# Patient Record
Sex: FEMALE | Race: WHITE | NOT HISPANIC OR LATINO | Employment: OTHER | URBAN - NONMETROPOLITAN AREA
[De-identification: names, ages, dates, MRNs, and addresses within clinical notes are randomized per-mention and may not be internally consistent; named-entity substitution may affect disease eponyms.]

---

## 2017-01-20 ENCOUNTER — DOCTOR'S OFFICE (OUTPATIENT)
Dept: URBAN - NONMETROPOLITAN AREA CLINIC 1 | Facility: CLINIC | Age: 62
Setting detail: OPHTHALMOLOGY
End: 2017-01-20
Payer: OTHER GOVERNMENT

## 2017-01-20 DIAGNOSIS — H04.122: ICD-10-CM

## 2017-01-20 DIAGNOSIS — H00.025: ICD-10-CM

## 2017-01-20 DIAGNOSIS — H04.121: ICD-10-CM

## 2017-01-20 PROCEDURE — 83861 MICROFLUID ANALY TEARS: CPT | Performed by: OPHTHALMOLOGY

## 2017-01-20 PROCEDURE — 92012 INTRM OPH EXAM EST PATIENT: CPT | Performed by: OPHTHALMOLOGY

## 2017-01-20 ASSESSMENT — REFRACTION_MANIFEST
OD_VA2: 20/
OS_ADD: +2.50
OS_AXIS: 090
OU_VA: 20/
OD_SPHERE: +1.00
OU_VA: 20/
OS_VA2: 20/
OD_AXIS: 090
OD_VA3: 20/
OD_VA2: 20/25-2
OD_VA1: 20/25-2
OS_VA2: 20/
OD_VA1: 20/
OS_VA3: 20/
OS_VA2: 20/25-2
OS_VA1: 20/25-2
OD_ADD: +2.50
OD_VA2: 20/
OS_VA3: 20/
OD_VA1: 20/
OU_VA: 20/
OS_SPHERE: +1.00
OS_VA3: 20/
OD_CYLINDER: -1.00
OD_VA3: 20/
OS_VA1: 20/
OS_CYLINDER: -1.25
OD_VA3: 20/
OS_VA1: 20/

## 2017-01-20 ASSESSMENT — REFRACTION_CURRENTRX
OD_VPRISM_DIRECTION: PROGS
OD_CYLINDER: -0.75
OS_VPRISM_DIRECTION: PROGS
OD_AXIS: 100
OD_OVR_VA: 20/
OS_AXIS: 110
OS_OVR_VA: 20/
OS_OVR_VA: 20/
OD_VPRISM_DIRECTION: PROGS
OD_OVR_VA: 20/
OS_ADD: +2.50
OD_ADD: +2.50
OS_OVR_VA: 20/
OD_OVR_VA: 20/
OS_VPRISM_DIRECTION: PROGS
OS_SPHERE: PLANO
OS_CYLINDER: -0.75
OD_SPHERE: PLANO

## 2017-01-20 ASSESSMENT — VISUAL ACUITY
OS_BCVA: 20/25-2
OD_BCVA: 20/30

## 2017-01-20 ASSESSMENT — REFRACTION_AUTOREFRACTION
OD_SPHERE: +1.50
OD_CYLINDER: -1.00
OS_SPHERE: +1.25
OS_AXIS: 87
OD_AXIS: 88
OS_CYLINDER: -1.25

## 2017-01-20 ASSESSMENT — SUPERFICIAL PUNCTATE KERATITIS (SPK)
OS_SPK: INFERIOR NASALLY
OS_SPK: 1+

## 2017-01-20 ASSESSMENT — CONFRONTATIONAL VISUAL FIELD TEST (CVF)
OS_FINDINGS: FULL
OD_FINDINGS: FULL

## 2017-01-20 ASSESSMENT — LID EXAM ASSESSMENTS
OS_MEIBOMITIS: 1+
OD_MEIBOMITIS: 1+

## 2017-01-20 ASSESSMENT — SPHEQUIV_DERIVED
OS_SPHEQUIV: 0.625
OS_SPHEQUIV: 0.375
OD_SPHEQUIV: 0.5
OD_SPHEQUIV: 1

## 2017-01-27 ENCOUNTER — RX ONLY (RX ONLY)
Age: 62
End: 2017-01-27

## 2017-01-27 ENCOUNTER — DOCTOR'S OFFICE (OUTPATIENT)
Dept: URBAN - NONMETROPOLITAN AREA CLINIC 1 | Facility: CLINIC | Age: 62
Setting detail: OPHTHALMOLOGY
End: 2017-01-27
Payer: OTHER GOVERNMENT

## 2017-01-27 DIAGNOSIS — H00.021: ICD-10-CM

## 2017-01-27 DIAGNOSIS — H10.413: ICD-10-CM

## 2017-01-27 DIAGNOSIS — H04.122: ICD-10-CM

## 2017-01-27 DIAGNOSIS — H00.025: ICD-10-CM

## 2017-01-27 DIAGNOSIS — H00.024: ICD-10-CM

## 2017-01-27 DIAGNOSIS — H33.312: ICD-10-CM

## 2017-01-27 DIAGNOSIS — H04.121: ICD-10-CM

## 2017-01-27 DIAGNOSIS — H00.022: ICD-10-CM

## 2017-01-27 PROCEDURE — 92012 INTRM OPH EXAM EST PATIENT: CPT | Performed by: OPHTHALMOLOGY

## 2017-01-27 ASSESSMENT — VISUAL ACUITY
OD_BCVA: 20/30
OS_BCVA: 20/25

## 2017-01-27 ASSESSMENT — REFRACTION_MANIFEST
OU_VA: 20/
OS_VA3: 20/
OS_CYLINDER: -1.25
OS_VA2: 20/25-2
OD_SPHERE: +1.00
OS_VA1: 20/25-2
OS_VA1: 20/
OD_VA1: 20/
OD_VA1: 20/25-2
OD_VA3: 20/
OS_ADD: +2.50
OS_VA1: 20/
OD_VA3: 20/
OU_VA: 20/
OS_AXIS: 090
OD_VA2: 20/
OD_AXIS: 090
OS_VA3: 20/
OS_VA2: 20/
OS_SPHERE: +1.00
OU_VA: 20/
OD_VA2: 20/25-2
OD_VA1: 20/
OD_VA2: 20/
OD_CYLINDER: -1.00
OD_VA3: 20/
OS_VA2: 20/
OS_VA3: 20/
OD_ADD: +2.50

## 2017-01-27 ASSESSMENT — LID EXAM ASSESSMENTS
OD_MEIBOMITIS: 1+
OS_MEIBOMITIS: 1+

## 2017-01-27 ASSESSMENT — SPHEQUIV_DERIVED
OS_SPHEQUIV: 0.375
OD_SPHEQUIV: 0.5
OD_SPHEQUIV: 1
OS_SPHEQUIV: 0.625

## 2017-01-27 ASSESSMENT — REFRACTION_AUTOREFRACTION
OD_CYLINDER: -1.00
OS_CYLINDER: -1.25
OD_SPHERE: +1.50
OS_SPHERE: +1.25
OD_AXIS: 88
OS_AXIS: 87

## 2017-01-27 ASSESSMENT — REFRACTION_CURRENTRX
OD_VPRISM_DIRECTION: PROGS
OS_OVR_VA: 20/
OS_ADD: +2.50
OS_OVR_VA: 20/
OS_CYLINDER: -0.75
OD_VPRISM_DIRECTION: PROGS
OD_CYLINDER: -0.75
OS_VPRISM_DIRECTION: PROGS
OS_AXIS: 110
OD_ADD: +2.50
OD_OVR_VA: 20/
OD_OVR_VA: 20/
OS_OVR_VA: 20/
OD_SPHERE: PLANO
OD_OVR_VA: 20/
OS_VPRISM_DIRECTION: PROGS
OD_AXIS: 100
OS_SPHERE: PLANO

## 2017-01-27 ASSESSMENT — SUPERFICIAL PUNCTATE KERATITIS (SPK)
OS_SPK: 1+
OS_SPK: INFERIOR NASALLY

## 2017-01-27 ASSESSMENT — CONFRONTATIONAL VISUAL FIELD TEST (CVF)
OD_FINDINGS: FULL
OS_FINDINGS: FULL

## 2017-02-10 ENCOUNTER — DOCTOR'S OFFICE (OUTPATIENT)
Dept: URBAN - NONMETROPOLITAN AREA CLINIC 1 | Facility: CLINIC | Age: 62
Setting detail: OPHTHALMOLOGY
End: 2017-02-10
Payer: OTHER GOVERNMENT

## 2017-02-10 DIAGNOSIS — H00.021: ICD-10-CM

## 2017-02-10 DIAGNOSIS — H00.025: ICD-10-CM

## 2017-02-10 DIAGNOSIS — H04.121: ICD-10-CM

## 2017-02-10 DIAGNOSIS — H00.022: ICD-10-CM

## 2017-02-10 DIAGNOSIS — H10.413: ICD-10-CM

## 2017-02-10 DIAGNOSIS — H00.024: ICD-10-CM

## 2017-02-10 DIAGNOSIS — H04.122: ICD-10-CM

## 2017-02-10 DIAGNOSIS — H33.312: ICD-10-CM

## 2017-02-10 PROCEDURE — 92012 INTRM OPH EXAM EST PATIENT: CPT | Performed by: OPHTHALMOLOGY

## 2017-02-10 ASSESSMENT — REFRACTION_AUTOREFRACTION
OD_AXIS: 88
OS_SPHERE: +1.25
OS_AXIS: 87
OD_CYLINDER: -1.00
OD_SPHERE: +1.50
OS_CYLINDER: -1.25

## 2017-02-10 ASSESSMENT — CONFRONTATIONAL VISUAL FIELD TEST (CVF)
OD_FINDINGS: FULL
OS_FINDINGS: FULL

## 2017-02-10 ASSESSMENT — REFRACTION_MANIFEST
OS_ADD: +2.50
OD_ADD: +2.50
OS_VA2: 20/
OD_VA1: 20/25-2
OS_CYLINDER: -1.25
OD_VA1: 20/
OD_SPHERE: +1.00
OU_VA: 20/
OD_VA3: 20/
OD_VA2: 20/25-2
OD_VA2: 20/
OS_VA3: 20/
OU_VA: 20/
OS_VA2: 20/25-2
OS_SPHERE: +1.00
OU_VA: 20/
OS_VA3: 20/
OD_VA1: 20/
OS_VA3: 20/
OS_VA1: 20/
OS_VA1: 20/
OD_CYLINDER: -1.00
OS_AXIS: 090
OD_VA3: 20/
OD_VA3: 20/
OD_AXIS: 090
OS_VA1: 20/25-2
OS_VA2: 20/
OD_VA2: 20/

## 2017-02-10 ASSESSMENT — SUPERFICIAL PUNCTATE KERATITIS (SPK)
OS_SPK: 1+
OS_SPK: INFERIOR NASALLY

## 2017-02-10 ASSESSMENT — LID EXAM ASSESSMENTS
OS_MEIBOMITIS: 1+
OD_MEIBOMITIS: 1+

## 2017-02-10 ASSESSMENT — REFRACTION_CURRENTRX
OS_ADD: +2.50
OD_VPRISM_DIRECTION: PROGS
OS_AXIS: 110
OS_VPRISM_DIRECTION: PROGS
OS_OVR_VA: 20/
OS_OVR_VA: 20/
OD_OVR_VA: 20/
OD_OVR_VA: 20/
OD_SPHERE: PLANO
OD_CYLINDER: -0.75
OS_CYLINDER: -0.75
OD_VPRISM_DIRECTION: PROGS
OD_OVR_VA: 20/
OD_ADD: +2.50
OS_OVR_VA: 20/
OS_SPHERE: PLANO
OD_AXIS: 100
OS_VPRISM_DIRECTION: PROGS

## 2017-02-10 ASSESSMENT — SPHEQUIV_DERIVED
OD_SPHEQUIV: 1
OS_SPHEQUIV: 0.625
OS_SPHEQUIV: 0.375
OD_SPHEQUIV: 0.5

## 2017-02-10 ASSESSMENT — VISUAL ACUITY
OS_BCVA: 20/25
OD_BCVA: 20/40

## 2017-02-23 ENCOUNTER — DOCTOR'S OFFICE (OUTPATIENT)
Dept: URBAN - NONMETROPOLITAN AREA CLINIC 1 | Facility: CLINIC | Age: 62
Setting detail: OPHTHALMOLOGY
End: 2017-02-23
Payer: COMMERCIAL

## 2017-02-23 DIAGNOSIS — H52.4: ICD-10-CM

## 2017-02-23 DIAGNOSIS — H52.03: ICD-10-CM

## 2017-02-23 PROCEDURE — 92015 DETERMINE REFRACTIVE STATE: CPT | Performed by: OPTOMETRIST

## 2017-02-23 ASSESSMENT — REFRACTION_OUTSIDERX
OD_VA2: 20/25-2
OS_VA2: 20/25-2
OD_ADD: +2.50
OD_CYLINDER: -1.00
OD_VA3: 20/
OD_VA1: 20/25-2
OU_VA: 20/25
OD_AXIS: 090
OS_ADD: +2.50
OS_SPHERE: +1.00
OS_CYLINDER: -1.00
OS_AXIS: 090
OS_VA3: 20/
OD_SPHERE: +1.00
OS_VA1: 20/25-2

## 2017-02-23 ASSESSMENT — REFRACTION_MANIFEST
OD_VA3: 20/
OU_VA: 20/
OS_VA2: 20/
OS_VA3: 20/
OD_VA1: 20/
OS_VA1: 20/
OD_VA3: 20/
OS_VA3: 20/
OD_VA1: 20/
OD_VA2: 20/
OD_VA2: 20/
OU_VA: 20/
OS_VA1: 20/
OS_VA2: 20/

## 2017-02-23 ASSESSMENT — REFRACTION_CURRENTRX
OS_OVR_VA: 20/
OS_AXIS: 105
OS_VPRISM_DIRECTION: PROGS
OD_SPHERE: +1.25
OS_SPHERE: PLANO
OD_VPRISM_DIRECTION: PROGS
OS_CYLINDER: -0.75
OS_ADD: +2.50
OD_AXIS: 90
OS_OVR_VA: 20/
OS_CYLINDER: -1.25
OS_AXIS: 110
OS_ADD: +2.25
OD_OVR_VA: 20/
OS_VPRISM_DIRECTION: PROGS
OD_CYLINDER: -0.75
OD_AXIS: 100
OS_OVR_VA: 20/
OD_CYLINDER: -1.00
OD_OVR_VA: 20/
OD_ADD: +2.50
OD_ADD: +2.25
OS_SPHERE: +1.25
OD_SPHERE: PLANO
OD_VPRISM_DIRECTION: PROGS
OD_OVR_VA: 20/

## 2017-02-23 ASSESSMENT — REFRACTION_AUTOREFRACTION
OS_SPHERE: +1.00
OS_AXIS: 76
OS_CYLINDER: -1.00
OD_SPHERE: +1.75
OD_CYLINDER: -2.00
OD_AXIS: 78

## 2017-02-23 ASSESSMENT — SPHEQUIV_DERIVED
OD_SPHEQUIV: 0.75
OS_SPHEQUIV: 0.5

## 2017-02-23 ASSESSMENT — VISUAL ACUITY
OD_BCVA: 20/25
OS_BCVA: 20/25-1

## 2017-07-14 ENCOUNTER — DOCTOR'S OFFICE (OUTPATIENT)
Dept: URBAN - NONMETROPOLITAN AREA CLINIC 1 | Facility: CLINIC | Age: 62
Setting detail: OPHTHALMOLOGY
End: 2017-07-14
Payer: OTHER GOVERNMENT

## 2017-07-14 DIAGNOSIS — H00.021: ICD-10-CM

## 2017-07-14 DIAGNOSIS — H33.312: ICD-10-CM

## 2017-07-14 DIAGNOSIS — H04.122: ICD-10-CM

## 2017-07-14 DIAGNOSIS — H04.121: ICD-10-CM

## 2017-07-14 DIAGNOSIS — H25.13: ICD-10-CM

## 2017-07-14 DIAGNOSIS — H00.024: ICD-10-CM

## 2017-07-14 DIAGNOSIS — H43.813: ICD-10-CM

## 2017-07-14 DIAGNOSIS — H00.022: ICD-10-CM

## 2017-07-14 DIAGNOSIS — H00.025: ICD-10-CM

## 2017-07-14 PROCEDURE — 92014 COMPRE OPH EXAM EST PT 1/>: CPT | Performed by: OPHTHALMOLOGY

## 2017-07-14 ASSESSMENT — REFRACTION_AUTOREFRACTION
OS_SPHERE: +1.00
OS_CYLINDER: -1.00
OD_CYLINDER: -2.00
OS_AXIS: 76
OD_AXIS: 78
OD_SPHERE: +1.75

## 2017-07-14 ASSESSMENT — REFRACTION_CURRENTRX
OD_VPRISM_DIRECTION: PROGS
OS_CYLINDER: -1.25
OD_CYLINDER: -1.00
OS_ADD: +2.25
OS_CYLINDER: -0.75
OD_SPHERE: +1.25
OD_AXIS: 90
OD_AXIS: 100
OS_AXIS: 110
OD_SPHERE: PLANO
OS_SPHERE: PLANO
OD_ADD: +2.50
OS_OVR_VA: 20/
OS_SPHERE: +1.25
OD_CYLINDER: -0.75
OD_OVR_VA: 20/
OD_OVR_VA: 20/
OD_VPRISM_DIRECTION: PROGS
OD_OVR_VA: 20/
OS_AXIS: 105
OS_OVR_VA: 20/
OS_OVR_VA: 20/
OS_ADD: +2.50
OS_VPRISM_DIRECTION: PROGS
OD_ADD: +2.25
OS_VPRISM_DIRECTION: PROGS

## 2017-07-14 ASSESSMENT — LID EXAM ASSESSMENTS
OD_MEIBOMITIS: 1+
OS_MEIBOMITIS: 1+

## 2017-07-14 ASSESSMENT — REFRACTION_MANIFEST
OD_VA3: 20/
OS_VA2: 20/
OS_VA1: 20/
OS_VA1: 20/
OS_VA2: 20/
OS_VA3: 20/
OD_VA1: 20/
OU_VA: 20/
OS_VA3: 20/
OD_VA1: 20/
OD_VA3: 20/
OD_VA2: 20/
OU_VA: 20/
OD_VA2: 20/

## 2017-07-14 ASSESSMENT — SPHEQUIV_DERIVED
OS_SPHEQUIV: 0.5
OD_SPHEQUIV: 0.75

## 2017-07-14 ASSESSMENT — REFRACTION_OUTSIDERX
OS_ADD: +2.50
OS_VA1: 20/25-2
OD_VA1: 20/25-2
OD_SPHERE: +1.00
OU_VA: 20/25
OS_VA2: 20/25-2
OS_SPHERE: +1.00
OD_ADD: +2.50
OD_CYLINDER: -1.00
OD_AXIS: 090
OS_VA3: 20/
OD_VA2: 20/25-2
OD_VA3: 20/
OS_AXIS: 090
OS_CYLINDER: -1.00

## 2017-07-14 ASSESSMENT — CONFRONTATIONAL VISUAL FIELD TEST (CVF)
OS_FINDINGS: FULL
OD_FINDINGS: FULL

## 2017-07-14 ASSESSMENT — SUPERFICIAL PUNCTATE KERATITIS (SPK)
OS_SPK: 1+
OS_SPK: INFERIOR NASALLY

## 2017-07-14 ASSESSMENT — VISUAL ACUITY
OD_BCVA: 20/25
OS_BCVA: 20/20-1

## 2017-11-07 ENCOUNTER — DOCTOR'S OFFICE (OUTPATIENT)
Dept: URBAN - NONMETROPOLITAN AREA CLINIC 1 | Facility: CLINIC | Age: 62
Setting detail: OPHTHALMOLOGY
End: 2017-11-07
Payer: OTHER GOVERNMENT

## 2017-11-07 DIAGNOSIS — H43.813: ICD-10-CM

## 2017-11-07 DIAGNOSIS — H00.025: ICD-10-CM

## 2017-11-07 DIAGNOSIS — H25.13: ICD-10-CM

## 2017-11-07 DIAGNOSIS — H04.123: ICD-10-CM

## 2017-11-07 DIAGNOSIS — H33.312: ICD-10-CM

## 2017-11-07 DIAGNOSIS — H00.024: ICD-10-CM

## 2017-11-07 DIAGNOSIS — H00.022: ICD-10-CM

## 2017-11-07 DIAGNOSIS — H04.121: ICD-10-CM

## 2017-11-07 DIAGNOSIS — H10.413: ICD-10-CM

## 2017-11-07 DIAGNOSIS — H04.122: ICD-10-CM

## 2017-11-07 DIAGNOSIS — H00.021: ICD-10-CM

## 2017-11-07 PROCEDURE — 92014 COMPRE OPH EXAM EST PT 1/>: CPT | Performed by: OPHTHALMOLOGY

## 2017-11-07 PROCEDURE — 68761 CLOSE TEAR DUCT OPENING: CPT | Performed by: OPHTHALMOLOGY

## 2017-11-07 PROCEDURE — 83861 MICROFLUID ANALY TEARS: CPT | Performed by: OPHTHALMOLOGY

## 2017-11-07 ASSESSMENT — REFRACTION_CURRENTRX
OS_OVR_VA: 20/
OS_CYLINDER: -0.75
OS_SPHERE: +1.25
OD_ADD: +2.25
OS_SPHERE: PLANO
OD_CYLINDER: -0.75
OD_AXIS: 90
OS_OVR_VA: 20/
OS_AXIS: 105
OD_VPRISM_DIRECTION: PROGS
OS_CYLINDER: -1.25
OD_SPHERE: PLANO
OD_CYLINDER: -1.00
OS_ADD: +2.50
OS_AXIS: 110
OD_SPHERE: +1.25
OS_VPRISM_DIRECTION: PROGS
OD_AXIS: 100
OD_ADD: +2.50
OS_VPRISM_DIRECTION: PROGS
OD_VPRISM_DIRECTION: PROGS
OD_OVR_VA: 20/
OS_OVR_VA: 20/
OD_OVR_VA: 20/
OS_ADD: +2.25
OD_OVR_VA: 20/

## 2017-11-07 ASSESSMENT — CONFRONTATIONAL VISUAL FIELD TEST (CVF)
OS_FINDINGS: FULL
OD_FINDINGS: FULL

## 2017-11-07 ASSESSMENT — REFRACTION_MANIFEST
OD_VA1: 20/
OS_VA3: 20/
OS_VA1: 20/
OS_VA3: 20/
OD_VA2: 20/
OD_VA2: 20/
OS_VA2: 20/
OS_VA2: 20/
OD_VA3: 20/
OU_VA: 20/
OS_VA1: 20/
OD_VA1: 20/
OU_VA: 20/
OD_VA3: 20/

## 2017-11-07 ASSESSMENT — SUPERFICIAL PUNCTATE KERATITIS (SPK)
OS_SPK: 1+
OS_SPK: INFERIOR NASALLY

## 2017-11-07 ASSESSMENT — REFRACTION_AUTOREFRACTION
OD_SPHERE: +1.75
OS_CYLINDER: -1.00
OS_AXIS: 76
OD_AXIS: 78
OS_SPHERE: +1.00
OD_CYLINDER: -2.00

## 2017-11-07 ASSESSMENT — LID EXAM ASSESSMENTS
OS_MEIBOMITIS: 1+
OD_MEIBOMITIS: 1+

## 2017-11-07 ASSESSMENT — REFRACTION_OUTSIDERX
OS_VA2: 20/25-2
OD_SPHERE: +1.00
OS_VA3: 20/
OS_AXIS: 090
OD_CYLINDER: -1.00
OD_ADD: +2.50
OD_AXIS: 090
OD_VA3: 20/
OS_CYLINDER: -1.00
OS_ADD: +2.50
OS_VA1: 20/25-2
OD_VA1: 20/25-2
OD_VA2: 20/25-2
OU_VA: 20/25
OS_SPHERE: +1.00

## 2017-11-07 ASSESSMENT — PUNCTA - ASSESSMENT: OD_PUNCTA: COL PLUG RLL RUL SMALL

## 2017-11-07 ASSESSMENT — SPHEQUIV_DERIVED
OD_SPHEQUIV: 0.75
OS_SPHEQUIV: 0.5

## 2017-11-07 ASSESSMENT — VISUAL ACUITY
OD_BCVA: 20/25-2
OS_BCVA: 20/25

## 2018-02-17 ENCOUNTER — OFFICE VISIT (OUTPATIENT)
Dept: URGENT CARE | Facility: CLINIC | Age: 63
End: 2018-02-17
Payer: OTHER GOVERNMENT

## 2018-02-17 VITALS
TEMPERATURE: 99.1 F | HEIGHT: 67 IN | BODY MASS INDEX: 39.24 KG/M2 | SYSTOLIC BLOOD PRESSURE: 121 MMHG | DIASTOLIC BLOOD PRESSURE: 58 MMHG | OXYGEN SATURATION: 96 % | HEART RATE: 80 BPM | RESPIRATION RATE: 18 BRPM | WEIGHT: 250 LBS

## 2018-02-17 DIAGNOSIS — Z23 FLU VACCINE NEED: Primary | ICD-10-CM

## 2018-02-17 DIAGNOSIS — H60.91 OTITIS EXTERNA OF RIGHT EAR, UNSPECIFIED CHRONICITY, UNSPECIFIED TYPE: ICD-10-CM

## 2018-02-17 PROCEDURE — 90686 IIV4 VACC NO PRSV 0.5 ML IM: CPT

## 2018-02-17 PROCEDURE — G0382 LEV 3 HOSP TYPE B ED VISIT: HCPCS | Performed by: PHYSICIAN ASSISTANT

## 2018-02-17 RX ORDER — CITALOPRAM 40 MG/1
40 TABLET ORAL DAILY
COMMUNITY

## 2018-02-17 RX ORDER — ATORVASTATIN CALCIUM 10 MG/1
10 TABLET, FILM COATED ORAL DAILY
COMMUNITY

## 2018-02-17 NOTE — PROGRESS NOTES
Assessment/Plan:      Diagnoses and all orders for this visit:    Flu vaccine need  -     Flu Vaccine greater than or equal to 2yo Preservative Free IM    Otitis externa of right ear, unspecified chronicity, unspecified type  -     neomycin-polymyxin-hydrocortisone (CORTISPORIN) otic solution; Administer 4 drops to the right ear every 8 (eight) hours for 7 days    Other orders  -     atorvastatin (LIPITOR) 10 mg tablet; Take 10 mg by mouth daily  -     citalopram (CeleXA) 10 mg/5 mL suspension; Take 10 mg by mouth daily  -     penbutolol (LEVATOL) 20 MG TABS; Take 20 mg by mouth daily        Patient Instructions   Drops as prescribed      Subjective:    Chief Complaint   Patient presents with   Rani Keila     started Wednesday      Patient ID: Caleb Dai is a 57 y o  female  Earache    There is pain in the right ear  This is a new problem  The current episode started yesterday  The problem occurs constantly  The problem has been unchanged  There has been no fever  Pertinent negatives include no abdominal pain, coughing, diarrhea, ear discharge, headaches, hearing loss, neck pain, rash, rhinorrhea, sore throat or vomiting  She has tried nothing for the symptoms  The treatment provided mild relief  There is no history of a chronic ear infection, hearing loss or a tympanostomy tube  Review of Systems   HENT: Positive for ear pain  Negative for ear discharge, hearing loss, rhinorrhea and sore throat  Respiratory: Negative for cough  Gastrointestinal: Negative for abdominal pain, diarrhea and vomiting  Musculoskeletal: Negative for neck pain  Skin: Negative for rash  Neurological: Negative for headaches           Objective:    /58 (BP Location: Right arm, Patient Position: Sitting, Cuff Size: Standard)   Pulse 80   Temp 99 1 °F (37 3 °C) (Tympanic)   Resp 18   Ht 5' 7" (1 702 m)   Wt 113 kg (250 lb)   LMP  (LMP Unknown)   SpO2 96%   BMI 39 16 kg/m²      Physical Exam Constitutional: She is oriented to person, place, and time  She appears well-developed and well-nourished  HENT:   Head: Normocephalic  Right Ear: There is tenderness (erythematous canal)  Left Ear: External ear normal    Mouth/Throat: Oropharynx is clear and moist  No oropharyngeal exudate  Eyes: Conjunctivae and EOM are normal  Pupils are equal, round, and reactive to light  Cardiovascular: Normal rate, regular rhythm, normal heart sounds and intact distal pulses  Exam reveals no gallop and no friction rub  No murmur heard  Pulmonary/Chest: Breath sounds normal  No respiratory distress  She has no wheezes  She has no rales  She exhibits no tenderness  Abdominal: Soft  Bowel sounds are normal  She exhibits no distension and no mass  There is no tenderness  There is no rebound and no guarding  Musculoskeletal: Normal range of motion  Neurological: She is alert and oriented to person, place, and time  She displays normal reflexes  No cranial nerve deficit  She exhibits normal muscle tone  Coordination normal    Skin: Skin is warm and dry  No rash noted  No erythema  No pallor

## 2018-12-21 ENCOUNTER — HOSPITAL ENCOUNTER (EMERGENCY)
Facility: HOSPITAL | Age: 63
Discharge: HOME | End: 2018-12-22
Attending: EMERGENCY MEDICINE | Admitting: EMERGENCY MEDICINE
Payer: MEDICARE

## 2018-12-21 ENCOUNTER — APPOINTMENT (EMERGENCY)
Dept: RADIOLOGY | Facility: HOSPITAL | Age: 63
End: 2018-12-21
Payer: MEDICARE

## 2018-12-21 VITALS
BODY MASS INDEX: 41.44 KG/M2 | TEMPERATURE: 97.8 F | HEIGHT: 67 IN | RESPIRATION RATE: 18 BRPM | DIASTOLIC BLOOD PRESSURE: 71 MMHG | WEIGHT: 264 LBS | HEART RATE: 75 BPM | SYSTOLIC BLOOD PRESSURE: 146 MMHG | OXYGEN SATURATION: 96 %

## 2018-12-21 DIAGNOSIS — S46.811A: Primary | ICD-10-CM

## 2018-12-21 PROCEDURE — 73030 X-RAY EXAM OF SHOULDER: CPT

## 2018-12-21 PROCEDURE — 99283 EMERGENCY DEPT VISIT LOW MDM: CPT

## 2018-12-22 NOTE — DISCHARGE INSTRUCTIONS
Muscle Strain   WHAT YOU NEED TO KNOW:   A muscle strain is a twist, pull, or tear of a muscle or tendon  A tendon is a strong elastic tissue that connects a muscle to a bone  Signs of a strained muscle include bruising and swelling over the area, pain with movement, and loss of strength  DISCHARGE INSTRUCTIONS:   Return to the emergency department if:   · You suddenly cannot feel or move your injured muscle  Contact your healthcare provider if:   · Your pain and swelling worsen or do not go away  · You have questions or concerns about your condition or care  Medicines:   · NSAIDs  help decrease swelling and pain or fever  This medicine is available with or without a doctor's order  NSAIDs can cause stomach bleeding or kidney problems in certain people  If you take blood thinner medicine, always ask your healthcare provider if NSAIDs are safe for you  Always read the medicine label and follow directions  · Muscle relaxers  help decrease pain and muscle spasms  · Take your medicine as directed  Contact your healthcare provider if you think your medicine is not helping or if you have side effects  Tell him of her if you are allergic to any medicine  Keep a list of the medicines, vitamins, and herbs you take  Include the amounts, and when and why you take them  Bring the list or the pill bottles to follow-up visits  Carry your medicine list with you in case of an emergency  Follow up with your healthcare provider as directed: Your healthcare provider may suggest that you have a follow-up visit before you go back to your usual activity  Write down your questions so you remember to ask them during your visits  Self-care:   · 3 to 7 days after the injury:  Use Rest, Ice, Compression, and Elevation (RICE) to help stop bruising and decrease pain and swelling  ¨ Rest:  Rest your muscle to allow your injury to heal  When the pain decreases, begin normal, slow movements   For mild and moderate muscle strains, you should rest your muscles for about 2 days  However, if you have a severe muscle strain, you should rest for 10 to 14 days  You may need to use crutches to walk if your muscle strain is in your legs or lower body  ¨ Ice:  Put an ice pack on the injured area  Put a towel between the ice pack and your skin  Do not put the ice pack directly on your skin  You can use a package of frozen peas instead of an ice pack  ¨ Compression:  You may need to wrap an elastic bandage around the area to decrease swelling  It should be tight enough for you to feel support  Do not wrap it too tightly  ¨ Elevation:  Keep the injured muscle raised above your heart if possible  For example if you have a strain of your lower leg muscle, lie down and prop your leg up on pillows  This helps decrease pain and swelling  · 3 to 21 days after the injury:  Start to slowly and regularly exercise your muscle  This will help it heal  If you feel pain, decrease how hard you are exercising  · 1 to 6 weeks after the injury:  Stretch the injured muscle  Hold the stretch for about 30 seconds  Do this 4 times a day  You may stretch the muscle until you feel a slight pull  Stop stretching if you feel pain  · 2 weeks to 6 months after the injury:  The goal of this phase is to return to the activity you were doing before the injury happened, without hurting the muscle again  · 3 weeks to 6 months after the injury:  Keep stretching and strengthening your muscles to avoid injury  Slowly increase the time and distance that you exercise  You may have signs and symptoms of muscle strain 6 months after the injury, even if you do things to help it heal  In this case, you may need surgery on the muscle  © 2017 [employer] Information is for End User's use only and may not be sold, redistributed or otherwise used for commercial purposes   All illustrations and images included in CareNotes® are the copyrighted property of [employer]  or [employer]  The above information is an  only  It is not intended as medical advice for individual conditions or treatments  Talk to your doctor, nurse or pharmacist before following any medical regimen to see if it is safe and effective for you  Exercises for Shoulder Abduction and Adduction   WHAT YOU NEED TO KNOW:   Shoulder abduction and adduction exercises work the muscles at the back of your shoulder and your upper back  DISCHARGE INSTRUCTIONS:   Contact your healthcare provider if:   · You have sharp or worsening pain during exercise or at rest     · You have questions or concerns about your shoulder exercises  Before you exercise:  Warm up and stretch before you exercise  Walk or ride a stationary bike for 5 to 10 minutes to help you warm up  Stretching helps increase range of motion  It may also decrease muscle soreness and help prevent another injury  Your healthcare provider will tell you which of the following stretches to do:  · Crossover arm stretch:  Relax your shoulders  Hold your upper arm with the opposite hand  Pull your arm across your chest until you feel a stretch  Hold the stretch for 30 seconds  Return to the starting position  · Shoulder flexion stretch:  Stand facing a wall  Slowly walk your fingers up the wall until you feel a stretch  Hold the stretch for 30 seconds  Return to the starting position  · Sleeper stretch:  Lie on your injured side on a firm, flat surface  Bend the elbow of your injured arm 90° with your hand facing up  Use your arm that is not injured to slowly push your injured arm down  Stop when you feel a stretch at the back of your injured shoulder  Hold the stretch for 30 seconds  Slowly return to the starting position  How to exercise with a weight:  Your healthcare provider will tell you how much weight to use    · Shoulder abduction:  Stand and hold a weight in your hand with your palm facing your body  Slowly raise your arm to the side with your thumb pointing up  Then raise your arm over your head as far as you can without pain  Hold this position for as long as directed  Do not raise your arm over your head unless your healthcare provider says it is okay  · Shoulder adduction:  Lie on your back on a firm surface  Extend your arm out to a "T " Bend your elbow so your forearm in the air  Hold a weight in your hand  Slowly raise your arm toward the ceiling and straighten your elbow  Hold this position for as long as directed  Slowly return to the starting position  How to exercise with an exercise band:   · Shoulder abduction:  Wrap the exercise band around a heavy, stable object near your foot  Grab the band with the hand of your injured shoulder  Keep your arm straight  Slowly raise your arm to the side with your thumb pointing up  Then, slowly pull the band over your head as far as you can without pain  Do not raise your arm over your head unless your healthcare provider says it is okay  Do not let your shoulder shrug  Hold this position for as long as directed  Slowly return to the starting position  · Shoulder adduction:  Wrap the exercise band around a heavy, stable object  Stand and face away from where the band is anchored  Hold each end of the band in both hands with your elbows bent  Your elbows should not be behind your body  Keep your arms parallel to the floor and slowly straighten your elbows  Hold this position for as long as directed  Slowly return to the starting position  Follow up with your physical therapist as directed:  Write down your questions so you remember to ask them at your visits  © 2017 [employer] Information is for End User's use only and may not be sold, redistributed or otherwise used for commercial purposes  All illustrations and images included in CareNotes® are the copyrighted property of [employer]  or [employer] Bernard    The above information is an  only  It is not intended as medical advice for individual conditions or treatments  Talk to your doctor, nurse or pharmacist before following any medical regimen to see if it is safe and effective for you

## 2018-12-23 NOTE — ED PROVIDER NOTES
History  Chief Complaint   Patient presents with    Shoulder Pain     patient states that shoulder has been bothering her for a week and it got worse today  denies any injury or trauma  hx of shoulder surgery in 1995       History provided by:  Patient  Shoulder Pain   Location:  Shoulder  Shoulder location:  R shoulder  Injury: no    Pain details:     Quality:  Aching    Radiates to:  Does not radiate    Severity:  Moderate    Onset quality:  Gradual    Duration:  7 days    Timing:  Constant    Progression:  Waxing and waning  Handedness:  Right-handed  Prior injury to area:  Yes (Previous bilateral rotator cuff repair in 1995)  Relieved by:  Rest and heat  Worsened by: Movement (Abduction of shoulder is the only movement that specifically causes pain)  Ineffective treatments:  None tried  Associated symptoms: no decreased range of motion, no fatigue, no fever, no muscle weakness, no numbness, no stiffness, no swelling and no tingling    Associated symptoms comment:  No paresthesias/weakness/paralysis of RUE  Risk factors comment:  Patient states that she has to frequently push her large dog away from her while she is eating at home  She does so with forceful abduction of the shoulder  Prior to Admission Medications   Prescriptions Last Dose Informant Patient Reported?  Taking?   atorvastatin (LIPITOR) 10 mg tablet   Yes No   Sig: Take 10 mg by mouth daily   citalopram (CeleXA) 10 mg/5 mL suspension   Yes No   Sig: Take 10 mg by mouth daily   neomycin-polymyxin-hydrocortisone (CORTISPORIN) otic solution   No No   Sig: Administer 4 drops to the right ear every 8 (eight) hours for 7 days   penbutolol (LEVATOL) 20 MG TABS  Self Yes No   Sig: Take 20 mg by mouth daily      Facility-Administered Medications: None       Past Medical History:   Diagnosis Date    Disease of thyroid gland     hypo    Hyperlipidemia     Hypertension        Past Surgical History:   Procedure Laterality Date    HYSTERECTOMY      SHOULDER OPEN ROTATOR CUFF REPAIR      TONSILLECTOMY         History reviewed  No pertinent family history  I have reviewed and agree with the history as documented  Social History   Substance Use Topics    Smoking status: Never Smoker    Smokeless tobacco: Never Used    Alcohol use No        Review of Systems   Constitutional: Negative for chills, diaphoresis, fatigue and fever  Musculoskeletal: Positive for arthralgias  Negative for stiffness  Skin: Negative for color change, pallor, rash and wound  Neurological: Negative for weakness and numbness  Hematological: Negative for adenopathy  Does not bruise/bleed easily  Physical Exam  Physical Exam   Constitutional: She is oriented to person, place, and time  Vital signs are normal  She appears well-developed and well-nourished  She is active and cooperative  No distress  HENT:   Head: Normocephalic and atraumatic  Neck: Trachea normal and phonation normal    Cardiovascular: Normal rate, regular rhythm, intact distal pulses and normal pulses  Pulses:       Radial pulses are 2+ on the right side, and 2+ on the left side  Pulmonary/Chest: Effort normal  No respiratory distress  Musculoskeletal:        Right shoulder: She exhibits tenderness (mild ttp of lateral aspect of deltoid only  There is no ttp of glenoid, humeral head, AC joint, or periscapular region  )  She exhibits normal range of motion (Full AROM in all planes at shoulder  Patient reports increasing pain with abduction however ), no swelling, no effusion, no deformity and no spasm          Left shoulder: Normal         Right elbow: Normal        Left elbow: Normal         Right wrist: Normal         Left wrist: Normal         Right upper arm: Normal         Left upper arm: Normal         Right forearm: Normal         Left forearm: Normal         Arms:       Right hand: Normal         Left hand: Normal    Apley scratch test, resisted abdominal lift-off test, empty can test, and drop arm tests wnl in b/l UE  Neurological: She is alert and oriented to person, place, and time  She has normal strength  No sensory deficit  GCS eye subscore is 4  GCS verbal subscore is 5  GCS motor subscore is 6  Reflex Scores:       Tricep reflexes are 2+ on the right side and 2+ on the left side  Bicep reflexes are 2+ on the right side and 2+ on the left side  Brachioradialis reflexes are 2+ on the right side and 2+ on the left side  Sensation intact C3-T2 in UE bilaterally to sharp/dull sensation  Strength 5/5 in UE bilaterally at shoulder/elbow/wrist in all planes of motion  Skin: Skin is warm, dry and intact  Capillary refill takes less than 2 seconds  She is not diaphoretic  Nursing note and vitals reviewed  Vital Signs  ED Triage Vitals [12/21/18 2321]   Temperature Pulse Respirations Blood Pressure SpO2   97 8 °F (36 6 °C) 75 18 146/71 96 %      Temp Source Heart Rate Source Patient Position - Orthostatic VS BP Location FiO2 (%)   Temporal Monitor Sitting Left arm --      Pain Score       No Pain           Vitals:    12/21/18 2321   BP: 146/71   Pulse: 75   Patient Position - Orthostatic VS: Sitting       Visual Acuity      ED Medications  Medications - No data to display    Diagnostic Studies  Results Reviewed     None                 XR shoulder 2+ views RIGHT   Final Result by Neftaly Cleary MD (12/22 6319)      No acute osseous abnormality              Workstation performed: [id number]                    Procedures  Procedures       Phone Contacts  ED Phone Contact    ED Course       MDM  Number of Diagnoses or Management Options  Strain of right deltoid muscle: new and does not require workup     Amount and/or Complexity of Data Reviewed  Tests in the radiology section of CPT®: ordered and reviewed  Decide to obtain previous medical records or to obtain history from someone other than the patient: yes  Review and summarize past medical records: yes  Independent visualization of images, tracings, or specimens: yes    Risk of Complications, Morbidity, and/or Mortality  Presenting problems: moderate  Diagnostic procedures: moderate  Management options: moderate  General comments: Atraumatic R shoulder pain isolated to region of deltoid without crepitus/bony deformity or evidence of neurovascular impairment  No radiographic evidence of fracture/dislocation  Patient's reported hx of forcibly pushing her dog with abduction of the shoulder likely accounts for her sx  Advised consistent use of nsaid/apap for sx control as needed with AROM as tolerated  She may follow with her orthopedic physician as needed also  All questions answered prior to discharge  She expressed understanding and agreed to plan  Patient Progress  Patient progress: stable    CritCare Time    Disposition  Final diagnoses:   Strain of right deltoid muscle     Time reflects when diagnosis was documented in both MDM as applicable and the Disposition within this note     Time User Action Codes Description Comment    12/22/2018 12:44 AM Nevaeh Arreguin Add [[id number]] Strain of right deltoid muscle       ED Disposition     ED Disposition Condition Comment    Discharge  [facility] discharge to home/self care  Condition at discharge: Stable        Follow-up Information     Follow up With Specialties Details Why Contact Info    Evelia Gillespie DO  Schedule an appointment as soon as possible for a visit in 1 week If symptoms have not started to improve or if pain has increased significantly [address] [place] [address] [place] [address]  [phone]            Discharge Medication List as of 12/22/2018 12:45 AM      CONTINUE these medications which have NOT CHANGED    Details   atorvastatin (LIPITOR) 10 mg tablet Take 10 mg by mouth daily, Historical Med      citalopram (CeleXA) 10 mg/5 mL suspension Take 10 mg by mouth daily, Historical Med      neomycin-polymyxin-hydrocortisone (CORTISPORIN) otic solution Administer 4 drops to the right ear every 8 (eight) hours for 7 days, Starting Sat 2/17/2018, Until Sat 2/24/2018, Normal      penbutolol (LEVATOL) 20 MG TABS Take 20 mg by mouth daily, Historical Med           No discharge procedures on file      ED Provider  Electronically Signed by           Yassine Aden DO  12/23/18 6658

## 2019-09-28 ENCOUNTER — APPOINTMENT (EMERGENCY)
Dept: CT IMAGING | Facility: HOSPITAL | Age: 64
End: 2019-09-28
Payer: MEDICARE

## 2019-09-28 ENCOUNTER — APPOINTMENT (EMERGENCY)
Dept: RADIOLOGY | Facility: HOSPITAL | Age: 64
End: 2019-09-28
Payer: MEDICARE

## 2019-09-28 ENCOUNTER — OFFICE VISIT (OUTPATIENT)
Dept: URGENT CARE | Facility: CLINIC | Age: 64
End: 2019-09-28
Payer: MEDICARE

## 2019-09-28 ENCOUNTER — HOSPITAL ENCOUNTER (EMERGENCY)
Facility: HOSPITAL | Age: 64
Discharge: HOME | End: 2019-09-28
Attending: EMERGENCY MEDICINE | Admitting: EMERGENCY MEDICINE
Payer: MEDICARE

## 2019-09-28 VITALS
SYSTOLIC BLOOD PRESSURE: 147 MMHG | OXYGEN SATURATION: 97 % | HEIGHT: 67 IN | HEART RATE: 79 BPM | DIASTOLIC BLOOD PRESSURE: 87 MMHG | RESPIRATION RATE: 18 BRPM | BODY MASS INDEX: 40.02 KG/M2 | TEMPERATURE: 97.9 F | WEIGHT: 255 LBS

## 2019-09-28 VITALS
SYSTOLIC BLOOD PRESSURE: 173 MMHG | RESPIRATION RATE: 20 BRPM | WEIGHT: 266.76 LBS | BODY MASS INDEX: 41.78 KG/M2 | TEMPERATURE: 98.9 F | OXYGEN SATURATION: 97 % | DIASTOLIC BLOOD PRESSURE: 69 MMHG | HEART RATE: 94 BPM

## 2019-09-28 DIAGNOSIS — R06.02 SOB (SHORTNESS OF BREATH): Primary | ICD-10-CM

## 2019-09-28 DIAGNOSIS — R06.00 DYSPNEA, UNSPECIFIED TYPE: Primary | ICD-10-CM

## 2019-09-28 LAB
ANION GAP SERPL CALCULATED.3IONS-SCNC: 8 MMOL/L (ref 4–13)
BASOPHILS # BLD AUTO: 0.05 THOUSANDS/ΜL (ref 0–0.1)
BASOPHILS NFR BLD AUTO: 1 % (ref 0–1)
BUN SERPL-MCNC: 20 MG/DL (ref 5–25)
CALCIUM SERPL-MCNC: 9.1 MG/DL (ref 8.3–10.1)
CHLORIDE SERPL-SCNC: 102 MMOL/L (ref 100–108)
CO2 SERPL-SCNC: 27 MMOL/L (ref 21–32)
CREAT SERPL-MCNC: 1.1 MG/DL (ref 0.6–1.3)
DEPRECATED D DIMER PPP: 1218 NG/ML (FEU)
EOSINOPHIL # BLD AUTO: 0.23 THOUSAND/ΜL (ref 0–0.61)
EOSINOPHIL NFR BLD AUTO: 3 % (ref 0–6)
ERYTHROCYTE [DISTWIDTH] IN BLOOD BY AUTOMATED COUNT: 14.6 % (ref 11.6–15.1)
GFR SERPL CREATININE-BSD FRML MDRD: 52 ML/MIN/1.73SQ M
GLUCOSE SERPL-MCNC: 100 MG/DL (ref 65–140)
HCT VFR BLD AUTO: 40.8 % (ref 34.8–46.1)
HGB BLD-MCNC: 13.1 G/DL (ref 11.5–15.4)
IMM GRANULOCYTES # BLD AUTO: 0.03 THOUSAND/UL (ref 0–0.2)
IMM GRANULOCYTES NFR BLD AUTO: 0 % (ref 0–2)
LYMPHOCYTES # BLD AUTO: 1.99 THOUSANDS/ΜL (ref 0.6–4.47)
LYMPHOCYTES NFR BLD AUTO: 22 % (ref 14–44)
MAGNESIUM SERPL-MCNC: 2.1 MG/DL (ref 1.6–2.6)
MCH RBC QN AUTO: 28.9 PG (ref 26.8–34.3)
MCHC RBC AUTO-ENTMCNC: 32.1 G/DL (ref 31.4–37.4)
MCV RBC AUTO: 90 FL (ref 82–98)
MONOCYTES # BLD AUTO: 0.73 THOUSAND/ΜL (ref 0.17–1.22)
MONOCYTES NFR BLD AUTO: 8 % (ref 4–12)
NEUTROPHILS # BLD AUTO: 6.24 THOUSANDS/ΜL (ref 1.85–7.62)
NEUTS SEG NFR BLD AUTO: 66 % (ref 43–75)
NRBC BLD AUTO-RTO: 0 /100 WBCS
PLATELET # BLD AUTO: 267 THOUSANDS/UL (ref 149–390)
PMV BLD AUTO: 9.5 FL (ref 8.9–12.7)
POTASSIUM SERPL-SCNC: 3.8 MMOL/L (ref 3.5–5.3)
RBC # BLD AUTO: 4.53 MILLION/UL (ref 3.81–5.12)
SODIUM SERPL-SCNC: 137 MMOL/L (ref 136–145)
TROPONIN I SERPL-MCNC: <0.02 NG/ML
TROPONIN I SERPL-MCNC: <0.02 NG/ML
WBC # BLD AUTO: 9.27 THOUSAND/UL (ref 4.31–10.16)

## 2019-09-28 PROCEDURE — 83735 ASSAY OF MAGNESIUM: CPT | Performed by: EMERGENCY MEDICINE

## 2019-09-28 PROCEDURE — 85025 COMPLETE CBC W/AUTO DIFF WBC: CPT | Performed by: EMERGENCY MEDICINE

## 2019-09-28 PROCEDURE — 93005 ELECTROCARDIOGRAM TRACING: CPT

## 2019-09-28 PROCEDURE — 71046 X-RAY EXAM CHEST 2 VIEWS: CPT

## 2019-09-28 PROCEDURE — 96374 THER/PROPH/DIAG INJ IV PUSH: CPT

## 2019-09-28 PROCEDURE — 96375 TX/PRO/DX INJ NEW DRUG ADDON: CPT

## 2019-09-28 PROCEDURE — 71275 CT ANGIOGRAPHY CHEST: CPT

## 2019-09-28 PROCEDURE — 99213 OFFICE O/P EST LOW 20 MIN: CPT | Performed by: EMERGENCY MEDICINE

## 2019-09-28 PROCEDURE — 99285 EMERGENCY DEPT VISIT HI MDM: CPT | Performed by: EMERGENCY MEDICINE

## 2019-09-28 PROCEDURE — 84484 ASSAY OF TROPONIN QUANT: CPT | Performed by: EMERGENCY MEDICINE

## 2019-09-28 PROCEDURE — 99285 EMERGENCY DEPT VISIT HI MDM: CPT

## 2019-09-28 PROCEDURE — G0463 HOSPITAL OUTPT CLINIC VISIT: HCPCS | Performed by: EMERGENCY MEDICINE

## 2019-09-28 PROCEDURE — 36415 COLL VENOUS BLD VENIPUNCTURE: CPT | Performed by: EMERGENCY MEDICINE

## 2019-09-28 PROCEDURE — 80048 BASIC METABOLIC PNL TOTAL CA: CPT | Performed by: EMERGENCY MEDICINE

## 2019-09-28 PROCEDURE — 85379 FIBRIN DEGRADATION QUANT: CPT | Performed by: EMERGENCY MEDICINE

## 2019-09-28 RX ORDER — DIPHENHYDRAMINE HYDROCHLORIDE 50 MG/ML
25 INJECTION INTRAMUSCULAR; INTRAVENOUS ONCE
Status: DISCONTINUED | OUTPATIENT
Start: 2019-09-28 | End: 2019-09-28

## 2019-09-28 RX ORDER — OLMESARTAN MEDOXOMIL 5 MG/1
5 TABLET ORAL DAILY
COMMUNITY
End: 2020-11-25 | Stop reason: CLARIF

## 2019-09-28 RX ORDER — ASPIRIN 81 MG/1
81 TABLET, CHEWABLE ORAL DAILY
COMMUNITY
End: 2020-11-25

## 2019-09-28 RX ORDER — METHYLPREDNISOLONE SODIUM SUCCINATE 125 MG/2ML
150 INJECTION, POWDER, LYOPHILIZED, FOR SOLUTION INTRAMUSCULAR; INTRAVENOUS ONCE
Status: COMPLETED | OUTPATIENT
Start: 2019-09-28 | End: 2019-09-28

## 2019-09-28 RX ORDER — DIPHENHYDRAMINE HYDROCHLORIDE 50 MG/ML
50 INJECTION INTRAMUSCULAR; INTRAVENOUS ONCE
Status: COMPLETED | OUTPATIENT
Start: 2019-09-28 | End: 2019-09-28

## 2019-09-28 RX ORDER — DIAZEPAM 2 MG/1
2 TABLET ORAL ONCE
Status: COMPLETED | OUTPATIENT
Start: 2019-09-28 | End: 2019-09-28

## 2019-09-28 RX ORDER — ALPRAZOLAM 0.5 MG/1
TABLET ORAL
COMMUNITY

## 2019-09-28 RX ADMIN — METHYLPREDNISOLONE SODIUM SUCCINATE 150 MG: 125 INJECTION, POWDER, FOR SOLUTION INTRAMUSCULAR; INTRAVENOUS at 15:48

## 2019-09-28 RX ADMIN — DIAZEPAM 2 MG: 2 TABLET ORAL at 18:02

## 2019-09-28 RX ADMIN — DIPHENHYDRAMINE HYDROCHLORIDE 50 MG: 50 INJECTION INTRAMUSCULAR; INTRAVENOUS at 18:54

## 2019-09-28 RX ADMIN — IODIXANOL 100 ML: 320 INJECTION, SOLUTION INTRAVASCULAR at 19:38

## 2019-09-28 NOTE — ED NOTES
Patient is very upset, crying, and anxious that there may be something wrong with her heart  Also worried about her husbands health conditions  Patient would like something for her anxiety  Dr Charity Jones made aware        Blossom Daley RN  09/28/19 4124

## 2019-09-28 NOTE — PROGRESS NOTES
[facility]        NAME: Guerrero Aguilar is a 75 y o  female  : 1955    MRN: [id number]  DATE: 2019  TIME: 1:51 PM    Assessment and Plan   Dyspnea, unspecified type [R06 00]  1  Dyspnea, unspecified type  Ambulatory Referral to Emergency Medicine    Transfer to other facility   t      Patient Instructions     Patient Instructions     Dyspnea   WHAT YOU NEED TO KNOW:   Dyspnea is breathing difficulty or discomfort  You may have labored, painful, or shallow breathing  You may feel breathless or short of breath  Dyspnea can occur during rest or with activity  You may have dyspnea for a short time, or it might become chronic  Dyspnea is often a symptom of a disease or condition  DISCHARGE INSTRUCTIONS:   Return to the emergency department if:   · Your signs and symptoms are the same or worse within 24 hours of treatment  · You have shaking chills or a fever over 102°F      · You have new pain, pressure, or tightness in your chest      · You have a new or worse cough or wheezing, or you cough up blood  · You feel like you cannot get enough air  · The skin over your ribs or on your neck sinks in when you breathe  · You have a severe headache with vomiting and abdominal pain  · You feel confused or dizzy  Contact your healthcare provider or specialist if:   · You have questions or concerns about your condition or care  Medicines:   · Medicines  may be used to treat the cause of your dyspnea  Medicines may reduce swelling in your airway or decrease extra fluid from around your heart or lungs  Other medicines may be used to decrease anxiety and help you feel calm and relaxed  · Take your medicine as directed  Contact your healthcare provider if you think your medicine is not helping or if you have side effects  Tell him or her if you are allergic to any medicine  Keep a list of the medicines, vitamins, and herbs you take   Include the amounts, and when and why you take them  Bring the list or the pill bottles to follow-up visits  Carry your medicine list with you in case of an emergency  Manage long-term dyspnea:   · Create an action plan  You and your healthcare provider can work together to create a plan for how to handle episodes of dyspnea  The plan can include daily activities, treatment changes, and what to do if you have severe breathing problems  · Lean forward on your elbows when you sit  This helps your lungs expand and may make it easier to breathe  · Use pursed-lip breathing any time you feel short of breath  Breathe in through your nose and then slowly breathe out through your mouth with your lips slightly puckered  It should take you twice as long to breathe out as it did to breathe in  · Do not smoke  Nicotine and other chemicals in cigarettes and cigars can cause lung damage and make it harder to breathe  Ask your healthcare provider for information if you currently smoke and need help to quit  E-cigarettes or smokeless tobacco still contain nicotine  Talk to your healthcare provider before you use these products  · Reach or maintain a healthy weight  Your healthcare provider can help you create a safe weight loss plan if you are overweight  · Exercise as directed  Exercise can help your lungs work more easily  Exercise can also help you lose weight if needed  Try to get at least 30 minutes of exercise most days of the week  Your healthcare provider can help you create an exercise plan that is safe for you  Follow up with your healthcare provider or specialist as directed:  Write down your questions so you remember to ask them during your visits  © 2017 [employer] Information is for End User's use only and may not be sold, redistributed or otherwise used for commercial purposes  All illustrations and images included in CareNotes® are the copyrighted property of [employer]  or [employer]    The above information is an  only  It is not intended as medical advice for individual conditions or treatments  Talk to your doctor, nurse or pharmacist before following any medical regimen to see if it is safe and effective for you  Follow up with PCP in 3-5 days  Proceed to  ER if symptoms worsen  Chief Complaint     Chief Complaint   Patient presents with    Shortness of Breath     shortness of breath with exertion yesterday and noticed an incresed heart rate  resolved  states highest rate was 90         History of Present Illness       Patient complains of dyspnea since yesterday  Onset of symptoms when patient was walking to a football game in hot humid conditions  Patient had very little to drink yesterday  She notes symptoms persisted after she arrived home  She still feels somewhat short of breath today  She denies associated chest pain, palpitations or leg swelling  She denies history of PE or DVT  She claims history of a heart murmur since childhood but denies other history of heart disease  She has history of hypertension but denies other cardiac risk factors  She has history of anxiety for which she has Xanax to take p r n  She took dose of Xanax last night without symptomatic relief  Review of Systems   Review of Systems   Constitutional: Negative for chills and fever  HENT: Negative for congestion, rhinorrhea, sinus pressure, sore throat, trouble swallowing and voice change  Respiratory: Positive for shortness of breath  Negative for cough, chest tightness and wheezing  Cardiovascular: Negative for chest pain, palpitations and leg swelling           Current Medications       Current Outpatient Medications:     olmesartan (BENICAR) 5 mg tablet, Take 5 mg by mouth daily, Disp: , Rfl:     atorvastatin (LIPITOR) 10 mg tablet, Take 10 mg by mouth daily, Disp: , Rfl:     citalopram (CeleXA) 10 mg tablet, Take 10 mg by mouth daily , Disp: , Rfl:    neomycin-polymyxin-hydrocortisone (CORTISPORIN) otic solution, Administer 4 drops to the right ear every 8 (eight) hours for 7 days, Disp: 10 mL, Rfl: 0    penbutolol (LEVATOL) 20 MG TABS, Take 20 mg by mouth daily, Disp: , Rfl:     Current Allergies     Allergies as of 09/28/2019 - Reviewed 09/28/2019   Allergen Reaction Noted    Prednisone Shortness Of Breath 12/21/2018    Benicar [olmesartan]  02/17/2018    Celexa [citalopram] Rash 02/17/2018    Contrast [iodinated diagnostic agents] Rash 12/21/2018    Iodine Rash 02/17/2018            The following portions of the patient's history were reviewed and updated as appropriate: allergies, current medications, past family history, past medical history, past social history, past surgical history and problem list      Past Medical History:   Diagnosis Date    Disease of thyroid gland     hypo    Hyperlipidemia     Hypertension        Past Surgical History:   Procedure Laterality Date    HYSTERECTOMY      SHOULDER OPEN ROTATOR CUFF REPAIR      TONSILLECTOMY         No family history on file  Medications have been verified  Objective   /87   Pulse 79   Temp 97 9 °F (36 6 °C) (Tympanic)   Resp 18   Ht 5' 7" (1 702 m)   Wt 116 kg (255 lb)   LMP  (LMP Unknown)   SpO2 97%   BMI 39 94 kg/m²        Physical Exam     Physical Exam   Constitutional: She is oriented to person, place, and time  She appears well-developed and well-nourished  HENT:   Head: Normocephalic and atraumatic  Eyes: Pupils are equal, round, and reactive to light  Conjunctivae and EOM are normal    Neck: Normal range of motion  Neck supple  No thyromegaly present  Cardiovascular: Normal rate and regular rhythm  Pulmonary/Chest: Effort normal and breath sounds normal    Abdominal: Soft  Bowel sounds are normal  She exhibits no distension and no mass  There is no tenderness  There is no rebound and no guarding  Musculoskeletal: She exhibits no tenderness  Right lower leg: Normal  She exhibits no tenderness and no edema  Left lower leg: Normal  She exhibits no tenderness and no edema  Homans negative bilaterally   Neurological: She is alert and oriented to person, place, and time  Skin: Skin is warm and dry  No rash noted  Psychiatric: She has a normal mood and affect  Her behavior is normal  Judgment and thought content normal    Nursing note and vitals reviewed

## 2019-09-28 NOTE — PATIENT INSTRUCTIONS
Dyspnea   WHAT YOU NEED TO KNOW:   Dyspnea is breathing difficulty or discomfort  You may have labored, painful, or shallow breathing  You may feel breathless or short of breath  Dyspnea can occur during rest or with activity  You may have dyspnea for a short time, or it might become chronic  Dyspnea is often a symptom of a disease or condition  DISCHARGE INSTRUCTIONS:   Return to the emergency department if:   · Your signs and symptoms are the same or worse within 24 hours of treatment  · You have shaking chills or a fever over 102°F      · You have new pain, pressure, or tightness in your chest      · You have a new or worse cough or wheezing, or you cough up blood  · You feel like you cannot get enough air  · The skin over your ribs or on your neck sinks in when you breathe  · You have a severe headache with vomiting and abdominal pain  · You feel confused or dizzy  Contact your healthcare provider or specialist if:   · You have questions or concerns about your condition or care  Medicines:   · Medicines  may be used to treat the cause of your dyspnea  Medicines may reduce swelling in your airway or decrease extra fluid from around your heart or lungs  Other medicines may be used to decrease anxiety and help you feel calm and relaxed  · Take your medicine as directed  Contact your healthcare provider if you think your medicine is not helping or if you have side effects  Tell him or her if you are allergic to any medicine  Keep a list of the medicines, vitamins, and herbs you take  Include the amounts, and when and why you take them  Bring the list or the pill bottles to follow-up visits  Carry your medicine list with you in case of an emergency  Manage long-term dyspnea:   · Create an action plan  You and your healthcare provider can work together to create a plan for how to handle episodes of dyspnea   The plan can include daily activities, treatment changes, and what to do if you have severe breathing problems  · Lean forward on your elbows when you sit  This helps your lungs expand and may make it easier to breathe  · Use pursed-lip breathing any time you feel short of breath  Breathe in through your nose and then slowly breathe out through your mouth with your lips slightly puckered  It should take you twice as long to breathe out as it did to breathe in  · Do not smoke  Nicotine and other chemicals in cigarettes and cigars can cause lung damage and make it harder to breathe  Ask your healthcare provider for information if you currently smoke and need help to quit  E-cigarettes or smokeless tobacco still contain nicotine  Talk to your healthcare provider before you use these products  · Reach or maintain a healthy weight  Your healthcare provider can help you create a safe weight loss plan if you are overweight  · Exercise as directed  Exercise can help your lungs work more easily  Exercise can also help you lose weight if needed  Try to get at least 30 minutes of exercise most days of the week  Your healthcare provider can help you create an exercise plan that is safe for you  Follow up with your healthcare provider or specialist as directed:  Write down your questions so you remember to ask them during your visits  © 2017 [employer]  Information is for End User's use only and may not be sold, redistributed or otherwise used for commercial purposes  All illustrations and images included in CareNotes® are the copyrighted property of [employer]  or Nate Bernard  The above information is an  only  It is not intended as medical advice for individual conditions or treatments  Talk to your doctor, nurse or pharmacist before following any medical regimen to see if it is safe and effective for you

## 2019-09-28 NOTE — ED PROVIDER NOTES
History  Chief Complaint   Patient presents with    Shortness of Breath     pt states was at football game, ambulated a small distance and became "winded"     This is a 75year old female with a history of hypertension and hyperlipidemia who presents with dyspnea on exertion  The patient states that she was at a football game last night  She walked a short distance from the entrance to her seat  The patient felt winded after the walk  However, her dyspnea resolved when sitting down  Walking to her car and felt winded again  Patient states that she felt fine driving home from the game  She was able to ambulate up and down her steps last night at her house without difficulty  She has never experienced these type of symptoms before  Denies any chest pain on exertion  She was seen at an urgent care today and advised to come to the emergency department for evaluation  Denies history of diabetes, tobacco use (within last 3 months), family history of CAD (before age 72), personal history of MI, PAD, CVA  Denies history of DVT/PE (also, no objective results indicating DVT/PE), unilateral calf pain/swelling, hemoptysis, recent trauma/surgery (</= 4 weeks ago requiring general anesthesia), recent travel, cancer/cancer treatment (in last 6 months), exogenous estrogen use  Currently, the patient states that she feels fine and just wanted to get checked out  Denies fever/chills, nausea/vomiting, lightheadedness/dizziness, numbness/weakness, headache, change in vision, URI symptoms, neck pain, chest pain, palpitations, cough, back pain, flank pain, abdominal pain, diarrhea, hematochezia, melena, dysuria, hematuria, abnormal vaginal discharge/bleeding  Prior to Admission Medications   Prescriptions Last Dose Informant Patient Reported? Taking?    ALPRAZolam (XANAX) 0 5 mg tablet 9/27/2019 at Unknown time  Yes Yes   Sig: Take by mouth daily at bedtime as needed for anxiety   aspirin 81 mg chewable tablet 9/28/2019 at Unknown time  Yes Yes   Sig: Chew 81 mg daily   atorvastatin (LIPITOR) 10 mg tablet 9/27/2019 at Unknown time  Yes Yes   Sig: Take 10 mg by mouth daily   citalopram (CeleXA) 10 mg tablet 9/28/2019 at Unknown time  Yes Yes   Sig: Take 10 mg by mouth daily    neomycin-polymyxin-hydrocortisone (CORTISPORIN) otic solution   No No   Sig: Administer 4 drops to the right ear every 8 (eight) hours for 7 days   olmesartan (BENICAR) 5 mg tablet 9/28/2019 at Unknown time  Yes Yes   Sig: Take 5 mg by mouth daily   penbutolol (LEVATOL) 20 MG TABS 9/28/2019 at Unknown time Self Yes Yes   Sig: Take 20 mg by mouth daily      Facility-Administered Medications: None       Past Medical History:   Diagnosis Date    Disease of thyroid gland     hypo    Hyperlipidemia     Hypertension        Past Surgical History:   Procedure Laterality Date    HYSTERECTOMY      SHOULDER OPEN ROTATOR CUFF REPAIR      TONSILLECTOMY         History reviewed  No pertinent family history  I have reviewed and agree with the history as documented  Social History     Tobacco Use    Smoking status: Never Smoker    Smokeless tobacco: Never Used   Substance Use Topics    Alcohol use: No    Drug use: No        Review of Systems   Constitutional: Negative for chills and fever  HENT: Negative for rhinorrhea, sore throat and trouble swallowing  Eyes: Negative for photophobia and visual disturbance  Respiratory: Positive for shortness of breath  Negative for cough and chest tightness  Cardiovascular: Negative for chest pain, palpitations and leg swelling  Gastrointestinal: Negative for abdominal pain, blood in stool, diarrhea, nausea and vomiting  Endocrine: Negative for polyuria  Genitourinary: Negative for dysuria, flank pain, hematuria, vaginal bleeding and vaginal discharge  Musculoskeletal: Negative for back pain and neck pain  Skin: Negative for color change and rash     Allergic/Immunologic: Negative for immunocompromised state  Neurological: Negative for dizziness, weakness, light-headedness, numbness and headaches  All other systems reviewed and are negative  Physical Exam  Physical Exam   Constitutional: Vital signs are normal  She appears well-developed  She is cooperative  No distress  HENT:   Mouth/Throat: Uvula is midline, oropharynx is clear and moist and mucous membranes are normal    Eyes: Pupils are equal, round, and reactive to light  Conjunctivae and EOM are normal    Neck: Trachea normal  No thyroid mass and no thyromegaly present  Cardiovascular: Normal rate, regular rhythm, normal heart sounds, intact distal pulses and normal pulses  No murmur heard  Pulmonary/Chest: Effort normal and breath sounds normal    Abdominal: Soft  Normal appearance and bowel sounds are normal  There is no tenderness  There is no rebound, no guarding and no CVA tenderness  Musculoskeletal:   No lower extremity edema  Neurological: She is alert  Skin: Skin is warm, dry and intact  Psychiatric: She has a normal mood and affect   Her speech is normal and behavior is normal  Thought content normal        Vital Signs  ED Triage Vitals   Temperature Pulse Respirations Blood Pressure SpO2   09/28/19 1449 09/28/19 1448 09/28/19 1448 09/28/19 1448 09/28/19 1448   98 9 °F (37 2 °C) 84 21 (!) 199/81 99 %      Temp Source Heart Rate Source Patient Position - Orthostatic VS BP Location FiO2 (%)   09/28/19 1448 09/28/19 1448 09/28/19 1448 09/28/19 1448 --   Temporal Monitor Sitting Left arm       Pain Score       09/28/19 1448       No Pain           Vitals:    09/28/19 1700 09/28/19 1730 09/28/19 1800 09/28/19 1900   BP: 165/73 (!) 186/82 (!) 172/68 (!) 173/69   Pulse: 72 81 84 94   Patient Position - Orthostatic VS: Lying Lying Lying Lying         Visual Acuity      ED Medications  Medications   methylPREDNISolone sodium succinate (Solu-MEDROL) injection 150 mg (150 mg Intravenous Given 9/28/19 1548) diphenhydrAMINE (BENADRYL) injection 50 mg (50 mg Intravenous Given 9/28/19 1854)   diazepam (VALIUM) tablet 2 mg (2 mg Oral Given 9/28/19 1802)   iodixanol (VISIPAQUE) 320 MG/ML injection 100 mL (100 mL Intravenous Given 9/28/19 1938)       Diagnostic Studies  Results Reviewed     Procedure Component Value Units Date/Time    Troponin I [[id number]]  (Normal) Collected:  09/28/19 1807    Lab Status:  Final result Specimen:  Blood from Arm, Right Updated:  09/28/19 1830     Troponin I <0 02 ng/mL     Troponin I [[id number]]  (Normal) Collected:  09/28/19 1457    Lab Status:  Final result Specimen:  Blood from Arm, Right Updated:  09/28/19 1523     Troponin I <0 02 ng/mL     D-Dimer [[id number]]  (Abnormal) Collected:  09/28/19 1457    Lab Status:  Final result Specimen:  Blood from Arm, Right Updated:  09/28/19 1517     D-Dimer, Quant 1,218 ng/ml (FEU)     Basic metabolic panel [[id number]] Collected:  09/28/19 1457    Lab Status:  Final result Specimen:  Blood from Arm, Right Updated:  09/28/19 1514     Sodium 137 mmol/L      Potassium 3 8 mmol/L      Chloride 102 mmol/L      CO2 27 mmol/L      ANION GAP 8 mmol/L      BUN 20 mg/dL      Creatinine 1 10 mg/dL      Glucose 100 mg/dL      Calcium 9 1 mg/dL      eGFR 52 ml/min/1 73sq m     Narrative:       Margi guidelines for Chronic Kidney Disease (CKD):     Stage 1 with normal or high GFR (GFR > 90 mL/min/1 73 square meters)    Stage 2 Mild CKD (GFR = 60-89 mL/min/1 73 square meters)    Stage 3A Moderate CKD (GFR = 45-59 mL/min/1 73 square meters)    Stage 3B Moderate CKD (GFR = 30-44 mL/min/1 73 square meters)    Stage 4 Severe CKD (GFR = 15-29 mL/min/1 73 square meters)    Stage 5 End Stage CKD (GFR <15 mL/min/1 73 square meters)  Note: GFR calculation is accurate only with a steady state creatinine    Magnesium [[id number]]  (Normal) Collected:  09/28/19 1457    Lab Status:  Final result Specimen:  Blood from Arm, Right Updated: 09/28/19 1514     Magnesium 2 1 mg/dL     CBC and differential [[id number]] Collected:  09/28/19 1457    Lab Status:  Final result Specimen:  Blood from Arm, Right Updated:  09/28/19 1502     WBC 9 27 Thousand/uL      RBC 4 53 Million/uL      Hemoglobin 13 1 g/dL      Hematocrit 40 8 %      MCV 90 fL      MCH 28 9 pg      MCHC 32 1 g/dL      RDW 14 6 %      MPV 9 5 fL      Platelets 544 Thousands/uL      nRBC 0 /100 WBCs      Neutrophils Relative 66 %      Immat GRANS % 0 %      Lymphocytes Relative 22 %      Monocytes Relative 8 %      Eosinophils Relative 3 %      Basophils Relative 1 %      Neutrophils Absolute 6 24 Thousands/µL      Immature Grans Absolute 0 03 Thousand/uL      Lymphocytes Absolute 1 99 Thousands/µL      Monocytes Absolute 0 73 Thousand/µL      Eosinophils Absolute 0 23 Thousand/µL      Basophils Absolute 0 05 Thousands/µL                  CTA ED chest PE study   Final Result by Marni Narvaez MD (09/28 2028)      No pulmonary embolism or aortic dissection  No effusion, airspace disease, or pneumothorax  Workstation performed: [id number]         XR chest 2 views   Final Result by Adarsh Crowe MD (09/28 1650)      No acute cardiopulmonary disease              Workstation performed: [id number]                    Procedures  ECG 12 Lead Documentation Only  Date/Time: 9/28/2019 3:06 PM  Performed by: Yovany Smith MD  Authorized by: Yovany Smith MD     ECG reviewed by me, the ED Provider: yes    Patient location:  ED  Previous ECG:     Previous ECG:  Compared to current    Comparison ECG info:  12/13/15    Similarity:  No change    Comparison to cardiac monitor: Yes    Interpretation:     Interpretation: normal    Rate:     ECG rate:  73    ECG rate assessment: normal    Rhythm:     Rhythm: sinus rhythm    Ectopy:     Ectopy: none    QRS:     QRS axis:  Normal    QRS intervals:  Normal  Conduction:     Conduction: normal    ST segments:     ST segments:  Normal  T waves:     T waves: normal             ED Course  ED Course as of Sep 28 2054   Sat Sep 28, 2019   1519 Will order CTA  The patient states that she got a rash from IV contrast approximately 30 years ago  D-DIMER QUANTITATIVE(!): 1,218   1826 Repeat EKG reveals normal sinus rhythm at 84 beats per minute  Normal axis  Normal QRS and QT intervals  No ST elevation or depressions  Unremarkable T-waves  Unchanged EKG from previous  Michael Hernandes' Criteria for PE      Most Recent Value   Wells' Criteria for PE   Clinical signs and symptoms of DVT  0 Filed at: 09/28/2019 1456   PE is primary diagnosis or equally likely  0 Filed at: 09/28/2019 1456   HR >100  0 Filed at: 09/28/2019 1456   Immobilization at least 3 days or Surgery in the previous 4 weeks  0 Filed at: 09/28/2019 1456   Previous, objectively diagnosed PE or DVT  0 Filed at: 09/28/2019 1456   Hemoptysis  0 Filed at: 09/28/2019 1456   Malignancy with treatment within 6 months or palliative  0 Filed at: 09/28/2019 1456   Wells' Criteria Total  0 Filed at: 09/28/2019 1456            [facility]  Number of Diagnoses or Management Options  Diagnosis management comments: Plan to check labs, troponin, D-dimer  EKG  Chest x-ray  Disposition pending results  Disposition  Final diagnoses:   SOB (shortness of breath)     Time reflects when diagnosis was documented in both MDM as applicable and the Disposition within this note     Time User Action Codes Description Comment    9/28/2019  8:40 PM Cathie ANGUIANO Add [R06 02] SOB (shortness of breath)       ED Disposition     ED Disposition Condition Date/Time Comment    Discharge Stable Sat Sep 28, 2019  8:40 PM Amy Bear discharge to home/self care  Follow-up Information     Follow up With Specialties Details Why Contact Info Additional Information    Evelia Gillespie DO  Schedule an appointment as soon as possible for a visit   [address] [place] [address] Joseph Colmenares  [phone]         [facility] SPECIALTY [facility] Emergency Department Emergency Medicine Go to  If symptoms worsen [place] [address]  [phone] [place] [facility], [address], [place], [place], [address]          Discharge Medication List as of 9/28/2019  8:40 PM      CONTINUE these medications which have NOT CHANGED    Details   ALPRAZolam (XANAX) 0 5 mg tablet Take by mouth daily at bedtime as needed for anxiety, Historical Med      aspirin 81 mg chewable tablet Chew 81 mg daily, Historical Med      atorvastatin (LIPITOR) 10 mg tablet Take 10 mg by mouth daily, Historical Med      citalopram (CeleXA) 10 mg tablet Take 10 mg by mouth daily , Historical Med      olmesartan (BENICAR) 5 mg tablet Take 5 mg by mouth daily, Historical Med      penbutolol (LEVATOL) 20 MG TABS Take 20 mg by mouth daily, Historical Med      neomycin-polymyxin-hydrocortisone (CORTISPORIN) otic solution Administer 4 drops to the right ear every 8 (eight) hours for 7 days, Starting Sat 2/17/2018, Until Sat 2/24/2018, Normal           No discharge procedures on file      ED Provider  Electronically Signed by           Codey Cortes MD  09/28/19 0197

## 2019-09-28 NOTE — ED NOTES
Patient transported to [facility] via stretcher       Gutierrez Daley RN  09/28/19 1904

## 2019-10-01 LAB
ATRIAL RATE: 73 BPM
ATRIAL RATE: 84 BPM
P AXIS: 24 DEGREES
P AXIS: 46 DEGREES
PR INTERVAL: 172 MS
PR INTERVAL: 176 MS
QRS AXIS: 7 DEGREES
QRS AXIS: 9 DEGREES
QRSD INTERVAL: 90 MS
QRSD INTERVAL: 94 MS
QT INTERVAL: 410 MS
QT INTERVAL: 416 MS
QTC INTERVAL: 458 MS
QTC INTERVAL: 484 MS
T WAVE AXIS: 26 DEGREES
T WAVE AXIS: 41 DEGREES
VENTRICULAR RATE: 73 BPM
VENTRICULAR RATE: 84 BPM

## 2019-10-01 PROCEDURE — 93010 ELECTROCARDIOGRAM REPORT: CPT | Performed by: INTERNAL MEDICINE

## 2019-10-29 ENCOUNTER — TELEPHONE (OUTPATIENT)
Dept: DERMATOLOGY | Facility: CLINIC | Age: 64
End: 2019-10-29

## 2019-11-11 ENCOUNTER — DOCTOR'S OFFICE (OUTPATIENT)
Dept: URBAN - NONMETROPOLITAN AREA CLINIC 1 | Facility: CLINIC | Age: 64
Setting detail: OPHTHALMOLOGY
End: 2019-11-11
Payer: COMMERCIAL

## 2019-11-11 DIAGNOSIS — H00.024: ICD-10-CM

## 2019-11-11 DIAGNOSIS — H00.021: ICD-10-CM

## 2019-11-11 DIAGNOSIS — H00.022: ICD-10-CM

## 2019-11-11 DIAGNOSIS — H04.123: ICD-10-CM

## 2019-11-11 PROCEDURE — 92012 INTRM OPH EXAM EST PATIENT: CPT | Performed by: PHYSICIAN ASSISTANT

## 2019-11-11 ASSESSMENT — PUNCTA - ASSESSMENT: OD_PUNCTA: COL PLUG RLL RUL SMALL

## 2019-11-11 ASSESSMENT — LID EXAM ASSESSMENTS
OS_MEIBOMITIS: 1+
OD_MEIBOMITIS: 1+

## 2019-11-11 ASSESSMENT — SUPERFICIAL PUNCTATE KERATITIS (SPK)
OS_SPK: INFERIOR NASALLY
OS_SPK: T 1+
OD_SPK: T

## 2019-11-11 ASSESSMENT — CONFRONTATIONAL VISUAL FIELD TEST (CVF)
OS_FINDINGS: FULL
OD_FINDINGS: FULL

## 2019-11-12 PROBLEM — H04.122 DRY EYE SYNDROME; RIGHT EYE, LEFT EYE, BOTH EYES: Status: ACTIVE | Noted: 2017-11-07

## 2019-11-12 PROBLEM — H00.025 MEIBOMITIS; RIGHT UPPER LID, RIGHT LOWER LID, LEFT UPPER LID, LEFT LOWER LID: Status: ACTIVE | Noted: 2017-02-23

## 2019-11-12 PROBLEM — H43.813 POSTERIOR VITREOUS DETACHMENT; BOTH EYES: Status: ACTIVE | Noted: 2017-02-23

## 2019-11-12 PROBLEM — H00.021 MEIBOMITIS; RIGHT UPPER LID, RIGHT LOWER LID, LEFT UPPER LID, LEFT LOWER LID: Status: ACTIVE | Noted: 2017-02-23

## 2019-11-12 PROBLEM — H00.024 MEIBOMITIS; RIGHT UPPER LID, RIGHT LOWER LID, LEFT UPPER LID, LEFT LOWER LID: Status: ACTIVE | Noted: 2017-02-23

## 2019-11-12 PROBLEM — H04.123 DRY EYE SYNDROME; RIGHT EYE, LEFT EYE, BOTH EYES: Status: ACTIVE | Noted: 2017-11-07

## 2019-11-12 PROBLEM — H04.121 DRY EYE SYNDROME; RIGHT EYE, LEFT EYE, BOTH EYES: Status: ACTIVE | Noted: 2017-11-07

## 2019-11-12 PROBLEM — H25.13 NUCLEAR SCLEROSIS ; BOTH EYES: Status: ACTIVE | Noted: 2017-02-23

## 2019-11-12 PROBLEM — H10.413 CONJUNCTIVITIS, GIANT PAPILLARY CHRONIC; BOTH EYES: Status: ACTIVE | Noted: 2017-02-23

## 2019-11-12 PROBLEM — H00.022 MEIBOMITIS; RIGHT UPPER LID, RIGHT LOWER LID, LEFT UPPER LID, LEFT LOWER LID: Status: ACTIVE | Noted: 2017-02-23

## 2019-11-12 PROBLEM — H33.312: Status: ACTIVE | Noted: 2017-02-23

## 2019-11-12 ASSESSMENT — REFRACTION_MANIFEST
OD_ADD: +2.50
OS_SPHERE: +1.00
OS_VA2: 20/
OD_VA3: 20/
OU_VA: 20/25
OD_VA3: 20/
OS_VA1: 20/25-2
OD_CYLINDER: -1.00
OD_VA2: 20/
OD_SPHERE: +1.00
OU_VA: 20/
OS_ADD: +2.50
OS_VA2: 20/25-2
OS_CYLINDER: -1.00
OS_VA3: 20/
OS_VA1: 20/
OS_AXIS: 090
OD_VA1: 20/
OS_VA3: 20/
OD_AXIS: 090
OD_VA1: 20/25-2
OD_VA2: 20/25-2

## 2019-11-12 ASSESSMENT — REFRACTION_CURRENTRX
OD_AXIS: 100
OD_OVR_VA: 20/
OS_CYLINDER: -0.75
OD_OVR_VA: 20/
OS_OVR_VA: 20/
OS_VPRISM_DIRECTION: PROGS
OD_ADD: +2.25
OD_VPRISM_DIRECTION: PROGS
OS_AXIS: 110
OD_OVR_VA: 20/
OD_CYLINDER: -1.00
OD_CYLINDER: -0.75
OS_AXIS: 105
OS_VPRISM_DIRECTION: PROGS
OS_ADD: +2.25
OS_CYLINDER: -1.25
OS_SPHERE: +1.25
OD_VPRISM_DIRECTION: PROGS
OS_SPHERE: PLANO
OD_AXIS: 90
OD_SPHERE: PLANO
OS_ADD: +2.50
OD_ADD: +2.50
OD_SPHERE: +1.25
OS_OVR_VA: 20/
OS_OVR_VA: 20/

## 2019-11-12 ASSESSMENT — SPHEQUIV_DERIVED
OS_SPHEQUIV: -0.375
OD_SPHEQUIV: 0
OD_SPHEQUIV: 0.5
OS_SPHEQUIV: 0.5

## 2019-11-12 ASSESSMENT — REFRACTION_AUTOREFRACTION
OD_CYLINDER: -1.00
OS_AXIS: 087
OD_SPHERE: +0.50
OS_CYLINDER: -1.25
OS_SPHERE: +0.25
OD_AXIS: 089

## 2019-11-12 ASSESSMENT — VISUAL ACUITY
OS_BCVA: 20/30+2
OD_BCVA: 20/30-1

## 2019-12-27 ENCOUNTER — TELEPHONE (OUTPATIENT)
Dept: DERMATOLOGY | Facility: CLINIC | Age: 64
End: 2019-12-27

## 2020-05-13 ENCOUNTER — OPTICAL OFFICE (OUTPATIENT)
Dept: URBAN - NONMETROPOLITAN AREA CLINIC 4 | Facility: CLINIC | Age: 65
Setting detail: OPHTHALMOLOGY
End: 2020-05-13
Payer: COMMERCIAL

## 2020-05-13 DIAGNOSIS — H52.223: ICD-10-CM

## 2020-05-13 PROCEDURE — V2020 VISION SVCS FRAMES PURCHASES: HCPCS | Performed by: OPHTHALMOLOGY

## 2020-05-13 PROCEDURE — V2750 ANTI-REFLECTIVE COATING: HCPCS | Performed by: OPHTHALMOLOGY

## 2020-05-13 PROCEDURE — V2203 LENS SPHCYL BIFOCAL 4.00D/.1: HCPCS | Performed by: OPHTHALMOLOGY

## 2020-05-13 PROCEDURE — V2025 EYEGLASSES DELUX FRAMES: HCPCS | Performed by: OPHTHALMOLOGY

## 2020-05-13 PROCEDURE — V2781 PROGRESSIVE LENS PER LENS: HCPCS | Performed by: OPHTHALMOLOGY

## 2020-11-25 ENCOUNTER — APPOINTMENT (EMERGENCY)
Dept: CT IMAGING | Facility: HOSPITAL | Age: 65
DRG: 309 | End: 2020-11-25
Payer: MEDICARE

## 2020-11-25 ENCOUNTER — TELEPHONE (OUTPATIENT)
Dept: CARDIOLOGY CLINIC | Facility: CLINIC | Age: 65
End: 2020-11-25

## 2020-11-25 ENCOUNTER — APPOINTMENT (EMERGENCY)
Dept: RADIOLOGY | Facility: HOSPITAL | Age: 65
DRG: 309 | End: 2020-11-25
Payer: MEDICARE

## 2020-11-25 ENCOUNTER — HOSPITAL ENCOUNTER (INPATIENT)
Facility: HOSPITAL | Age: 65
LOS: 1 days | Discharge: HOME | DRG: 309 | End: 2020-11-25
Attending: EMERGENCY MEDICINE | Admitting: FAMILY MEDICINE
Payer: MEDICARE

## 2020-11-25 ENCOUNTER — TELEPHONE (OUTPATIENT)
Dept: EMERGENCY DEPT | Facility: HOSPITAL | Age: 65
End: 2020-11-25

## 2020-11-25 VITALS
TEMPERATURE: 97.8 F | HEART RATE: 68 BPM | BODY MASS INDEX: 43.25 KG/M2 | WEIGHT: 275.57 LBS | SYSTOLIC BLOOD PRESSURE: 119 MMHG | HEIGHT: 67 IN | DIASTOLIC BLOOD PRESSURE: 56 MMHG | OXYGEN SATURATION: 92 % | RESPIRATION RATE: 17 BRPM

## 2020-11-25 DIAGNOSIS — I48.0 PAROXYSMAL ATRIAL FIBRILLATION (HCC): ICD-10-CM

## 2020-11-25 DIAGNOSIS — R00.2 PALPITATIONS: ICD-10-CM

## 2020-11-25 DIAGNOSIS — E06.3 HYPOTHYROIDISM DUE TO HASHIMOTO'S THYROIDITIS: ICD-10-CM

## 2020-11-25 DIAGNOSIS — E66.01 MORBID OBESITY WITH BMI OF 40.0-44.9, ADULT (HCC): ICD-10-CM

## 2020-11-25 DIAGNOSIS — R07.9 CHEST PAIN: Primary | ICD-10-CM

## 2020-11-25 DIAGNOSIS — E03.8 HYPOTHYROIDISM DUE TO HASHIMOTO'S THYROIDITIS: ICD-10-CM

## 2020-11-25 PROBLEM — F41.9 ANXIETY AND DEPRESSION: Status: ACTIVE | Noted: 2020-11-25

## 2020-11-25 PROBLEM — R79.89 ELEVATED D-DIMER: Status: ACTIVE | Noted: 2020-11-25

## 2020-11-25 PROBLEM — F32.A ANXIETY AND DEPRESSION: Status: ACTIVE | Noted: 2020-11-25

## 2020-11-25 LAB
ANION GAP SERPL CALCULATED.3IONS-SCNC: 12 MMOL/L (ref 4–13)
BASOPHILS # BLD AUTO: 0.06 THOUSANDS/ΜL (ref 0–0.1)
BASOPHILS NFR BLD AUTO: 1 % (ref 0–1)
BUN SERPL-MCNC: 28 MG/DL (ref 5–25)
CALCIUM SERPL-MCNC: 8.7 MG/DL (ref 8.3–10.1)
CHLORIDE SERPL-SCNC: 103 MMOL/L (ref 100–108)
CO2 SERPL-SCNC: 22 MMOL/L (ref 21–32)
CREAT SERPL-MCNC: 1.28 MG/DL (ref 0.6–1.3)
D DIMER PPP FEU-MCNC: 1.24 UG/ML FEU
EOSINOPHIL # BLD AUTO: 0.23 THOUSAND/ΜL (ref 0–0.61)
EOSINOPHIL NFR BLD AUTO: 3 % (ref 0–6)
ERYTHROCYTE [DISTWIDTH] IN BLOOD BY AUTOMATED COUNT: 13.8 % (ref 11.6–15.1)
FLUAV RNA RESP QL NAA+PROBE: NEGATIVE
FLUBV RNA RESP QL NAA+PROBE: NEGATIVE
GFR SERPL CREATININE-BSD FRML MDRD: 43 ML/MIN/1.73SQ M
GLUCOSE SERPL-MCNC: 117 MG/DL (ref 65–140)
HCT VFR BLD AUTO: 38.2 % (ref 34.8–46.1)
HGB BLD-MCNC: 12.5 G/DL (ref 11.5–15.4)
IMM GRANULOCYTES # BLD AUTO: 0.02 THOUSAND/UL (ref 0–0.2)
IMM GRANULOCYTES NFR BLD AUTO: 0 % (ref 0–2)
INR PPP: 1.13 (ref 0.84–1.19)
LYMPHOCYTES # BLD AUTO: 2.71 THOUSANDS/ΜL (ref 0.6–4.47)
LYMPHOCYTES NFR BLD AUTO: 36 % (ref 14–44)
MAGNESIUM SERPL-MCNC: 2 MG/DL (ref 1.6–2.6)
MCH RBC QN AUTO: 28.8 PG (ref 26.8–34.3)
MCHC RBC AUTO-ENTMCNC: 32.7 G/DL (ref 31.4–37.4)
MCV RBC AUTO: 88 FL (ref 82–98)
MONOCYTES # BLD AUTO: 0.73 THOUSAND/ΜL (ref 0.17–1.22)
MONOCYTES NFR BLD AUTO: 10 % (ref 4–12)
NEUTROPHILS # BLD AUTO: 3.87 THOUSANDS/ΜL (ref 1.85–7.62)
NEUTS SEG NFR BLD AUTO: 50 % (ref 43–75)
NRBC BLD AUTO-RTO: 0 /100 WBCS
NT-PROBNP SERPL-MCNC: 129 PG/ML
PLATELET # BLD AUTO: 228 THOUSANDS/UL (ref 149–390)
PMV BLD AUTO: 10.3 FL (ref 8.9–12.7)
POTASSIUM SERPL-SCNC: 3.5 MMOL/L (ref 3.5–5.3)
PROTHROMBIN TIME: 14.3 SECONDS (ref 11.6–14.5)
RBC # BLD AUTO: 4.34 MILLION/UL (ref 3.81–5.12)
RSV RNA RESP QL NAA+PROBE: NEGATIVE
SARS-COV-2 RNA RESP QL NAA+PROBE: NEGATIVE
SODIUM SERPL-SCNC: 137 MMOL/L (ref 136–145)
TROPONIN I SERPL-MCNC: <0.02 NG/ML
TSH SERPL DL<=0.05 MIU/L-ACNC: 4.05 UIU/ML (ref 0.36–3.74)
WBC # BLD AUTO: 7.62 THOUSAND/UL (ref 4.31–10.16)

## 2020-11-25 PROCEDURE — 71275 CT ANGIOGRAPHY CHEST: CPT

## 2020-11-25 PROCEDURE — 93005 ELECTROCARDIOGRAM TRACING: CPT

## 2020-11-25 PROCEDURE — 85610 PROTHROMBIN TIME: CPT | Performed by: NURSE PRACTITIONER

## 2020-11-25 PROCEDURE — 0241U HB NFCT DS VIR RESP RNA 4 TRGT: CPT | Performed by: EMERGENCY MEDICINE

## 2020-11-25 PROCEDURE — 85379 FIBRIN DEGRADATION QUANT: CPT | Performed by: EMERGENCY MEDICINE

## 2020-11-25 PROCEDURE — 1124F ACP DISCUSS-NO DSCNMKR DOCD: CPT | Performed by: INTERNAL MEDICINE

## 2020-11-25 PROCEDURE — 99285 EMERGENCY DEPT VISIT HI MDM: CPT

## 2020-11-25 PROCEDURE — 83880 ASSAY OF NATRIURETIC PEPTIDE: CPT | Performed by: EMERGENCY MEDICINE

## 2020-11-25 PROCEDURE — 71045 X-RAY EXAM CHEST 1 VIEW: CPT

## 2020-11-25 PROCEDURE — 84484 ASSAY OF TROPONIN QUANT: CPT | Performed by: NURSE PRACTITIONER

## 2020-11-25 PROCEDURE — 80048 BASIC METABOLIC PNL TOTAL CA: CPT | Performed by: EMERGENCY MEDICINE

## 2020-11-25 PROCEDURE — 84443 ASSAY THYROID STIM HORMONE: CPT | Performed by: EMERGENCY MEDICINE

## 2020-11-25 PROCEDURE — NC001 PR NO CHARGE: Performed by: FAMILY MEDICINE

## 2020-11-25 PROCEDURE — 85025 COMPLETE CBC W/AUTO DIFF WBC: CPT | Performed by: EMERGENCY MEDICINE

## 2020-11-25 PROCEDURE — 99222 1ST HOSP IP/OBS MODERATE 55: CPT | Performed by: INTERNAL MEDICINE

## 2020-11-25 PROCEDURE — 83735 ASSAY OF MAGNESIUM: CPT | Performed by: NURSE PRACTITIONER

## 2020-11-25 PROCEDURE — 96374 THER/PROPH/DIAG INJ IV PUSH: CPT

## 2020-11-25 PROCEDURE — 96375 TX/PRO/DX INJ NEW DRUG ADDON: CPT

## 2020-11-25 PROCEDURE — 84484 ASSAY OF TROPONIN QUANT: CPT | Performed by: EMERGENCY MEDICINE

## 2020-11-25 PROCEDURE — 36415 COLL VENOUS BLD VENIPUNCTURE: CPT | Performed by: EMERGENCY MEDICINE

## 2020-11-25 PROCEDURE — G1004 CDSM NDSC: HCPCS

## 2020-11-25 PROCEDURE — 99285 EMERGENCY DEPT VISIT HI MDM: CPT | Performed by: EMERGENCY MEDICINE

## 2020-11-25 RX ORDER — METOPROLOL SUCCINATE 25 MG/1
25 TABLET, EXTENDED RELEASE ORAL DAILY
Qty: 30 TABLET | Refills: 0 | Status: SHIPPED | OUTPATIENT
Start: 2020-11-26 | End: 2020-12-04 | Stop reason: SDUPTHER

## 2020-11-25 RX ORDER — ASPIRIN 81 MG/1
81 TABLET, CHEWABLE ORAL DAILY
Status: DISCONTINUED | OUTPATIENT
Start: 2020-11-25 | End: 2020-11-25

## 2020-11-25 RX ORDER — CITALOPRAM 20 MG/1
40 TABLET ORAL DAILY
Status: DISCONTINUED | OUTPATIENT
Start: 2020-11-25 | End: 2020-11-25 | Stop reason: HOSPADM

## 2020-11-25 RX ORDER — METOPROLOL SUCCINATE 25 MG/1
25 TABLET, EXTENDED RELEASE ORAL DAILY
Status: DISCONTINUED | OUTPATIENT
Start: 2020-11-25 | End: 2020-11-25 | Stop reason: HOSPADM

## 2020-11-25 RX ORDER — DILTIAZEM HYDROCHLORIDE 5 MG/ML
10 INJECTION INTRAVENOUS ONCE
Status: COMPLETED | OUTPATIENT
Start: 2020-11-25 | End: 2020-11-25

## 2020-11-25 RX ORDER — POTASSIUM CHLORIDE 20 MEQ/1
20 TABLET, EXTENDED RELEASE ORAL ONCE
Status: COMPLETED | OUTPATIENT
Start: 2020-11-25 | End: 2020-11-25

## 2020-11-25 RX ORDER — LEVOTHYROXINE SODIUM 0.1 MG/1
100 TABLET ORAL DAILY
Qty: 30 TABLET | Refills: 2 | Status: SHIPPED | OUTPATIENT
Start: 2020-11-25 | End: 2021-03-22

## 2020-11-25 RX ORDER — SODIUM CHLORIDE 9 MG/ML
3 INJECTION INTRAVENOUS
Status: DISCONTINUED | OUTPATIENT
Start: 2020-11-25 | End: 2020-11-25 | Stop reason: HOSPADM

## 2020-11-25 RX ORDER — LEVOTHYROXINE SODIUM 88 UG/1
TABLET ORAL
COMMUNITY
End: 2020-11-25 | Stop reason: SDUPTHER

## 2020-11-25 RX ORDER — LEVOTHYROXINE SODIUM 88 UG/1
88 TABLET ORAL
Status: DISCONTINUED | OUTPATIENT
Start: 2020-11-25 | End: 2020-11-25 | Stop reason: HOSPADM

## 2020-11-25 RX ORDER — METOPROLOL TARTRATE 5 MG/5ML
2.5 INJECTION INTRAVENOUS ONCE
Status: COMPLETED | OUTPATIENT
Start: 2020-11-25 | End: 2020-11-25

## 2020-11-25 RX ORDER — LORAZEPAM 2 MG/ML
1 INJECTION INTRAMUSCULAR ONCE
Status: COMPLETED | OUTPATIENT
Start: 2020-11-25 | End: 2020-11-25

## 2020-11-25 RX ORDER — OLMESARTAN MEDOXOMIL AND HYDROCHLOROTHIAZIDE 20/12.5 20; 12.5 MG/1; MG/1
1 TABLET ORAL DAILY
COMMUNITY

## 2020-11-25 RX ORDER — ATORVASTATIN CALCIUM 10 MG/1
10 TABLET, FILM COATED ORAL DAILY
Status: DISCONTINUED | OUTPATIENT
Start: 2020-11-25 | End: 2020-11-25 | Stop reason: HOSPADM

## 2020-11-25 RX ORDER — DIPHENHYDRAMINE HYDROCHLORIDE 50 MG/ML
50 INJECTION INTRAMUSCULAR; INTRAVENOUS ONCE
Status: COMPLETED | OUTPATIENT
Start: 2020-11-25 | End: 2020-11-25

## 2020-11-25 RX ADMIN — METOPROLOL SUCCINATE 25 MG: 25 TABLET, EXTENDED RELEASE ORAL at 13:48

## 2020-11-25 RX ADMIN — POTASSIUM CHLORIDE 20 MEQ: 1500 TABLET, EXTENDED RELEASE ORAL at 09:30

## 2020-11-25 RX ADMIN — CITALOPRAM HYDROBROMIDE 40 MG: 20 TABLET ORAL at 08:39

## 2020-11-25 RX ADMIN — DIPHENHYDRAMINE HYDROCHLORIDE 50 MG: 50 INJECTION, SOLUTION INTRAMUSCULAR; INTRAVENOUS at 02:46

## 2020-11-25 RX ADMIN — DILTIAZEM HYDROCHLORIDE 10 MG: 5 INJECTION INTRAVENOUS at 01:50

## 2020-11-25 RX ADMIN — LEVOTHYROXINE SODIUM 88 MCG: 88 TABLET ORAL at 08:39

## 2020-11-25 RX ADMIN — ASPIRIN 81 MG 81 MG: 81 TABLET ORAL at 08:45

## 2020-11-25 RX ADMIN — IODIXANOL 100 ML: 320 INJECTION, SOLUTION INTRAVASCULAR at 03:06

## 2020-11-25 RX ADMIN — SODIUM CHLORIDE 1000 ML: 0.9 INJECTION, SOLUTION INTRAVENOUS at 05:37

## 2020-11-25 RX ADMIN — METOROPROLOL TARTRATE 2.5 MG: 5 INJECTION, SOLUTION INTRAVENOUS at 04:41

## 2020-11-25 RX ADMIN — APIXABAN 5 MG: 5 TABLET, FILM COATED ORAL at 08:47

## 2020-11-25 RX ADMIN — LORAZEPAM 1 MG: 2 INJECTION INTRAMUSCULAR; INTRAVENOUS at 02:58

## 2020-11-27 ENCOUNTER — TELEPHONE (OUTPATIENT)
Dept: NON INVASIVE DIAGNOSTICS | Facility: HOSPITAL | Age: 65
End: 2020-11-27

## 2020-11-29 LAB
ATRIAL RATE: 286 BPM
QRS AXIS: 34 DEGREES
QRSD INTERVAL: 90 MS
QT INTERVAL: 350 MS
QTC INTERVAL: 490 MS
T WAVE AXIS: 75 DEGREES
VENTRICULAR RATE: 118 BPM

## 2020-11-29 PROCEDURE — 93010 ELECTROCARDIOGRAM REPORT: CPT | Performed by: INTERNAL MEDICINE

## 2020-12-02 ENCOUNTER — HOSPITAL ENCOUNTER (OUTPATIENT)
Dept: NON INVASIVE DIAGNOSTICS | Facility: HOSPITAL | Age: 65
Discharge: HOME | End: 2020-12-02
Attending: FAMILY MEDICINE
Payer: MEDICARE

## 2020-12-02 ENCOUNTER — HOSPITAL ENCOUNTER (OUTPATIENT)
Dept: NUCLEAR MEDICINE | Facility: HOSPITAL | Age: 65
Discharge: HOME | End: 2020-12-02
Attending: FAMILY MEDICINE
Payer: MEDICARE

## 2020-12-02 ENCOUNTER — HOSPITAL ENCOUNTER (OUTPATIENT)
Dept: NON INVASIVE DIAGNOSTICS | Facility: HOSPITAL | Age: 65
Discharge: HOME | End: 2020-12-02
Payer: MEDICARE

## 2020-12-02 DIAGNOSIS — I48.0 PAROXYSMAL ATRIAL FIBRILLATION (HCC): ICD-10-CM

## 2020-12-02 PROCEDURE — 93306 TTE W/DOPPLER COMPLETE: CPT

## 2020-12-02 PROCEDURE — A9502 TC99M TETROFOSMIN: HCPCS

## 2020-12-02 PROCEDURE — G1004 CDSM NDSC: HCPCS

## 2020-12-02 PROCEDURE — 93017 CV STRESS TEST TRACING ONLY: CPT

## 2020-12-02 PROCEDURE — 78452 HT MUSCLE IMAGE SPECT MULT: CPT

## 2020-12-02 RX ADMIN — REGADENOSON 0.4 MG: 0.08 INJECTION, SOLUTION INTRAVENOUS at 10:14

## 2020-12-04 ENCOUNTER — OFFICE VISIT (OUTPATIENT)
Dept: CARDIOLOGY CLINIC | Facility: CLINIC | Age: 65
End: 2020-12-04
Payer: MEDICARE

## 2020-12-04 VITALS
BODY MASS INDEX: 41.44 KG/M2 | HEIGHT: 67 IN | TEMPERATURE: 97.9 F | DIASTOLIC BLOOD PRESSURE: 78 MMHG | OXYGEN SATURATION: 96 % | SYSTOLIC BLOOD PRESSURE: 138 MMHG | HEART RATE: 64 BPM | WEIGHT: 264 LBS

## 2020-12-04 DIAGNOSIS — F51.11 PRIMARY HYPERSOMNIA: ICD-10-CM

## 2020-12-04 DIAGNOSIS — I10 ESSENTIAL HYPERTENSION: ICD-10-CM

## 2020-12-04 DIAGNOSIS — E03.8 HYPOTHYROIDISM DUE TO HASHIMOTO'S THYROIDITIS: ICD-10-CM

## 2020-12-04 DIAGNOSIS — R06.83 SNORING: ICD-10-CM

## 2020-12-04 DIAGNOSIS — E78.5 HYPERLIPIDEMIA, UNSPECIFIED HYPERLIPIDEMIA TYPE: ICD-10-CM

## 2020-12-04 DIAGNOSIS — R40.0 DAYTIME SLEEPINESS: ICD-10-CM

## 2020-12-04 DIAGNOSIS — I48.0 PAROXYSMAL ATRIAL FIBRILLATION (HCC): Primary | ICD-10-CM

## 2020-12-04 DIAGNOSIS — E66.01 MORBID OBESITY WITH BMI OF 40.0-44.9, ADULT (HCC): ICD-10-CM

## 2020-12-04 DIAGNOSIS — E06.3 HYPOTHYROIDISM DUE TO HASHIMOTO'S THYROIDITIS: ICD-10-CM

## 2020-12-04 PROCEDURE — 99215 OFFICE O/P EST HI 40 MIN: CPT | Performed by: INTERNAL MEDICINE

## 2020-12-04 RX ORDER — METOPROLOL SUCCINATE 25 MG/1
25 TABLET, EXTENDED RELEASE ORAL DAILY
Qty: 90 TABLET | Refills: 3 | Status: SHIPPED | OUTPATIENT
Start: 2020-12-04 | End: 2022-05-05

## 2020-12-11 LAB
ARRHY DURING EX: NORMAL
CHEST PAIN STATEMENT: NORMAL
MAX DIASTOLIC BP: 70 MMHG
MAX HEART RATE: 85 BPM
MAX PREDICTED HEART RATE: 153 BPM
MAX. SYSTOLIC BP: 138 MMHG
PROTOCOL NAME: NORMAL
REASON FOR TERMINATION: NORMAL
TARGET HR FORMULA: NORMAL
TEST INDICATION: NORMAL
TIME IN EXERCISE PHASE: NORMAL

## 2020-12-21 ENCOUNTER — TELEPHONE (OUTPATIENT)
Dept: SLEEP CENTER | Facility: CLINIC | Age: 65
End: 2020-12-21

## 2020-12-23 ENCOUNTER — TELEPHONE (OUTPATIENT)
Dept: OTHER | Facility: OTHER | Age: 65
End: 2020-12-23

## 2020-12-23 ENCOUNTER — TELEPHONE (OUTPATIENT)
Dept: NON INVASIVE DIAGNOSTICS | Facility: HOSPITAL | Age: 65
End: 2020-12-23

## 2021-01-08 ENCOUNTER — TELEPHONE (OUTPATIENT)
Dept: NEPHROLOGY | Facility: CLINIC | Age: 66
End: 2021-01-08

## 2021-01-08 NOTE — TELEPHONE ENCOUNTER
Pt called questioning how Dr Martha Vitale will receive results from any EKG's that she does on her own  Pt bought device on her own   She will contact company to make sure she added doctor to receive results

## 2021-01-15 ENCOUNTER — TELEPHONE (OUTPATIENT)
Dept: CARDIOLOGY CLINIC | Facility: CLINIC | Age: 66
End: 2021-01-15

## 2021-01-15 NOTE — TELEPHONE ENCOUNTER
Pt is concerned and states that her PCP is also concerned that her TSH dropped from 1 76 to  91  Her PCP wanted Dr Alida Villalobos to take a look at this  FYI pt states that she did get her cardiamobile device  States that there is a way to have her information from that sent to you, but unsure how

## 2021-01-19 NOTE — TELEPHONE ENCOUNTER
Per Dr Carmella Weinstein she would recommend that the level be around 1-1 5  advised pt of this  Advised her to talk to her PCP about adjustment

## 2021-02-04 ENCOUNTER — APPOINTMENT (EMERGENCY)
Dept: RADIOLOGY | Facility: HOSPITAL | Age: 66
End: 2021-02-04
Payer: MEDICARE

## 2021-02-04 ENCOUNTER — HOSPITAL ENCOUNTER (EMERGENCY)
Facility: HOSPITAL | Age: 66
Discharge: HOME | End: 2021-02-04
Attending: EMERGENCY MEDICINE
Payer: MEDICARE

## 2021-02-04 VITALS
TEMPERATURE: 98.4 F | OXYGEN SATURATION: 94 % | RESPIRATION RATE: 12 BRPM | HEART RATE: 52 BPM | BODY MASS INDEX: 42.57 KG/M2 | SYSTOLIC BLOOD PRESSURE: 122 MMHG | WEIGHT: 271.83 LBS | DIASTOLIC BLOOD PRESSURE: 64 MMHG

## 2021-02-04 DIAGNOSIS — R42 DIZZINESS: ICD-10-CM

## 2021-02-04 DIAGNOSIS — N39.0 UTI (URINARY TRACT INFECTION): Primary | ICD-10-CM

## 2021-02-04 LAB
ALBUMIN SERPL BCP-MCNC: 3.7 G/DL (ref 3.5–5)
ALP SERPL-CCNC: 110 U/L (ref 46–116)
ALT SERPL W P-5'-P-CCNC: 45 U/L (ref 12–78)
ANION GAP SERPL CALCULATED.3IONS-SCNC: 9 MMOL/L (ref 4–13)
AST SERPL W P-5'-P-CCNC: 37 U/L (ref 5–45)
BACTERIA UR QL AUTO: ABNORMAL /HPF
BASOPHILS # BLD AUTO: 0.07 THOUSANDS/ΜL (ref 0–0.1)
BASOPHILS NFR BLD AUTO: 1 % (ref 0–1)
BILIRUB SERPL-MCNC: 0.61 MG/DL (ref 0.2–1)
BILIRUB UR QL STRIP: NEGATIVE
BUN SERPL-MCNC: 22 MG/DL (ref 5–25)
CALCIUM SERPL-MCNC: 9.2 MG/DL (ref 8.3–10.1)
CHLORIDE SERPL-SCNC: 99 MMOL/L (ref 100–108)
CLARITY UR: CLEAR
CO2 SERPL-SCNC: 28 MMOL/L (ref 21–32)
COLOR UR: YELLOW
CREAT SERPL-MCNC: 1.14 MG/DL (ref 0.6–1.3)
EOSINOPHIL # BLD AUTO: 0.2 THOUSAND/ΜL (ref 0–0.61)
EOSINOPHIL NFR BLD AUTO: 2 % (ref 0–6)
ERYTHROCYTE [DISTWIDTH] IN BLOOD BY AUTOMATED COUNT: 14.1 % (ref 11.6–15.1)
GFR SERPL CREATININE-BSD FRML MDRD: 50 ML/MIN/1.73SQ M
GLUCOSE SERPL-MCNC: 104 MG/DL (ref 65–140)
GLUCOSE SERPL-MCNC: 91 MG/DL (ref 65–140)
GLUCOSE UR STRIP-MCNC: NEGATIVE MG/DL
HCT VFR BLD AUTO: 39.6 % (ref 34.8–46.1)
HGB BLD-MCNC: 12.9 G/DL (ref 11.5–15.4)
HGB UR QL STRIP.AUTO: ABNORMAL
IMM GRANULOCYTES # BLD AUTO: 0.03 THOUSAND/UL (ref 0–0.2)
IMM GRANULOCYTES NFR BLD AUTO: 0 % (ref 0–2)
KETONES UR STRIP-MCNC: NEGATIVE MG/DL
LEUKOCYTE ESTERASE UR QL STRIP: ABNORMAL
LYMPHOCYTES # BLD AUTO: 2.16 THOUSANDS/ΜL (ref 0.6–4.47)
LYMPHOCYTES NFR BLD AUTO: 25 % (ref 14–44)
MAGNESIUM SERPL-MCNC: 2.1 MG/DL (ref 1.6–2.6)
MCH RBC QN AUTO: 28.5 PG (ref 26.8–34.3)
MCHC RBC AUTO-ENTMCNC: 32.6 G/DL (ref 31.4–37.4)
MCV RBC AUTO: 88 FL (ref 82–98)
MONOCYTES # BLD AUTO: 0.86 THOUSAND/ΜL (ref 0.17–1.22)
MONOCYTES NFR BLD AUTO: 10 % (ref 4–12)
NEUTROPHILS # BLD AUTO: 5.23 THOUSANDS/ΜL (ref 1.85–7.62)
NEUTS SEG NFR BLD AUTO: 62 % (ref 43–75)
NITRITE UR QL STRIP: NEGATIVE
NON-SQ EPI CELLS URNS QL MICRO: ABNORMAL /HPF
NRBC BLD AUTO-RTO: 0 /100 WBCS
PH UR STRIP.AUTO: 6.5 [PH]
PLATELET # BLD AUTO: 248 THOUSANDS/UL (ref 149–390)
PMV BLD AUTO: 10.1 FL (ref 8.9–12.7)
POTASSIUM SERPL-SCNC: 4.4 MMOL/L (ref 3.5–5.3)
PROT SERPL-MCNC: 8.3 G/DL (ref 6.4–8.2)
PROT UR STRIP-MCNC: NEGATIVE MG/DL
RBC # BLD AUTO: 4.52 MILLION/UL (ref 3.81–5.12)
RBC #/AREA URNS AUTO: ABNORMAL /HPF
SODIUM SERPL-SCNC: 136 MMOL/L (ref 136–145)
SP GR UR STRIP.AUTO: 1.01 (ref 1–1.03)
TROPONIN I SERPL-MCNC: <0.02 NG/ML
TSH SERPL DL<=0.05 MIU/L-ACNC: 1.15 UIU/ML (ref 0.36–3.74)
UROBILINOGEN UR QL STRIP.AUTO: 0.2 E.U./DL
WBC # BLD AUTO: 8.55 THOUSAND/UL (ref 4.31–10.16)
WBC #/AREA URNS AUTO: ABNORMAL /HPF

## 2021-02-04 PROCEDURE — 81001 URINALYSIS AUTO W/SCOPE: CPT | Performed by: PHYSICIAN ASSISTANT

## 2021-02-04 PROCEDURE — 87086 URINE CULTURE/COLONY COUNT: CPT | Performed by: PHYSICIAN ASSISTANT

## 2021-02-04 PROCEDURE — 36415 COLL VENOUS BLD VENIPUNCTURE: CPT | Performed by: PHYSICIAN ASSISTANT

## 2021-02-04 PROCEDURE — 84443 ASSAY THYROID STIM HORMONE: CPT | Performed by: PHYSICIAN ASSISTANT

## 2021-02-04 PROCEDURE — 85025 COMPLETE CBC W/AUTO DIFF WBC: CPT | Performed by: PHYSICIAN ASSISTANT

## 2021-02-04 PROCEDURE — 84484 ASSAY OF TROPONIN QUANT: CPT | Performed by: PHYSICIAN ASSISTANT

## 2021-02-04 PROCEDURE — 96374 THER/PROPH/DIAG INJ IV PUSH: CPT

## 2021-02-04 PROCEDURE — 80053 COMPREHEN METABOLIC PANEL: CPT | Performed by: PHYSICIAN ASSISTANT

## 2021-02-04 PROCEDURE — 99284 EMERGENCY DEPT VISIT MOD MDM: CPT

## 2021-02-04 PROCEDURE — 71045 X-RAY EXAM CHEST 1 VIEW: CPT

## 2021-02-04 PROCEDURE — 82948 REAGENT STRIP/BLOOD GLUCOSE: CPT

## 2021-02-04 PROCEDURE — 96361 HYDRATE IV INFUSION ADD-ON: CPT

## 2021-02-04 PROCEDURE — 99285 EMERGENCY DEPT VISIT HI MDM: CPT | Performed by: PHYSICIAN ASSISTANT

## 2021-02-04 PROCEDURE — 93005 ELECTROCARDIOGRAM TRACING: CPT

## 2021-02-04 PROCEDURE — 83735 ASSAY OF MAGNESIUM: CPT | Performed by: PHYSICIAN ASSISTANT

## 2021-02-04 RX ORDER — CEPHALEXIN 250 MG/1
500 CAPSULE ORAL ONCE
Status: COMPLETED | OUTPATIENT
Start: 2021-02-04 | End: 2021-02-04

## 2021-02-04 RX ORDER — ONDANSETRON 4 MG/1
4 TABLET, FILM COATED ORAL EVERY 6 HOURS
Qty: 12 TABLET | Refills: 0 | Status: SHIPPED | OUTPATIENT
Start: 2021-02-04 | End: 2021-06-24

## 2021-02-04 RX ORDER — MECLIZINE HCL 12.5 MG/1
12.5 TABLET ORAL ONCE
Status: COMPLETED | OUTPATIENT
Start: 2021-02-04 | End: 2021-02-04

## 2021-02-04 RX ORDER — DIAZEPAM 5 MG/ML
5 INJECTION, SOLUTION INTRAMUSCULAR; INTRAVENOUS ONCE
Status: COMPLETED | OUTPATIENT
Start: 2021-02-04 | End: 2021-02-04

## 2021-02-04 RX ORDER — CEPHALEXIN 500 MG/1
500 CAPSULE ORAL EVERY 6 HOURS SCHEDULED
Qty: 28 CAPSULE | Refills: 0 | Status: SHIPPED | OUTPATIENT
Start: 2021-02-04 | End: 2021-02-11

## 2021-02-04 RX ORDER — MECLIZINE HYDROCHLORIDE 25 MG/1
25 TABLET ORAL 3 TIMES DAILY PRN
Qty: 30 TABLET | Refills: 0 | Status: SHIPPED | OUTPATIENT
Start: 2021-02-04 | End: 2021-06-24 | Stop reason: ALTCHOICE

## 2021-02-04 RX ADMIN — CEPHALEXIN 500 MG: 250 CAPSULE ORAL at 17:26

## 2021-02-04 RX ADMIN — SODIUM CHLORIDE 1000 ML: 0.9 INJECTION, SOLUTION INTRAVENOUS at 16:04

## 2021-02-04 RX ADMIN — MECLIZINE HCL 12.5 MG 12.5 MG: 12.5 TABLET ORAL at 16:39

## 2021-02-04 RX ADMIN — DIAZEPAM 5 MG: 10 INJECTION, SOLUTION INTRAMUSCULAR; INTRAVENOUS at 16:39

## 2021-02-04 NOTE — ED PROVIDER NOTES
History  Chief Complaint   Patient presents with    Dizziness     PT STATES SHE WOKE UP TODAY AND FELT DIZZY, LIKE THE ROOM WAS SPINNING  IT HAS COME AND GONE TODAY AND IS NOW WORSE, STATES WORSE WITH MOVEMENT  INTERMITTENT NAUSEA  NEURO INTACT  DENIES SOB CP     The patient is a 77year old female, PMH Afib on eliquis, hypothyroidism,  who presents to the ED today with the c/o dizziness  The patient states that after waking up she bent over at home to do laundry and had an episode of dizziness that resolved with rest  She states that she then had another episode with movement and nausea without vomiting  She denies any falls or traumas, headache, neck pain, sob, chest pain, vomiting, abdominal pain, numbness or tingling  She describes this as a spinning sensation  She denies any recent illnesses or ear pain, tinnitus  She denies any new medications  Dizziness  Quality:  Room spinning  Severity:  Moderate  Onset quality:  Unable to specify  Duration:  1 day  Timing:  Intermittent  Progression:  Waxing and waning  Chronicity:  New  Context: bending over, head movement and standing up    Relieved by:  Change in position  Worsened by: Movement  Ineffective treatments:  Lying down  Associated symptoms: nausea    Associated symptoms: no chest pain, no diarrhea, no headaches, no hearing loss, no palpitations, no shortness of breath, no syncope, no tinnitus, no vision changes, no vomiting and no weakness    Nausea:     Severity:  Moderate    Onset quality:  Unable to specify    Timing:  Intermittent    Progression:  Waxing and waning  Risk factors: multiple medications    Risk factors: no heart disease, no hx of stroke, no hx of vertigo, no Meniere's disease and no new medications        Prior to Admission Medications   Prescriptions Last Dose Informant Patient Reported? Taking?    ALPRAZolam (XANAX) 0 5 mg tablet   Yes No   Sig: Take by mouth daily at bedtime as needed for anxiety   Cholecalciferol (Vitamin D3) 125 MCG (5000 UT) TABS   Yes No   Sig: Take 5,000 Units by mouth daily   apixaban (ELIQUIS) 5 mg   No No   Sig: Take 1 tablet (5 mg total) by mouth 2 (two) times a day   atorvastatin (LIPITOR) 10 mg tablet   Yes No   Sig: Take 10 mg by mouth daily   citalopram (CeleXA) 10 mg tablet   Yes No   Sig: Take 40 mg by mouth daily    levothyroxine 100 mcg tablet   No No   Sig: Take 1 tablet (100 mcg total) by mouth daily   magnesium oxide (MAG-OX) 400 mg   No No   Sig: Take 0 5 tablets (200 mg total) by mouth 2 (two) times a day   metoprolol succinate (TOPROL-XL) 25 mg 24 hr tablet   No No   Sig: Take 1 tablet (25 mg total) by mouth daily   olmesartan-hydrochlorothiazide (BENICAR HCT) 20-12 5 MG per tablet   Yes No   Sig: Take 1 tablet by mouth daily      Facility-Administered Medications: None       Past Medical History:   Diagnosis Date    Anxiety     Elevated d-dimer     2 seperate episodes of unclear etiology   Hyperlipidemia     Hypertension     Hypothyroid     Obesity     Paroxysmal atrial fibrillation ([facility] [address] )     New onset 2020  Past Surgical History:   Procedure Laterality Date    HYSTERECTOMY      KNEE ARTHROPLASTY      SHOULDER OPEN ROTATOR CUFF REPAIR      x 2    TONSILLECTOMY         Family History   Problem Relation Age of Onset    Hypertension Sister     Heart disease Paternal [de-identified]     Other Mother         Encephalitis    Other Father          in his 80s without significant problems    Lung cancer Brother     No Known Problems Brother      I have reviewed and agree with the history as documented  E-Cigarette/Vaping    E-Cigarette Use Never User      E-Cigarette/Vaping Substances     Social History     Tobacco Use    Smoking status: Never Smoker    Smokeless tobacco: Never Used   Substance Use Topics    Alcohol use: Yes     Frequency: Monthly or less     Comment: Social EtOH    Drug use: Never       Review of Systems   Constitutional: Negative for chills and fever  HENT: Negative for ear pain, hearing loss, sore throat and tinnitus  Eyes: Negative for pain and visual disturbance  Respiratory: Negative for cough, shortness of breath and wheezing  Cardiovascular: Negative for chest pain, palpitations, leg swelling and syncope  Gastrointestinal: Positive for nausea  Negative for abdominal pain, diarrhea and vomiting  Genitourinary: Negative for dysuria and hematuria  Musculoskeletal: Negative for arthralgias and back pain  Skin: Negative for color change and rash  Neurological: Positive for dizziness  Negative for seizures, syncope, weakness and headaches  Physical Exam  Physical Exam  Vitals signs and nursing note reviewed  Constitutional:       General: She is not in acute distress  Appearance: She is well-developed  HENT:      Head: Normocephalic and atraumatic  Right Ear: External ear normal       Left Ear: External ear normal       Mouth/Throat:      Mouth: Mucous membranes are moist    Eyes:      Extraocular Movements: Extraocular movements intact  Right eye: No nystagmus  Left eye: No nystagmus  Pupils: Pupils are equal, round, and reactive to light  Neck:      Musculoskeletal: Normal range of motion and neck supple  Cardiovascular:      Rate and Rhythm: Normal rate and regular rhythm  Pulses: Normal pulses  Heart sounds: No murmur  Pulmonary:      Effort: Pulmonary effort is normal  No respiratory distress  Breath sounds: Normal breath sounds  No wheezing  Abdominal:      General: Bowel sounds are normal       Palpations: Abdomen is soft  There is no mass  Tenderness: There is no abdominal tenderness  There is no rebound  Hernia: No hernia is present  Musculoskeletal:      Right lower leg: No edema  Left lower leg: No edema  Skin:     General: Skin is warm and dry  Capillary Refill: Capillary refill takes less than 2 seconds     Neurological:      General: No focal deficit present  Mental Status: She is alert and oriented to person, place, and time  Cranial Nerves: No cranial nerve deficit  Coordination: Coordination normal       Gait: Gait is intact  Gait normal    Psychiatric:         Behavior: Behavior normal          Vital Signs  ED Triage Vitals   Temperature Pulse Respirations Blood Pressure SpO2   02/04/21 1540 02/04/21 1540 02/04/21 1540 02/04/21 1541 02/04/21 1541   98 4 °F (36 9 °C) 71 18 (!) 187/85 95 %      Temp Source Heart Rate Source Patient Position - Orthostatic VS BP Location FiO2 (%)   02/04/21 1540 02/04/21 1540 02/04/21 1540 02/04/21 1540 --   Temporal Monitor Lying Right arm       Pain Score       02/04/21 1749       No Pain           Vitals:    02/04/21 1630 02/04/21 1645 02/04/21 1700 02/04/21 1715   BP: 129/65 124/62 133/71 122/64   Pulse: 57 63 56 (!) 52   Patient Position - Orthostatic VS:             Visual Acuity  Visual Acuity      Most Recent Value   L Pupil Size (mm)  4   R Pupil Size (mm)  4          ED Medications  Medications   sodium chloride 0 9 % bolus 1,000 mL (1,000 mL Intravenous New Bag 2/4/21 1604)   diazepam (VALIUM) injection 5 mg (5 mg Intravenous Given 2/4/21 1639)   meclizine (ANTIVERT) tablet 12 5 mg (12 5 mg Oral Given 2/4/21 1639)   cephalexin (KEFLEX) capsule 500 mg (500 mg Oral Given 2/4/21 1726)       Diagnostic Studies  Results Reviewed     Procedure Component Value Units Date/Time    TSH [[id number]]  (Normal) Collected: 02/04/21 1558    Lab Status: Final result Specimen: Blood from Arm, Left Updated: 02/04/21 1630     TSH 3RD GENERATON 1 150 uIU/mL     Narrative:      Patients undergoing fluorescein dye angiography may retain small amounts of fluorescein in the body for 48-72 hours post procedure  Samples containing fluorescein can produce falsely depressed TSH values  If the patient had this procedure,a specimen should be resubmitted post fluorescein clearance        Urine Microscopic [[id number]]  (Abnormal) Collected: 02/04/21 1603    Lab Status: Final result Specimen: Urine, Clean Catch Updated: 02/04/21 1626     RBC, UA 0-5 /hpf      WBC, UA 10-20 /hpf      Epithelial Cells Occasional /hpf      Bacteria, UA Occasional /hpf     Urine culture [[id number]] Collected: 02/04/21 1603    Lab Status:  In process Specimen: Urine, Clean Catch Updated: 02/04/21 1626    Troponin I [[id number]]  (Normal) Collected: 02/04/21 1558    Lab Status: Final result Specimen: Blood from Arm, Left Updated: 02/04/21 1623     Troponin I <0 02 ng/mL     Comprehensive metabolic panel [[id number]]  (Abnormal) Collected: 02/04/21 1558    Lab Status: Final result Specimen: Blood from Arm, Left Updated: 02/04/21 1618     Sodium 136 mmol/L      Potassium 4 4 mmol/L      Chloride 99 mmol/L      CO2 28 mmol/L      ANION GAP 9 mmol/L      BUN 22 mg/dL      Creatinine 1 14 mg/dL      Glucose 104 mg/dL      Calcium 9 2 mg/dL      AST 37 U/L      ALT 45 U/L      Alkaline Phosphatase 110 U/L      Total Protein 8 3 g/dL      Albumin 3 7 g/dL      Total Bilirubin 0 61 mg/dL      eGFR 50 ml/min/1 73sq m     Narrative:      [place] guidelines for Chronic Kidney Disease (CKD):     Stage 1 with normal or high GFR (GFR > 90 mL/min/1 73 square meters)    Stage 2 Mild CKD (GFR = 60-89 mL/min/1 73 square meters)    Stage 3A Moderate CKD (GFR = 45-59 mL/min/1 73 square meters)    Stage 3B Moderate CKD (GFR = 30-44 mL/min/1 73 square meters)    Stage 4 Severe CKD (GFR = 15-29 mL/min/1 73 square meters)    Stage 5 End Stage CKD (GFR <15 mL/min/1 73 square meters)  Note: GFR calculation is accurate only with a steady state creatinine    Magnesium [[id number]]  (Normal) Collected: 02/04/21 1558    Lab Status: Final result Specimen: Blood from Arm, Left Updated: 02/04/21 1618     Magnesium 2 1 mg/dL     UA w Reflex to Microscopic w Reflex to Culture [[id number]]  (Abnormal) Collected: 02/04/21 1603    Lab Status: Final result Specimen: Urine, Clean Catch Updated: 02/04/21 1617     Color, UA Yellow     Clarity, UA Clear     Specific Gravity, UA 1 015     pH, UA 6 5     Leukocytes, UA Moderate     Nitrite, UA Negative     Protein, UA Negative mg/dl      Glucose, UA Negative mg/dl      Ketones, UA Negative mg/dl      Urobilinogen, UA 0 2 E U /dl      Bilirubin, UA Negative     Blood, UA Trace-Intact    CBC and differential [[id number]] Collected: 02/04/21 1558    Lab Status: Final result Specimen: Blood from Arm, Left Updated: 02/04/21 1603     WBC 8 55 Thousand/uL      RBC 4 52 Million/uL      Hemoglobin 12 9 g/dL      Hematocrit 39 6 %      MCV 88 fL      MCH 28 5 pg      MCHC 32 6 g/dL      RDW 14 1 %      MPV 10 1 fL      Platelets 482 Thousands/uL      nRBC 0 /100 WBCs      Neutrophils Relative 62 %      Immat GRANS % 0 %      Lymphocytes Relative 25 %      Monocytes Relative 10 %      Eosinophils Relative 2 %      Basophils Relative 1 %      Neutrophils Absolute 5 23 Thousands/µL      Immature Grans Absolute 0 03 Thousand/uL      Lymphocytes Absolute 2 16 Thousands/µL      Monocytes Absolute 0 86 Thousand/µL      Eosinophils Absolute 0 20 Thousand/µL      Basophils Absolute 0 07 Thousands/µL     Fingerstick Glucose (POCT) [[id number]]  (Normal) Collected: 02/04/21 1546    Lab Status: Final result Updated: 02/04/21 1548     POC Glucose 91 mg/dl                  XR chest 1 view portable   Final Result by Katie De La Rosa MD (02/04 1754)      No acute cardiopulmonary disease                    Workstation performed: [id number]                    Procedures  ECG 12 Lead Documentation Only    Date/Time: 2/4/2021 4:07 PM  Performed by: Viviana Chanel PA-C  Authorized by: Viviana Chanel PA-C     Indications / Diagnosis:  Dizziness  ECG reviewed by me, the ED Provider: yes    Patient location:  ED  Previous ECG:     Previous ECG:  Unavailable  Interpretation:     Interpretation: normal    Rate:     ECG rate:  74    ECG rate assessment: normal    Rhythm: Rhythm: sinus rhythm    Conduction:     Conduction: normal    ST segments:     ST segments:  Non-specific  T waves:     T waves: non-specific               ED Course  ED Course as of Feb 04 1811   Thu Feb 04, 2021   1549 Reviewed echo 12/02/2020SUMMARY:  -  Perfusion imaging: Rotating projection images reveal moderate breast attenuation   -  Gated SPECT: The calculated left ventricular ejection fraction was 73 %  There was no left ventricular regional abnormality   -  Impressions and recommendations: Normal study after pharmacologic vasodilation      IMPRESSIONS: Normal study after pharmacologic vasodilation      Prepared and signed by     Michael Buchanan MD  Signed 12/02/2020 12:01:02      1625 Troponin I: <0 02   1630 Magnesium: 2 1   1632 TSH 3RD GENERATON: 1 150   1650 Patient just informed of lab work findingsTreated with IVF, valium, and antivert will re evaluate later        [id number] Ambulated well without assistance had improvement of dizziness                                  SBIRT 20yo+      Most Recent Value   SBIRT (25 yo +)   In order to provide better care to our patients, we are screening all of our patients for alcohol and drug use  Would it be okay to ask you these screening questions? Yes Filed at: 02/04/2021 1610   Initial Alcohol Screen: US AUDIT-C    1  How often do you have a drink containing alcohol?  0 Filed at: 02/04/2021 1610   2  How many drinks containing alcohol do you have on a typical day you are drinking? 0 Filed at: 02/04/2021 1610   3b  FEMALE Any Age, or MALE 65+: How often do you have 4 or more drinks on one occassion? 0 Filed at: 02/04/2021 1610   Audit-C Score  0 Filed at: 02/04/2021 1610                    MDM  Number of Diagnoses or Management Options  Dizziness:   UTI (urinary tract infection):   Diagnosis management comments: Labs unremarkable  Patients symptoms improved  UA concerning for UTI  Patient ambulated without difficulty         Amount and/or Complexity of Data Reviewed  Clinical lab tests: ordered and reviewed  Tests in the radiology section of CPT®: ordered and reviewed  Decide to obtain previous medical records or to obtain history from someone other than the patient: yes  Review and summarize past medical records: yes  Independent visualization of images, tracings, or specimens: yes    Risk of Complications, Morbidity, and/or Mortality  Presenting problems: moderate  Diagnostic procedures: moderate  Management options: moderate    Patient Progress  Patient progress: stable      Disposition  Final diagnoses:   UTI (urinary tract infection)   Dizziness     Time reflects when diagnosis was documented in both MDM as applicable and the Disposition within this note     Time User Action Codes Description Comment    2/4/2021  5:48 PM Lillie Butt Add [N39 0] UTI (urinary tract infection)     2/4/2021  5:48 PM Lillie Woody [R42] Dizziness       ED Disposition     ED Disposition Condition Date/Time Comment    Discharge Stable Thu Feb 4, 2021  5:48 PM Codey Bey discharge to home/self care              Follow-up Information     Follow up With Specialties Details Why Contact [facility],   Call  As needed [id number]  [phone]            Patient's Medications   Discharge Prescriptions    CEPHALEXIN (KEFLEX) 500 MG CAPSULE    Take 1 capsule (500 mg total) by mouth every 6 (six) hours for 7 days       Start Date: 2/4/2021  End Date: 2/11/2021       Order Dose: 500 mg       Quantity: 28 capsule    Refills: 0    MECLIZINE (ANTIVERT) 25 MG TABLET    Take 1 tablet (25 mg total) by mouth 3 (three) times a day as needed for dizziness       Start Date: 2/4/2021  End Date: --       Order Dose: 25 mg       Quantity: 30 tablet    Refills: 0    ONDANSETRON (ZOFRAN) 4 MG TABLET    Take 1 tablet (4 mg total) by mouth every 6 (six) hours       Start Date: 2/4/2021  End Date: --       Order Dose: 4 mg       Quantity: 12 tablet Refills: 0     No discharge procedures on file      PDMP Review       Value Time User    PDMP Reviewed  Yes 11/25/2020  2:06 PM Marko Yadav MD          ED Provider  Electronically Signed by           Alex Irene PA-C  02/04/21 [id number]

## 2021-02-05 NOTE — ED ATTENDING ATTESTATION
2/4/2021  ITam, DO, discussed the patient with the resident/non-physician practitioner and agree with the resident's/non-physician practitioner's findings, Plan of Care, and MDM as documented in the resident's/non-physician practitioner's note, except where noted  All available labs and Radiology studies were reviewed  I was present for key portions of any procedure(s) performed by the resident/non-physician practitioner and I was immediately available to provide assistance  At this point I agree with the current assessment done in the Emergency Department

## 2021-02-06 LAB — BACTERIA UR CULT: NORMAL

## 2021-02-07 LAB
ATRIAL RATE: 74 BPM
P AXIS: 71 DEGREES
PR INTERVAL: 176 MS
QRS AXIS: 60 DEGREES
QRSD INTERVAL: 98 MS
QT INTERVAL: 438 MS
QTC INTERVAL: 486 MS
T WAVE AXIS: 82 DEGREES
VENTRICULAR RATE: 74 BPM

## 2021-02-07 PROCEDURE — 93010 ELECTROCARDIOGRAM REPORT: CPT | Performed by: INTERNAL MEDICINE

## 2021-03-04 ENCOUNTER — HOSPITAL ENCOUNTER (OUTPATIENT)
Dept: SLEEP CENTER | Facility: HOSPITAL | Age: 66
Discharge: HOME | End: 2021-03-04
Attending: INTERNAL MEDICINE
Payer: MEDICARE

## 2021-03-04 DIAGNOSIS — R06.83 SNORING: ICD-10-CM

## 2021-03-04 DIAGNOSIS — F51.11 PRIMARY HYPERSOMNIA: ICD-10-CM

## 2021-03-04 DIAGNOSIS — E66.01 MORBID OBESITY WITH BMI OF 40.0-44.9, ADULT (HCC): ICD-10-CM

## 2021-03-04 DIAGNOSIS — I10 ESSENTIAL HYPERTENSION: ICD-10-CM

## 2021-03-04 DIAGNOSIS — I48.0 PAROXYSMAL ATRIAL FIBRILLATION (HCC): ICD-10-CM

## 2021-03-04 DIAGNOSIS — R40.0 DAYTIME SLEEPINESS: ICD-10-CM

## 2021-03-04 PROCEDURE — G0399 HOME SLEEP TEST/TYPE 3 PORTA: HCPCS

## 2021-03-06 NOTE — PROGRESS NOTES
Home Sleep Study Documentation    Pre-Sleep Home Study:    Set-up and instructions performed by: LIBRADO Sky RRT    Technician performed demonstration for Patient: yes    Return demonstration performed by Patient: yes    Written instructions provided to Patient: yes    Patient signed consent form: yes        Post-Sleep Home Study:    Additional comments by Patient: n/a    Home Sleep Study Failed:no:    Failure reason: N/A    Reported or Detected: N/A    Scored by: LIBRADO Sky RRT

## 2021-03-09 ENCOUNTER — TELEPHONE (OUTPATIENT)
Dept: SLEEP CENTER | Facility: CLINIC | Age: 66
End: 2021-03-09

## 2021-03-09 ENCOUNTER — TELEPHONE (OUTPATIENT)
Dept: CARDIOLOGY CLINIC | Facility: CLINIC | Age: 66
End: 2021-03-09

## 2021-03-09 DIAGNOSIS — G47.33 OSA (OBSTRUCTIVE SLEEP APNEA): Primary | ICD-10-CM

## 2021-03-09 NOTE — TELEPHONE ENCOUNTER
----- Message from [facility], DO sent at 3/9/2021  7:32 AM EST -----  Can you please call the patient let her know that her sleep study does suggest severe sleep apnea and I would recommend evaluation with one of our sleep medicine doctors in [place] [employer] if she is agreeable  I will place order for evaluation if she is agreeable  Thank you

## 2021-03-09 NOTE — TELEPHONE ENCOUNTER
PCP reached out to patient with sleep study results and recommended fconsult with sleep specialist   Patient scheduled consult
Neuro

## 2021-03-10 DIAGNOSIS — Z23 ENCOUNTER FOR IMMUNIZATION: ICD-10-CM

## 2021-03-14 ENCOUNTER — OFFICE VISIT (OUTPATIENT)
Dept: URGENT CARE | Facility: CLINIC | Age: 66
End: 2021-03-14
Payer: MEDICARE

## 2021-03-14 VITALS
SYSTOLIC BLOOD PRESSURE: 116 MMHG | HEIGHT: 67 IN | OXYGEN SATURATION: 98 % | BODY MASS INDEX: 39.24 KG/M2 | DIASTOLIC BLOOD PRESSURE: 54 MMHG | RESPIRATION RATE: 18 BRPM | HEART RATE: 66 BPM | WEIGHT: 250 LBS

## 2021-03-14 DIAGNOSIS — R07.89 CHEST WALL PAIN: Primary | ICD-10-CM

## 2021-03-14 PROCEDURE — 99213 OFFICE O/P EST LOW 20 MIN: CPT | Performed by: EMERGENCY MEDICINE

## 2021-03-14 PROCEDURE — G0463 HOSPITAL OUTPT CLINIC VISIT: HCPCS | Performed by: EMERGENCY MEDICINE

## 2021-03-14 NOTE — PROGRESS NOTES
[facility]        NAME: London Santos is a 77 y o  female  : 1955    MRN: [id number]  DATE: 2021  TIME: 1:53 PM    Assessment and Plan   Chest wall pain [R07 89]  1  Chest wall pain        EKG shows  Sinus bradycardia at 57 beats per minute, no Q-waves, no ST or T-wave abnormalities  Patient Instructions     Patient Instructions   Chest Pain   AMBULATORY CARE:   Chest pain  can be caused by a range of conditions, from not serious to life-threatening  It may be caused by a heart attack or a blood clot in your lungs  Sometimes chest pain or pressure is caused by poor blood flow to your heart (angina)  Infection, inflammation, or a fracture in the bones or cartilage in your chest can cause pain or discomfort  Chest pain can also be a symptom of a digestive problem, such as acid reflux or a stomach ulcer  An anxiety attack or a strong emotion such as anger can also cause chest pain  It is important to follow up with your healthcare provider to find the cause of your chest pain  Common symptoms you may have with chest pain:   · Fever or sweating     · Nausea or vomiting     · Shortness of breath     · Discomfort or pressure that spreads from your chest to your back, jaw, or arm     · A racing or slow heartbeat     · Feeling weak, tired, or faint    Call 911 if:   · You have any of the following signs of a heart attack:      ? Squeezing, pressure, or pain in your chest    ? You may  also have any of the following:     § Discomfort or pain in your back, neck, jaw, stomach, or arm    § Shortness of breath    § Nausea or vomiting    § Lightheadedness or a sudden cold sweat      Seek care immediately if:   · You have chest discomfort that gets worse, even with medicine  · You cough or vomit blood  · Your bowel movements are black or bloody  · You cannot stop vomiting, or it hurts to swallow      Contact your healthcare provider if:   · You have questions or concerns about your condition or care  Treatment for chest pain  may include medicine to treat your symptoms while your healthcare provider finds the cause of your chest pain  · Medicines  may be given to treat the cause of your chest pain  Examples include pain medicine, anxiety medicine, or medicines to increase blood flow to your heart  · Do not take certain medicines without asking your healthcare provider first   These include NSAIDs, herbal or vitamin supplements, or hormones (estrogen or progestin)  · One or more stents  may need to be placed in your heart if pain was caused by blockage  A stent is a wire mesh tube that helps hold your artery open  Follow up with your healthcare provider within 72 hours, or as directed: You may need to return for more tests to find the cause of your chest pain  You may be referred to a specialist, such as a cardiologist or gastroenterologist  Write down your questions so you remember to ask them during your visits  Healthy living tips: The following are general healthy guidelines  If your chest pain is caused by a heart problem, your healthcare provider will give you specific guidelines to follow  · Do not smoke  Nicotine and other chemicals in cigarettes and cigars can cause lung and heart damage  Ask your healthcare provider for information if you currently smoke and need help to quit  E-cigarettes or smokeless tobacco still contain nicotine  Talk to your healthcare provider before you use these products  · Eat a variety of healthy, low-fat, low-salt foods  Healthy foods include fruits, vegetables, whole-grain breads, low-fat dairy products, beans, lean meats, and fish  Ask for more information about a heart healthy diet  · Drink plenty of water every day  Your body is made of mostly water  Water helps your body to control your temperature and blood pressure  Ask your healthcare provider how much water you should drink every day  · Ask about activity    Your healthcare provider will tell you which activities to limit or avoid  Ask when you can drive, return to work, and have sex  Ask about the best exercise plan for you  · Maintain a healthy weight  Ask your healthcare provider how much you should weigh  Ask him or her to help you create a weight loss plan if you are overweight  · Get the flu and pneumonia vaccines  All adults should get the influenza (flu) vaccine  Get it every year as soon as it becomes available  The pneumococcal vaccine is given to adults aged 72 years or older  The vaccine is given every 5 years to prevent pneumococcal disease, such as pneumonia  If you have a stent:   · Carry your stent card with you at all times  · Let all healthcare providers know that you have a stent  © Copyright [address] Information is for End User's use only and may not be sold, redistributed or otherwise used for commercial purposes  All illustrations and images included in CareNotes® are the copyrighted property of A D A M , Inc  or [facility] [employer]   The above information is an  only  It is not intended as medical advice for individual conditions or treatments  Talk to your doctor, nurse or pharmacist before following any medical regimen to see if it is safe and effective for you  Follow up with PCP in 3-5 days  Proceed to  ER if symptoms worsen  Chief Complaint     Chief Complaint   Patient presents with    Chest Pain     When she takes a deep breath sometimes the right side of her chest hurts, doesnt always happens only when she takes a really deep breath  History of Present Illness        Patient complains of recurrent episodes of right sided positional and pleuritic chest pain for the past 3 days, onset after rearranging furniture  She denies associated palpitation, shortness of breath, diaphoresis  Pain is not radiating    She was recently diagnosed with atrial fibrillation and has an appointment with her cardiologist in 2 days  Review of Systems   Review of Systems   Constitutional: Negative for activity change and fever  Respiratory: Negative for cough, chest tightness, shortness of breath and wheezing  Cardiovascular: Positive for chest pain  Negative for palpitations and leg swelling  Gastrointestinal: Negative for abdominal pain  Musculoskeletal: Negative for arthralgias, back pain, myalgias, neck pain and neck stiffness  Skin: Negative for color change and wound  Neurological: Negative for dizziness, syncope, weakness, light-headedness and headaches  Psychiatric/Behavioral: Negative for confusion           Current Medications       Current Outpatient Medications:     ALPRAZolam (XANAX) 0 5 mg tablet, Take by mouth daily at bedtime as needed for anxiety, Disp: , Rfl:     apixaban (ELIQUIS) 5 mg, Take 1 tablet (5 mg total) by mouth 2 (two) times a day, Disp: 60 tablet, Rfl: 0    atorvastatin (LIPITOR) 10 mg tablet, Take 10 mg by mouth daily, Disp: , Rfl:     Cholecalciferol (Vitamin D3) 125 MCG (5000 UT) TABS, Take 5,000 Units by mouth daily, Disp: , Rfl:     citalopram (CeleXA) 10 mg tablet, Take 40 mg by mouth daily , Disp: , Rfl:     levothyroxine 100 mcg tablet, Take 1 tablet (100 mcg total) by mouth daily, Disp: 30 tablet, Rfl: 2    magnesium oxide (MAG-OX) 400 mg, Take 0 5 tablets (200 mg total) by mouth 2 (two) times a day, Disp: 30 tablet, Rfl: 0    meclizine (ANTIVERT) 25 mg tablet, Take 1 tablet (25 mg total) by mouth 3 (three) times a day as needed for dizziness, Disp: 30 tablet, Rfl: 0    metoprolol succinate (TOPROL-XL) 25 mg 24 hr tablet, Take 1 tablet (25 mg total) by mouth daily, Disp: 90 tablet, Rfl: 3    olmesartan-hydrochlorothiazide (BENICAR HCT) 20-12 5 MG per tablet, Take 1 tablet by mouth daily, Disp: , Rfl:     ondansetron (ZOFRAN) 4 mg tablet, Take 1 tablet (4 mg total) by mouth every 6 (six) hours, Disp: 12 tablet, Rfl: 0    Current Allergies Allergies as of 2021 - Reviewed 2021   Allergen Reaction Noted    Prednisone Shortness Of Breath 2018    Contrast [iodinated diagnostic agents] Rash 2018    Iodine Rash 2018            The following portions of the patient's history were reviewed and updated as appropriate: allergies, current medications, past family history, past medical history, past social history, past surgical history and problem list      Past Medical History:   Diagnosis Date    Anxiety     Elevated d-dimer     2 seperate episodes of unclear etiology   Hyperlipidemia     Hypertension     Hypothyroid     Obesity     Paroxysmal atrial fibrillation ([address] )     New onset 2020  Past Surgical History:   Procedure Laterality Date    HYSTERECTOMY      KNEE ARTHROPLASTY      SHOULDER OPEN ROTATOR CUFF REPAIR      x 2    TONSILLECTOMY         Family History   Problem Relation Age of Onset    Hypertension Sister     Heart disease Paternal [de-identified]     Other Mother         Encephalitis    Other Father          in his 80s without significant problems    Lung cancer Brother     No Known Problems Brother          Medications have been verified  Objective   /54   Pulse 66   Resp 18   Ht 5' 7" (1 702 m)   Wt 113 kg (250 lb)   LMP  (LMP Unknown)   SpO2 98%   BMI 39 16 kg/m²        Physical Exam     Physical Exam  Vitals signs and nursing note reviewed  Constitutional:       Appearance: She is well-developed  HENT:      Head: Normocephalic and atraumatic  Eyes:      Conjunctiva/sclera: Conjunctivae normal       Pupils: Pupils are equal, round, and reactive to light  Neck:      Musculoskeletal: Normal range of motion and neck supple  Cardiovascular:      Rate and Rhythm: Normal rate and regular rhythm  Heart sounds: Normal heart sounds  No murmur  No friction rub  No gallop      Pulmonary:      Effort: Pulmonary effort is normal       Breath sounds: Normal breath sounds  Chest:      Chest wall: Tenderness present  Comments: Tender right chest wall midclavicular line with full reproduction of pain  Musculoskeletal:         General: No tenderness  Skin:     General: Skin is warm and dry  Findings: No rash  Neurological:      Mental Status: She is alert and oriented to person, place, and time

## 2021-03-14 NOTE — PATIENT INSTRUCTIONS
Chest Wall Pain   WHAT YOU NEED TO KNOW:   Chest wall pain may be caused by problems with the muscles, cartilage, or bones of the chest wall  Chest wall pain may also be caused by pain that spreads to your chest from another part of your body  The pain may be aching, severe, dull, or sharp  It may come and go, or it may be constant  The pain may be worse when you move in certain ways, breathe deeply, or cough  DISCHARGE INSTRUCTIONS:   Call 911 if:   · You have any of the following signs of a heart attack:      ? Squeezing, pressure, or pain in your chest    ? You may  also have any of the following:     § Discomfort or pain in your back, neck, jaw, stomach, or arm    § Shortness of breath    § Nausea or vomiting    § Lightheadedness or a sudden cold sweat      Return to the emergency department if:   · You have severe pain  Contact your healthcare provider if:   · You develop a rash  · You have other new symptoms  · Your pain does not improve, even with treatment  · You have questions or concerns about your condition or care  Medicines: You may need any of the following:  · NSAIDs , such as ibuprofen, help decrease swelling, pain, and fever  This medicine is available with or without a doctor's order  NSAIDs can cause stomach bleeding or kidney problems in certain people  If you take blood thinner medicine, always ask your healthcare provider if NSAIDs are safe for you  Always read the medicine label and follow directions  · Acetaminophen  decreases pain  It is available without a doctor's order  Ask how much to take and how often to take it  Follow directions  Acetaminophen can cause liver damage if not taken correctly  · A cream  may be applied to your chest to decrease pain  · Take your medicine as directed  Contact your healthcare provider if you think your medicine is not helping or if you have side effects  Tell him of her if you are allergic to any medicine   Keep a list of the medicines, vitamins, and herbs you take  Include the amounts, and when and why you take them  Bring the list or the pill bottles to follow-up visits  Carry your medicine list with you in case of an emergency  Follow up with your healthcare provider as directed:  Write down your questions so you remember to ask them during your visits  Self-care:   · Rest  as needed  Avoid activities that make your chest wall pain worse  · Apply heat  on your chest for 20 to 30 minutes every 2 hours for as many days as directed  Heat helps decrease pain and muscle spasms  · Apply ice  on your chest for 15 to 20 minutes every hour or as directed  Use an ice pack, or put crushed ice in a plastic bag  Cover it with a towel  Ice helps prevent tissue damage and decreases swelling and pain  © Copyright [address] Information is for End User's use only and may not be sold, redistributed or otherwise used for commercial purposes  All illustrations and images included in CareNotes® are the copyrighted property of A D A M , Inc  or [employer]   The above information is an  only  It is not intended as medical advice for individual conditions or treatments  Talk to your doctor, nurse or pharmacist before following any medical regimen to see if it is safe and effective for you  Chest Pain   AMBULATORY CARE:   Chest pain  can be caused by a range of conditions, from not serious to life-threatening  It may be caused by a heart attack or a blood clot in your lungs  Sometimes chest pain or pressure is caused by poor blood flow to your heart (angina)  Infection, inflammation, or a fracture in the bones or cartilage in your chest can cause pain or discomfort  Chest pain can also be a symptom of a digestive problem, such as acid reflux or a stomach ulcer  An anxiety attack or a strong emotion such as anger can also cause chest pain   It is important to follow up with your healthcare provider to find the cause of your chest pain  Common symptoms you may have with chest pain:   · Fever or sweating     · Nausea or vomiting     · Shortness of breath     · Discomfort or pressure that spreads from your chest to your back, jaw, or arm     · A racing or slow heartbeat     · Feeling weak, tired, or faint    Call 911 if:   · You have any of the following signs of a heart attack:      ? Squeezing, pressure, or pain in your chest    ? You may  also have any of the following:     § Discomfort or pain in your back, neck, jaw, stomach, or arm    § Shortness of breath    § Nausea or vomiting    § Lightheadedness or a sudden cold sweat      Seek care immediately if:   · You have chest discomfort that gets worse, even with medicine  · You cough or vomit blood  · Your bowel movements are black or bloody  · You cannot stop vomiting, or it hurts to swallow  Contact your healthcare provider if:   · You have questions or concerns about your condition or care  Treatment for chest pain  may include medicine to treat your symptoms while your healthcare provider finds the cause of your chest pain  · Medicines  may be given to treat the cause of your chest pain  Examples include pain medicine, anxiety medicine, or medicines to increase blood flow to your heart  · Do not take certain medicines without asking your healthcare provider first   These include NSAIDs, herbal or vitamin supplements, or hormones (estrogen or progestin)  · One or more stents  may need to be placed in your heart if pain was caused by blockage  A stent is a wire mesh tube that helps hold your artery open  Follow up with your healthcare provider within 72 hours, or as directed: You may need to return for more tests to find the cause of your chest pain  You may be referred to a specialist, such as a cardiologist or gastroenterologist  Write down your questions so you remember to ask them during your visits  Healthy living tips:   The following are general healthy guidelines  If your chest pain is caused by a heart problem, your healthcare provider will give you specific guidelines to follow  · Do not smoke  Nicotine and other chemicals in cigarettes and cigars can cause lung and heart damage  Ask your healthcare provider for information if you currently smoke and need help to quit  E-cigarettes or smokeless tobacco still contain nicotine  Talk to your healthcare provider before you use these products  · Eat a variety of healthy, low-fat, low-salt foods  Healthy foods include fruits, vegetables, whole-grain breads, low-fat dairy products, beans, lean meats, and fish  Ask for more information about a heart healthy diet  · Drink plenty of water every day  Your body is made of mostly water  Water helps your body to control your temperature and blood pressure  Ask your healthcare provider how much water you should drink every day  · Ask about activity  Your healthcare provider will tell you which activities to limit or avoid  Ask when you can drive, return to work, and have sex  Ask about the best exercise plan for you  · Maintain a healthy weight  Ask your healthcare provider how much you should weigh  Ask him or her to help you create a weight loss plan if you are overweight  · Get the flu and pneumonia vaccines  All adults should get the influenza (flu) vaccine  Get it every year as soon as it becomes available  The pneumococcal vaccine is given to adults aged 72 years or older  The vaccine is given every 5 years to prevent pneumococcal disease, such as pneumonia  If you have a stent:   · Carry your stent card with you at all times  · Let all healthcare providers know that you have a stent  © Copyright [address] Information is for End User's use only and may not be sold, redistributed or otherwise used for commercial purposes   All illustrations and images included in CareNotes® are the copyrighted property of A D A M , Inc  or [facility] [employer] Lyman   The above information is an  only  It is not intended as medical advice for individual conditions or treatments  Talk to your doctor, nurse or pharmacist before following any medical regimen to see if it is safe and effective for you

## 2021-03-22 ENCOUNTER — OFFICE VISIT (OUTPATIENT)
Dept: CARDIOLOGY CLINIC | Facility: CLINIC | Age: 66
End: 2021-03-22
Payer: MEDICARE

## 2021-03-22 VITALS
WEIGHT: 268.4 LBS | TEMPERATURE: 97.9 F | HEIGHT: 67 IN | BODY MASS INDEX: 42.12 KG/M2 | HEART RATE: 62 BPM | DIASTOLIC BLOOD PRESSURE: 78 MMHG | OXYGEN SATURATION: 97 % | SYSTOLIC BLOOD PRESSURE: 120 MMHG

## 2021-03-22 DIAGNOSIS — I48.0 PAROXYSMAL ATRIAL FIBRILLATION (HCC): Primary | ICD-10-CM

## 2021-03-22 DIAGNOSIS — G47.33 OBSTRUCTIVE SLEEP APNEA SYNDROME: ICD-10-CM

## 2021-03-22 DIAGNOSIS — E66.01 MORBID OBESITY WITH BMI OF 40.0-44.9, ADULT (HCC): ICD-10-CM

## 2021-03-22 DIAGNOSIS — I10 ESSENTIAL HYPERTENSION: ICD-10-CM

## 2021-03-22 DIAGNOSIS — E03.8 HYPOTHYROIDISM DUE TO HASHIMOTO'S THYROIDITIS: ICD-10-CM

## 2021-03-22 DIAGNOSIS — E78.2 MIXED HYPERLIPIDEMIA: ICD-10-CM

## 2021-03-22 DIAGNOSIS — R06.83 SNORING: ICD-10-CM

## 2021-03-22 DIAGNOSIS — E06.3 HYPOTHYROIDISM DUE TO HASHIMOTO'S THYROIDITIS: ICD-10-CM

## 2021-03-22 PROCEDURE — 99214 OFFICE O/P EST MOD 30 MIN: CPT | Performed by: INTERNAL MEDICINE

## 2021-03-22 RX ORDER — LEVOTHYROXINE SODIUM 0.07 MG/1
75 TABLET ORAL DAILY
COMMUNITY

## 2021-03-22 NOTE — PROGRESS NOTES
Cardiology Office Visit    Vannesa Shipman  [id number]  1955    [facility]  [address] [facility] ([place], [place]) RT [facility] [facility] [address] [phone]      Dear Guillermo De Leon, ,    I had the pleasure of seeing your patient at our [address] [facility] today 3/22/2021  As you know she is a pleasant  female with a medical history as described below  Reason for office visit: Follow up atrial fibrillation, hypertension and hyperlipidemia  1  Paroxysmal atrial fibrillation [facility])  Assessment & Plan:  Patient with new onset atrial fibrillation 11/25/2020  Patient presented to the emergency room after onset of fluttering and rapid heart rate  She had associated chest discomfort  In the emergency room she was noted to have atrial fibrillation/atrial flutter with rapid ventricular rate  Patient was given Cardizem and metoprolol and converted to normal sinus rhythm  She has remained in normal sinus rhythm since and feels well  [id number] Score 3   Patient was started on  Eliquis 5 mg twice daily which should be continued for stroke prophylaxis  Aspirin 81 mg daily was discontinued  Echocardiogram and outpatient stress test 12/02/2020 were unremarkable  Home sleep study notable for severe sleep apnea with follow up in place with Sleep medicine  Continue magnesium 250 mg daily over-the-counter  She was discharged home on magnesium oxide 400 mg twice daily which should be discontinued when finished and transition to 250 mg daily over-the-counter  Patient was instructed that if she has recurrent palpitations she can take an extra metoprolol succinate as well as Xanax and relax for period of time however if symptoms persist she should come back to the emergency room    I had previously and again reassured her that atrial fibrillation in and of itself is not dangerous however the risk of stroke is and she is now on anticoagulation to reduce that risk  We discussed the need to decrease caffeine intake  She also needs to find additional ways to deal with the significant amount of stress she is under  I have not ordered a monitor at this time as she has not had any recurrent palpitations  If she does have recurrent palpitations we can consider ZIO  Currently episodes are very short-lived and well tolerated  Orders:  -     Ambulatory referral to Cardiology    2  Essential hypertension  Assessment & Plan:  Blood pressure is well controlled on exam today  Would continue on losartan hydrochlorothiazide as well as metoprolol succinate at current doses  If blood pressure remains elevated we can consider up titration or addition of medications  3  Mixed hyperlipidemia  Assessment & Plan:  Continue atorvastatin 10 mg daily  Lipid panel 10/13/2020: C 166  T 154  H 64  L 71  Should have updated lipid panel 10/2021  4  Morbid obesity with BMI of 40 0-44 9, adult [facility])  Assessment & Plan: Body mass index is 42 04 kg/m²  Weight loss would be beneficial   Will continue to address attempts at weight loss  5  Snoring    6  Obstructive sleep apnea syndrome  Assessment & Plan:  Home sleep study 12/20/2020 revealed severe sleep apnea  She does have follow-up to discuss treatment options with sleep medicine  Will await final recommendation for CPAP  7  Hypothyroidism due to Hashimoto's thyroiditis  Assessment & Plan:  Would try to optimize TSH in the 1-2 range if possible  HPI     Patient presented to [address] [place] [address] 11/25/2020 with chest pain, palpitations and shortness of breath  Patient was found to be in atrial fibrillation with a heart rate of 118 beats per minute  She was noted to have an elevated D-dimer and underwent CT scan to rule out pulmonary embolism which was negative  Patient reported that she had previously had an elevated D-dimer about 5 years ago unclear etiology    She was given IV lopressor and IV Cardizem in the ER and converted back to normal sinus rhythm  I saw the patient in consultation and recommended that she be discharged on metoprolol succinate 25 mg daily as well as magnesium 250 mg daily and eliquis 5 mg twice daily  I discussed with the patient at that time that she would need a stress test as well as echocardiogram and sleep study given new onset atrial fibrillation  TSH was mildly elevated and the hospitalist did increase her levothyroxine from 88 to 100 mcg with plans for repeat TSH in 4-6 weeks  Patient was very anxious about the onset of atrial fibrillation and I tried to reassure her about the diagnoses and treatment options  Patient had noted significant stress as her  has a limited prognosis due to cancer and had just undergone a very large surgery in [place]  She previously had noted only rare palpitations/'fluttering' in her chest      12/4/2020: Patient presents to the office today for follow up from her ER evaluation  She has been tolerating the medications started in the hospital without difficulty  She has had no recurrent palpitations or chest pain since her discharge home  She did see her PCP yesterday  Full note is not available for review  There was some question as to why she was discharged on magnesium as well as why her thyroid dose has been adjusted  I will be sure to send copy of this note to PCP for review  Patient had noted some increased dyspnea on exertion  She also told me that she had been seen by Cardiology in the past when she was first diagnosed with hypertension and underwent echocardiogram and stress test at that time  She does admit to snoring but has never undergone a sleep study  She typically drinks coffee in the morning and tea throughout the day  Echocardiogram 12/02/2020 was unremarkable with an ejection fraction of 60% and no significant valvular abnormalities    Nuclear stress test 12/02/2020 showed no scar or ischemia with an EF of 73%  We discussed the results of her testing     3/22/2021: Junie Matute presents to the office today for follow-up  In general she states she is feeling well  She did have 1 episode of vertigo about 2 weeks ago  She denies any recurrent sustained episodes of palpitations  She does note occasional short episodes  She did have home sleep study done 03/08/2021 which showed severe obstructive sleep apnea  She does have follow-up scheduled with sleep medicine  She denies any overt chest pain  She denies any significant shortness of breath  Patient Active Problem List   Diagnosis    Paroxysmal atrial fibrillation (HCC)    Essential hypertension    Hyperlipidemia    Hypothyroidism due to Hashimoto's thyroiditis    Morbid obesity with BMI of 40 0-44 9, adult (HCC)    Anxiety and depression    Elevated d-dimer    Snoring    Obstructive sleep apnea syndrome    Obesity hypoventilation syndrome (HCC)     Past Medical History:   Diagnosis Date    Anxiety     Elevated d-dimer     2 seperate episodes of unclear etiology   Hyperlipidemia     Hypertension     Hypothyroid     Obesity     Paroxysmal atrial fibrillation ([facility] [address] )     New onset 11/25/2020       Social History     Socioeconomic History    Marital status: /Civil Union     Spouse name: Not on file    Number of children: Not on file    Years of education: Not on file    Highest education level: Not on file   Occupational History    Occupation: Retired teacher   Tobacco Use    Smoking status: Never Smoker    Smokeless tobacco: Never Used   Vaping Use    Vaping Use: Never used   Substance and Sexual Activity    Alcohol use: Not Currently    Drug use: Never    Sexual activity: Yes     Partners: Male   Other Topics Concern    Not on file   Social History Narrative    Not on file     Social Determinants of Health     Financial Resource Strain: Not on file   Food Insecurity: Not on file   Transportation Needs: Not on file   Physical Activity: Not on file   Stress: Not on file   Social Connections: Not on file   Intimate Partner Violence: Not on file   Housing Stability: Not on file      Family History   Problem Relation Age of Onset    Hypertension Sister     Heart disease Paternal [address]     Other Mother         Encephalitis    Other Father          in his 80s without significant problems    Lung cancer Brother     No Known Problems Brother      Past Surgical History:   Procedure Laterality Date    CATARACT EXTRACTION, BILATERAL      HYSTERECTOMY      KNEE ARTHROPLASTY      SHOULDER OPEN ROTATOR CUFF REPAIR      x 2    TONSILLECTOMY       * Current medications reviewed  Allergies   Allergen Reactions    Prednisone Shortness Of Breath    Contrast [Iodinated Diagnostic Agents] Rash    Iodine - Food Allergy Rash       Test Results:     ECG 2020:  Atrial flutter/atrial fibrillation at 118 bpm     Echocardiogram 2020:  EF 60%  Normal diastolic function  Mild annular calcification of the mitral valve  Trace tricuspid regurgitation  Milner Darnell nuclear stress test 2020:  No evidence of myocardial scar  No evidence of myocardial ischemia  EF 73%  Lipid panel 10/13/2020: C 166  T 154  H 64  L 71  Echocardiogram 2015:  EF 55-60%  Mild tricuspid regurgitation  Estimated PASP 38 mmHg  Review of Systems:    Review of Systems   Constitutional: Positive for fatigue  Negative for activity change and appetite change  HENT: Negative for congestion, hearing loss, tinnitus and trouble swallowing  Eyes: Negative for visual disturbance  Respiratory: Negative for cough, chest tightness, shortness of breath and wheezing  Dyspnea on exertion with certain activities   Cardiovascular: Negative for chest pain, palpitations and leg swelling  Gastrointestinal: Negative for abdominal distention, abdominal pain, nausea and vomiting     Genitourinary: Negative for difficulty urinating  Musculoskeletal: Negative for arthralgias  Skin: Negative for rash  Neurological: Negative for dizziness, syncope and light-headedness  Hematological: Does not bruise/bleed easily  Psychiatric/Behavioral: Negative for confusion  The patient is nervous/anxious  Increased stress   All other systems reviewed and are negative  Vitals:    03/22/21 1552 03/22/21 1622   BP: 130/78 120/78   BP Location:  Left arm   Patient Position:  Sitting   Pulse: 62    Temp: 97 9 °F (36 6 °C)    SpO2: 97%    Weight: 122 kg (268 lb 6 4 oz)    Height: 5' 7" (1 702 m)      Vitals:    03/22/21 1552   Weight: 122 kg (268 lb 6 4 oz)     Height: 5' 7" (170 2 cm)     Body mass index is 42 04 kg/m²  Physical Exam   Constitutional: She is oriented to person, place, and time  She appears well-developed and well-nourished  HENT:   Head: Normocephalic and atraumatic  Eyes: Pupils are equal, round, and reactive to light  Conjunctivae are normal    Neck: Normal range of motion  No JVD present  Cardiovascular: Normal rate, regular rhythm and normal heart sounds  Exam reveals no gallop and no friction rub  No murmur heard  Pulmonary/Chest: Effort normal and breath sounds normal    Abdominal: Soft  Bowel sounds are normal    Musculoskeletal:         General: No edema  Neurological: She is alert and oriented to person, place, and time  Skin: Skin is warm and dry  Psychiatric: She has a normal mood and affect  Her behavior is normal    Vitals reviewed

## 2021-04-01 ENCOUNTER — OFFICE VISIT (OUTPATIENT)
Dept: SLEEP CENTER | Facility: CLINIC | Age: 66
End: 2021-04-01
Payer: MEDICARE

## 2021-04-01 ENCOUNTER — TELEPHONE (OUTPATIENT)
Dept: SLEEP CENTER | Facility: CLINIC | Age: 66
End: 2021-04-01

## 2021-04-01 VITALS
BODY MASS INDEX: 41.12 KG/M2 | SYSTOLIC BLOOD PRESSURE: 144 MMHG | HEIGHT: 67 IN | WEIGHT: 262 LBS | DIASTOLIC BLOOD PRESSURE: 70 MMHG | OXYGEN SATURATION: 95 % | HEART RATE: 60 BPM | TEMPERATURE: 96.3 F

## 2021-04-01 DIAGNOSIS — E66.01 MORBID OBESITY WITH BMI OF 40.0-44.9, ADULT (HCC): ICD-10-CM

## 2021-04-01 DIAGNOSIS — Z01.812 ENCOUNTER FOR PREPROCEDURE SCREENING LABORATORY TESTING FOR COVID-19: Primary | ICD-10-CM

## 2021-04-01 DIAGNOSIS — F41.9 ANXIETY: ICD-10-CM

## 2021-04-01 DIAGNOSIS — G47.33 OSA (OBSTRUCTIVE SLEEP APNEA): Primary | ICD-10-CM

## 2021-04-01 DIAGNOSIS — F32.A ANXIETY AND DEPRESSION: ICD-10-CM

## 2021-04-01 DIAGNOSIS — I10 ESSENTIAL HYPERTENSION: ICD-10-CM

## 2021-04-01 DIAGNOSIS — F41.9 ANXIETY AND DEPRESSION: ICD-10-CM

## 2021-04-01 DIAGNOSIS — E66.2 OBESITY HYPOVENTILATION SYNDROME (HCC): ICD-10-CM

## 2021-04-01 DIAGNOSIS — R40.0 DAYTIME SLEEPINESS: ICD-10-CM

## 2021-04-01 DIAGNOSIS — J31.0 CHRONIC RHINITIS: ICD-10-CM

## 2021-04-01 DIAGNOSIS — G47.9 SLEEP DISTURBANCE: ICD-10-CM

## 2021-04-01 DIAGNOSIS — Z20.822 ENCOUNTER FOR PREPROCEDURE SCREENING LABORATORY TESTING FOR COVID-19: Primary | ICD-10-CM

## 2021-04-01 DIAGNOSIS — I48.0 PAROXYSMAL ATRIAL FIBRILLATION (HCC): ICD-10-CM

## 2021-04-01 PROCEDURE — 99204 OFFICE O/P NEW MOD 45 MIN: CPT | Performed by: INTERNAL MEDICINE

## 2021-04-01 RX ORDER — VITAMIN B COMPLEX
TABLET ORAL
COMMUNITY
End: 2021-11-02 | Stop reason: CLARIF

## 2021-04-01 RX ORDER — METRONIDAZOLE 7.5 MG/G
GEL VAGINAL AS NEEDED
COMMUNITY
Start: 2021-03-22

## 2021-04-01 RX ORDER — CONJUGATED ESTROGENS 0.62 MG/G
CREAM VAGINAL
COMMUNITY
Start: 2021-03-18 | End: 2021-06-24

## 2021-04-01 RX ORDER — MULTIVIT-MIN/IRON/FOLIC ACID/K 18-600-40
CAPSULE ORAL
COMMUNITY

## 2021-04-01 NOTE — PROGRESS NOTES
Consultation - [address] Ng  77 y o  female  FGN:[id number]  NOEMI:[id number]    Physician Requesting Consult: Navi Holloway DO     Reason for Consult : At your kind request I saw Kwabena Houser for initial sleep evaluation today  Home sleep testing was undertaken to evaluate for sleep disordered breathing and patient is here to review results and further options  The study demonstrated : CAROLINE (respiratory event index of) 46 9 /hour  Minimum oxygen saturation 81%  and 14 4 % of total sleep time was spent with saturations less than 90%  The snore index was 21 6%  PFSH, Problem List, Medications & Allergies were reviewed in EMR  Norma Bear  has a past medical history of Anxiety, Elevated d-dimer, Hyperlipidemia, Hypertension, Hypothyroid, Obesity, and Paroxysmal atrial fibrillation ([address] )  She has a current medication list which includes the following prescription(s): alprazolam, vitamin c, atorvastatin, vitamin d3, citalopram, coq10, levothyroxine, magnesium gluconate, metoprolol succinate, metronidazole, olmesartan-hydrochlorothiazide, premarin, apixaban, meclizine, and ondansetron  HPI:  Study was undertaken because of an episode of atrial fibrillation arising out of sleep in November of 2020  She has successfully cardioverted and there has been no recurrence  She has a history of loud snoring of several years duration reported by her   On rare occasions she has a woken herself with snoring  Symptoms are worse when she sleep supine and she has to sleep in the prone position  She is not aware of breathing difficulties during sleep  Other Complaints: none  Restless Leg Syndrome: reports no suggestive symptoms    Parasomnia: no features reported    Sleep Routine (averages): Typical Bedtime:  11:00 p m  Gets OOB:  8:00 a m  TIB:9 hrs  Sleep latency:<  30 minutes Sleep Interruptions:2-3/nite and frequently has difficulty falling back asleep    She attributes to anxiety for which she uses Xanax p r n  Dalton Manifold Awakens: spontaneously  Upon awakening: is not always refreshed  She estimates getting 7 hrs sleep  Worthy Ranks reports episodic  Excessive Daytime Sleepiness feels drowsy in the afternoons and naps occasionally and rated herself at Total score: 7 /24 on the [place] Sleepiness Scale  Habits:  reports that she has never smoked  She has never used smokeless tobacco  ;   E-Cigarette/Vaping    E-Cigarette Use Never User     ;  reports no history of drug use ;  reports current alcohol use  ; Caffeine use:limited ; Exercise routine: sometimes   Family History: Negative for sleep disturbance  ROS - see attached  Significant for weight has been stable  She has nasal and sinus congestion  She has rare episodic palpitations and at times feel short of breath  She has feelings of anxiety and depression  EXAM:  /70 (BP Location: Right arm, Cuff Size: Large)   Pulse 60   Temp (!) 96 3 °F (35 7 °C) (Temporal)   Ht 5' 7" (1 702 m)   Wt 119 kg (262 lb)   LMP  (LMP Unknown)   SpO2 95%   BMI 41 04 kg/m²    General: Well groomed female, well appearing, in no apparent distress  Neurological: Alert and orientated;  cooperative; Cranial nerves intact;    Psychiatric: Speech:clear and coherent; Normal mood, affect & thought   Skin: warm and dry; Color& Hydration good; no facial rashes or lesions   HEENT:  Craniofacial anatomy: normal Sinuses: non- tender  TMJ: Normal    Eyes: EOM's intact;  conjunctiva/corneas clear   Ears: Externallyappear normal     Nasal Airway: narrow nares Septum:intact; Mucosa: normal; Turbinates: normal; Rhinorrhea: None   Mouth: Lips: normal posture; Dentition: normal   Mucosa:moist  ; Hard Palate:normal    Oropharryx: crowded and AP narrowing Tongue: Mallampati:Class IV, Mobile and ScallopedSoft Palate:  redundant  Tonsils: cryptic  Neck:is thick; Neck Circumference: 17 5 "; Supple; no abnormal masses;  Thyroid:normal  Trachea:central  Lymph: No Cervical or Submandibular Lymhadenopathy  Heart: S1,S2 normal; RRR; no gallop; nomurmurs   Lungs: Respiratory Effort:normal  Air entry good bilaterally  No wheezes  No rales  Abdomen: Obese, Soft & non-tender    Extremities: No pedal edema  No clubbing or cyanosis  Musculoskeletal:  Motor normal; Gait:normal        Recent CMP in February of this year demonstrated CO2 of 28 millimoles per L and marginally low chloride of 99 millimoles per L  IMPRESSION: Primary, Secondary (to Medical or Psych conditions) Diagnoses & Comorbidities   1  DEANNA (obstructive sleep apnea)  Ambulatory referral to Sleep Medicine    CPAP Study   2  Obesity hypoventilation syndrome (HCC)  CPAP Study   3  Sleep disturbance     4  Daytime sleepiness     5  Anxiety     6  Paroxysmal atrial fibrillation (HCC)     7  Essential hypertension     8  Morbid obesity with BMI of 40 0-44 9, adult ([facility] [address] )     9  Chronic rhinitis     10  Anxiety and depression          PLAN:   1  I reviewed results of the Sleep study with the patient  2  With respect to above conditions, I counseled on pathophysiology, diagnosis, treatment options, risks and benefits; inter-relationship and effects on symptoms and comorbidities; risks of no treatment; costs and insurance aspects  3  Patient elected positive airway pressure therapy and is to be scheduled for a titration study  4  Nasal symptoms may improve with regular nasal saline rinse followed by topical nasal steroid if necessary  5  I also advised on weight reduction  6  Follow-up to be scheduled after the study to review results and to initiate therapy           Sincerely,     Authenticated electronically by Caryle Cozier MD   on 32/64/68   Board Certified Specialist

## 2021-04-01 NOTE — TELEPHONE ENCOUNTER
Patient informed that COVID testing is to be performed 5-10 days prior to PAP study  COVID tests performed more than 10 days prior to the sleep study will not be accepted  Testing site information provided as well as letter with testing dates  Explained when and where to have her covid test done  Pt was agreeable with everything

## 2021-04-01 NOTE — PROGRESS NOTES
Review of Systems      Genitourinary post menopausal (no peroids)   Cardiology palpitations/fluttering feeling in the chest   Gastrointestinal none   Neurology numbness/tingling of an extremity   Constitutional claustrophobia   Integumentary rash or dry skin   Psychiatry anxiety and depression   Musculoskeletal joint pain and sciatica   Pulmonary shortness of breath with activity and snoring   ENT none   Endocrine none   Hematological none

## 2021-04-01 NOTE — PATIENT INSTRUCTIONS
----- Message from Wicho Colvin MD sent at 12/3/2019 11:52 AM CST -----  I'm good with it.     ThanksWicho  ----- Message -----  From: José Joshi MD  Sent: 12/3/2019   9:17 AM CST  To: Wicho Colvin MD, Yahir Kiran MD, #    At this time his immunosuppressant is rituximab which he received in October and will not need again until April. I am glad that you will keep him on voriconazole. Dyspnea and fatigue are his greatest morbidities at this time so addition of 10 mg/d prednisone may help his QOL if Dr Colvin is ok with that.   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  His fungitell has started climbing again suggesting persistent Aspergillus infection so I will continue his voriconazole - Mr. Mccormick and his wife are okay continuing with the voriconazole for treatment.  I am still okay with him receiving immunosuppression as it seems their goal is to improve his quality of life as opposed to prolonging it.  If you feel the immunosuppression will help his quality of life, I am okay with continuing him on voriconazole indefinitely.  Let me know if you have any questions or concerns.  Thanks,  Yesi       What is DEANNA? Obstructive sleep apnea is a common and serious sleep disorder that causes you to stop breathing during sleep  The airway repeatedly becomes blocked, limiting the amount of air that reaches your lungs  When this happens, you may snore loudly or making choking noises as you try to breathe  Your brain and body becomes oxygen deprived and you may wake up  This may happen a few times a night, or in more severe cases, several hundred times a night  Sleep apnea can make you wake up in the morning feeling tired or unrefreshed even though you have had a full night of sleep  During the day, you may feel fatigued, have difficulty concentrating or you may even unintentionally fall asleep  This is because your body is waking up numerous times throughout the night, even though you might not be conscious of each awakening  The lack of oxygen your body receives can have negative long-term consequences for your health  This includes:  High blood pressure  Heart disease  Irregular heart rhythms  Stroke  Pre-diabetes and diabetes  Depression    Testing  An objective evaluation of your sleep may be needed before your board certified sleep physician can make a diagnosis  Options include:   In-lab overnight sleep study  This type of sleep study requires you to stay overnight at a sleep center, in a bed that may resemble a hotel room  You will sleep with sensors hooked up to various parts of your body  These sensors record your brain waves, heartbeat, breathing and movement  An overnight sleep study also provides your doctor with the most complete information about your sleep  Learn more about an overnight sleep study      Home sleep apnea test  Some patients with high risk factors for obstructive sleep apnea and no other medical disorders may be candidates for a home sleep apnea test  The testing equipment differs in that it is less complicated than what is used in an overnight sleep study   As such, does not give all the data an in-lab will and if negative, may not mean you do not have the problem  Treatment for sleep apnea  includes using a continuous positive airway pressure (CPAP) machine to keep your airway open during sleep  A mask is placed over your nose and mouth, or just your nose  The mask is hooked to the CPAP machine that blows a gentle stream of air into the mask when you breathe  This helps keep your airway open so you can breathe more regularly  Extra oxygen may be given to you through the machine  You may be given a mouth device  It looks like a mouth guard or dental retainer and stops your tongue and mouth tissues from blocking your throat while you sleep  Surgery may be needed to remove extra tissues that block your mouth, throat, or nose  Manage sleep apnea:   Do not smoke  Nicotine and other chemicals in cigarettes and cigars can cause lung damage  Ask your healthcare provider for information if you currently smoke and need help to quit  E-cigarettes or smokeless tobacco still contain nicotine  Talk to your healthcare provider before you use these products  Do not drink alcohol or take sedative medicine before you go to sleep  Alcohol and sedatives can relax the muscles and tissues around your throat  This can block the airflow to your lungs  Maintain a healthy weight  Excess tissue around your throat may restrict your breathing  Ask your healthcare provider for information if you need to lose weight  Sleep on your side or use pillows designed to prevent sleep apnea  This prevents your tongue or other tissues from blocking your throat  You can also raise the head of your bed  Driving Safety  Refrain from driving when drowsy  Follow up with your healthcare provider as directed:  Write down your questions so you remember to ask them during your visits  Go to AASM website for more information: Sleepeducation  org     What is DEANNA?    Obstructive sleep apnea is a common and serious sleep disorder that causes you to stop breathing during sleep  The airway repeatedly becomes blocked, limiting the amount of air that reaches your lungs  When this happens, you may snore loudly or making choking noises as you try to breathe  Your brain and body becomes oxygen deprived and you may wake up  This may happen a few times a night, or in more severe cases, several hundred times a night  Sleep apnea can make you wake up in the morning feeling tired or unrefreshed even though you have had a full night of sleep  During the day, you may feel fatigued, have difficulty concentrating or you may even unintentionally fall asleep  This is because your body is waking up numerous times throughout the night, even though you might not be conscious of each awakening  The lack of oxygen your body receives can have negative long-term consequences for your health  This includes:  High blood pressure  Heart disease  Irregular heart rhythms  Stroke  Pre-diabetes and diabetes  Depression    Testing  An objective evaluation of your sleep may be needed before your board certified sleep physician can make a diagnosis  Options include:   In-lab overnight sleep study  This type of sleep study requires you to stay overnight at a sleep center, in a bed that may resemble a hotel room  You will sleep with sensors hooked up to various parts of your body  These sensors record your brain waves, heartbeat, breathing and movement  An overnight sleep study also provides your doctor with the most complete information about your sleep  Learn more about an overnight sleep study      Home sleep apnea test  Some patients with high risk factors for obstructive sleep apnea and no other medical disorders may be candidates for a home sleep apnea test  The testing equipment differs in that it is less complicated than what is used in an overnight sleep study   As such, does not give all the data an in-lab will and if negative, may not mean you do not have the problem  Treatment for sleep apnea  includes using a continuous positive airway pressure (CPAP) machine to keep your airway open during sleep  A mask is placed over your nose and mouth, or just your nose  The mask is hooked to the CPAP machine that blows a gentle stream of air into the mask when you breathe  This helps keep your airway open so you can breathe more regularly  Extra oxygen may be given to you through the machine  You may be given a mouth device  It looks like a mouth guard or dental retainer and stops your tongue and mouth tissues from blocking your throat while you sleep  Surgery may be needed to remove extra tissues that block your mouth, throat, or nose  Manage sleep apnea:   Do not smoke  Nicotine and other chemicals in cigarettes and cigars can cause lung damage  Ask your healthcare provider for information if you currently smoke and need help to quit  E-cigarettes or smokeless tobacco still contain nicotine  Talk to your healthcare provider before you use these products  Do not drink alcohol or take sedative medicine before you go to sleep  Alcohol and sedatives can relax the muscles and tissues around your throat  This can block the airflow to your lungs  Maintain a healthy weight  Excess tissue around your throat may restrict your breathing  Ask your healthcare provider for information if you need to lose weight  Sleep on your side or use pillows designed to prevent sleep apnea  This prevents your tongue or other tissues from blocking your throat  You can also raise the head of your bed  Driving Safety  Refrain from driving when drowsy  Follow up with your healthcare provider as directed:  Write down your questions so you remember to ask them during your visits  Go to AAS website for more information: Sleepeducation  org           Nursing Support:  When: Monday through Friday 7A-5PM except holidays  Where: Our direct line is [phone]      If you are having a true emergency please call 911  In the event that the line is busy or it is after hours please leave a voice message and we will return your call  Please speak clearly, leaving your full name, birth date, best number to reach you and the reason for your call  Medication refills: We will need the name of the medication, the dosage, the ordering provider, whether you get a 30 or 90 day refill, and the pharmacy name and address  Medications will be ordered by the provider only  Nurses cannot call in prescriptions  Please allow 7 days for medication refills  Physician requested updates: If your provider requested that you call with an update after starting medication, please be ready to provide us the medication and dosage, what time you take your medication, the time you attempt to fall asleep, time you fall asleep, when you wake up, and what time you get out of bed  Sleep Study Results: We will contact you with sleep study results and/or next steps after the physician has reviewed your testing

## 2021-04-06 DIAGNOSIS — Z01.812 ENCOUNTER FOR PREPROCEDURE SCREENING LABORATORY TESTING FOR COVID-19: ICD-10-CM

## 2021-04-06 DIAGNOSIS — Z20.822 ENCOUNTER FOR PREPROCEDURE SCREENING LABORATORY TESTING FOR COVID-19: ICD-10-CM

## 2021-04-06 PROCEDURE — U0003 INFECTIOUS AGENT DETECTION BY NUCLEIC ACID (DNA OR RNA); SEVERE ACUTE RESPIRATORY SYNDROME CORONAVIRUS 2 (SARS-COV-2) (CORONAVIRUS DISEASE [COVID-19]), AMPLIFIED PROBE TECHNIQUE, MAKING USE OF HIGH THROUGHPUT TECHNOLOGIES AS DESCRIBED BY CMS-2020-01-R: HCPCS | Performed by: INTERNAL MEDICINE

## 2021-04-06 PROCEDURE — U0005 INFEC AGEN DETEC AMPLI PROBE: HCPCS | Performed by: INTERNAL MEDICINE

## 2021-04-07 LAB — SARS-COV-2 RNA RESP QL NAA+PROBE: NEGATIVE

## 2021-04-08 ENCOUNTER — HOSPITAL ENCOUNTER (OUTPATIENT)
Dept: SLEEP CENTER | Facility: HOSPITAL | Age: 66
Discharge: HOME | End: 2021-04-08
Attending: INTERNAL MEDICINE
Payer: MEDICARE

## 2021-04-08 DIAGNOSIS — E66.2 OBESITY HYPOVENTILATION SYNDROME (HCC): ICD-10-CM

## 2021-04-08 DIAGNOSIS — G47.33 OSA (OBSTRUCTIVE SLEEP APNEA): ICD-10-CM

## 2021-04-08 PROCEDURE — 95811 POLYSOM 6/>YRS CPAP 4/> PARM: CPT

## 2021-04-09 DIAGNOSIS — G47.33 OSA (OBSTRUCTIVE SLEEP APNEA): Primary | ICD-10-CM

## 2021-04-09 NOTE — PROGRESS NOTES
Sleep Study Documentation    Pre-Sleep Study       Sleep testing procedure explained to patient:YES    Patient napped prior to study:NO    Caffeine:Dayshift worker after 12PM   Caffeine use:YES- soda  12 ounces    Alcohol:Dayshift workers after 5PM: Alcohol use:NO    Typical day for patient:YES       Study Documentation    Sleep Study Indications: DEANNA diagnosed on HST    Sleep Study: Treatment   Optimal PAP pressure: +9  Leak:Small  Snore:Eliminated  REM Obtained:yes  Supplemental O2: no    Minimum SaO2 89  Baseline SaO2 94  PAP mask tried (list all)Simplus p30i, N20  PAP mask choice (final)Medium Resmed N20  PAP mask type:nasal  PAP pressure at which snoring was eliminated +9  Minimum SaO2 at final PAP pressure 89  Mode of Therapy:CPAP  ETCO2:No  CPAP changed to BiPAP:No    Mode of Therapy:CPAP    EKG abnormalities: no     EEG abnormalities: no    Sleep Study Recorded < 2 hours: N/A    Sleep Study Recorded > 2 hours but incomplete study: N/A    Sleep Study Recorded 6 hours but no sleep obtained: NO    Patient classification: retired       Post-Sleep Study    Medication used at bedtime or during sleep study:YES other prescription medications    Patient reports time it took to fall asleep:less than 20 minutes    Patient reports waking up during study:1 to 2 times  Patient reports returning to sleep without difficulty  Patient reports sleeping 6 to 8 hours with dreaming  Patient reports sleep during study:better than usual    Patient rated sleepiness: Not sleepy or tired    PAP treatment:yes: Post PAP treatment patient reports feeling better and  would wear PAP mask at home

## 2021-04-12 ENCOUNTER — TRANSCRIBE ORDERS (OUTPATIENT)
Dept: ADMINISTRATIVE | Facility: HOSPITAL | Age: 66
End: 2021-04-12

## 2021-04-12 DIAGNOSIS — G47.30 SLEEP APNEA: Primary | ICD-10-CM

## 2021-04-13 ENCOUNTER — TELEPHONE (OUTPATIENT)
Dept: OTOLARYNGOLOGY | Facility: CLINIC | Age: 66
End: 2021-04-13

## 2021-04-13 NOTE — TELEPHONE ENCOUNTER
Patient had left 3 different messages to change her sleep appointment  1 message on Friday 4/9 @ 8:49am and 2 messages yesterday4/12/ @ 11:12am and 3pm  No body was in the office those 2 days  I called the patient this morning and left message for the patient to return my calls regarding about changing an appointment with Dr Jaylen Sullivan

## 2021-04-14 ENCOUNTER — OFFICE VISIT (OUTPATIENT)
Dept: SLEEP CENTER | Facility: CLINIC | Age: 66
End: 2021-04-14
Payer: MEDICARE

## 2021-04-14 VITALS
HEART RATE: 66 BPM | HEIGHT: 67 IN | SYSTOLIC BLOOD PRESSURE: 110 MMHG | DIASTOLIC BLOOD PRESSURE: 68 MMHG | BODY MASS INDEX: 42.44 KG/M2 | WEIGHT: 270.38 LBS

## 2021-04-14 DIAGNOSIS — F32.A ANXIETY AND DEPRESSION: ICD-10-CM

## 2021-04-14 DIAGNOSIS — R40.0 DAYTIME SLEEPINESS: ICD-10-CM

## 2021-04-14 DIAGNOSIS — F41.9 ANXIETY AND DEPRESSION: ICD-10-CM

## 2021-04-14 DIAGNOSIS — J31.0 CHRONIC RHINITIS: ICD-10-CM

## 2021-04-14 DIAGNOSIS — I48.0 PAROXYSMAL ATRIAL FIBRILLATION (HCC): ICD-10-CM

## 2021-04-14 DIAGNOSIS — I10 ESSENTIAL HYPERTENSION: ICD-10-CM

## 2021-04-14 DIAGNOSIS — E66.2 OBESITY HYPOVENTILATION SYNDROME (HCC): ICD-10-CM

## 2021-04-14 DIAGNOSIS — E66.01 MORBID OBESITY WITH BMI OF 40.0-44.9, ADULT (HCC): ICD-10-CM

## 2021-04-14 DIAGNOSIS — G47.9 SLEEP DISTURBANCE: ICD-10-CM

## 2021-04-14 DIAGNOSIS — G47.33 OBSTRUCTIVE SLEEP APNEA SYNDROME: Primary | ICD-10-CM

## 2021-04-14 PROCEDURE — 99214 OFFICE O/P EST MOD 30 MIN: CPT | Performed by: INTERNAL MEDICINE

## 2021-04-14 NOTE — PROGRESS NOTES
Follow-up Note - [address]  77 y o  female  FWK:[birth year]  EWT:[id number]    I saw Ora Flanagan in sleep clinic today for her sleep complaints & comorbidities  A sleep study to titrate Positive airway pressure (PAP) therapy was undertaken  Patient is here to review results and to initiate therapy  The study demonstrated sleep disordered breathing was successfully remediated with PAP at 9 cm H2O  Preceding Home sleep testing demonstrated CAROLINE (respiratory event index of) 46 9 /hour  Minimum oxygen saturation 81%  and 14 4 % of total sleep time was spent with saturations less than 90%  The snore index was 21 6%  PFSH, Problem List, Medications & Allergies were reviewed in EMR  Interval changes: none reported  She  has a past medical history of Anxiety, Elevated d-dimer, Hyperlipidemia, Hypertension, Hypothyroid, Obesity, and Paroxysmal atrial fibrillation ([facility] [address] )  She has a current medication list which includes the following prescription(s): alprazolam, apixaban, vitamin c, atorvastatin, vitamin d3, citalopram, coq10, levothyroxine, magnesium gluconate, meclizine, metoprolol succinate, metronidazole, olmesartan-hydrochlorothiazide, ondansetron, and premarin  HPI:  Patient had no difficulty tolerating the mask or the pressure on the study night  Reported no nasal congestion, no mucosal dryness, no chest or abdominal discomfort  Sleep was   better than usual and felt more energetic the next day  Sleep Routine: No interval changes  [She reports getting 7 hrs sleep out of approximately 9 hours in bed  She has frequent interruptions of sleep and difficulty falling back asleep ; She awakens spontaneously and is not always refreshed  Ora Flanagan reports Excessive Daytime Sleepiness feels drowsy in the afternoons and naps occasionally  She  rated herself at Total score: 6 /24 on the [place] Sleepiness Scale  Habits:  reports that she has never smoked   She has never used smokeless tobacco  She reports current alcohol use  She reports that she does not use drugs  ROS: as attached  EXAM: /68   Pulse 66   Ht 5' 7" (1 702 m)   Wt 123 kg (270 lb 6 oz)   LMP  (LMP Unknown)   BMI 42 35 kg/m²   Patient is well groomed; well appearing  Psych: cooperativeand in no distress  Mental state appears anxious  CNS: Alert, orientated, clear & coherent speech  H&N: EOMI; NC/AT:no facial pressure marks, no rashes  Skin/Extrem: col & hydration normal; no edema  Physical findings are otherwise essentially unchanged from previous     IMPRESSION: Diagnoses, Problem List Items & Comorbidities Addressed this Visit   1  Obstructive sleep apnea syndrome  Ambulatory referral to Sleep Medicine   2  Obesity hypoventilation syndrome ([facility] )     3  Sleep disturbance     4  Daytime sleepiness     5  Anxiety and depression     6  Chronic rhinitis     7  Paroxysmal atrial fibrillation (HCC)     8  Essential hypertension     9  Morbid obesity with BMI of 40 0-44 9, adult ([facility] )         PLAN:    1  I reviewed results of the Sleep studies with the patient  2  With respect to above conditions, I counseled on pathophysiology, diagnosis, treatment options, risks and benefits; inter-relationship and effects on symptoms and comorbidities; risks of no treatment; costs and insurance aspects  3  Patient elected positive airway pressure therapy and care coordinated with the [facility] provider for set up  4  Need for compliance with therapy and weight reduction were emphasized  5  Nasal symptoms may improve with regular nasal saline rinse followed by topical nasal steroid if necessary  6  Cognitive behavioral therapy, Sleep Hygiene and behavioral techniques to manage Insomnia were discussed  7  Follow-up to be scheduled in 2 months to monitor compliance, progress and to adjust therapy  Thank you for allowing me to participate in the care of this patient   I will keep you apprised of developments        Sincerely,    Authenticated electronically by Sienna Wright MD on 66/32/86   Board Certified Specialist

## 2021-04-14 NOTE — PATIENT INSTRUCTIONS

## 2021-04-14 NOTE — PROGRESS NOTES
Review of Systems      Genitourinary post menopausal (no peroids)   Cardiology palpitations/fluttering feeling in the chest   Gastrointestinal none   Neurology none   Constitutional claustrophobia and fatigue   Integumentary rash or dry skin   Psychiatry anxiety   Musculoskeletal joint pain   Pulmonary snoring   ENT none   Endocrine none   Hematological none

## 2021-04-15 ENCOUNTER — TELEPHONE (OUTPATIENT)
Dept: SLEEP CENTER | Facility: CLINIC | Age: 66
End: 2021-04-15

## 2021-04-15 NOTE — TELEPHONE ENCOUNTER
I spoke with Newton Phoenix from [facility], advised patient scheduled for DME set up scheduled tomorrow, she will discuss with Edward Dutton and contact patient if they need to be rescheduled

## 2021-04-16 ENCOUNTER — TELEPHONE (OUTPATIENT)
Dept: SLEEP CENTER | Facility: CLINIC | Age: 66
End: 2021-04-16

## 2021-04-16 NOTE — TELEPHONE ENCOUNTER
dreamstation @ 9cm, heated humidifier, heated tubing, N30i nasal mask   Equip set up @ [facility] per script Dr Keshav Angel

## 2021-04-19 ENCOUNTER — TELEPHONE (OUTPATIENT)
Dept: SLEEP CENTER | Facility: CLINIC | Age: 66
End: 2021-04-19

## 2021-04-19 NOTE — TELEPHONE ENCOUNTER
Returned the patient's call   Patient set up on 4/15/2021    Patient reports that she did well at her sleep study with a mask that only went over her nose  But when she got her machine they gave her nasal prong mask  And she can't use it  She does have the mask they gave her at the sleep study but she states it does not fit the machine  Advised that I would have the Nick foster call her   Also provided her the phone number    Left voicemail message with the Nick foster in the [facility] office

## 2021-06-24 ENCOUNTER — OFFICE VISIT (OUTPATIENT)
Dept: SLEEP CENTER | Facility: CLINIC | Age: 66
End: 2021-06-24
Payer: MEDICARE

## 2021-06-24 VITALS
HEIGHT: 67 IN | BODY MASS INDEX: 40.97 KG/M2 | OXYGEN SATURATION: 93 % | WEIGHT: 261 LBS | HEART RATE: 73 BPM | DIASTOLIC BLOOD PRESSURE: 72 MMHG | SYSTOLIC BLOOD PRESSURE: 130 MMHG | TEMPERATURE: 96.3 F

## 2021-06-24 DIAGNOSIS — J30.9 ALLERGIC RHINITIS WITH POSTNASAL DRIP: ICD-10-CM

## 2021-06-24 DIAGNOSIS — R68.2 DRY MOUTH: ICD-10-CM

## 2021-06-24 DIAGNOSIS — I10 ESSENTIAL HYPERTENSION: ICD-10-CM

## 2021-06-24 DIAGNOSIS — F41.9 ANXIETY AND DEPRESSION: ICD-10-CM

## 2021-06-24 DIAGNOSIS — E66.01 MORBID OBESITY WITH BMI OF 40.0-44.9, ADULT (HCC): ICD-10-CM

## 2021-06-24 DIAGNOSIS — I48.0 PAROXYSMAL ATRIAL FIBRILLATION (HCC): ICD-10-CM

## 2021-06-24 DIAGNOSIS — R09.82 ALLERGIC RHINITIS WITH POSTNASAL DRIP: ICD-10-CM

## 2021-06-24 DIAGNOSIS — E66.2 OBESITY HYPOVENTILATION SYNDROME (HCC): ICD-10-CM

## 2021-06-24 DIAGNOSIS — G47.33 OBSTRUCTIVE SLEEP APNEA SYNDROME: Primary | ICD-10-CM

## 2021-06-24 DIAGNOSIS — F32.A ANXIETY AND DEPRESSION: ICD-10-CM

## 2021-06-24 DIAGNOSIS — R40.0 DAYTIME SLEEPINESS: ICD-10-CM

## 2021-06-24 DIAGNOSIS — G47.9 SLEEP DISTURBANCE: ICD-10-CM

## 2021-06-24 PROCEDURE — 99214 OFFICE O/P EST MOD 30 MIN: CPT | Performed by: INTERNAL MEDICINE

## 2021-06-24 NOTE — PATIENT INSTRUCTIONS

## 2021-06-24 NOTE — PROGRESS NOTES
Follow-Up Note - [address]  74 y o  female  [facility]:[birth year]  TKP:[id number]    CC: I saw this patient for follow-up in clinic today for Sleep disordered breathing, Coexisting Sleep and Medical Problems  Home sleep testing in March of this year demonstrated : CAROLINE (respiratory event index of) 46 9 /hour  Minimum oxygen saturation 81%  and 14 4 % of total sleep time was spent with saturations less than 90%  The snore index was 21 6%  The subsequent titration study demonstrated sleep disordered breathing was successfully remediated with PAP at 9 cm H2O  PFSH, Problem List, Medications & Allergies were reviewed in EMR  Interval changes: none reported  She  has a past medical history of Anxiety, Elevated d-dimer, Hyperlipidemia, Hypertension, Hypothyroid, Obesity, and Paroxysmal atrial fibrillation ([address] )  She has a current medication list which includes the following prescription(s): alprazolam, vitamin c, atorvastatin, vitamin d3, citalopram, coq10, levothyroxine, magnesium gluconate, metoprolol succinate, olmesartan-hydrochlorothiazide, apixaban, and metronidazole  PHYSIOLOGICAL DATA REVIEW AND INTERPRETATION:   using PAP > 4 hours/night 100%  Estimated CAROLINE 4 4/hour with pressure of 9cm H2O ; Compliance: excellent; Sleep disordered breathing:stable & within target range    SUBJECTIVE: Regarding use of PAP, Ramona reports:   · She is experiencing some adverse effects: dry mouth/throat  · She is benefiting from use: sleeping better and no longer snoring / having breathing difficulties   Sleep Routine: Jeromekendrickjoanne Greshamcarolyn reports getting 8 hrs sleep  ; she has no difficulty initiating or maintaining sleep   She arises spontaneously and feels more refreshed since on Rx  Jeromekendrickjoanne Amaral reports significantly improved  Excessive Daytime Sleepiness  and rated herself at Total score: 5 /24 on the [place] Sleepiness Scale  Habits: reports that she has never smoked   She has never used smokeless tobacco ,  reports current alcohol use ,  reports no history of drug use , Caffeine use: none , Exercise routine: sometimes   ROS: as attached  Significant for postnasal drip due to allergies  Infrequent flutter sensation  EXAM: /72 (BP Location: Left arm, Cuff Size: Large)   Pulse 73   Temp (!) 96 3 °F (35 7 °C) (Temporal)   Ht 5' 7" (1 702 m)   Wt 118 kg (261 lb)   LMP  (LMP Unknown)   SpO2 93%   BMI 40 88 kg/m²     Patient is well groomed; well appearing  H&N: EOMI; NC/AT:no facial pressure marks, no rashes  Skin/Extrem: col & hydration normal; no edema  Psych: cooperativeand in no distress  Mental state:appears normal   Resp: Respiratory effort is normal  CNS: Alert, orientated, clear & coherent speech  Physical findings otherwise essentially unchanged from previous  IMPRESSION: Problem List Items & Comorbidities Addressed this Visit    1  Obstructive sleep apnea syndrome  PAP DME Resupply/Reorder    Cpap DME   2  Obesity hypoventilation syndrome ([address] )     3  Sleep disturbance     4  Daytime sleepiness     5  Dry mouth     6  Anxiety and depression     7  Allergic rhinitis with postnasal drip     8  Paroxysmal atrial fibrillation (HCC)     9  Essential hypertension     10  Morbid obesity with BMI of 40 0-44 9, adult ([address] )         PLAN:   I reviewed results of prior studies and physiologic data with the patient  Notified regarding recall of Topher devices and the recommendation to discontinue use pending resolution of degradation of the foam by replacing it  I discussed treatment options with risks and benefits  Patient understands risks of discontinuing PAP vs continuing use while awaiting resolution of the new issue  Instructed patient to register  machine with [employer] and to follow progress on their website  Including web sites of DME provider, [facility] and [facility]  looking out for notifications     Treatment is medically necessary and patient elected to continue using CPAP while awaiting remediation  Care of equipment, methods to improve comfort using PAP and importance of compliance with therapy were discussed  He was instructed not to use So Clean or other cleaning devices unless approved by  a and FDA  Pressure settin cmH2O  Rx provided to replace the machine, supplies and Care coordinated with DME provider  Nasal symptoms may improve with regular nasal saline rinse followed by topical nasal steroid if necessary  Discussed strategies for weight reduction  Follow-up is advised in 1 year or sooner if needed to monitor progress, compliance and to adjust therapy  Thank you for allowing me to participate in the care of this patient      Sincerely,    Authenticated electronically by Angie Mcdonald MD on    Board Certified Specialist

## 2021-06-24 NOTE — PROGRESS NOTES
Review of Systems      Genitourinary none   Cardiology palpitations/fluttering feeling in the chest   Gastrointestinal none   Neurology none   Constitutional claustrophobia   Integumentary rash or dry skin   Psychiatry anxiety   Musculoskeletal none   Pulmonary difficulty breathing when lying flat    ENT throat clearing   Endocrine none   Hematological none

## 2021-06-25 ENCOUNTER — TELEPHONE (OUTPATIENT)
Dept: SLEEP CENTER | Facility: CLINIC | Age: 66
End: 2021-06-25

## 2021-06-25 NOTE — TELEPHONE ENCOUNTER
Left message, advised following up from visit, new DME script written  Advised to call office to determine what to do with new script

## 2021-06-28 ENCOUNTER — OFFICE VISIT (OUTPATIENT)
Dept: CARDIOLOGY CLINIC | Facility: CLINIC | Age: 66
End: 2021-06-28
Payer: MEDICARE

## 2021-06-28 VITALS
DIASTOLIC BLOOD PRESSURE: 78 MMHG | BODY MASS INDEX: 41.28 KG/M2 | OXYGEN SATURATION: 95 % | WEIGHT: 263 LBS | HEART RATE: 62 BPM | HEIGHT: 67 IN | SYSTOLIC BLOOD PRESSURE: 140 MMHG | TEMPERATURE: 97.6 F

## 2021-06-28 DIAGNOSIS — I10 ESSENTIAL HYPERTENSION: ICD-10-CM

## 2021-06-28 DIAGNOSIS — E03.8 HYPOTHYROIDISM DUE TO HASHIMOTO'S THYROIDITIS: ICD-10-CM

## 2021-06-28 DIAGNOSIS — G47.33 OBSTRUCTIVE SLEEP APNEA SYNDROME: ICD-10-CM

## 2021-06-28 DIAGNOSIS — E78.2 MIXED HYPERLIPIDEMIA: ICD-10-CM

## 2021-06-28 DIAGNOSIS — E06.3 HYPOTHYROIDISM DUE TO HASHIMOTO'S THYROIDITIS: ICD-10-CM

## 2021-06-28 DIAGNOSIS — I48.0 PAROXYSMAL ATRIAL FIBRILLATION (HCC): Primary | ICD-10-CM

## 2021-06-28 PROCEDURE — 99214 OFFICE O/P EST MOD 30 MIN: CPT | Performed by: NURSE PRACTITIONER

## 2021-06-28 NOTE — PATIENT INSTRUCTIONS
No adjustment to medications today  Continue with dietary and lifestyle modifications! Keep up the good work!

## 2021-06-28 NOTE — ASSESSMENT & PLAN NOTE
Patient with new onset atrial fibrillation 11/25/2020  Patient presented to the emergency room after onset of fluttering and rapid heart rate  She had associated chest discomfort  In the emergency room she was noted to have atrial fibrillation/atrial flutter with rapid ventricular rate  Patient was given Cardizem and metoprolol and converted to normal sinus rhythm  She has remained in normal sinus rhythm since and feels well  [id number] Score 3   Patient was started on  Eliquis 5 mg twice daily which should be continued for stroke prophylaxis  Aspirin 81 mg daily was discontinued  Echocardiogram and outpatient stress test 12/02/2020 were unremarkable  Home sleep study revealed severe sleep apnea - which is now treated  Continue magnesium 250 mg daily over-the-counter   Patient was instructed that if she has recurrent palpitations she can take an extra metoprolol succinate as well as Xanax and relax for period of time however if symptoms persist she should come back to the emergency room  Continue decreased caffeine intake  I have not ordered a monitor at this time as she has not had any recurrent palpitations  If she does have recurrent palpitations we can consider JASON

## 2021-06-28 NOTE — PROGRESS NOTES
Cardiology Follow Up    Leland Garcia  1955  [id number]  [facility]  [address]  [address] [address]  [phone]  [phone]    [facility] presents today for routine follow up of her atrial fibrillation, HTN, HLD     1  Paroxysmal atrial fibrillation [facility])  Assessment & Plan:  Patient with new onset atrial fibrillation 11/25/2020  Patient presented to the emergency room after onset of fluttering and rapid heart rate  She had associated chest discomfort  In the emergency room she was noted to have atrial fibrillation/atrial flutter with rapid ventricular rate  Patient was given Cardizem and metoprolol and converted to normal sinus rhythm  She has remained in normal sinus rhythm since and feels well  [id number] Score 3   Patient was started on  Eliquis 5 mg twice daily which should be continued for stroke prophylaxis  Aspirin 81 mg daily was discontinued  Echocardiogram and outpatient stress test 12/02/2020 were unremarkable  Home sleep study revealed severe sleep apnea - which is now treated  Continue magnesium 250 mg daily over-the-counter   Patient was instructed that if she has recurrent palpitations she can take an extra metoprolol succinate as well as Xanax and relax for period of time however if symptoms persist she should come back to the emergency room  Continue decreased caffeine intake  I have not ordered a monitor at this time as she has not had any recurrent palpitations  If she does have recurrent palpitations we can consider ZIO  Orders:  -     apixaban (ELIQUIS) 5 mg; Take 1 tablet (5 mg total) by mouth 2 (two) times a day    2  Essential hypertension  Assessment & Plan:  Blood pressure is mildly elevated on exam today  BP in excellent control at PCP office and with home cuff  Would continue on olmesartan-hydrochlorothiazide as well as metoprolol succinate at current doses    If blood pressure remains elevated we can consider up titration or addition of medications  3  Mixed hyperlipidemia  Assessment & Plan:  Continue atorvastatin 10 mg daily  Lipid panel 10/13/2020: C 166  T 154  H 64  L 71        4  Obstructive sleep apnea syndrome  Assessment & Plan:  Home sleep study 12/2020 revealed severe sleep apnea  Compliant with CPAP therapy with excellent control in symptoms  Currently there is a recall on pt's CPAP machine and she will be obtaining a Res-med travel machine to continue treatment as waiting for a new machine through her primary medical supply company [employer]      5  Hypothyroidism due to Hashimoto's thyroiditis  Assessment & Plan:  Would try to optimize TSH in the 1-2 range if possible  TSH 1 38 on labs in 4/2021 through PCP  On Levothyroxine 75mcg currently  PCP managing         Ena presented to [address] [place] [address] 11/25/2020 with chest pain, palpitations and shortness of breath  Patient was found to be in atrial fibrillation with a heart rate of 118 beats per minute  She was noted to have an elevated D-dimer and underwent CT scan to rule out pulmonary embolism which was negative  Patient reported that she had previously had an elevated D-dimer about 5 years ago unclear etiology  She was given IV lopressor and IV Cardizem in the ER and converted back to normal sinus rhythm  Dr Ciarra Otto saw the patient in consultation and recommended that she be discharged on metoprolol succinate 25 mg daily as well as magnesium 250 mg daily and eliquis 5 mg twice daily  She discussed with the patient at that time that she would need a stress test as well as echocardiogram and sleep study given new onset atrial fibrillation  TSH was mildly elevated and the hospitalist did increase her levothyroxine from 88 to 100 mcg with plans for repeat TSH in 4-6 weeks  Patient was very anxious about the onset of atrial fibrillation and Dr Ciarra Otto tried to reassure her about the diagnoses and treatment options  Patient had noted significant stress as her  has a limited prognosis due to cancer and had just undergone a very large surgery in [place]  She previously had noted only rare palpitations/'fluttering' in her chest       12/4/2020: Patient presented to the office today for follow up with Dr Alvina Reddy from her ER evaluation  She has been tolerating the medications started in the hospital without difficulty  She had no recurrent palpitations or chest pain since her discharge home  She did see her PCP and there was some question as to why she was discharged on magnesium as well as why her thyroid dose has been adjusted  Patient had noted some increased dyspnea on exertion  She also reported that she had been seen by Cardiology in the past when she was first diagnosed with hypertension and underwent echocardiogram and stress test at that time  She did admit to snoring but has never undergone a sleep study  She typically drank coffee in the morning and tea throughout the day        Echocardiogram 12/02/2020 was unremarkable with an ejection fraction of 60% and no significant valvular abnormalities  Nuclear stress test 12/02/2020 showed no scar or ischemia with an EF of 73%  Interval History: Hamzah Song denies complaint today  She states she has only felt a very brief fluttering sensation that lasts only seconds on 3 occasions in the past 2 months  She denies any lightheadedness, dizziness, chest pain/pressure, palpitations, leg swelling  She is taking and tolerating her medications without issue  No abnormal bleeding or bruising  She has been avoiding caffeine in her diet other than a cup of decaffeinated coffee in the mornings  She does tell me she has noticed excellent improvement with faithful CPAP usage  She unfortunately got notice last week of a recall on her machine  She states the foam in the humidification system can break off and be inhaled   She will be obtaining a travel machine today or tomorrow while she waits for a replacement through her medical supply company, which she was told could take a few months  She has noticed a big difference not having a machine for a few days  She continues to support her spouse through his terminal cancer treatment  She reports less stress as her spouse's most recent scan did not show evidence of tumor progression  She states he will have another scan at the end of next month  Medical Problems     Problem List     Paroxysmal atrial fibrillation ([facility] [address] )    Essential hypertension    Hyperlipidemia    Hypothyroidism due to Hashimoto's thyroiditis    Obstructive sleep apnea syndrome    Morbid obesity with BMI of 40 0-44 9, adult (HCC)    Anxiety and depression    Elevated d-dimer    Snoring    Obesity hypoventilation syndrome (HCC)              Past Medical History:   Diagnosis Date    Anxiety     Elevated d-dimer     2 seperate episodes of unclear etiology   Hyperlipidemia     Hypertension     Hypothyroid     Obesity     [facility]     New onset 11/25/2020       Social History     Socioeconomic History    Marital status: /Civil Union     Spouse name: Not on file    Number of children: Not on file    Years of education: Not on file    Highest education level: Not on file   Occupational History    Occupation: Retired teacher   Tobacco Use    Smoking status: Never Smoker    Smokeless tobacco: Never Used   Vaping Use    Vaping Use: Never used   Substance and Sexual Activity    Alcohol use: Yes     Comment: Social EtOH    Drug use: Never    Sexual activity: Yes     Partners: Male   Other Topics Concern    Not on file   Social History Narrative    Not on file     Social Determinants of Health     Financial Resource Strain:     Difficulty of Paying Living Expenses:    Food Insecurity:     Worried About [address] in the Last Year:     [address] in the Last Year:    Transportation Needs:     Lack of Transportation (Medical):      Lack of Transportation (Non-Medical):    Physical Activity:     Days of Exercise per Week:     Minutes of Exercise per Session:    Stress:     Feeling of Stress :    Social Connections:     Frequency of Communication with Friends and Family:     Frequency of Social Gatherings with Friends and Family:     Attends [employer] Services:     Active Member of Clubs or Organizations:     Attends Club or Organization Meetings:     Marital Status:    Intimate Partner Violence:     Fear of Current or Ex-Partner:     Emotionally Abused:     Physically Abused:     Sexually Abused:       Family History   Problem Relation Age of Onset    Hypertension Sister     Heart disease Paternal [de-identified]     Other Mother         Encephalitis    Other Father          in his 80s without significant problems    Lung cancer Brother     No Known Problems Brother      Past Surgical History:   Procedure Laterality Date    HYSTERECTOMY      KNEE ARTHROPLASTY      SHOULDER OPEN ROTATOR CUFF REPAIR      x 2    TONSILLECTOMY         Current Outpatient Medications:     ALPRAZolam (XANAX) 0 5 mg tablet, Take by mouth daily at bedtime as needed for anxiety, Disp: , Rfl:     apixaban (ELIQUIS) 5 mg, Take 1 tablet (5 mg total) by mouth 2 (two) times a day, Disp: 180 tablet, Rfl: 3    Ascorbic Acid (Vitamin C) 500 MG CAPS, Take by mouth, Disp: , Rfl:     atorvastatin (LIPITOR) 10 mg tablet, Take 10 mg by mouth daily, Disp: , Rfl:     Cholecalciferol (Vitamin D3) 125 MCG (5000 UT) TABS, Take 2,000 Units by mouth daily , Disp: , Rfl:     citalopram (CeleXA) 40 mg tablet, Take 40 mg by mouth daily , Disp: , Rfl:     Coenzyme Q10 (CoQ10) 100 MG CAPS, Take by mouth, Disp: , Rfl:     levothyroxine 75 mcg tablet, Take 75 mcg by mouth daily , Disp: , Rfl:     MAGNESIUM GLUCONATE PO, Take 250 mg by mouth , Disp: , Rfl:     metoprolol succinate (TOPROL-XL) 25 mg 24 hr tablet, Take 1 tablet (25 mg total) by mouth daily, Disp: 90 tablet, Rfl: 3    olmesartan-hydrochlorothiazide (BENICAR HCT) 20-12 5 MG per tablet, Take 1 tablet by mouth daily, Disp: , Rfl:     metroNIDAZOLE (METROGEL) 0 75 % vaginal gel, insert 1 applicatorful vaginally nightly for 7 days (Patient not taking: Reported on 6/24/2021), Disp: , Rfl:   Allergies   Allergen Reactions    Prednisone Shortness Of Breath    Contrast [Iodinated Diagnostic Agents] Rash    Iodine - Food Allergy Rash       Labs:     Chemistry        Component Value Date/Time     11/06/2015 0655    K 4 4 02/04/2021 1558    K 3 9 11/06/2015 0655    CL 99 (L) 02/04/2021 1558     11/06/2015 0655    CO2 28 02/04/2021 1558    CO2 26 3 11/06/2015 0655    BUN 22 02/04/2021 1558    BUN 15 11/06/2015 0655    CREATININE 1 14 02/04/2021 1558    CREATININE 0 86 11/06/2015 0655        Component Value Date/Time    CALCIUM 9 2 02/04/2021 1558    CALCIUM 8 7 11/06/2015 0655    ALKPHOS 110 02/04/2021 1558    ALKPHOS 96 11/06/2015 0655    AST 37 02/04/2021 1558    AST 21 11/06/2015 0655    ALT 45 02/04/2021 1558    ALT 32 11/06/2015 0655    BILITOT 0 34 11/06/2015 0655            Lab Results   Component Value Date    CHOL 166 11/06/2015     Lab Results   Component Value Date    HDL 67 11/06/2015     Lab Results   Component Value Date    LDLCALC 74 11/06/2015     Lab Results   Component Value Date    TRIG 124 11/06/2015     ECG 11/25/2020:  Atrial flutter/atrial fibrillation at 118 bpm      Echocardiogram 12/02/2020:  EF 60%  Normal diastolic function  Mild annular calcification of the mitral valve  Trace tricuspid regurgitation      Lexiscan nuclear stress test 12/02/2020:  No evidence of myocardial scar  No evidence of myocardial ischemia  EF 73%      Lipid panel 10/13/2020: C 166  T 154  H 64  L 71       Echocardiogram 11/05/2015:  EF 55-60%  Mild tricuspid regurgitation  Estimated PASP 38 mmHg  Review of Systems   Constitutional: Negative  HENT: Negative  Cardiovascular: Negative for chest pain, dyspnea on exertion, irregular heartbeat, leg swelling, near-syncope, orthopnea, palpitations and syncope  Respiratory: Negative for cough, sleep disturbances due to breathing and snoring  Endocrine: Negative  Skin: Negative  Musculoskeletal: Negative  Gastrointestinal: Negative  Genitourinary: Negative  Neurological: Negative  Psychiatric/Behavioral: Negative  Vitals:    06/28/21 1113   BP: 140/78   Pulse: 62   Temp: 97 6 °F (36 4 °C)   SpO2: 95%     Vitals:    06/28/21 1113   Weight: 119 kg (263 lb)     Height: 5' 7" (170 2 cm)   Body mass index is 41 19 kg/m²  Physical Exam  Vitals and nursing note reviewed  Constitutional:       General: She is not in acute distress  Appearance: She is well-developed  She is obese  She is not diaphoretic  HENT:      Head: Normocephalic and atraumatic  Neck:      Thyroid: No thyromegaly  Vascular: No carotid bruit or JVD  Cardiovascular:      Rate and Rhythm: Normal rate and regular rhythm  Pulses: Intact distal pulses  Radial pulses are 2+ on the right side and 2+ on the left side  Heart sounds: Normal heart sounds, S1 normal and S2 normal  No murmur heard  Comments: No lower leg edema on exam  Pulmonary:      Effort: Pulmonary effort is normal       Breath sounds: Normal breath sounds  Abdominal:      General: There is no distension  Palpations: Abdomen is soft  Tenderness: There is no abdominal tenderness  Musculoskeletal:         General: Normal range of motion  Cervical back: Normal range of motion and neck supple  Lymphadenopathy:      Cervical: No cervical adenopathy  Skin:     General: Skin is warm and dry  Neurological:      Mental Status: She is alert and oriented to person, place, and time  Cranial Nerves: No cranial nerve deficit     Psychiatric:         Behavior: Behavior normal

## 2021-06-28 NOTE — ASSESSMENT & PLAN NOTE
Would try to optimize TSH in the 1-2 range if possible    TSH 1 38 on labs in 4/2021 through PCP  On Levothyroxine 75mcg currently  PCP managing

## 2021-06-28 NOTE — ASSESSMENT & PLAN NOTE
Blood pressure is mildly elevated on exam today  BP in excellent control at PCP office and with home cuff  Would continue on olmesartan-hydrochlorothiazide as well as metoprolol succinate at current doses  If blood pressure remains elevated we can consider up titration or addition of medications

## 2021-06-28 NOTE — ASSESSMENT & PLAN NOTE
Home sleep study 12/2020 revealed severe sleep apnea  Compliant with CPAP therapy with excellent control in symptoms  Currently there is a recall on pt's CPAP machine and she will be obtaining a Res-med travel machine to continue treatment as waiting for a new machine through her primary medical supply company [employer]

## 2021-07-21 ENCOUNTER — HOSPITAL ENCOUNTER (EMERGENCY)
Facility: HOSPITAL | Age: 66
Discharge: HOME | End: 2021-07-21
Attending: EMERGENCY MEDICINE | Admitting: EMERGENCY MEDICINE
Payer: MEDICARE

## 2021-07-21 ENCOUNTER — APPOINTMENT (EMERGENCY)
Dept: CT IMAGING | Facility: HOSPITAL | Age: 66
End: 2021-07-21
Payer: MEDICARE

## 2021-07-21 VITALS
OXYGEN SATURATION: 96 % | TEMPERATURE: 98.2 F | SYSTOLIC BLOOD PRESSURE: 162 MMHG | DIASTOLIC BLOOD PRESSURE: 70 MMHG | HEART RATE: 68 BPM | WEIGHT: 267.2 LBS | BODY MASS INDEX: 41.85 KG/M2 | RESPIRATION RATE: 16 BRPM

## 2021-07-21 DIAGNOSIS — K80.20 CALCULUS OF GALLBLADDER WITHOUT CHOLECYSTITIS WITHOUT OBSTRUCTION: ICD-10-CM

## 2021-07-21 DIAGNOSIS — N39.0 UTI (URINARY TRACT INFECTION): Primary | ICD-10-CM

## 2021-07-21 DIAGNOSIS — R16.1 SPLEEN ENLARGED: ICD-10-CM

## 2021-07-21 LAB
ALBUMIN SERPL BCP-MCNC: 3.7 G/DL (ref 3.5–5)
ALP SERPL-CCNC: 101 U/L (ref 46–116)
ALT SERPL W P-5'-P-CCNC: 40 U/L (ref 12–78)
ANION GAP SERPL CALCULATED.3IONS-SCNC: 13 MMOL/L (ref 4–13)
APTT PPP: 33 SECONDS (ref 23–37)
AST SERPL W P-5'-P-CCNC: 31 U/L (ref 5–45)
BACTERIA UR QL AUTO: ABNORMAL /HPF
BASOPHILS # BLD AUTO: 0.06 THOUSANDS/ΜL (ref 0–0.1)
BASOPHILS NFR BLD AUTO: 1 % (ref 0–1)
BILIRUB SERPL-MCNC: 0.82 MG/DL (ref 0.2–1)
BILIRUB UR QL STRIP: NEGATIVE
BUN SERPL-MCNC: 23 MG/DL (ref 5–25)
CALCIUM SERPL-MCNC: 9.3 MG/DL (ref 8.3–10.1)
CHLORIDE SERPL-SCNC: 102 MMOL/L (ref 100–108)
CLARITY UR: ABNORMAL
CO2 SERPL-SCNC: 24 MMOL/L (ref 21–32)
COLOR UR: YELLOW
CREAT SERPL-MCNC: 1.16 MG/DL (ref 0.6–1.3)
EOSINOPHIL # BLD AUTO: 0.31 THOUSAND/ΜL (ref 0–0.61)
EOSINOPHIL NFR BLD AUTO: 3 % (ref 0–6)
ERYTHROCYTE [DISTWIDTH] IN BLOOD BY AUTOMATED COUNT: 14 % (ref 11.6–15.1)
GFR SERPL CREATININE-BSD FRML MDRD: 48 ML/MIN/1.73SQ M
GLUCOSE SERPL-MCNC: 106 MG/DL (ref 65–140)
GLUCOSE UR STRIP-MCNC: NEGATIVE MG/DL
HCT VFR BLD AUTO: 40 % (ref 34.8–46.1)
HGB BLD-MCNC: 13.1 G/DL (ref 11.5–15.4)
HGB UR QL STRIP.AUTO: ABNORMAL
IMM GRANULOCYTES # BLD AUTO: 0.02 THOUSAND/UL (ref 0–0.2)
IMM GRANULOCYTES NFR BLD AUTO: 0 % (ref 0–2)
INR PPP: 1.16 (ref 0.84–1.19)
KETONES UR STRIP-MCNC: NEGATIVE MG/DL
LACTATE SERPL-SCNC: 2 MMOL/L (ref 0.5–2)
LEUKOCYTE ESTERASE UR QL STRIP: ABNORMAL
LIPASE SERPL-CCNC: 136 U/L (ref 73–393)
LYMPHOCYTES # BLD AUTO: 2.91 THOUSANDS/ΜL (ref 0.6–4.47)
LYMPHOCYTES NFR BLD AUTO: 31 % (ref 14–44)
MCH RBC QN AUTO: 29 PG (ref 26.8–34.3)
MCHC RBC AUTO-ENTMCNC: 32.8 G/DL (ref 31.4–37.4)
MCV RBC AUTO: 89 FL (ref 82–98)
MONOCYTES # BLD AUTO: 0.78 THOUSAND/ΜL (ref 0.17–1.22)
MONOCYTES NFR BLD AUTO: 8 % (ref 4–12)
NEUTROPHILS # BLD AUTO: 5.34 THOUSANDS/ΜL (ref 1.85–7.62)
NEUTS SEG NFR BLD AUTO: 57 % (ref 43–75)
NITRITE UR QL STRIP: NEGATIVE
NON-SQ EPI CELLS URNS QL MICRO: ABNORMAL /HPF
NRBC BLD AUTO-RTO: 0 /100 WBCS
OTHER STN SPEC: ABNORMAL
PH UR STRIP.AUTO: 6 [PH]
PLATELET # BLD AUTO: 238 THOUSANDS/UL (ref 149–390)
PMV BLD AUTO: 10.2 FL (ref 8.9–12.7)
POTASSIUM SERPL-SCNC: 3.7 MMOL/L (ref 3.5–5.3)
PROT SERPL-MCNC: 8.1 G/DL (ref 6.4–8.2)
PROT UR STRIP-MCNC: NEGATIVE MG/DL
PROTHROMBIN TIME: 14.6 SECONDS (ref 11.6–14.5)
RBC # BLD AUTO: 4.51 MILLION/UL (ref 3.81–5.12)
RBC #/AREA URNS AUTO: ABNORMAL /HPF
SODIUM SERPL-SCNC: 139 MMOL/L (ref 136–145)
SP GR UR STRIP.AUTO: 1.01 (ref 1–1.03)
TROPONIN I SERPL-MCNC: <0.02 NG/ML
UROBILINOGEN UR QL STRIP.AUTO: 0.2 E.U./DL
WBC # BLD AUTO: 9.42 THOUSAND/UL (ref 4.31–10.16)
WBC #/AREA URNS AUTO: ABNORMAL /HPF
WBC CLUMPS # UR AUTO: PRESENT /UL

## 2021-07-21 PROCEDURE — 85025 COMPLETE CBC W/AUTO DIFF WBC: CPT | Performed by: EMERGENCY MEDICINE

## 2021-07-21 PROCEDURE — 85610 PROTHROMBIN TIME: CPT | Performed by: EMERGENCY MEDICINE

## 2021-07-21 PROCEDURE — 36415 COLL VENOUS BLD VENIPUNCTURE: CPT | Performed by: EMERGENCY MEDICINE

## 2021-07-21 PROCEDURE — 93005 ELECTROCARDIOGRAM TRACING: CPT

## 2021-07-21 PROCEDURE — 74176 CT ABD & PELVIS W/O CONTRAST: CPT

## 2021-07-21 PROCEDURE — 84484 ASSAY OF TROPONIN QUANT: CPT | Performed by: EMERGENCY MEDICINE

## 2021-07-21 PROCEDURE — 81001 URINALYSIS AUTO W/SCOPE: CPT | Performed by: EMERGENCY MEDICINE

## 2021-07-21 PROCEDURE — 99284 EMERGENCY DEPT VISIT MOD MDM: CPT

## 2021-07-21 PROCEDURE — 80053 COMPREHEN METABOLIC PANEL: CPT | Performed by: EMERGENCY MEDICINE

## 2021-07-21 PROCEDURE — 96365 THER/PROPH/DIAG IV INF INIT: CPT

## 2021-07-21 PROCEDURE — 87086 URINE CULTURE/COLONY COUNT: CPT | Performed by: EMERGENCY MEDICINE

## 2021-07-21 PROCEDURE — 96375 TX/PRO/DX INJ NEW DRUG ADDON: CPT

## 2021-07-21 PROCEDURE — 83605 ASSAY OF LACTIC ACID: CPT | Performed by: EMERGENCY MEDICINE

## 2021-07-21 PROCEDURE — 99284 EMERGENCY DEPT VISIT MOD MDM: CPT | Performed by: EMERGENCY MEDICINE

## 2021-07-21 PROCEDURE — 83690 ASSAY OF LIPASE: CPT | Performed by: EMERGENCY MEDICINE

## 2021-07-21 PROCEDURE — 96361 HYDRATE IV INFUSION ADD-ON: CPT

## 2021-07-21 PROCEDURE — 85730 THROMBOPLASTIN TIME PARTIAL: CPT | Performed by: EMERGENCY MEDICINE

## 2021-07-21 RX ORDER — CEPHALEXIN 500 MG/1
500 CAPSULE ORAL EVERY 6 HOURS SCHEDULED
Qty: 40 CAPSULE | Refills: 0 | Status: SHIPPED | OUTPATIENT
Start: 2021-07-21 | End: 2021-07-31

## 2021-07-21 RX ORDER — CEFTRIAXONE 1 G/50ML
1000 INJECTION, SOLUTION INTRAVENOUS ONCE
Status: COMPLETED | OUTPATIENT
Start: 2021-07-21 | End: 2021-07-21

## 2021-07-21 RX ADMIN — CEFTRIAXONE 1000 MG: 1 INJECTION, SOLUTION INTRAVENOUS at 06:10

## 2021-07-21 RX ADMIN — MORPHINE SULFATE 2 MG: 2 INJECTION, SOLUTION INTRAMUSCULAR; INTRAVENOUS at 05:06

## 2021-07-21 RX ADMIN — SODIUM CHLORIDE 1000 ML: 0.9 INJECTION, SOLUTION INTRAVENOUS at 05:05

## 2021-07-21 RX ADMIN — SODIUM CHLORIDE 500 ML: 0.9 INJECTION, SOLUTION INTRAVENOUS at 06:09

## 2021-07-21 NOTE — ED PROVIDER NOTES
History  Chief Complaint   Patient presents with    Flank Pain     Patient experiencing left flank pain for the last 2 weeks  Patient has been having intermittent left-sided abdominal and flank pain for the past 2 weeks  Got worse today  No change with deep breath  No recent cough or cold symptoms  No fevers or chills  No change with eating  No trauma  No rash  No dysuria frequency  Has been slightly constipated recently  Last bowel movement was this morning and normal   Appetite has been normal       History provided by:  Patient   used: No    Flank Pain  Pain location:  LUQ and L flank  Pain quality: aching    Pain radiates to:  Does not radiate  Pain severity:  Mild  Onset quality:  Gradual  Duration:  2 weeks  Timing:  Intermittent  Progression:  Unchanged  Chronicity:  New  Context: not diet changes, not previous surgeries and not sick contacts    Relieved by:  Nothing  Worsened by:  Nothing  Ineffective treatments:  None tried  Associated symptoms: constipation    Associated symptoms: no anorexia, no chest pain, no chills, no cough, no diarrhea, no dysuria, no fever, no flatus, no hematochezia, no hematuria, no nausea, no shortness of breath, no sore throat and no vomiting    Risk factors: obesity        Prior to Admission Medications   Prescriptions Last Dose Informant Patient Reported? Taking?    ALPRAZolam (XANAX) 0 5 mg tablet  Self Yes No   Sig: Take by mouth daily at bedtime as needed for anxiety   Ascorbic Acid (Vitamin C) 500 MG CAPS  Self Yes No   Sig: Take by mouth   Cholecalciferol (Vitamin D3) 125 MCG (5000 UT) TABS  Self Yes No   Sig: Take 2,000 Units by mouth daily    Coenzyme Q10 (CoQ10) 100 MG CAPS  Self Yes No   Sig: Take by mouth   MAGNESIUM GLUCONATE PO  Self Yes No   Sig: Take 250 mg by mouth    apixaban (ELIQUIS) 5 mg   No No   Sig: Take 1 tablet (5 mg total) by mouth 2 (two) times a day   atorvastatin (LIPITOR) 10 mg tablet  Self Yes No   Sig: Take 10 mg by mouth daily   citalopram (CeleXA) 40 mg tablet  Self Yes No   Sig: Take 40 mg by mouth daily    levothyroxine 75 mcg tablet  Self Yes No   Sig: Take 75 mcg by mouth daily    metoprolol succinate (TOPROL-XL) 25 mg 24 hr tablet  Self No No   Sig: Take 1 tablet (25 mg total) by mouth daily   metroNIDAZOLE (METROGEL) 0 75 % vaginal gel  Self Yes No   Sig: insert 1 applicatorful vaginally nightly for 7 days   Patient not taking: Reported on 2021   olmesartan-hydrochlorothiazide (BENICAR HCT) 20-12 5 MG per tablet  Self Yes No   Sig: Take 1 tablet by mouth daily      Facility-Administered Medications: None       Past Medical History:   Diagnosis Date    Anxiety     Elevated d-dimer     2 seperate episodes of unclear etiology   Hyperlipidemia     Hypertension     Hypothyroid     Obesity     Paroxysmal atrial fibrillation ([facility] [address] )     New onset 2020  Past Surgical History:   Procedure Laterality Date    HYSTERECTOMY      KNEE ARTHROPLASTY      SHOULDER OPEN ROTATOR CUFF REPAIR      x 2    TONSILLECTOMY         Family History   Problem Relation Age of Onset    Hypertension Sister     Heart disease Paternal [de-identified]     Other Mother         Encephalitis    Other Father          in his 80s without significant problems    Lung cancer Brother     No Known Problems Brother      I have reviewed and agree with the history as documented  E-Cigarette/Vaping    E-Cigarette Use Never User      E-Cigarette/Vaping Substances     Social History     Tobacco Use    Smoking status: Never Smoker    Smokeless tobacco: Never Used   Vaping Use    Vaping Use: Never used   Substance Use Topics    Alcohol use: Not Currently    Drug use: Never       Review of Systems   Constitutional: Negative for chills and fever  HENT: Negative for ear pain, hearing loss, sore throat, trouble swallowing and voice change  Eyes: Negative for pain and discharge     Respiratory: Negative for cough, shortness of breath and wheezing  Cardiovascular: Negative for chest pain and palpitations  Gastrointestinal: Positive for constipation  Negative for abdominal pain, anorexia, blood in stool, diarrhea, flatus, hematochezia, nausea and vomiting  Genitourinary: Positive for flank pain  Negative for dysuria, frequency and hematuria  Musculoskeletal: Negative for joint swelling, neck pain and neck stiffness  Skin: Negative for rash and wound  Neurological: Negative for dizziness, seizures, syncope, facial asymmetry and headaches  Psychiatric/Behavioral: Negative for hallucinations, self-injury and suicidal ideas  All other systems reviewed and are negative  Physical Exam  Physical Exam  Vitals and nursing note reviewed  Constitutional:       General: She is not in acute distress  Appearance: She is well-developed  HENT:      Head: Normocephalic and atraumatic  Right Ear: External ear normal       Left Ear: External ear normal    Eyes:      General: No scleral icterus  Right eye: No discharge  Left eye: No discharge  Extraocular Movements: Extraocular movements intact  Conjunctiva/sclera: Conjunctivae normal    Cardiovascular:      Rate and Rhythm: Normal rate and regular rhythm  Heart sounds: Normal heart sounds  No murmur heard  Pulmonary:      Effort: Pulmonary effort is normal       Breath sounds: Normal breath sounds  No wheezing or rales  Abdominal:      General: Bowel sounds are normal  There is no distension  Palpations: Abdomen is soft  Tenderness: There is no abdominal tenderness  There is no guarding or rebound  Musculoskeletal:         General: No deformity  Normal range of motion  Cervical back: Normal range of motion and neck supple  Skin:     General: Skin is warm and dry  Findings: No rash  Neurological:      General: No focal deficit present  Mental Status: She is alert and oriented to person, place, and time        Cranial Nerves: No cranial nerve deficit  Psychiatric:         Mood and Affect: Mood normal          Behavior: Behavior normal          Thought Content:  Thought content normal          Judgment: Judgment normal          Vital Signs  ED Triage Vitals [07/21/21 0452]   Temperature Pulse Respirations Blood Pressure SpO2   98 2 °F (36 8 °C) 76 18 162/70 98 %      Temp Source Heart Rate Source Patient Position - Orthostatic VS BP Location FiO2 (%)   Temporal Monitor Lying Left arm --      Pain Score       8           Vitals:    07/21/21 0452 07/21/21 0515   BP: 162/70 162/70   Pulse: 76 68   Patient Position - Orthostatic VS: Lying          Visual Acuity      ED Medications  Medications   sodium chloride 0 9 % bolus 1,000 mL (0 mL Intravenous Stopped 7/21/21 0607)   morphine injection 2 mg (2 mg Intravenous Given 7/21/21 0506)   sodium chloride 0 9 % bolus 500 mL (0 mL Intravenous Stopped 7/21/21 0626)   cefTRIAXone (ROCEPHIN) IVPB (premix in dextrose) 1,000 mg 50 mL (0 mg Intravenous Stopped 7/21/21 0626)       Diagnostic Studies  Results Reviewed     Procedure Component Value Units Date/Time    Comprehensive metabolic panel [[id number]] Collected: 07/21/21 0500    Lab Status: Final result Specimen: Blood from Arm, Right Updated: 07/21/21 0553     Sodium 139 mmol/L      Potassium 3 7 mmol/L      Chloride 102 mmol/L      CO2 24 mmol/L      ANION GAP 13 mmol/L      BUN 23 mg/dL      Creatinine 1 16 mg/dL      Glucose 106 mg/dL      Calcium 9 3 mg/dL      AST 31 U/L      ALT 40 U/L      Alkaline Phosphatase 101 U/L      Total Protein 8 1 g/dL      Albumin 3 7 g/dL      Total Bilirubin 0 82 mg/dL      eGFR 48 ml/min/1 73sq m     Narrative:      Margi guidelines for Chronic Kidney Disease (CKD):     Stage 1 with normal or high GFR (GFR > 90 mL/min/1 73 square meters)    Stage 2 Mild CKD (GFR = 60-89 mL/min/1 73 square meters)    Stage 3A Moderate CKD (GFR = 45-59 mL/min/1 73 square meters)    Stage 3B Moderate CKD (GFR = 30-44 mL/min/1 73 square meters)    Stage 4 Severe CKD (GFR = 15-29 mL/min/1 73 square meters)    Stage 5 End Stage CKD (GFR <15 mL/min/1 73 square meters)  Note: GFR calculation is accurate only with a steady state creatinine    Lipase [[id number]]  (Normal) Collected: 07/21/21 0500    Lab Status: Final result Specimen: Blood from Arm, Right Updated: 07/21/21 0551     Lipase 136 u/L     Troponin I [[id number]]  (Normal) Collected: 07/21/21 0500    Lab Status: Final result Specimen: Blood from Arm, Right Updated: 07/21/21 0550     Troponin I <0 02 ng/mL     Urine Microscopic [[id number]]  (Abnormal) Collected: 07/21/21 0510    Lab Status: Final result Specimen: Urine, Clean Catch Updated: 07/21/21 0550     RBC, UA 2-4 /hpf      WBC, UA Innumerable /hpf      Epithelial Cells Moderate /hpf      Bacteria, UA Innumerable /hpf      WBC Clumps Present     OTHER OBSERVATIONS Transitional Epithelial Cells    Urine culture [[id number]] Collected: 07/21/21 0510    Lab Status: In process Specimen: Urine, Clean Catch Updated: 07/21/21 0550    Lactic acid [[id number]]  (Normal) Collected: 07/21/21 0500    Lab Status: Final result Specimen: Blood from Arm, Right Updated: 07/21/21 0550     LACTIC ACID 2 0 mmol/L     Narrative:      Result may be elevated if tourniquet was used during collection      Protime-INR [[id number]]  (Abnormal) Collected: 07/21/21 0500    Lab Status: Final result Specimen: Blood from Arm, Right Updated: 07/21/21 0547     Protime 14 6 seconds      INR 1 16    APTT [[id number]]  (Normal) Collected: 07/21/21 0500    Lab Status: Final result Specimen: Blood from Arm, Right Updated: 07/21/21 0547     PTT 33 seconds     CBC and differential [[id number]] Collected: 07/21/21 0500    Lab Status: Final result Specimen: Blood from Arm, Right Updated: 07/21/21 0544     WBC 9 42 Thousand/uL      RBC 4 51 Million/uL      Hemoglobin 13 1 g/dL      Hematocrit 40 0 %      MCV 89 fL      MCH 29 0 pg MCHC 32 8 g/dL      RDW 14 0 %      MPV 10 2 fL      Platelets 377 Thousands/uL      nRBC 0 /100 WBCs      Neutrophils Relative 57 %      Immat GRANS % 0 %      Lymphocytes Relative 31 %      Monocytes Relative 8 %      Eosinophils Relative 3 %      Basophils Relative 1 %      Neutrophils Absolute 5 34 Thousands/µL      Immature Grans Absolute 0 02 Thousand/uL      Lymphocytes Absolute 2 91 Thousands/µL      Monocytes Absolute 0 78 Thousand/µL      Eosinophils Absolute 0 31 Thousand/µL      Basophils Absolute 0 06 Thousands/µL     UA w Reflex to Microscopic w Reflex to Culture [[id number]]  (Abnormal) Collected: 07/21/21 0510    Lab Status: Final result Specimen: Urine, Clean Catch Updated: 07/21/21 0542     Color, UA Yellow     Clarity, UA Slightly Cloudy     Specific [place], UA 1 010     pH, UA 6 0     Leukocytes, UA Large     Nitrite, UA Negative     Protein, UA Negative mg/dl      Glucose, UA Negative mg/dl      Ketones, UA Negative mg/dl      Urobilinogen, UA 0 2 E U /dl      Bilirubin, UA Negative     Blood, UA Small                 CT abdomen pelvis wo contrast   Final Result by Nithya Ahmadi DO (07/21 6814)   Colonic diverticulosis  Workstation performed: [id number]                    Procedures  ECG 12 Lead Documentation Only    Date/Time: 7/21/2021 5:01 AM  Performed by: Marita Medeiros MD  Authorized by: Marita Medeiros MD     Patient location:  ED  Previous ECG:     Previous ECG:  Compared to current    Similarity:  No change  Rate:     ECG rate:  70  Rhythm:     Rhythm: sinus rhythm    Ectopy:     Ectopy: none    QRS:     QRS axis:  Normal             ED Course  ED Course as of Jul 21 0626   Wed Jul 21, 2021   0616 Discussed with patient and  about her lab and CT scan results  Mid aware of mildly enlarged spleen  Will need follow up with family doctor  0247 Patient also made aware possible stone in her gallbladder                                  SBIRT 20yo+      Most Recent Value   SBIRT (22 yo +)   In order to provide better care to our patients, we are screening all of our patients for alcohol and drug use  Would it be okay to ask you these screening questions? Yes Filed at: 07/21/2021 0455   Initial Alcohol Screen: US AUDIT-C    1  How often do you have a drink containing alcohol?  0 Filed at: 07/21/2021 0455   2  How many drinks containing alcohol do you have on a typical day you are drinking? 0 Filed at: 07/21/2021 0455   3a  Male UNDER 65: How often do you have five or more drinks on one occasion? 0 Filed at: 07/21/2021 0455   3b  FEMALE Any Age, or MALE 65+: How often do you have 4 or more drinks on one occassion? 0 Filed at: 07/21/2021 0455   Audit-C Score  0 Filed at: 07/21/2021 9954   CLEVE: How many times in the past year have you    Used an illegal drug or used a prescription medication for non-medical reasons?   Never Filed at: 07/21/2021 0455                    MDM  Number of Diagnoses or Management Options     Amount and/or Complexity of Data Reviewed  Clinical lab tests: reviewed  Review and summarize past medical records: yes        Disposition  Final diagnoses:   UTI (urinary tract infection)   Spleen enlarged   Calculus of gallbladder without cholecystitis without obstruction - Possible     Time reflects when diagnosis was documented in both MDM as applicable and the Disposition within this note     Time User Action Codes Description Comment    7/21/2021  6:17 AM Yefri Christensen Add [N39 0] UTI (urinary tract infection)     7/21/2021  6:17 AM Yefri Christensen Add [R16 1] Spleen enlarged     7/21/2021  6:17 AM Po Christensen Add [K80 20] Calculus of gallbladder without cholecystitis without obstruction     7/21/2021  6:17 AM Po Christensen Modify [K80 20] Calculus of gallbladder without cholecystitis without obstruction Possible      ED Disposition     ED Disposition Condition Date/Time Comment    Discharge Stable Wed Jul 21, 2021  6:17 AM Bella Bass [place] discharge to home/self care  Follow-up Information     Follow up With Specialties Details Why Contact Info    Evelia Worthington,   Call   [address] [facility] [address]  [phone]            Patient's Medications   Discharge Prescriptions    CEPHALEXIN (KEFLEX) 500 MG CAPSULE    Take 1 capsule (500 mg total) by mouth every 6 (six) hours for 10 days       Start Date: 7/21/2021 End Date: 7/31/2021       Order Dose: 500 mg       Quantity: 40 capsule    Refills: 0     No discharge procedures on file      PDMP Review       Value Time User    PDMP Reviewed  Yes 11/25/2020  2:06 PM Brady Garrett MD          ED Provider  Electronically Signed by           Faizan Nava MD  07/21/21 0955       Faizan Nava MD  07/21/21 1812

## 2021-07-22 LAB — BACTERIA UR CULT: NORMAL

## 2021-07-26 ENCOUNTER — PATIENT MESSAGE (OUTPATIENT)
Dept: CARDIOLOGY CLINIC | Facility: CLINIC | Age: 66
End: 2021-07-26

## 2021-07-26 LAB
ATRIAL RATE: 70 BPM
P AXIS: 55 DEGREES
PR INTERVAL: 174 MS
QRS AXIS: 48 DEGREES
QRSD INTERVAL: 92 MS
QT INTERVAL: 424 MS
QTC INTERVAL: 457 MS
T WAVE AXIS: 57 DEGREES
VENTRICULAR RATE: 70 BPM

## 2021-07-26 PROCEDURE — 93010 ELECTROCARDIOGRAM REPORT: CPT | Performed by: INTERNAL MEDICINE

## 2021-07-26 NOTE — TELEPHONE ENCOUNTER
Called and spoke with pt  Reviewed ekg from ER that was read by Dr Kristen Ayala  Nonspecific T wave abnormality that was unchanged from prior study  Discussed that this is not anything for her to worry about  She verbalized understanding

## 2021-07-26 NOTE — TELEPHONE ENCOUNTER
From: Nitin Ayala  To: FRANCISCA Fish  Sent: 7/26/2021 12:57 PM EDT  Subject: Test Results Question    Hi     I was concerned that my ECG test said " Abnormal " from my ER visit on July 21st   Can you explain that to me? Thank you so very much!     [employer]

## 2021-07-30 ENCOUNTER — TELEPHONE (OUTPATIENT)
Dept: CARDIOLOGY CLINIC | Facility: CLINIC | Age: 66
End: 2021-07-30

## 2021-07-30 NOTE — TELEPHONE ENCOUNTER
The pt called and stated that she is on vacation in [place] and she went to the ER for a nosebleed  She woke up with it  She wanted to make Dr Marita Berry aware of it  She was told by the urgent care doctor that the bleed had nothing to do with a bleed in her head or anything serious  She is concerned because of taking Eliquis, having HTN and A-fib  She states that her BP was 175/90 upon arrival  By the time she left it was 130/60  She states that she has a lot of sinus issues and that she wonders if it could of been from that  She also mentions that this incident reminded her of years ago when she was DX with HTN  She went to the ER with a nose bleed and her BP was 200/110

## 2021-09-10 ENCOUNTER — TELEPHONE (OUTPATIENT)
Dept: CARDIOLOGY CLINIC | Facility: CLINIC | Age: 66
End: 2021-09-10

## 2021-09-10 NOTE — TELEPHONE ENCOUNTER
Fluttering in her abd to the left of her belly button  Asking if she should be concerned that it is her aorta

## 2021-09-10 NOTE — TELEPHONE ENCOUNTER
Her bowels can cause a fluttering movement in the abdomen, especially if the fluttering is not midline    She can stop in for an ECG for reassurance if she wishes

## 2021-09-20 ENCOUNTER — HOSPITAL ENCOUNTER (OUTPATIENT)
Dept: CT IMAGING | Facility: HOSPITAL | Age: 66
Discharge: HOME | End: 2021-09-20
Payer: MEDICARE

## 2021-09-20 DIAGNOSIS — I71.4 ABDOMINAL AORTIC ANEURYSM WITHOUT RUPTURE (HCC): ICD-10-CM

## 2021-09-20 PROCEDURE — 74176 CT ABD & PELVIS W/O CONTRAST: CPT

## 2021-09-20 PROCEDURE — G1004 CDSM NDSC: HCPCS

## 2021-11-02 ENCOUNTER — OFFICE VISIT (OUTPATIENT)
Dept: CARDIOLOGY CLINIC | Facility: CLINIC | Age: 66
End: 2021-11-02
Payer: MEDICARE

## 2021-11-02 VITALS
SYSTOLIC BLOOD PRESSURE: 142 MMHG | BODY MASS INDEX: 41.75 KG/M2 | OXYGEN SATURATION: 94 % | HEIGHT: 67 IN | HEART RATE: 75 BPM | DIASTOLIC BLOOD PRESSURE: 82 MMHG | WEIGHT: 266 LBS

## 2021-11-02 DIAGNOSIS — E06.3 HYPOTHYROIDISM DUE TO HASHIMOTO'S THYROIDITIS: ICD-10-CM

## 2021-11-02 DIAGNOSIS — E78.2 MIXED HYPERLIPIDEMIA: ICD-10-CM

## 2021-11-02 DIAGNOSIS — G47.33 OBSTRUCTIVE SLEEP APNEA SYNDROME: ICD-10-CM

## 2021-11-02 DIAGNOSIS — R06.83 SNORING: ICD-10-CM

## 2021-11-02 DIAGNOSIS — E03.8 HYPOTHYROIDISM DUE TO HASHIMOTO'S THYROIDITIS: ICD-10-CM

## 2021-11-02 DIAGNOSIS — I48.0 PAROXYSMAL ATRIAL FIBRILLATION (HCC): Primary | ICD-10-CM

## 2021-11-02 DIAGNOSIS — E66.01 MORBID OBESITY WITH BMI OF 40.0-44.9, ADULT (HCC): ICD-10-CM

## 2021-11-02 DIAGNOSIS — I10 ESSENTIAL HYPERTENSION: ICD-10-CM

## 2021-11-02 PROCEDURE — 99214 OFFICE O/P EST MOD 30 MIN: CPT | Performed by: INTERNAL MEDICINE

## 2021-11-02 RX ORDER — CEPHALEXIN 500 MG/1
CAPSULE ORAL
COMMUNITY
Start: 2021-07-21 | End: 2021-11-02 | Stop reason: ALTCHOICE

## 2021-11-02 RX ORDER — TOBRAMYCIN AND DEXAMETHASONE 3; 1 MG/ML; MG/ML
SUSPENSION/ DROPS OPHTHALMIC
COMMUNITY
Start: 2021-08-30 | End: 2022-06-10 | Stop reason: ALTCHOICE

## 2021-11-02 RX ORDER — ERYTHROMYCIN 5 MG/G
OINTMENT OPHTHALMIC
COMMUNITY
Start: 2021-08-30

## 2021-11-02 RX ORDER — MAGNESIUM OXIDE 400 MG/1
TABLET ORAL
COMMUNITY

## 2021-11-02 NOTE — PATIENT INSTRUCTIONS
Continue current medications without change  Monitor blood pressure intermittently at home  Call me with any change in symptoms  Should have updated TSH with Dr Dominguez Stage  Agree with your decision to restart CPAP

## 2021-11-03 ENCOUNTER — TELEPHONE (OUTPATIENT)
Dept: CARDIOLOGY CLINIC | Facility: CLINIC | Age: 66
End: 2021-11-03

## 2021-11-22 ENCOUNTER — TELEPHONE (OUTPATIENT)
Dept: CARDIOLOGY CLINIC | Facility: CLINIC | Age: 66
End: 2021-11-22

## 2021-12-14 ENCOUNTER — TELEPHONE (OUTPATIENT)
Dept: CARDIOLOGY CLINIC | Facility: CLINIC | Age: 66
End: 2021-12-14

## 2022-03-10 ENCOUNTER — OFFICE VISIT (OUTPATIENT)
Dept: CARDIOLOGY CLINIC | Facility: CLINIC | Age: 67
End: 2022-03-10
Payer: MEDICARE

## 2022-03-10 VITALS
DIASTOLIC BLOOD PRESSURE: 78 MMHG | SYSTOLIC BLOOD PRESSURE: 138 MMHG | OXYGEN SATURATION: 98 % | WEIGHT: 274 LBS | TEMPERATURE: 97.5 F | BODY MASS INDEX: 43 KG/M2 | HEART RATE: 69 BPM | HEIGHT: 67 IN

## 2022-03-10 DIAGNOSIS — I48.0 PAROXYSMAL ATRIAL FIBRILLATION (HCC): Primary | ICD-10-CM

## 2022-03-10 DIAGNOSIS — G47.33 OBSTRUCTIVE SLEEP APNEA SYNDROME: ICD-10-CM

## 2022-03-10 DIAGNOSIS — E03.8 HYPOTHYROIDISM DUE TO HASHIMOTO'S THYROIDITIS: ICD-10-CM

## 2022-03-10 DIAGNOSIS — E66.01 MORBID OBESITY WITH BMI OF 40.0-44.9, ADULT (HCC): ICD-10-CM

## 2022-03-10 DIAGNOSIS — E78.2 MIXED HYPERLIPIDEMIA: ICD-10-CM

## 2022-03-10 DIAGNOSIS — I10 ESSENTIAL HYPERTENSION: ICD-10-CM

## 2022-03-10 DIAGNOSIS — E06.3 HYPOTHYROIDISM DUE TO HASHIMOTO'S THYROIDITIS: ICD-10-CM

## 2022-03-10 PROCEDURE — 99214 OFFICE O/P EST MOD 30 MIN: CPT | Performed by: NURSE PRACTITIONER

## 2022-03-10 NOTE — PROGRESS NOTES
Cardiology Follow Up    Joey Fletcher  1955  [id number]  [facility] [place]  [address]  [address] [facility] [id number] [phone]    Armen Rubio presents today for routine follow up of her atrial fibrillation, HTN, HLD     1  Paroxysmal atrial fibrillation [facility])  Assessment & Plan:  Patient with new onset atrial fibrillation 11/25/2020 at [address] [place] [address] ER presentation for chest pain, shortness of breath  Patient was given IV Cardizem and Lopressor and converted to normal sinus rhythm  She has remained in normal sinus rhythm since and feels well  [id number] Score 3   Patient was started on  Eliquis 5 mg twice daily which should be continued for stroke prophylaxis  Aspirin 81 mg daily was discontinued  Echocardiogram and outpatient stress test 12/02/2020 were unremarkable  Home sleep study revealed severe sleep apnea - which is now treated  Continue magnesium 250 mg daily over-the-counter   Patient was instructed that if she has recurrent palpitations she can take an extra metoprolol succinate as well as Xanax and relax for period of time however if symptoms persist she should come back to the emergency room  Continue decreased caffeine intake  I have not ordered a monitor at this time as she has not had any recurrent palpitations  If she does have recurrent palpitations we can consider ZIO  2  Essential hypertension  Assessment & Plan:  Blood pressure is in good control  Continue olmesartan-hydrochlorothiazide 20-12 5 mg daily and metoprolol succinate 25 mg daily  Recent lab work reviewed  Discussed benefits of 2 g sodium diet, regular exercise, and weight control  3  Mixed hyperlipidemia  Assessment & Plan:  Lipid panel 12/13/2021: C 176  T 177  H 65  L 76  Continue atorvastatin 10 mg daily          4  Obstructive sleep apnea syndrome  Assessment & Plan:  Home sleep study 12/2020 revealed severe sleep apnea  Compliant with CPAP therapy with excellent control in symptoms  Currently there is a recall on pt's CPAP machine and she obtained a Res-med travel machine to continue treatment as waiting for a new machine through her primary medical supply company [employer]      5  Hypothyroidism due to Hashimoto's thyroiditis  Assessment & Plan:  Goal TSH in the 1-2 range  TSH 2 6 on 12/13/21  On levothyroxine 75 mcg currently  PCP managing         6  Morbid obesity with BMI of 40 0-44 9, adult ([facility] [address] )  Assessment & Plan:  BMI 42 91kg/m2  Weight loss would be beneficial   Reviewed dietary and exercise modifications needed for weight control  Will monitor  HPI  Rich Cleveland has a past medical history of atrial fibrillation, HTN, HLD, DEANNA on CPAP, hypothyroidism, anxiety/depression, and obesity  She initially presented to [address] [place] [address] 11/25/2020 with chest pain, palpitations, and shortness of breath  Found to be in atrial fibrillation with RVR, rate 118 bpm  CTA ruled out PE  She was treated with IV Lopressor and Cardiazem and spontaneously converted to NSR  She was evaluated by Dr Fortunato Stinson during admission and ultimately discharged home on metoprolol succinate 25 mg daily and Eliquis 5 mg BID  Her TSH was mildly elevated and levothyroxine increased to 100 mch from 88 mcg during admission  Magnesium supplementation was initiated  She was under significant stress at the time with a recent cancer diagnosis for her spouse  He had just returned home from a complicated bowel surgery  She followed up 12/4/2020 at which time symptoms had not returned  She was tolerating the medication changes  Echo 12/2/2020 was unremarkable  Nuclear stress test 12/2/20 showed no scar or ischemia with EF 73%  She completed a sleep study 3/4/2021 which was positive for DEANNA  She was referred to sleep medicine and underwent in-lab study 4/8/2021 and CPAP therapy was initiated  She followed up with me 6/28/2021 where she denied complaint   She reported brief palpitations  She had been avoiding caffeine  She noticed excellent improvement in symptoms with starting the CPAP, but unfortunately got a recall notice on her [employer] machine and had stopped use  She noticed a big difference not using the machine  She ultimately decided to restart use  3/10/22: Ena presents for routine follow up  She underwent bilateral cataract extractions which have healed well  Her spouse recently underwent surgery for another tumor removal and is starting chemotherapy again  This is stressful  She notes rare brief palpitations, which are not bothersome  She is taking magnesium supplementation daily  She uses a [employer] mobile at home which continues to show NSR  She denies any chest discomfort, shortness of breath, or dyspnea on exertion  No swelling  No lightheadedness  She is frustrated with her weight  She is an emotional eater and feels she could do better with food choices  She denies any unusual bleeding or bruising  She is tolerating her medication well  She purchased a ResMed CPAP machine which she has been using faithfully  Medical Problems             Problem List     Paroxysmal atrial fibrillation ([facility] [address] )    Essential hypertension    Hyperlipidemia    Hypothyroidism due to Hashimoto's thyroiditis    Obstructive sleep apnea syndrome    Morbid obesity with BMI of 40 0-44 9, adult (HCC)    Anxiety and depression    Elevated d-dimer    Snoring    Obesity hypoventilation syndrome (HCC)              Past Medical History:   Diagnosis Date    Anxiety     Elevated d-dimer     2 seperate episodes of unclear etiology   Hyperlipidemia     Hypertension     Hypothyroid     Obesity     Paroxysmal atrial fibrillation ([address] )     New onset 11/25/2020       Social History     Socioeconomic History    Marital status: /Civil Union     Spouse name: Not on file    Number of children: Not on file    Years of education: Not on file    Highest education level: Not on file Occupational History    Occupation: Retired teacher   Tobacco Use    Smoking status: Never Smoker    Smokeless tobacco: Never Used   Vaping Use    Vaping Use: Never used   Substance and Sexual Activity    Alcohol use: Not Currently    Drug use: Never    Sexual activity: Yes     Partners: Male   Other Topics Concern    Not on file   Social History Narrative    Not on file     Social Determinants of Health     Financial Resource Strain: Not on file   Food Insecurity: Not on file   Transportation Needs: Not on file   Physical Activity: Not on file   Stress: Not on file   Social Connections: Not on file   Intimate Partner Violence: Not on file   Housing Stability: Not on file      Family History   Problem Relation Age of Onset    Hypertension Sister     Heart disease Paternal [de-identified]     Other Mother         Encephalitis    Other Father          in his 80s without significant problems    Lung cancer Brother     No Known Problems Brother      Past Surgical History:   Procedure Laterality Date    CATARACT EXTRACTION, BILATERAL      HYSTERECTOMY      KNEE ARTHROPLASTY      SHOULDER OPEN ROTATOR CUFF REPAIR      x 2    TONSILLECTOMY         Current Outpatient Medications:     ALPRAZolam (XANAX) 0 5 mg tablet, Take by mouth daily at bedtime as needed for anxiety, Disp: , Rfl:     apixaban (ELIQUIS) 5 mg, Take 1 tablet (5 mg total) by mouth 2 (two) times a day, Disp: 180 tablet, Rfl: 3    Ascorbic Acid (Vitamin C) 500 MG CAPS, Take by mouth, Disp: , Rfl:     atorvastatin (LIPITOR) 10 mg tablet, Take 10 mg by mouth daily, Disp: , Rfl:     Cholecalciferol (Vitamin D3) 125 MCG (5000 UT) TABS, Take 2,000 Units by mouth daily , Disp: , Rfl:     citalopram (CeleXA) 40 mg tablet, Take 40 mg by mouth daily , Disp: , Rfl:     erythromycin (ILOTYCIN) ophthalmic ointment, APPLY TO BOTH EYES AT NIGHT BEFORE SLEEP, Disp: , Rfl:     levothyroxine 75 mcg tablet, Take 75 mcg by mouth daily , Disp: , Rfl:    magnesium oxide (MAG-OX) 400 mg tablet, Take by mouth, Disp: , Rfl:     metroNIDAZOLE (METROGEL) 0 75 % vaginal gel, as needed , Disp: , Rfl:     olmesartan-hydrochlorothiazide (BENICAR HCT) 20-12 5 MG per tablet, Take 1 tablet by mouth daily, Disp: , Rfl:     tobramycin-dexamethasone (TOBRADEX) ophthalmic suspension, instill 1 drop into both eyes four times a day, Disp: , Rfl:     metoprolol succinate (TOPROL-XL) 25 mg 24 hr tablet, Take 1 tablet (25 mg total) by mouth daily, Disp: 90 tablet, Rfl: 3  Allergies   Allergen Reactions    Prednisone Shortness Of Breath    Contrast [Iodinated Diagnostic Agents] Rash    Iodine - Food Allergy Rash       Labs:     Chemistry        Component Value Date/Time     11/06/2015 0655    K 3 7 07/21/2021 0500    K 3 9 11/06/2015 0655     07/21/2021 0500     11/06/2015 0655    CO2 24 07/21/2021 0500    CO2 26 3 11/06/2015 0655    BUN 23 07/21/2021 0500    BUN 15 11/06/2015 0655    CREATININE 1 16 07/21/2021 0500    CREATININE 0 86 11/06/2015 0655        Component Value Date/Time    CALCIUM 9 3 07/21/2021 0500    CALCIUM 8 7 11/06/2015 0655    ALKPHOS 101 07/21/2021 0500    ALKPHOS 96 11/06/2015 0655    AST 31 07/21/2021 0500    AST 21 11/06/2015 0655    ALT 40 07/21/2021 0500    ALT 32 11/06/2015 0655    BILITOT 0 34 11/06/2015 0655            Lab Results   Component Value Date    CHOL 166 11/06/2015     Lab Results   Component Value Date    HDL 67 11/06/2015     Lab Results   Component Value Date    LDLCALC 74 11/06/2015     Lab Results   Component Value Date    TRIG 124 11/06/2015     Lipid panel 12/13/2021: C 176  T 177  H 65  L 76  ECG 11/25/2020:  Atrial flutter/atrial fibrillation at 118 bpm      Echocardiogram 12/02/2020:  EF 60%  Normal diastolic function  Mild annular calcification of the mitral valve  Trace tricuspid regurgitation      Lexiscan nuclear stress test 12/02/2020:  No evidence of myocardial scar    No evidence of myocardial ischemia  EF 73%      Lipid panel 10/13/2020: C 166  T 154  H 64  L 71       Echocardiogram 11/05/2015:  EF 55-60%  Mild tricuspid regurgitation  Estimated PASP 38 mmHg  Review of Systems   Constitutional: Negative  HENT: Negative  Cardiovascular: Positive for palpitations (rare)  Negative for chest pain, dyspnea on exertion, irregular heartbeat, leg swelling, near-syncope and orthopnea  Respiratory: Negative for cough, shortness of breath, sleep disturbances due to breathing and snoring  Endocrine: Negative  Skin: Negative  Musculoskeletal: Negative  Gastrointestinal: Negative  Genitourinary: Negative  Neurological: Negative  Psychiatric/Behavioral: Negative  Vitals:    03/10/22 0807   BP: 138/78   Pulse: 69   Temp: 97 5 °F (36 4 °C)   SpO2: 98%     Vitals:    03/10/22 0807   Weight: 124 kg (274 lb)     Height: 5' 7" (170 2 cm)   Body mass index is 42 91 kg/m²  Physical Exam  Vitals and nursing note reviewed  Constitutional:       General: She is not in acute distress  Appearance: She is well-developed  She is obese  She is not diaphoretic  HENT:      Head: Normocephalic and atraumatic  Neck:      Thyroid: No thyromegaly  Vascular: No carotid bruit or JVD  Cardiovascular:      Rate and Rhythm: Normal rate and regular rhythm  No extrasystoles are present  Pulses: Intact distal pulses  Radial pulses are 2+ on the right side and 2+ on the left side  Heart sounds: Normal heart sounds, S1 normal and S2 normal  No murmur heard  Comments: No edema  Pulmonary:      Effort: Pulmonary effort is normal       Breath sounds: Normal breath sounds  Abdominal:      General: There is no distension  Palpations: Abdomen is soft  Tenderness: There is no abdominal tenderness  Musculoskeletal:         General: Normal range of motion  Cervical back: Normal range of motion and neck supple     Lymphadenopathy:      Cervical: No cervical adenopathy  Skin:     General: Skin is warm and dry  Neurological:      Mental Status: She is alert and oriented to person, place, and time  Cranial Nerves: No cranial nerve deficit  Psychiatric:         Mood and Affect: Mood and affect normal          Speech: Speech normal          Behavior: Behavior normal  Behavior is cooperative           Cognition and Memory: Cognition and memory normal

## 2022-03-12 NOTE — ASSESSMENT & PLAN NOTE
BMI 42 91kg/m2  Weight loss would be beneficial   Reviewed dietary and exercise modifications needed for weight control  Will monitor

## 2022-03-12 NOTE — ASSESSMENT & PLAN NOTE
Patient with new onset atrial fibrillation 11/25/2020 at [address] [facility] ER presentation for chest pain, shortness of breath  Patient was given IV Cardizem and Lopressor and converted to normal sinus rhythm  She has remained in normal sinus rhythm since and feels well  [id number] Score 3   Patient was started on  Eliquis 5 mg twice daily which should be continued for stroke prophylaxis  Aspirin 81 mg daily was discontinued  Echocardiogram and outpatient stress test 12/02/2020 were unremarkable  Home sleep study revealed severe sleep apnea - which is now treated  Continue magnesium 250 mg daily over-the-counter   Patient was instructed that if she has recurrent palpitations she can take an extra metoprolol succinate as well as Xanax and relax for period of time however if symptoms persist she should come back to the emergency room  Continue decreased caffeine intake  I have not ordered a monitor at this time as she has not had any recurrent palpitations  If she does have recurrent palpitations we can consider JASON

## 2022-03-12 NOTE — ASSESSMENT & PLAN NOTE
Home sleep study 12/2020 revealed severe sleep apnea  Compliant with CPAP therapy with excellent control in symptoms  Currently there is a recall on pt's CPAP machine and she obtained a Res-med travel machine to continue treatment as waiting for a new machine through her primary medical supply company [employer]

## 2022-03-12 NOTE — ASSESSMENT & PLAN NOTE
Blood pressure is in good control  Continue olmesartan-hydrochlorothiazide 20-12 5 mg daily and metoprolol succinate 25 mg daily  Recent lab work reviewed  Discussed benefits of 2 g sodium diet, regular exercise, and weight control

## 2022-03-21 NOTE — PROGRESS NOTES
Cardiology Office Visit    Stephen Aceves  [id number]  1955    [facility] [place]  [address] [facility] [address] [place] [id number] [phone]      Dear Ruthie Luz DO,    I had the pleasure of seeing your patient at our [address] [place] [address] office today 11/2/2021  As you know she is a pleasant 74y o  year old female with a medical history as described below  Reason for office visit: Follow up  atrial fibrillation, hypertension and hyperlipidemia  1  Paroxysmal atrial fibrillation ([address] )    2  Essential hypertension    3  Mixed hyperlipidemia    4  Morbid obesity with BMI of 40 0-44 9, adult ([facility] [address] )    5  Snoring    6  Hypothyroidism due to Hashimoto's thyroiditis    7  Obstructive sleep apnea syndrome       Continue current medications without change  Monitor blood pressure intermittently at home  Call me with any change in symptoms  Should have updated TSH with Dr Kenney Gama  Agree with your decision to restart CPAP  HPI     Patient presented to [address] [place] [address] 11/25/2020 with chest pain, palpitations and shortness of breath  Patient was found to be in atrial fibrillation with a heart rate of 118 beats per minute  She was noted to have an elevated D-dimer and underwent CT scan to rule out pulmonary embolism which was negative  Patient reported that she had previously had an elevated D-dimer about 5 years ago unclear etiology  She was given IV lopressor and IV Cardizem in the ER and converted back to normal sinus rhythm  I saw the patient in consultation and recommended that she be discharged on metoprolol succinate 25 mg daily as well as magnesium 250 mg daily and eliquis 5 mg twice daily  I discussed with the patient at that time that she would need a stress test as well as echocardiogram and sleep study given new onset atrial fibrillation   TSH was mildly elevated and the hospitalist did increase her levothyroxine from 88 to 100 mcg with plans for repeat TSH in 4-6 weeks  Patient was very anxious about the onset of atrial fibrillation and I tried to reassure her about the diagnoses and treatment options  Patient had noted significant stress as her  has a limited prognosis due to cancer and had just undergone a very large surgery in [place]  She previously had noted only rare palpitations/'fluttering' in her chest      12/4/2020: Patient presents to the office today for follow up from her ER evaluation  She has been tolerating the medications started in the hospital without difficulty  She has had no recurrent palpitations or chest pain since her discharge home  She did see her PCP yesterday  Full note is not available for review  There was some question as to why she was discharged on magnesium as well as why her thyroid dose has been adjusted  I will be sure to send copy of this note to PCP for review  Patient had noted some increased dyspnea on exertion  She also told me that she had been seen by Cardiology in the past when she was first diagnosed with hypertension and underwent echocardiogram and stress test at that time  She does admit to snoring but has never undergone a sleep study  She typically drinks coffee in the morning and tea throughout the day  Echocardiogram 12/02/2020 was unremarkable with an ejection fraction of 60% and no significant valvular abnormalities  Nuclear stress test 12/02/2020 showed no scar or ischemia with an EF of 73%  We discussed the results of her testing  * Patient was last seeen 3/22/2021>    11/3/2021:  Patient presents to the office today for follow-up      Patient Active Problem List   Diagnosis    Paroxysmal atrial fibrillation (HCC)    Essential hypertension    Hyperlipidemia    Hypothyroidism due to Hashimoto's thyroiditis    Morbid obesity with BMI of 40 0-44 9, adult ([address] )    Anxiety and depression    Elevated d-dimer    Snoring    Obstructive sleep apnea syndrome    Obesity hypoventilation syndrome (HCC)     Past Medical History:   Diagnosis Date    Anxiety     Elevated d-dimer     2 seperate episodes of unclear etiology   Hyperlipidemia     Hypertension     Hypothyroid     Obesity     Paroxysmal atrial fibrillation ([facility] [address] )     New onset 2020       Social History     Socioeconomic History    Marital status: /Civil Union     Spouse name: Not on file    Number of children: Not on file    Years of education: Not on file    Highest education level: Not on file   Occupational History    Occupation: Retired teacher   Tobacco Use    Smoking status: Never Smoker    Smokeless tobacco: Never Used   Vaping Use    Vaping Use: Never used   Substance and Sexual Activity    Alcohol use: Not Currently    Drug use: Never    Sexual activity: Yes     Partners: Male   Other Topics Concern    Not on file   Social History Narrative    Not on file     Social Determinants of Health     Financial Resource Strain: Not on file   Food Insecurity: Not on file   Transportation Needs: Not on file   Physical Activity: Not on file   Stress: Not on file   Social Connections: Not on file   Intimate Partner Violence: Not on file   Housing Stability: Not on file      Family History   Problem Relation Age of Onset    Hypertension Sister     Heart disease Paternal [de-identified]     Other Mother         Encephalitis    Other Father          in his 80s without significant problems    Lung cancer Brother     No Known Problems Brother      Past Surgical History:   Procedure Laterality Date    CATARACT EXTRACTION, BILATERAL      HYSTERECTOMY      KNEE ARTHROPLASTY      SHOULDER OPEN ROTATOR CUFF REPAIR      x 2    TONSILLECTOMY         Current Outpatient Medications:     ALPRAZolam (XANAX) 0 5 mg tablet, Take by mouth daily at bedtime as needed for anxiety, Disp: , Rfl:     apixaban (ELIQUIS) 5 mg, Take 1 tablet (5 mg total) by mouth 2 (two) times a day, Disp: 180 tablet, Rfl: 3    Ascorbic Acid (Vitamin C) 500 MG CAPS, Take by mouth, Disp: , Rfl:     atorvastatin (LIPITOR) 10 mg tablet, Take 10 mg by mouth daily, Disp: , Rfl:     Cholecalciferol (Vitamin D3) 125 MCG (5000 UT) TABS, Take 2,000 Units by mouth daily , Disp: , Rfl:     citalopram (CeleXA) 40 mg tablet, Take 40 mg by mouth daily , Disp: , Rfl:     erythromycin (ILOTYCIN) ophthalmic ointment, APPLY TO BOTH EYES AT NIGHT BEFORE SLEEP, Disp: , Rfl:     levothyroxine 75 mcg tablet, Take 75 mcg by mouth daily , Disp: , Rfl:     magnesium oxide (MAG-OX) 400 mg tablet, Take by mouth, Disp: , Rfl:     metoprolol succinate (TOPROL-XL) 25 mg 24 hr tablet, Take 1 tablet (25 mg total) by mouth daily, Disp: 90 tablet, Rfl: 3    metroNIDAZOLE (METROGEL) 0 75 % vaginal gel, as needed , Disp: , Rfl:     olmesartan-hydrochlorothiazide (BENICAR HCT) 20-12 5 MG per tablet, Take 1 tablet by mouth daily, Disp: , Rfl:     tobramycin-dexamethasone (TOBRADEX) ophthalmic suspension, instill 1 drop into both eyes four times a day, Disp: , Rfl:   Allergies   Allergen Reactions    Prednisone Shortness Of Breath    Contrast [Iodinated Diagnostic Agents] Rash    Iodine - Food Allergy Rash       Test Results:     ECG 11/25/2020:  Atrial flutter/atrial fibrillation at 118 bpm     Echocardiogram 12/02/2020:  EF 60%  Normal diastolic function  Mild annular calcification of the mitral valve  Trace tricuspid regurgitation  [place] nuclear stress test 12/02/2020:  No evidence of myocardial scar  No evidence of myocardial ischemia  EF 73%  Lipid panel 10/13/2020: C 166  T 154  H 64  L 71  Echocardiogram 11/05/2015:  EF 55-60%  Mild tricuspid regurgitation  Estimated PASP 38 mmHg  Review of Systems:    Review of Systems   Constitutional: Positive for fatigue  Negative for activity change and appetite change  HENT: Negative for congestion, hearing loss, tinnitus and trouble swallowing      Eyes: Negative for visual disturbance  Respiratory: Positive for shortness of breath (Dyspnea on exertion)  Negative for cough, chest tightness and wheezing  Cardiovascular: Negative for chest pain, palpitations and leg swelling  Gastrointestinal: Negative for abdominal distention, abdominal pain, nausea and vomiting  Genitourinary: Negative for difficulty urinating  Musculoskeletal: Negative for arthralgias  Skin: Negative for rash  Neurological: Negative for dizziness, syncope and light-headedness  Hematological: Does not bruise/bleed easily  Psychiatric/Behavioral: Negative for confusion  The patient is nervous/anxious  Increased stress   All other systems reviewed and are negative  Vitals:    11/02/21 0934 11/02/21 1015   BP: 126/80 142/82   BP Location: Left arm Left arm   Patient Position: Sitting    Cuff Size: Standard    Pulse: 75    SpO2: 94%    Weight: 121 kg (266 lb)    Height: 5' 7" (1 702 m)      Vitals:    11/02/21 0934   Weight: 121 kg (266 lb)     Height: 5' 7" (170 2 cm)     Physical Exam  Vitals reviewed  Constitutional:       Appearance: She is well-developed  HENT:      Head: Normocephalic and atraumatic  Eyes:      Conjunctiva/sclera: Conjunctivae normal       Pupils: Pupils are equal, round, and reactive to light  Neck:      Vascular: No JVD  Cardiovascular:      Rate and Rhythm: Normal rate and regular rhythm  Heart sounds: Normal heart sounds  No murmur heard  No friction rub  No gallop  Pulmonary:      Effort: Pulmonary effort is normal       Breath sounds: Normal breath sounds  Abdominal:      General: Bowel sounds are normal       Palpations: Abdomen is soft  Musculoskeletal:      Cervical back: Normal range of motion  Skin:     General: Skin is warm and dry  Neurological:      Mental Status: She is alert and oriented to person, place, and time     Psychiatric:         Behavior: Behavior normal

## 2022-04-15 NOTE — ASSESSMENT & PLAN NOTE
Patient with new onset atrial fibrillation 11/25/2020  Patient presented to the emergency room after onset of fluttering and rapid heart rate  She had associated chest discomfort  In the emergency room she was noted to have atrial fibrillation/atrial flutter with rapid ventricular rate  Patient was given Cardizem and metoprolol and converted to normal sinus rhythm  She has remained in normal sinus rhythm since and feels well  [id number] Score 3   Patient was started on  Eliquis 5 mg twice daily which should be continued for stroke prophylaxis  Aspirin 81 mg daily was discontinued  Echocardiogram and outpatient stress test 12/02/2020 were unremarkable  Home sleep study notable for severe sleep apnea with follow up in place with Sleep medicine  Continue magnesium 250 mg daily over-the-counter  She was discharged home on magnesium oxide 400 mg twice daily which should be discontinued when finished and transition to 250 mg daily over-the-counter  Patient was instructed that if she has recurrent palpitations she can take an extra metoprolol succinate as well as Xanax and relax for period of time however if symptoms persist she should come back to the emergency room  I had previously and again reassured her that atrial fibrillation in and of itself is not dangerous however the risk of stroke is and she is now on anticoagulation to reduce that risk  We discussed the need to decrease caffeine intake  She also needs to find additional ways to deal with the significant amount of stress she is under  I have not ordered a monitor at this time as she has not had any recurrent palpitations  If she does have recurrent palpitations we can consider ZIO  Currently episodes are very short-lived and well tolerated

## 2022-04-15 NOTE — ASSESSMENT & PLAN NOTE
Continue atorvastatin 10 mg daily  Lipid panel 10/13/2020: C 166  T 154  H 64  L 71  Should have updated lipid panel 10/2021

## 2022-04-15 NOTE — ASSESSMENT & PLAN NOTE
Body mass index is 42 04 kg/m²  Weight loss would be beneficial   Will continue to address attempts at weight loss

## 2022-04-15 NOTE — ASSESSMENT & PLAN NOTE
Blood pressure is well controlled on exam today  Would continue on losartan hydrochlorothiazide as well as metoprolol succinate at current doses  If blood pressure remains elevated we can consider up titration or addition of medications

## 2022-04-15 NOTE — ASSESSMENT & PLAN NOTE
Home sleep study 12/20/2020 revealed severe sleep apnea  She does have follow-up to discuss treatment options with sleep medicine  Will await final recommendation for CPAP

## 2022-05-02 ENCOUNTER — TELEPHONE (OUTPATIENT)
Dept: SLEEP CENTER | Facility: CLINIC | Age: 67
End: 2022-05-02

## 2022-05-02 NOTE — TELEPHONE ENCOUNTER
YONY Sutherland to call [phone] to reschedule her yearly sleep appointment  Dr Jerilyn Callaway will be out of the office on 6/30/22

## 2022-05-05 ENCOUNTER — OFFICE VISIT (OUTPATIENT)
Dept: CARDIOLOGY CLINIC | Facility: CLINIC | Age: 67
End: 2022-05-05
Payer: MEDICARE

## 2022-05-05 VITALS
SYSTOLIC BLOOD PRESSURE: 148 MMHG | DIASTOLIC BLOOD PRESSURE: 92 MMHG | HEART RATE: 86 BPM | OXYGEN SATURATION: 95 % | BODY MASS INDEX: 42.85 KG/M2 | HEIGHT: 67 IN | WEIGHT: 273 LBS

## 2022-05-05 DIAGNOSIS — E03.8 HYPOTHYROIDISM DUE TO HASHIMOTO'S THYROIDITIS: ICD-10-CM

## 2022-05-05 DIAGNOSIS — E66.01 MORBID OBESITY WITH BMI OF 40.0-44.9, ADULT (HCC): ICD-10-CM

## 2022-05-05 DIAGNOSIS — I10 ESSENTIAL HYPERTENSION: ICD-10-CM

## 2022-05-05 DIAGNOSIS — G47.33 OBSTRUCTIVE SLEEP APNEA SYNDROME: ICD-10-CM

## 2022-05-05 DIAGNOSIS — E78.2 MIXED HYPERLIPIDEMIA: ICD-10-CM

## 2022-05-05 DIAGNOSIS — E06.3 HYPOTHYROIDISM DUE TO HASHIMOTO'S THYROIDITIS: ICD-10-CM

## 2022-05-05 DIAGNOSIS — I48.0 PAROXYSMAL ATRIAL FIBRILLATION (HCC): Primary | ICD-10-CM

## 2022-05-05 PROCEDURE — 99214 OFFICE O/P EST MOD 30 MIN: CPT | Performed by: INTERNAL MEDICINE

## 2022-05-05 NOTE — ASSESSMENT & PLAN NOTE
Patient with new onset atrial fibrillation 11/25/2020  Patient presented to the emergency room after onset of fluttering and rapid heart rate  She converted spontaneously to  normal sinus rhythm  She has remained in normal sinus rhythm since and feels well  [id number] Score 3   Eliquis 5 mg twice daily for stroke prophylaxis  No bleeding issues  Aspirin 81 mg daily was discontinued  Echocardiogram and outpatient stress test 12/02/2020 were unremarkable  Home sleep study notable for severe sleep apnea being treated with CPAP  Continue magnesium 250 mg daily over-the-counter     Patient was instructed that if she has recurrent palpitations she can take an extra metoprolol succinate as well as Xanax and relax for period of time however if symptoms persist she should come back to the emergency room  We discussed the need to decrease caffeine intake  She also needs to find additional ways to deal with the significant amount of stress she is under  I have not ordered a monitor at this time as she has not had any recurrent persistent palpitations  If she does have recurrent palpitations we can consider ZIO  Currently episodes are very short-lived and well tolerated

## 2022-05-05 NOTE — ASSESSMENT & PLAN NOTE
Body mass index is 42 76 kg/m²  Weight loss would be beneficial   Will continue to address attempts at weight loss  She has recently joined [employer]

## 2022-05-05 NOTE — PATIENT INSTRUCTIONS
Blood pressure is a little high today  I would call and drop by the office with your wrist cuff so we can check calibration  Call me with any change in symptoms  You can take an extra 1/2 of 1 whole metoprolol succinate

## 2022-05-05 NOTE — ASSESSMENT & PLAN NOTE
Home sleep study 12/2020 revealed severe sleep apnea  Compliant with CPAP therapy  Her initial CPAP machine was recalled and she obtained a Res-med travel machine to continue treatment as waiting for a new machine through her primary medical supply company [employer]

## 2022-05-05 NOTE — PROGRESS NOTES
Cardiology Office Visit    Savita Love  [id number]  1955    [facility]  [address] [address] [phone]      Dear Amy Lee DO,    I had the pleasure of seeing your patient at our [address] [facility] office today 5/5/2022  As you know she is a pleasant 74y o  year old female with a medical history as described below  Reason for office visit: Follow up  atrial fibrillation, hypertension and hyperlipidemia  1  Paroxysmal atrial fibrillation [facility])  Assessment & Plan:  Patient with new onset atrial fibrillation 11/25/2020  Patient presented to the emergency room after onset of fluttering and rapid heart rate  She converted spontaneously to  normal sinus rhythm  She has remained in normal sinus rhythm since and feels well  [id number] Score 3   Eliquis 5 mg twice daily for stroke prophylaxis  No bleeding issues  Aspirin 81 mg daily was discontinued  Echocardiogram and outpatient stress test 12/02/2020 were unremarkable  Home sleep study notable for severe sleep apnea being treated with CPAP  Continue magnesium 250 mg daily over-the-counter     Patient was instructed that if she has recurrent palpitations she can take an extra metoprolol succinate as well as Xanax and relax for period of time however if symptoms persist she should come back to the emergency room  We discussed the need to decrease caffeine intake  She also needs to find additional ways to deal with the significant amount of stress she is under  I have not ordered a monitor at this time as she has not had any recurrent persistent palpitations  If she does have recurrent palpitations we can consider ZIO  Currently episodes are very short-lived and well tolerated  2  Essential hypertension  Assessment & Plan:  Blood pressure is mildly elevated today     She does note significant stress due to her husbands cancer and treatments  If blood pressure remains elevated we can likely uptitrate olmesartan dose  3  Mixed hyperlipidemia  Assessment & Plan:  Lipid panel 12/13/2021: C 176  T 177  H 65  L 76  Continue atorvastatin 10 mg daily  4  Morbid obesity with BMI of 40 0-44 9, adult [facility])  Assessment & Plan: Body mass index is 42 76 kg/m²  Weight loss would be beneficial   Will continue to address attempts at weight loss  She has recently joined [employer]  5  Obstructive sleep apnea syndrome  Assessment & Plan:  Home sleep study 12/2020 revealed severe sleep apnea  Compliant with CPAP therapy  Her initial CPAP machine was recalled and she obtained a Res-med travel machine to continue treatment as waiting for a new machine through her primary medical supply company [employer]  6  Hypothyroidism due to Hashimoto's thyroiditis  Assessment & Plan:  Would try to optimize TSH in the 1-2 range if possible  HPI     Patient presented to [address] [place] [address] 11/25/2020 with chest pain, palpitations and shortness of breath  Patient was found to be in atrial fibrillation with a heart rate of 118 beats per minute  She was noted to have an elevated D-dimer and underwent CT scan to rule out pulmonary embolism which was negative  Patient reported that she had previously had an elevated D-dimer about 5 years ago unclear etiology  She was given IV lopressor and IV Cardizem in the ER and converted back to normal sinus rhythm  I saw the patient in consultation and recommended that she be discharged on metoprolol succinate 25 mg daily as well as magnesium 250 mg daily and eliquis 5 mg twice daily  I discussed with the patient at that time that she would need a stress test as well as echocardiogram and sleep study given new onset atrial fibrillation  TSH was mildly elevated and the hospitalist did increase her levothyroxine from 88 to 100 mcg with plans for repeat TSH in 4-6 weeks   Patient was very anxious about the onset of atrial fibrillation and I tried to reassure her about the diagnoses and treatment options  Patient had noted significant stress as her  has a limited prognosis due to cancer and had just undergone a very large surgery in [place]  She previously had noted only rare palpitations/'fluttering' in her chest      12/4/2020: Patient presents to the office today for follow up from her ER evaluation  She has been tolerating the medications started in the hospital without difficulty  She has had no recurrent palpitations or chest pain since her discharge home  She did see her PCP yesterday  Full note is not available for review  There was some question as to why she was discharged on magnesium as well as why her thyroid dose has been adjusted  I will be sure to send copy of this note to PCP for review  Patient had noted some increased dyspnea on exertion  She also told me that she had been seen by Cardiology in the past when she was first diagnosed with hypertension and underwent echocardiogram and stress test at that time  She does admit to snoring but has never undergone a sleep study  She typically drinks coffee in the morning and tea throughout the day  Echocardiogram 12/02/2020 was unremarkable with an ejection fraction of 60% and no significant valvular abnormalities  Nuclear stress test 12/02/2020 showed no scar or ischemia with an EF of 73%  We discussed the results of her testing  I had last seen the patient 11/02/2021 at which time she was doing well  I asked her to monitor her blood pressure intermittently at home  I also recommended that she have updated blood work including TSH with her PCP  She had opted to restart her CPAP and I agreed this was a good idea  5/5/2022:  Patient presents to the office today for follow-up  She bought a new CPAP machine  She feels like she gets much better sleep  Short lived episodes of palpitations   She joined [employer]  She does admit to short, quick twinges in her left chest  She does have a lot of stress as her husbands cancer has come back  He had surgery 2/2022 and he is doing 3 days of chemotherapy in the hospital every other week  She continues to be frustrated over her weight  No bleeding issues  Palpitations are manageable at this time  Patient Active Problem List   Diagnosis    Paroxysmal atrial fibrillation (HCC)    Essential hypertension    Hyperlipidemia    Hypothyroidism due to Hashimoto's thyroiditis    Morbid obesity with BMI of 40 0-44 9, adult (HCC)    Anxiety and depression    Elevated d-dimer    Snoring    Obstructive sleep apnea syndrome    Obesity hypoventilation syndrome (HCC)     Past Medical History:   Diagnosis Date    Anxiety     Elevated d-dimer     2 seperate episodes of unclear etiology   Hyperlipidemia     Hypertension     Hypothyroid     Obesity     [facility]     New onset 11/25/2020       Social History     Socioeconomic History    Marital status: /Civil Union     Spouse name: Not on file    Number of children: Not on file    Years of education: Not on file    Highest education level: Not on file   Occupational History    Occupation: Retired teacher   Tobacco Use    Smoking status: Never Smoker    Smokeless tobacco: Never Used   Vaping Use    Vaping Use: Never used   Substance and Sexual Activity    Alcohol use: Not Currently    Drug use: Never    Sexual activity: Yes     Partners: Male   Other Topics Concern    Not on file   Social History Narrative    Not on file     Social Determinants of Health     Financial Resource Strain: Not on file   Food Insecurity: Not on file   Transportation Needs: Not on file   Physical Activity: Not on file   Stress: Not on file   Social Connections: Not on file   Intimate Partner Violence: Not on file   Housing Stability: Not on file      Family History   Problem Relation Age of Onset    Hypertension Sister     Heart disease Paternal [de-identified]     Other Mother         Encephalitis    Other Father          in his 80s without significant problems    Lung cancer Brother     No Known Problems Brother      Past Surgical History:   Procedure Laterality Date    CATARACT EXTRACTION, BILATERAL      HYSTERECTOMY      KNEE ARTHROPLASTY      SHOULDER OPEN ROTATOR CUFF REPAIR      x 2    TONSILLECTOMY         Current Outpatient Medications:     ALPRAZolam (XANAX) 0 5 mg tablet, Take by mouth daily at bedtime as needed for anxiety, Disp: , Rfl:     apixaban (ELIQUIS) 5 mg, Take 1 tablet (5 mg total) by mouth 2 (two) times a day, Disp: 180 tablet, Rfl: 3    Ascorbic Acid (Vitamin C) 500 MG CAPS, Take by mouth, Disp: , Rfl:     atorvastatin (LIPITOR) 10 mg tablet, Take 10 mg by mouth daily, Disp: , Rfl:     Cholecalciferol (Vitamin D3) 125 MCG (5000 UT) TABS, Take 2,000 Units by mouth daily , Disp: , Rfl:     citalopram (CeleXA) 40 mg tablet, Take 40 mg by mouth daily , Disp: , Rfl:     erythromycin (ILOTYCIN) ophthalmic ointment, APPLY TO BOTH EYES AT NIGHT BEFORE SLEEP, Disp: , Rfl:     levothyroxine 75 mcg tablet, Take 75 mcg by mouth daily , Disp: , Rfl:     magnesium oxide (MAG-OX) 400 mg tablet, Take by mouth, Disp: , Rfl:     metoprolol succinate (TOPROL-XL) 25 mg 24 hr tablet, Take 1 tablet (25 mg total) by mouth daily, Disp: 90 tablet, Rfl: 3    metroNIDAZOLE (METROGEL) 0 75 % vaginal gel, as needed , Disp: , Rfl:     olmesartan-hydrochlorothiazide (BENICAR HCT) 20-12 5 MG per tablet, Take 1 tablet by mouth daily, Disp: , Rfl:     tobramycin-dexamethasone (TOBRADEX) ophthalmic suspension, instill 1 drop into both eyes four times a day (Patient not taking: Reported on 2022), Disp: , Rfl:   Allergies   Allergen Reactions    Prednisone Shortness Of Breath    Contrast [Iodinated Diagnostic Agents] Rash    Iodine - Food Allergy Rash       Cardiac Test Results:     Lipid panel 12/13/2021: C 176  T 177  H 65  L 76  ECG 11/25/2020:  Atrial flutter/atrial fibrillation at 118 bpm     Echocardiogram 12/02/2020:  EF 60%  Normal diastolic function  Mild annular calcification of the mitral valve  Trace tricuspid regurgitation  Jolly Acevedo nuclear stress test 12/02/2020:  No evidence of myocardial scar  No evidence of myocardial ischemia  EF 73%  Lipid panel 10/13/2020: C 166  T 154  H 64  L 71  Echocardiogram 11/05/2015:  EF 55-60%  Mild tricuspid regurgitation  Estimated PASP 38 mmHg  Review of Systems:    Review of Systems   Constitutional: Positive for fatigue  Negative for activity change and appetite change  HENT: Negative for congestion, hearing loss, tinnitus and trouble swallowing  Eyes: Negative for visual disturbance  Respiratory: Positive for shortness of breath (Dyspnea on exertion)  Negative for cough, chest tightness and wheezing  Cardiovascular: Negative for chest pain, palpitations and leg swelling  Gastrointestinal: Negative for abdominal distention, abdominal pain, nausea and vomiting  Genitourinary: Negative for difficulty urinating  Musculoskeletal: Negative for arthralgias  Skin: Negative for rash  Neurological: Negative for dizziness, syncope and light-headedness  Hematological: Does not bruise/bleed easily  Psychiatric/Behavioral: Negative for confusion  The patient is nervous/anxious  Increased stress   All other systems reviewed and are negative  Vitals:    05/05/22 1423 05/05/22 1442   BP: 152/80 148/92   BP Location:  Left arm   Patient Position:  Sitting   Pulse: 86    SpO2: 95%    Weight: 124 kg (273 lb)    Height: 5' 7" (1 702 m)      Vitals:    05/05/22 1423   Weight: 124 kg (273 lb)     Height: 5' 7" (170 2 cm)     Body mass index is 42 76 kg/m²  Physical Exam  Vitals reviewed  Constitutional:       Appearance: She is well-developed  HENT:      Head: Normocephalic and atraumatic     Eyes: Conjunctiva/sclera: Conjunctivae normal       Pupils: Pupils are equal, round, and reactive to light  Neck:      Vascular: No JVD  Cardiovascular:      Rate and Rhythm: Normal rate and regular rhythm  Heart sounds: Normal heart sounds  No murmur heard  No friction rub  No gallop  Pulmonary:      Effort: Pulmonary effort is normal       Breath sounds: Normal breath sounds  Abdominal:      General: Bowel sounds are normal       Palpations: Abdomen is soft  Musculoskeletal:      Cervical back: Normal range of motion  Skin:     General: Skin is warm and dry  Neurological:      Mental Status: She is alert and oriented to person, place, and time     Psychiatric:         Behavior: Behavior normal

## 2022-05-05 NOTE — ASSESSMENT & PLAN NOTE
Blood pressure is mildly elevated today  She does note significant stress due to her husbands cancer and treatments  If blood pressure remains elevated we can likely uptitrate olmesartan dose

## 2022-06-07 ENCOUNTER — CLINICAL SUPPORT (OUTPATIENT)
Dept: CARDIOLOGY CLINIC | Facility: CLINIC | Age: 67
End: 2022-06-07
Payer: MEDICARE

## 2022-06-07 ENCOUNTER — TELEPHONE (OUTPATIENT)
Dept: CARDIOLOGY CLINIC | Facility: CLINIC | Age: 67
End: 2022-06-07

## 2022-06-07 VITALS
HEART RATE: 74 BPM | DIASTOLIC BLOOD PRESSURE: 78 MMHG | SYSTOLIC BLOOD PRESSURE: 148 MMHG | BODY MASS INDEX: 42.85 KG/M2 | HEIGHT: 67 IN | OXYGEN SATURATION: 93 % | WEIGHT: 273 LBS

## 2022-06-07 DIAGNOSIS — I48.0 PAROXYSMAL ATRIAL FIBRILLATION (HCC): Primary | ICD-10-CM

## 2022-06-07 DIAGNOSIS — R00.2 PALPITATIONS: ICD-10-CM

## 2022-06-07 PROCEDURE — 93000 ELECTROCARDIOGRAM COMPLETE: CPT

## 2022-06-07 PROCEDURE — RECHECK

## 2022-06-07 PROCEDURE — 99211 OFF/OP EST MAY X REQ PHY/QHP: CPT

## 2022-06-07 NOTE — PATIENT INSTRUCTIONS
Patient scheduled for Holter Monitor Thursday 6/8/22 at [place]  She is aware that monitor is at [place]

## 2022-06-07 NOTE — TELEPHONE ENCOUNTER
Patient called into the office today reporting increasing palpitations  She was brought in for EKG which shows sinus bradycardia, rate 58 bpm  She told staff her symptoms feel like PVC's and are becoming more frequent  Will obtain 24 hour holter monitor to assess further given paroxysmal a fib history

## 2022-06-08 ENCOUNTER — HOSPITAL ENCOUNTER (OUTPATIENT)
Dept: NON INVASIVE DIAGNOSTICS | Facility: HOSPITAL | Age: 67
Discharge: HOME | End: 2022-06-08
Payer: MEDICARE

## 2022-06-08 DIAGNOSIS — R00.2 PALPITATION: ICD-10-CM

## 2022-06-08 PROCEDURE — 93225 XTRNL ECG REC<48 HRS REC: CPT

## 2022-06-08 PROCEDURE — 93226 XTRNL ECG REC<48 HR SCAN A/R: CPT

## 2022-06-10 ENCOUNTER — TELEPHONE (OUTPATIENT)
Dept: NON INVASIVE DIAGNOSTICS | Facility: HOSPITAL | Age: 67
End: 2022-06-10

## 2022-06-10 PROCEDURE — 93227 XTRNL ECG REC<48 HR R&I: CPT | Performed by: INTERNAL MEDICINE

## 2022-06-10 NOTE — TELEPHONE ENCOUNTER
Attempted to reach patient with her holter monitor results  Left voicemail to call back on Monday to review results  My message: holter monitor showed sinus rhythm with rare PAC's/PVC's (early beats from the top and bottom of the heart)  No atrial fibrillation seen  Recommend increasing magnesium supplement as tolerated  Be sure to be using CPAP machine faithfully  I am happy to discuss if patient would like to speak with me directly    Thank you

## 2022-06-13 NOTE — TELEPHONE ENCOUNTER
Attempted to reach patient to discuss as well  Left voicemail  I will reach out to her via the patient portal if she has any additional questions or concerns

## 2022-08-01 ENCOUNTER — NURSE TRIAGE (OUTPATIENT)
Dept: OTHER | Facility: OTHER | Age: 67
End: 2022-08-01

## 2022-08-01 ENCOUNTER — HOSPITAL ENCOUNTER (EMERGENCY)
Facility: HOSPITAL | Age: 67
Discharge: HOME | End: 2022-08-01
Attending: EMERGENCY MEDICINE | Admitting: EMERGENCY MEDICINE
Payer: MEDICARE

## 2022-08-01 VITALS
BODY MASS INDEX: 43.68 KG/M2 | HEART RATE: 56 BPM | WEIGHT: 278.88 LBS | SYSTOLIC BLOOD PRESSURE: 152 MMHG | OXYGEN SATURATION: 97 % | TEMPERATURE: 97.8 F | RESPIRATION RATE: 19 BRPM | DIASTOLIC BLOOD PRESSURE: 60 MMHG

## 2022-08-01 DIAGNOSIS — E83.42 HYPOMAGNESEMIA: Primary | ICD-10-CM

## 2022-08-01 DIAGNOSIS — R89.9 ABNORMAL LABORATORY TEST: Primary | ICD-10-CM

## 2022-08-01 LAB
ANION GAP SERPL CALCULATED.3IONS-SCNC: 10 MMOL/L (ref 4–13)
BUN SERPL-MCNC: 26 MG/DL (ref 5–25)
CALCIUM SERPL-MCNC: 9 MG/DL (ref 8.3–10.1)
CHLORIDE SERPL-SCNC: 100 MMOL/L (ref 96–108)
CO2 SERPL-SCNC: 26 MMOL/L (ref 21–32)
CREAT SERPL-MCNC: 1.2 MG/DL (ref 0.6–1.3)
GFR SERPL CREATININE-BSD FRML MDRD: 46 ML/MIN/1.73SQ M
GLUCOSE SERPL-MCNC: 94 MG/DL (ref 65–140)
MAGNESIUM SERPL-MCNC: 1.9 MG/DL (ref 1.6–2.6)
POTASSIUM SERPL-SCNC: 4 MMOL/L (ref 3.5–5.3)
SODIUM SERPL-SCNC: 136 MMOL/L (ref 135–147)

## 2022-08-01 PROCEDURE — 99283 EMERGENCY DEPT VISIT LOW MDM: CPT

## 2022-08-01 PROCEDURE — 99285 EMERGENCY DEPT VISIT HI MDM: CPT | Performed by: EMERGENCY MEDICINE

## 2022-08-01 PROCEDURE — 36415 COLL VENOUS BLD VENIPUNCTURE: CPT | Performed by: EMERGENCY MEDICINE

## 2022-08-01 PROCEDURE — 83735 ASSAY OF MAGNESIUM: CPT | Performed by: EMERGENCY MEDICINE

## 2022-08-01 PROCEDURE — 93005 ELECTROCARDIOGRAM TRACING: CPT

## 2022-08-01 PROCEDURE — 80048 BASIC METABOLIC PNL TOTAL CA: CPT | Performed by: EMERGENCY MEDICINE

## 2022-08-01 NOTE — TELEPHONE ENCOUNTER
Answer Assessment - Initial Assessment Questions  1   REASON FOR CALL or QUESTION: "What is your reason for calling today?" or "How can I best help you?" or "What question do you have that I can help answer?"      Magnesium level very  Low 0 3  she feels normal  HR is  62    Protocols used: INFORMATION ONLY CALL - NO TRIAGE-ADULT-

## 2022-08-01 NOTE — TELEPHONE ENCOUNTER
Pt will going to the [facility] ER for evaluation of critical low magnesium level

## 2022-08-01 NOTE — ED PROVIDER NOTES
History  Chief Complaint   Patient presents with    Abnormal Lab     Pt had mag level checked by PCP, 1 month ago Mg 2 4, today Mg was 0 3, pt has no symptoms but was told to come to ED     Patient is a 68-year-old female with history of paroxysmal atrial fibrillation prescribed magnesium by cardiology had her magnesium checked 1 month ago and was told it was borderline high at 2 4 and was to have it checked again today had outpatient lab work done routinely today and was called by primary physician advised to come to the ED due to critically low magnesium of 0 3  Patient denies any acute symptoms  Patient has been taking magnesium as prescribed but occasionally misses a dose  History provided by:  Patient  Evaluation of Abnormal Diagnostic Test  Patient referred by:  PCP  Result type: chemistry    Chemistry:     Other abnormal chemistry result:  Low magnesium      Prior to Admission Medications   Prescriptions Last Dose Informant Patient Reported? Taking?    ALPRAZolam (XANAX) 0 5 mg tablet  Self Yes No   Sig: Take by mouth daily at bedtime as needed for anxiety   Ascorbic Acid (Vitamin C) 500 MG CAPS  Self Yes No   Sig: Take by mouth   Cholecalciferol (Vitamin D3) 125 MCG (5000 UT) TABS  Self Yes No   Sig: Take 2,000 Units by mouth daily    apixaban (ELIQUIS) 5 mg   No No   Sig: Take 1 tablet (5 mg total) by mouth 2 (two) times a day   atorvastatin (LIPITOR) 10 mg tablet  Self Yes No   Sig: Take 10 mg by mouth daily   citalopram (CeleXA) 40 mg tablet  Self Yes No   Sig: Take 40 mg by mouth daily    erythromycin (ILOTYCIN) ophthalmic ointment   Yes No   Sig: APPLY TO BOTH EYES AT NIGHT BEFORE SLEEP   levothyroxine 75 mcg tablet  Self Yes No   Sig: Take 75 mcg by mouth daily    magnesium oxide (MAG-OX) 400 mg tablet   Yes No   Sig: Take by mouth   metoprolol succinate (TOPROL-XL) 25 mg 24 hr tablet  Self No No   Sig: Take 1 tablet (25 mg total) by mouth daily   metroNIDAZOLE (METROGEL) 0 75 % vaginal gel Self Yes No   Sig: as needed    olmesartan-hydrochlorothiazide (BENICAR HCT) 20-12 5 MG per tablet  Self Yes No   Sig: Take 1 tablet by mouth daily      Facility-Administered Medications: None       Past Medical History:   Diagnosis Date    Anxiety     Elevated d-dimer     2 seperate episodes of unclear etiology   Hyperlipidemia     Hypertension     Hypothyroid     Obesity     Paroxysmal atrial fibrillation ([facility] [address] )     New onset 2020  Past Surgical History:   Procedure Laterality Date    CATARACT EXTRACTION, BILATERAL      HYSTERECTOMY      KNEE ARTHROPLASTY      SHOULDER OPEN ROTATOR CUFF REPAIR      x 2    TONSILLECTOMY         Family History   Problem Relation Age of Onset    Hypertension Sister     Heart disease Paternal [de-identified]     Other Mother         Encephalitis    Other Father          in his 80s without significant problems    Lung cancer Brother     No Known Problems Brother      I have reviewed and agree with the history as documented  E-Cigarette/Vaping    E-Cigarette Use Never User      E-Cigarette/Vaping Substances     Social History     Tobacco Use    Smoking status: Never Smoker    Smokeless tobacco: Never Used   Vaping Use    Vaping Use: Never used   Substance Use Topics    Alcohol use: Not Currently    Drug use: Never       Review of Systems   Constitutional: Negative for activity change, appetite change, chills, fatigue and fever  HENT: Negative for congestion, ear pain, rhinorrhea and sore throat  Eyes: Negative for discharge, redness and visual disturbance  Respiratory: Negative for cough, chest tightness, shortness of breath and wheezing  Cardiovascular: Negative for chest pain and palpitations  Gastrointestinal: Negative for abdominal pain, constipation, diarrhea, nausea and vomiting  Endocrine: Negative for polydipsia and polyuria  Genitourinary: Negative for difficulty urinating, dysuria, frequency, hematuria and urgency  Musculoskeletal: Negative for arthralgias and myalgias  Skin: Negative for color change, pallor and rash  Neurological: Negative for dizziness, weakness, light-headedness, numbness and headaches  Hematological: Negative for adenopathy  Does not bruise/bleed easily  All other systems reviewed and are negative  Physical Exam  Physical Exam  Vitals and nursing note reviewed  Constitutional:       Appearance: She is well-developed  HENT:      Head: Normocephalic and atraumatic  Right Ear: External ear normal       Left Ear: External ear normal       Nose: Nose normal    Eyes:      Conjunctiva/sclera: Conjunctivae normal       Pupils: Pupils are equal, round, and reactive to light  Cardiovascular:      Rate and Rhythm: Normal rate and regular rhythm  Heart sounds: Normal heart sounds  Pulmonary:      Effort: Pulmonary effort is normal  No respiratory distress  Breath sounds: Normal breath sounds  No wheezing or rales  Chest:      Chest wall: No tenderness  Abdominal:      General: Bowel sounds are normal  There is no distension  Palpations: Abdomen is soft  Tenderness: There is no abdominal tenderness  There is no guarding  Musculoskeletal:         General: Normal range of motion  Cervical back: Normal range of motion and neck supple  Skin:     General: Skin is warm and dry  Neurological:      Mental Status: She is alert and oriented to person, place, and time  Cranial Nerves: No cranial nerve deficit  Sensory: No sensory deficit           Vital Signs  ED Triage Vitals [08/01/22 1742]   Temperature Pulse Respirations Blood Pressure SpO2   97 8 °F (36 6 °C) 59 20 138/65 97 %      Temp Source Heart Rate Source Patient Position - Orthostatic VS BP Location FiO2 (%)   Temporal Monitor Lying Right arm --      Pain Score       No Pain           Vitals:    08/01/22 1742 08/01/22 1745   BP: 138/65 152/60   Pulse: 59 58   Patient Position - Orthostatic VS: Lying          Visual Acuity      ED Medications  Medications - No data to display    Diagnostic Studies  Results Reviewed     Procedure Component Value Units Date/Time    Magnesium [[id number]]  (Normal) Collected: 08/01/22 1746    Lab Status: Final result Specimen: Blood from Line, Venous Updated: 08/01/22 1803     Magnesium 1 9 mg/dL     Basic metabolic panel [[id number]]  (Abnormal) Collected: 08/01/22 1746    Lab Status: Final result Specimen: Blood from Line, Venous Updated: 08/01/22 1803     Sodium 136 mmol/L      Potassium 4 0 mmol/L      Chloride 100 mmol/L      CO2 26 mmol/L      ANION GAP 10 mmol/L      BUN 26 mg/dL      Creatinine 1 20 mg/dL      Glucose 94 mg/dL      Calcium 9 0 mg/dL      eGFR 46 ml/min/1 73sq m     Narrative:      [place] guidelines for Chronic Kidney Disease (CKD):     Stage 1 with normal or high GFR (GFR > 90 mL/min/1 73 square meters)    Stage 2 Mild CKD (GFR = 60-89 mL/min/1 73 square meters)    Stage 3A Moderate CKD (GFR = 45-59 mL/min/1 73 square meters)    Stage 3B Moderate CKD (GFR = 30-44 mL/min/1 73 square meters)    Stage 4 Severe CKD (GFR = 15-29 mL/min/1 73 square meters)    Stage 5 End Stage CKD (GFR <15 mL/min/1 73 square meters)  Note: GFR calculation is accurate only with a steady state creatinine                 No orders to display              Procedures  ECG 12 Lead Documentation Only    Date/Time: 8/1/2022 6:08 PM  Performed by: Santhosh Leavitt DO  Authorized by:  Santhosh Leavitt DO     ECG reviewed by me, the ED Provider: yes    Patient location:  ED  Previous ECG:     Comparison to cardiac monitor: Yes    Quality:     Tracing quality:  Limited by artifact  Rate:     ECG rate:  57    ECG rate assessment: bradycardic    Rhythm:     Rhythm: sinus bradycardia    QRS:     QRS axis:  Normal    QRS intervals:  Normal  Conduction:     Conduction: normal    ST segments:     ST segments:  Normal  T waves:     T waves: normal               ED Course                               SBIRT 20yo+    Flowsheet Row Most Recent Value   SBIRT (23 yo +)    In order to provide better care to our patients, we are screening all of our patients for alcohol and drug use  Would it be okay to ask you these screening questions? Yes Filed at: 08/01/2022 1746   Initial Alcohol Screen: US AUDIT-C     1  How often do you have a drink containing alcohol? 0 Filed at: 08/01/2022 1746   2  How many drinks containing alcohol do you have on a typical day you are drinking? 0 Filed at: 08/01/2022 1746   3a  Male UNDER 65: How often do you have five or more drinks on one occasion? 0 Filed at: 08/01/2022 1746   3b  FEMALE Any Age, or MALE 65+: How often do you have 4 or more drinks on one occassion? 0 Filed at: 08/01/2022 1746   Audit-C Score 0 Filed at: 08/01/2022 1746   CLEVE: How many times in the past year have you    Used an illegal drug or used a prescription medication for non-medical reasons? Never Filed at: 08/01/2022 1746                    MDM  Number of Diagnoses or Management Options  Abnormal laboratory test: new and requires workup  Diagnosis management comments: Patient remained clinically and hemodynamically stable in the emergency department workup in the ED reveals normal magnesium level 1 9 patient is asymptomatic and stable magnesium level 0 3 likely represents an erroneous laboratory value from the outpatient test that was done  Advised continue magnesium supplement prescribed by primary physician/cardiology follow-up promptly with PCP/cardiologist for further evaluation and treatment  Return precautions and anticipatory guidance discussed         Amount and/or Complexity of Data Reviewed  Clinical lab tests: reviewed and ordered  Decide to obtain previous medical records or to obtain history from someone other than the patient: yes  Review and summarize past medical records: yes    Risk of Complications, Morbidity, and/or Mortality  Presenting problems: low  Diagnostic procedures: low  Management options: low    Patient Progress  Patient progress: stable      Disposition  Final diagnoses:   Abnormal laboratory test - Hypomagnesmia resolved in the ED likely laboratory error outpatient test     Time reflects when diagnosis was documented in both MDM as applicable and the Disposition within this note     Time User Action Codes Description Comment    8/1/2022  6:06 PM Darwni Comes Add [R89 9] Abnormal laboratory test     8/1/2022  6:06 PM Darwin Comes Modify [R89 9] Abnormal laboratory test Hypomagnesmia resolved in the ED likely laboratory error outpatient test      ED Disposition     ED Disposition   Discharge    Condition   Stable    Date/Time   Mon Aug 1, 2022  6:05 PM    Comment   Celestedaishaia Viola discharge to home/self care  Follow-up Information     Follow up With Specialties Details Why Contact Info    Evelia Gillespie DO  Schedule an appointment as soon as possible for a visit in 2 days  [address] [facility]  [phone]            Patient's Medications   Discharge Prescriptions    No medications on file       No discharge procedures on file      PDMP Review       Value Time User    PDMP Reviewed  Yes 11/25/2020  2:06 PM Lexi Mosqueda MD          ED Provider  Electronically Signed by           Harvinder Leon,   08/01/22 [id number] Daren Funk, DO  08/01/22 2120

## 2022-08-02 NOTE — TELEPHONE ENCOUNTER
Patient seen in [facility] ED, low magnesium  resolved  ED reports most likely outpatient lab error

## 2022-08-02 NOTE — TELEPHONE ENCOUNTER
Call to patient, lmom to continue taking mag supplement and that there is an order for lab work prior to visit

## 2022-08-02 NOTE — TELEPHONE ENCOUNTER
Reviewed results from ER  Mag 1 9  I have ordered a repeat for her to complete prior to her visit with me  Order in her chart   Continue her mag supplement

## 2022-08-07 LAB
ATRIAL RATE: 57 BPM
P AXIS: 59 DEGREES
PR INTERVAL: 176 MS
QRS AXIS: 12 DEGREES
QRSD INTERVAL: 94 MS
QT INTERVAL: 472 MS
QTC INTERVAL: 459 MS
T WAVE AXIS: 53 DEGREES
VENTRICULAR RATE: 57 BPM

## 2022-08-07 PROCEDURE — 93010 ELECTROCARDIOGRAM REPORT: CPT | Performed by: INTERNAL MEDICINE

## 2022-08-09 DIAGNOSIS — I48.0 PAROXYSMAL ATRIAL FIBRILLATION (HCC): ICD-10-CM

## 2022-08-10 RX ORDER — APIXABAN 5 MG/1
TABLET, FILM COATED ORAL
Qty: 180 TABLET | Refills: 3 | Status: SHIPPED | OUTPATIENT
Start: 2022-08-10

## 2022-09-01 ENCOUNTER — APPOINTMENT (OUTPATIENT)
Dept: LAB | Facility: HOSPITAL | Age: 67
End: 2022-09-01
Payer: MEDICARE

## 2022-09-01 DIAGNOSIS — E83.42 HYPOMAGNESEMIA: ICD-10-CM

## 2022-09-01 LAB — MAGNESIUM SERPL-MCNC: 2.1 MG/DL (ref 1.6–2.6)

## 2022-09-01 PROCEDURE — 83735 ASSAY OF MAGNESIUM: CPT

## 2022-09-01 PROCEDURE — 36415 COLL VENOUS BLD VENIPUNCTURE: CPT

## 2022-09-06 ENCOUNTER — OFFICE VISIT (OUTPATIENT)
Dept: CARDIOLOGY CLINIC | Facility: CLINIC | Age: 67
End: 2022-09-06
Payer: MEDICARE

## 2022-09-06 VITALS
OXYGEN SATURATION: 94 % | BODY MASS INDEX: 43 KG/M2 | HEART RATE: 72 BPM | WEIGHT: 274 LBS | HEIGHT: 67 IN | SYSTOLIC BLOOD PRESSURE: 147 MMHG | DIASTOLIC BLOOD PRESSURE: 73 MMHG

## 2022-09-06 DIAGNOSIS — G47.33 OBSTRUCTIVE SLEEP APNEA SYNDROME: ICD-10-CM

## 2022-09-06 DIAGNOSIS — I48.0 PAROXYSMAL ATRIAL FIBRILLATION (HCC): Primary | ICD-10-CM

## 2022-09-06 DIAGNOSIS — E78.2 MIXED HYPERLIPIDEMIA: ICD-10-CM

## 2022-09-06 DIAGNOSIS — E66.01 MORBID OBESITY WITH BMI OF 40.0-44.9, ADULT (HCC): ICD-10-CM

## 2022-09-06 DIAGNOSIS — I10 ESSENTIAL HYPERTENSION: ICD-10-CM

## 2022-09-06 DIAGNOSIS — E03.8 HYPOTHYROIDISM DUE TO HASHIMOTO'S THYROIDITIS: ICD-10-CM

## 2022-09-06 DIAGNOSIS — E06.3 HYPOTHYROIDISM DUE TO HASHIMOTO'S THYROIDITIS: ICD-10-CM

## 2022-09-06 DIAGNOSIS — R42 VERTIGO: ICD-10-CM

## 2022-09-06 PROCEDURE — 99214 OFFICE O/P EST MOD 30 MIN: CPT | Performed by: NURSE PRACTITIONER

## 2022-09-06 RX ORDER — MECLIZINE HYDROCHLORIDE 25 MG/1
25 TABLET ORAL 3 TIMES DAILY PRN
Qty: 30 TABLET | Refills: 0 | Status: SHIPPED | OUTPATIENT
Start: 2022-09-06

## 2022-09-06 NOTE — PATIENT INSTRUCTIONS
Meclizine 25 mg every 8 hours as needed for dizziness  Contact your primary care for order for vestibular therapy if ongoing symptoms  Notify our office if BP is persistently > 140/90  Continue with medications without change at this time  Contact us with any concerns

## 2022-09-06 NOTE — PROGRESS NOTES
Cardiology Follow Up    Michell Aguillon  1955  [id number]  [facility]  [address]  [address] [address] [phone]    Soo Navarrete presents today for routine follow up of her atrial fibrillation, HTN, HLD     1  Paroxysmal atrial fibrillation [facility])  Assessment & Plan:  New onset atrial fibrillation 11/25/2020  Patient presented to the emergency room after onset of fluttering and rapid heart rate  She converted spontaneously to  normal sinus rhythm  She has remained in normal sinus rhythm since and feels well  [id number] Score 3   Eliquis 5 mg twice daily for stroke prophylaxis  No bleeding issues  Echocardiogram and outpatient stress test 12/02/2020 were unremarkable  Home sleep study notable for severe sleep apnea being treated with CPAP  48 hour Holter monitor 6/8/2022 showed NSR with 5 short AT runs  Continue magnesium 250 mg daily over-the-counter  Patient was instructed that if she has recurrent palpitations she can take an extra metoprolol succinate as well as Xanax and relax for period of time however if symptoms persist she should come back to the emergency room  We discussed the need to decrease caffeine intake  2  Essential hypertension  Assessment & Plan:  Blood pressure remains elevated today  She does note significant stress due to her husbands cancer and treatments  Also with bout of vertigo this morning  If blood pressure remains elevated we can likely uptitrate olmesartan dose  Home BP cuff is accurate today when compared to our manual reading  Monitor BP daily and report readings persistently > 140/90       3  Mixed hyperlipidemia  Assessment & Plan:  Lipid panel 12/13/2021: C 176  T 177  H 65  L 76  Continue atorvastatin 10 mg daily  4  Obstructive sleep apnea syndrome  Assessment & Plan:  Home sleep study 12/2020 revealed severe sleep apnea  Compliant with CPAP therapy        5  Hypothyroidism due to Hashimoto's thyroiditis  Assessment & Plan:  Managed by PCP  Goal TSH 1-2 if possible given PAF  6  Morbid obesity with BMI of 40 0-44 9, adult [facility])  Assessment & Plan: Body mass index is 42 91 kg/m²  Weight loss would be beneficial   Continue dietary and exercise modifications  7  Vertigo  Comments:  second episode in 2 years  Meclizine ordered  Instructed to follow up with PCP if persistent symptoms  Orders:  -     meclizine (ANTIVERT) 25 mg tablet; Take 1 tablet (25 mg total) by mouth 3 (three) times a day as needed for dizziness       HPI  Ena has a past medical history of atrial fibrillation, HTN, HLD, DEANNA on CPAP, hypothyroidism, anxiety/depression, and obesity      She initially presented to [address] [place] [address] 11/25/2020 with chest pain, palpitations, and shortness of breath  Found to be in atrial fibrillation with RVR, rate 118 bpm  CTA ruled out PE  She was treated with IV Lopressor and Cardiazem and spontaneously converted to NSR  She was evaluated by Dr Abel Rodrigues during admission and ultimately discharged home on metoprolol succinate 25 mg daily and Eliquis 5 mg BID  Her TSH was mildly elevated and levothyroxine increased to 100 mch from 88 mcg during admission  Magnesium supplementation was initiated  She was under significant stress at the time with a recent cancer diagnosis for her spouse  He had just returned home from a complicated bowel surgery      She followed up 12/4/2020 at which time symptoms had not returned  She was tolerating the medication changes  Echo 12/2/2020 was unremarkable  Nuclear stress test 12/2/20 showed no scar or ischemia with EF 73%      She completed a sleep study 3/4/2021 which was positive for DEANNA  She was referred to sleep medicine and underwent in-lab study 4/8/2021 and CPAP therapy was initiated      She followed up with me 6/28/2021 where she denied complaint  She reported brief palpitations  She had been avoiding caffeine   She noticed excellent improvement in symptoms with starting the CPAP, but unfortunately got a recall notice on her Gerhardt Hedger machine and had stopped use  She noticed a big difference not using the machine  She ultimately decided to restart use  At 3/10/22 follow up with me she had recently undergone bilateral cataract extraction  Her spouse underwent surgery for another tumor removal and was starting chemotherapy again  She reported brief palpitations  Kardiamobile monitoring at home showed NSR  She had purchased a [employer] CPAP machine which she was using faithfully      She followed up most recently with Dr Demarcus Velasquez on 5/5/2022  She had joined [employer] with hopes for weight loss  Her palpitations were manageable at that time  She called our office 6/7/22 with complaint of increased palpitations  She was brought into the office where an ECG showed SB, rate 58 bpm  She reported that her symptoms felt like PVC's  48 hour Holter monitoring was obtained showing predominantly NSR, HR 47-98, avg 60 bpm with rare PAC's and PVC's  Symptoms were associated with PAC's/PVC's and brief runs of atrial tachycardia  9/6/2022Droosevelt See presents today for routine follow up  Shewoke up with vertigo this morning  Reports room spinning sensation when she turned over in bed  Improved now however her spouse drove her to this appointment  Last time this happed was 2/2021 and took meclizine with good results  Home BP cuff today is accurate  BP at home 049-320 systolic  No chest pain, shortness of breath, palpitations, or swelling  Doing well from cardiac standpoint  Notes anxiety/stress increase palpitations/BP  Son diagnosed with a fib this past week  She continues to avoid alcohol and caffeine  Using CPAP faithfully      Medical Problems     Problem List     Morbid obesity with BMI of 40 0-44 9, adult (HCC)    Anxiety and depression    Elevated d-dimer    Snoring    Obesity hypoventilation syndrome (HCC)    Paroxysmal atrial fibrillation [facility])    Essential hypertension    Hyperlipidemia    Hypothyroidism due to Hashimoto's thyroiditis    Obstructive sleep apnea syndrome        Past Medical History:   Diagnosis Date    Anxiety     Elevated d-dimer     2 seperate episodes of unclear etiology   Hyperlipidemia     Hypertension     Hypothyroid     Obesity     Paroxysmal atrial fibrillation ([facility] [address] )     New onset 2020       Social History     Socioeconomic History    Marital status: /Civil Union     Spouse name: Not on file    Number of children: Not on file    Years of education: Not on file    Highest education level: Not on file   Occupational History    Occupation: Retired teacher   Tobacco Use    Smoking status: Never Smoker    Smokeless tobacco: Never Used   Vaping Use    Vaping Use: Never used   Substance and Sexual Activity    Alcohol use: Not Currently    Drug use: Never    Sexual activity: Yes     Partners: Male   Other Topics Concern    Not on file   Social History Narrative    Not on file     Social Determinants of Health     Financial Resource Strain: Not on file   Food Insecurity: Not on file   Transportation Needs: Not on file   Physical Activity: Not on file   Stress: Not on file   Social Connections: Not on file   Intimate Partner Violence: Not on file   Housing Stability: Not on file      Family History   Problem Relation Age of Onset    Hypertension Sister     Heart disease Paternal [de-identified]     Other Mother         Encephalitis    Other Father          in his 80s without significant problems    Lung cancer Brother     No Known Problems Brother      Past Surgical History:   Procedure Laterality Date    CATARACT EXTRACTION, BILATERAL      HYSTERECTOMY      KNEE ARTHROPLASTY      SHOULDER OPEN ROTATOR CUFF REPAIR      x 2    TONSILLECTOMY         Current Outpatient Medications:     ALPRAZolam (XANAX) 0 5 mg tablet, Take by mouth daily at bedtime as needed for anxiety, Disp: , Rfl:     Ascorbic Acid (Vitamin C) 500 MG CAPS, Take by mouth, Disp: , Rfl:     atorvastatin (LIPITOR) 10 mg tablet, Take 10 mg by mouth daily, Disp: , Rfl:     citalopram (CeleXA) 40 mg tablet, Take 40 mg by mouth daily , Disp: , Rfl:     Eliquis 5 MG, TAKE 1 TABLET TWICE A DAY, Disp: 180 tablet, Rfl: 3    erythromycin (ILOTYCIN) ophthalmic ointment, APPLY TO BOTH EYES AT NIGHT BEFORE SLEEP, Disp: , Rfl:     levothyroxine 75 mcg tablet, Take 75 mcg by mouth daily , Disp: , Rfl:     magnesium oxide (MAG-OX) 400 mg tablet, Take by mouth, Disp: , Rfl:     meclizine (ANTIVERT) 25 mg tablet, Take 1 tablet (25 mg total) by mouth 3 (three) times a day as needed for dizziness, Disp: 30 tablet, Rfl: 0    metoprolol succinate (TOPROL-XL) 25 mg 24 hr tablet, Take 1 tablet (25 mg total) by mouth daily, Disp: 90 tablet, Rfl: 3    metroNIDAZOLE (METROGEL) 0 75 % vaginal gel, as needed , Disp: , Rfl:     olmesartan-hydrochlorothiazide (BENICAR HCT) 20-12 5 MG per tablet, Take 1 tablet by mouth daily, Disp: , Rfl:     Cholecalciferol (Vitamin D3) 125 MCG (5000 UT) TABS, Take 2,000 Units by mouth daily  (Patient not taking: Reported on 9/6/2022), Disp: , Rfl:   Allergies   Allergen Reactions    Prednisone Shortness Of Breath    Contrast [Iodinated Diagnostic Agents] Rash    Iodine - Food Allergy Rash       Labs:     Chemistry        Component Value Date/Time     11/06/2015 0655    K 4 0 08/01/2022 1746    K 3 9 11/06/2015 0655     08/01/2022 1746     11/06/2015 0655    CO2 26 08/01/2022 1746    CO2 26 3 11/06/2015 0655    BUN 26 (H) 08/01/2022 1746    BUN 15 11/06/2015 0655    CREATININE 1 20 08/01/2022 1746    CREATININE 0 86 11/06/2015 0655        Component Value Date/Time    CALCIUM 9 0 08/01/2022 1746    CALCIUM 8 7 11/06/2015 0655    ALKPHOS 101 07/21/2021 0500    ALKPHOS 96 11/06/2015 0655    AST 31 07/21/2021 0500    AST 21 11/06/2015 0655    ALT 40 07/21/2021 0500    ALT 32 11/06/2015 0655    BILITOT 0 34 11/06/2015 0655        Cardiac Test Results:      Lipid panel 12/13/2021: C 176  T 177  H 65  L 76       ECG 11/25/2020:  Atrial flutter/atrial fibrillation at 118 bpm      Echocardiogram 12/02/2020:  EF 60%  Normal diastolic function  Mild annular calcification of the mitral valve  Trace tricuspid regurgitation      Lexiscan nuclear stress test 12/02/2020:  No evidence of myocardial scar  No evidence of myocardial ischemia  EF 73%      Lipid panel 10/13/2020: C 166  T 154  H 64  L 71       Echocardiogram 11/05/2015:  EF 55-60%  Mild tricuspid regurgitation  Estimated PASP 38 mmHg  Review of Systems   Constitutional: Negative  HENT: Negative  Cardiovascular: Negative for chest pain, dyspnea on exertion, irregular heartbeat, leg swelling, near-syncope, orthopnea and palpitations  Respiratory: Negative for cough, shortness of breath and snoring  Endocrine: Negative  Skin: Negative  Musculoskeletal: Negative  Gastrointestinal: Negative  Genitourinary: Negative  Neurological: Positive for vertigo  Psychiatric/Behavioral: The patient is nervous/anxious  Vitals:    09/06/22 1029   BP: 147/73   Pulse:    SpO2:      Vitals:    09/06/22 1025   Weight: 124 kg (274 lb)     Height: 5' 7" (170 2 cm)   Body mass index is 42 91 kg/m²  Physical Exam  Vitals and nursing note reviewed  Constitutional:       General: She is not in acute distress  Appearance: She is well-developed  She is obese  She is not diaphoretic  HENT:      Head: Normocephalic and atraumatic  Neck:      Thyroid: No thyromegaly  Vascular: No carotid bruit or JVD  Cardiovascular:      Rate and Rhythm: Normal rate and regular rhythm  No extrasystoles are present  Pulses: Intact distal pulses  Radial pulses are 2+ on the right side and 2+ on the left side  Heart sounds: Normal heart sounds, S1 normal and S2 normal  No murmur heard       Comments: No lower extremity edema  Pulmonary: Effort: Pulmonary effort is normal       Breath sounds: Normal breath sounds  Abdominal:      General: There is no distension  Palpations: Abdomen is soft  Tenderness: There is no abdominal tenderness  Musculoskeletal:         General: Normal range of motion  Cervical back: Normal range of motion and neck supple  Lymphadenopathy:      Cervical: No cervical adenopathy  Skin:     General: Skin is warm and dry  Neurological:      Mental Status: She is alert and oriented to person, place, and time  Cranial Nerves: No cranial nerve deficit  Psychiatric:         Mood and Affect: Mood and affect normal          Speech: Speech normal          Behavior: Behavior normal  Behavior is cooperative           Cognition and Memory: Cognition and memory normal

## 2022-09-11 NOTE — ASSESSMENT & PLAN NOTE
New onset atrial fibrillation 11/25/2020  Patient presented to the emergency room after onset of fluttering and rapid heart rate  She converted spontaneously to  normal sinus rhythm  She has remained in normal sinus rhythm since and feels well  [id number] Score 3   Eliquis 5 mg twice daily for stroke prophylaxis  No bleeding issues  Echocardiogram and outpatient stress test 12/02/2020 were unremarkable  Home sleep study notable for severe sleep apnea being treated with CPAP  48 hour Holter monitor 6/8/2022 showed NSR with 5 short AT runs  Continue magnesium 250 mg daily over-the-counter  Patient was instructed that if she has recurrent palpitations she can take an extra metoprolol succinate as well as Xanax and relax for period of time however if symptoms persist she should come back to the emergency room  We discussed the need to decrease caffeine intake

## 2022-09-11 NOTE — ASSESSMENT & PLAN NOTE
Blood pressure remains elevated today  She does note significant stress due to her husbands cancer and treatments  Also with bout of vertigo this morning  If blood pressure remains elevated we can likely uptitrate olmesartan dose  Home BP cuff is accurate today when compared to our manual reading  Monitor BP daily and report readings persistently > 140/90

## 2022-09-11 NOTE — ASSESSMENT & PLAN NOTE
Body mass index is 42 91 kg/m²  Weight loss would be beneficial   Continue dietary and exercise modifications

## 2022-10-19 ENCOUNTER — HOSPITAL ENCOUNTER (EMERGENCY)
Facility: HOSPITAL | Age: 67
Discharge: HOME | End: 2022-10-19
Attending: EMERGENCY MEDICINE
Payer: MEDICARE

## 2022-10-19 ENCOUNTER — APPOINTMENT (EMERGENCY)
Dept: CT IMAGING | Facility: HOSPITAL | Age: 67
End: 2022-10-19
Payer: MEDICARE

## 2022-10-19 ENCOUNTER — APPOINTMENT (EMERGENCY)
Dept: RADIOLOGY | Facility: HOSPITAL | Age: 67
End: 2022-10-19
Payer: MEDICARE

## 2022-10-19 VITALS
RESPIRATION RATE: 15 BRPM | SYSTOLIC BLOOD PRESSURE: 149 MMHG | BODY MASS INDEX: 43.16 KG/M2 | OXYGEN SATURATION: 97 % | TEMPERATURE: 97.8 F | WEIGHT: 275 LBS | HEART RATE: 64 BPM | HEIGHT: 67 IN | DIASTOLIC BLOOD PRESSURE: 70 MMHG

## 2022-10-19 DIAGNOSIS — K74.60 CIRRHOSIS (HCC): ICD-10-CM

## 2022-10-19 DIAGNOSIS — R06.02 SOB (SHORTNESS OF BREATH): Primary | ICD-10-CM

## 2022-10-19 LAB
ALBUMIN SERPL BCP-MCNC: 3.4 G/DL (ref 3.5–5)
ALP SERPL-CCNC: 94 U/L (ref 46–116)
ALT SERPL W P-5'-P-CCNC: 48 U/L (ref 12–78)
ANION GAP SERPL CALCULATED.3IONS-SCNC: 10 MMOL/L (ref 4–13)
APTT PPP: 31 SECONDS (ref 23–37)
AST SERPL W P-5'-P-CCNC: 41 U/L (ref 5–45)
BACTERIA UR QL AUTO: ABNORMAL /HPF
BASOPHILS # BLD AUTO: 0.06 THOUSANDS/ÂΜL (ref 0–0.1)
BASOPHILS NFR BLD AUTO: 1 % (ref 0–1)
BILIRUB SERPL-MCNC: 0.39 MG/DL (ref 0.2–1)
BILIRUB UR QL STRIP: NEGATIVE
BUN SERPL-MCNC: 31 MG/DL (ref 5–25)
CALCIUM ALBUM COR SERPL-MCNC: 8.9 MG/DL (ref 8.3–10.1)
CALCIUM SERPL-MCNC: 8.4 MG/DL (ref 8.3–10.1)
CARDIAC TROPONIN I PNL SERPL HS: 5 NG/L
CHLORIDE SERPL-SCNC: 102 MMOL/L (ref 96–108)
CLARITY UR: CLEAR
CO2 SERPL-SCNC: 26 MMOL/L (ref 21–32)
COLOR UR: ABNORMAL
CREAT SERPL-MCNC: 1.17 MG/DL (ref 0.6–1.3)
EOSINOPHIL # BLD AUTO: 0.26 THOUSAND/ÂΜL (ref 0–0.61)
EOSINOPHIL NFR BLD AUTO: 3 % (ref 0–6)
ERYTHROCYTE [DISTWIDTH] IN BLOOD BY AUTOMATED COUNT: 14.6 % (ref 11.6–15.1)
FLUAV RNA RESP QL NAA+PROBE: NEGATIVE
FLUBV RNA RESP QL NAA+PROBE: NEGATIVE
GFR SERPL CREATININE-BSD FRML MDRD: 47 ML/MIN/1.73SQ M
GLUCOSE SERPL-MCNC: 105 MG/DL (ref 65–140)
GLUCOSE UR STRIP-MCNC: NEGATIVE MG/DL
HCT VFR BLD AUTO: 37.2 % (ref 34.8–46.1)
HGB BLD-MCNC: 12 G/DL (ref 11.5–15.4)
HGB UR QL STRIP.AUTO: ABNORMAL
IMM GRANULOCYTES # BLD AUTO: 0.04 THOUSAND/UL (ref 0–0.2)
IMM GRANULOCYTES NFR BLD AUTO: 1 % (ref 0–2)
INR PPP: 1.23 (ref 0.84–1.19)
KETONES UR STRIP-MCNC: NEGATIVE MG/DL
LACTATE SERPL-SCNC: 1 MMOL/L (ref 0.5–2)
LEUKOCYTE ESTERASE UR QL STRIP: ABNORMAL
LYMPHOCYTES # BLD AUTO: 2.11 THOUSANDS/ÂΜL (ref 0.6–4.47)
LYMPHOCYTES NFR BLD AUTO: 25 % (ref 14–44)
MAGNESIUM SERPL-MCNC: 1.8 MG/DL (ref 1.6–2.6)
MCH RBC QN AUTO: 28.9 PG (ref 26.8–34.3)
MCHC RBC AUTO-ENTMCNC: 32.3 G/DL (ref 31.4–37.4)
MCV RBC AUTO: 90 FL (ref 82–98)
MONOCYTES # BLD AUTO: 0.69 THOUSAND/ÂΜL (ref 0.17–1.22)
MONOCYTES NFR BLD AUTO: 8 % (ref 4–12)
NEUTROPHILS # BLD AUTO: 5.44 THOUSANDS/ÂΜL (ref 1.85–7.62)
NEUTS SEG NFR BLD AUTO: 62 % (ref 43–75)
NITRITE UR QL STRIP: NEGATIVE
NON-SQ EPI CELLS URNS QL MICRO: ABNORMAL /HPF
NRBC BLD AUTO-RTO: 0 /100 WBCS
NT-PROBNP SERPL-MCNC: 206 PG/ML
PH UR STRIP.AUTO: 5.5 [PH]
PLATELET # BLD AUTO: 239 THOUSANDS/UL (ref 149–390)
PMV BLD AUTO: 10 FL (ref 8.9–12.7)
POTASSIUM SERPL-SCNC: 4.1 MMOL/L (ref 3.5–5.3)
PROT SERPL-MCNC: 7.6 G/DL (ref 6.4–8.4)
PROT UR STRIP-MCNC: NEGATIVE MG/DL
PROTHROMBIN TIME: 15.6 SECONDS (ref 11.6–14.5)
RBC # BLD AUTO: 4.15 MILLION/UL (ref 3.81–5.12)
RBC #/AREA URNS AUTO: ABNORMAL /HPF
RSV RNA RESP QL NAA+PROBE: NEGATIVE
SARS-COV-2 RNA RESP QL NAA+PROBE: NEGATIVE
SODIUM SERPL-SCNC: 138 MMOL/L (ref 135–147)
SP GR UR STRIP.AUTO: 1.01 (ref 1–1.03)
UROBILINOGEN UR QL STRIP.AUTO: 0.2 E.U./DL
WBC # BLD AUTO: 8.6 THOUSAND/UL (ref 4.31–10.16)
WBC #/AREA URNS AUTO: ABNORMAL /HPF

## 2022-10-19 PROCEDURE — 71275 CT ANGIOGRAPHY CHEST: CPT

## 2022-10-19 PROCEDURE — 85730 THROMBOPLASTIN TIME PARTIAL: CPT | Performed by: PHYSICIAN ASSISTANT

## 2022-10-19 PROCEDURE — 83880 ASSAY OF NATRIURETIC PEPTIDE: CPT | Performed by: PHYSICIAN ASSISTANT

## 2022-10-19 PROCEDURE — 87086 URINE CULTURE/COLONY COUNT: CPT | Performed by: PHYSICIAN ASSISTANT

## 2022-10-19 PROCEDURE — 81001 URINALYSIS AUTO W/SCOPE: CPT | Performed by: PHYSICIAN ASSISTANT

## 2022-10-19 PROCEDURE — 83605 ASSAY OF LACTIC ACID: CPT | Performed by: PHYSICIAN ASSISTANT

## 2022-10-19 PROCEDURE — 85025 COMPLETE CBC W/AUTO DIFF WBC: CPT | Performed by: PHYSICIAN ASSISTANT

## 2022-10-19 PROCEDURE — 84484 ASSAY OF TROPONIN QUANT: CPT | Performed by: PHYSICIAN ASSISTANT

## 2022-10-19 PROCEDURE — 0241U HB NFCT DS VIR RESP RNA 4 TRGT: CPT | Performed by: PHYSICIAN ASSISTANT

## 2022-10-19 PROCEDURE — 36415 COLL VENOUS BLD VENIPUNCTURE: CPT | Performed by: PHYSICIAN ASSISTANT

## 2022-10-19 PROCEDURE — 71045 X-RAY EXAM CHEST 1 VIEW: CPT

## 2022-10-19 PROCEDURE — 83735 ASSAY OF MAGNESIUM: CPT | Performed by: PHYSICIAN ASSISTANT

## 2022-10-19 PROCEDURE — 85610 PROTHROMBIN TIME: CPT | Performed by: PHYSICIAN ASSISTANT

## 2022-10-19 PROCEDURE — 96375 TX/PRO/DX INJ NEW DRUG ADDON: CPT

## 2022-10-19 PROCEDURE — 99285 EMERGENCY DEPT VISIT HI MDM: CPT

## 2022-10-19 PROCEDURE — 96374 THER/PROPH/DIAG INJ IV PUSH: CPT

## 2022-10-19 PROCEDURE — G1004 CDSM NDSC: HCPCS

## 2022-10-19 PROCEDURE — 93005 ELECTROCARDIOGRAM TRACING: CPT

## 2022-10-19 PROCEDURE — 80053 COMPREHEN METABOLIC PANEL: CPT | Performed by: PHYSICIAN ASSISTANT

## 2022-10-19 PROCEDURE — 99284 EMERGENCY DEPT VISIT MOD MDM: CPT | Performed by: PHYSICIAN ASSISTANT

## 2022-10-19 RX ORDER — DIPHENHYDRAMINE HYDROCHLORIDE 50 MG/ML
50 INJECTION INTRAMUSCULAR; INTRAVENOUS ONCE
Status: COMPLETED | OUTPATIENT
Start: 2022-10-19 | End: 2022-10-19

## 2022-10-19 RX ORDER — DEXAMETHASONE SODIUM PHOSPHATE 4 MG/ML
10 INJECTION, SOLUTION INTRA-ARTICULAR; INTRALESIONAL; INTRAMUSCULAR; INTRAVENOUS; SOFT TISSUE ONCE
Status: COMPLETED | OUTPATIENT
Start: 2022-10-19 | End: 2022-10-19

## 2022-10-19 RX ADMIN — DIPHENHYDRAMINE HYDROCHLORIDE 50 MG: 50 INJECTION, SOLUTION INTRAMUSCULAR; INTRAVENOUS at 21:59

## 2022-10-19 RX ADMIN — DEXAMETHASONE SODIUM PHOSPHATE 10 MG: 4 INJECTION, SOLUTION INTRAMUSCULAR; INTRAVENOUS at 21:57

## 2022-10-19 RX ADMIN — IOHEXOL 100 ML: 350 INJECTION, SOLUTION INTRAVENOUS at 22:28

## 2022-10-20 LAB
ATRIAL RATE: 53 BPM
P AXIS: 51 DEGREES
PR INTERVAL: 178 MS
QRS AXIS: 50 DEGREES
QRSD INTERVAL: 94 MS
QT INTERVAL: 476 MS
QTC INTERVAL: 446 MS
T WAVE AXIS: 65 DEGREES
VENTRICULAR RATE: 53 BPM

## 2022-10-20 NOTE — ED PROVIDER NOTES
History  Chief Complaint   Patient presents with   • Shortness of Breath     Pt was short of breath earlier when carrying something light going up stairs around 1430  Pt stopped and took her bp and it was 164/78, then took a rest  Pt walked around a store a few hours later and began to feel Sob but not as bad  PMHX: afib, Htn, Hypercholestermia    Pt also taking Metroniodazole on day 3 and feels she is also having some of those side effects  The patient is a 68-year-old female with a past history AFib, hypertension, dyslipidemia presents emergency department today with chief complaint of dyspnea  Patient states that she has not been feeling well over last 3 days  States she has had increased fatigue  Feels “wiped out”  Patient states that today around noon she was carrying blanket up a flight of stairs at home and extremely SOB   She states that her shortness of breath is abnormal for her  The patient states that she was able to catch her breath with rest   She denies any chest pain or heart fluttering  States she typically tell when she is in AFib  Patient around 4:00 p m  Again ambulating around a local store but notice with ambulation she was extremely short of breath  Did receive her last COVID vaccination/booster a week ago  Shortness of Breath  Severity:  Moderate  Timing:  Constant  Progression:  Worsening  Chronicity:  New  Context: activity and weather changes    Relieved by:  Rest  Worsened by:  Exertion  Ineffective treatments:  Rest and lying down  Associated symptoms: no diaphoresis and no ear pain    Risk factors: obesity        Prior to Admission Medications   Prescriptions Last Dose Informant Patient Reported? Taking?    ALPRAZolam (XANAX) 0 5 mg tablet  Self Yes No   Sig: Take by mouth daily at bedtime as needed for anxiety   Ascorbic Acid (Vitamin C) 500 MG CAPS  Self Yes No   Sig: Take by mouth   Cholecalciferol (Vitamin D3) 125 MCG (5000 UT) TABS  Self Yes No   Sig: Take 2,000 Units by mouth daily    Patient not taking: Reported on 2022   Eliquis 5 MG   No No   Sig: TAKE 1 TABLET TWICE A DAY   atorvastatin (LIPITOR) 10 mg tablet  Self Yes No   Sig: Take 10 mg by mouth daily   citalopram (CeleXA) 40 mg tablet  Self Yes No   Sig: Take 40 mg by mouth daily    erythromycin (ILOTYCIN) ophthalmic ointment   Yes No   Sig: APPLY TO BOTH EYES AT NIGHT BEFORE SLEEP   levothyroxine 75 mcg tablet  Self Yes No   Sig: Take 75 mcg by mouth daily    magnesium oxide (MAG-OX) 400 mg tablet   Yes No   Sig: Take by mouth   meclizine (ANTIVERT) 25 mg tablet   No No   Sig: Take 1 tablet (25 mg total) by mouth 3 (three) times a day as needed for dizziness   metoprolol succinate (TOPROL-XL) 25 mg 24 hr tablet  Self No No   Sig: Take 1 tablet (25 mg total) by mouth daily   metroNIDAZOLE (METROGEL) 0 75 % vaginal gel  Self Yes No   Sig: as needed    olmesartan-hydrochlorothiazide (BENICAR HCT) 20-12 5 MG per tablet  Self Yes No   Sig: Take 1 tablet by mouth daily      Facility-Administered Medications: None       Past Medical History:   Diagnosis Date   • Anxiety    • Diabetes mellitus (HCC)    • Elevated d-dimer     2 seperate episodes of unclear etiology  • Hyperlipidemia    • Hypertension    • Hypothyroid    • Obesity    • Paroxysmal atrial fibrillation ([facility] [address] )     New onset 2020  Past Surgical History:   Procedure Laterality Date   • CATARACT EXTRACTION, BILATERAL     • HYSTERECTOMY     • KNEE ARTHROPLASTY     • SHOULDER OPEN ROTATOR CUFF REPAIR      x 2   • TONSILLECTOMY         Family History   Problem Relation Age of Onset   • Hypertension Sister    • Heart disease Paternal Aunt    • Other Mother         Encephalitis   • Other Father          in his 80s without significant problems   • Lung cancer Brother    • No Known Problems Brother      I have reviewed and agree with the history as documented      E-Cigarette/Vaping   • E-Cigarette Use Never User      E-Cigarette/Vaping Substances Social History     Tobacco Use   • Smoking status: Never Smoker   • Smokeless tobacco: Never Used   Vaping Use   • Vaping Use: Never used   Substance Use Topics   • Alcohol use: Not Currently   • Drug use: Never       Review of Systems   Constitutional: Negative for diaphoresis  HENT: Negative for ear pain  Respiratory: Positive for shortness of breath  All other systems reviewed and are negative  Physical Exam  Physical Exam  Vitals and nursing note reviewed  Constitutional:       General: She is not in acute distress  Appearance: She is well-developed  HENT:      Head: Normocephalic and atraumatic  Right Ear: External ear normal       Left Ear: External ear normal    Eyes:      Extraocular Movements: Extraocular movements intact  Pupils: Pupils are equal, round, and reactive to light  Cardiovascular:      Rate and Rhythm: Normal rate and regular rhythm  Heart sounds: No murmur heard  Pulmonary:      Effort: Pulmonary effort is normal  No respiratory distress  Breath sounds: Normal breath sounds  No wheezing  Chest:      Chest wall: No tenderness  Abdominal:      General: Bowel sounds are normal       Palpations: Abdomen is soft  There is no mass  Tenderness: There is no abdominal tenderness  There is no rebound  Hernia: No hernia is present  Musculoskeletal:      Cervical back: Normal range of motion and neck supple  Right lower leg: No tenderness  No edema  Left lower leg: No tenderness  No edema  Skin:     General: Skin is warm and dry  Capillary Refill: Capillary refill takes less than 2 seconds  Neurological:      General: No focal deficit present  Mental Status: She is alert and oriented to person, place, and time        Coordination: Coordination normal    Psychiatric:         Behavior: Behavior normal          Vital Signs  ED Triage Vitals   Temperature Pulse Respirations Blood Pressure SpO2   10/19/22 1919 10/19/22 1919 10/19/22 1919 10/19/22 1921 10/19/22 1919   97 8 °F (36 6 °C) 62 18 158/83 96 %      Temp Source Heart Rate Source Patient Position - Orthostatic VS BP Location FiO2 (%)   10/19/22 1919 10/19/22 1919 10/19/22 2031 10/19/22 2031 --   Temporal Monitor Sitting Left arm       Pain Score       10/19/22 2031       No Pain           Vitals:    10/19/22 1919 10/19/22 1921 10/19/22 2031 10/19/22 2115   BP:  158/83 166/80 124/57   Pulse: 62  55 55   Patient Position - Orthostatic VS:   Sitting Lying         Visual Acuity      ED Medications  Medications   dexamethasone (DECADRON) injection 10 mg (has no administration in time range)   diphenhydrAMINE (BENADRYL) injection 50 mg (has no administration in time range)       Diagnostic Studies  Results Reviewed     Procedure Component Value Units Date/Time    Magnesium [[id number]]  (Normal) Collected: 10/19/22 2028    Lab Status: Final result Specimen: Blood from Arm, Right Updated: 10/19/22 2107     Magnesium 1 8 mg/dL     NT-BNP PRO [[id number]]  (Abnormal) Collected: 10/19/22 2028    Lab Status: Final result Specimen: Blood from Arm, Right Updated: 10/19/22 2107     NT-proBNP 206 pg/mL     HS Troponin 0hr (reflex protocol) [[id number]]  (Normal) Collected: 10/19/22 2028    Lab Status: Final result Specimen: Blood from Arm, Right Updated: 10/19/22 2104     hs TnI 0hr 5 ng/L     Comprehensive metabolic panel [[id number]]  (Abnormal) Collected: 10/19/22 2028    Lab Status: Final result Specimen: Blood from Arm, Right Updated: 10/19/22 2102     Sodium 138 mmol/L      Potassium 4 1 mmol/L      Chloride 102 mmol/L      CO2 26 mmol/L      ANION GAP 10 mmol/L      BUN 31 mg/dL      Creatinine 1 17 mg/dL      Glucose 105 mg/dL      Calcium 8 4 mg/dL      Corrected Calcium 8 9 mg/dL      AST 41 U/L      ALT 48 U/L      Alkaline Phosphatase 94 U/L      Total Protein 7 6 g/dL      Albumin 3 4 g/dL      Total Bilirubin 0 39 mg/dL      eGFR 47 ml/min/1 73sq m     Narrative:      National Kid[employer] Foundation guidelines for Chronic Kidney Disease (CKD):   •  Stage 1 with normal or high GFR (GFR > 90 mL/min/1 73 square meters)  •  Stage 2 Mild CKD (GFR = 60-89 mL/min/1 73 square meters)  •  Stage 3A Moderate CKD (GFR = 45-59 mL/min/1 73 square meters)  •  Stage 3B Moderate CKD (GFR = 30-44 mL/min/1 73 square meters)  •  Stage 4 Severe CKD (GFR = 15-29 mL/min/1 73 square meters)  •  Stage 5 End Stage CKD (GFR <15 mL/min/1 73 square meters)  Note: GFR calculation is accurate only with a steady state creatinine    Lactic acid [[id number]]  (Normal) Collected: 10/19/22 2028    Lab Status: Final result Specimen: Blood from Arm, Right Updated: 10/19/22 2102     LACTIC ACID 1 0 mmol/L     Narrative:      Result may be elevated if tourniquet was used during collection  Protime-INR [[id number]]  (Abnormal) Collected: 10/19/22 2028    Lab Status: Final result Specimen: Blood from Arm, Right Updated: 10/19/22 2059     Protime 15 6 seconds      INR 1 23    APTT [[id number]]  (Normal) Collected: 10/19/22 2028    Lab Status: Final result Specimen: Blood from Arm, Right Updated: 10/19/22 2059     PTT 31 seconds     Urine Microscopic [[id number]]  (Abnormal) Collected: 10/19/22 2022    Lab Status: Final result Specimen: Urine, Clean Catch Updated: 10/19/22 2042     RBC, UA 4-10 /hpf      WBC, UA 30-50 /hpf      Epithelial Cells Moderate /hpf      Bacteria, UA None Seen /hpf     Urine culture [[id number]] Collected: 10/19/22 2022    Lab Status:  In process Specimen: Urine, Clean Catch Updated: 10/19/22 2042    CBC and differential [[id number]] Collected: 10/19/22 2028    Lab Status: Final result Specimen: Blood from Arm, Right Updated: 10/19/22 2039     WBC 8 60 Thousand/uL      RBC 4 15 Million/uL      Hemoglobin 12 0 g/dL      Hematocrit 37 2 %      MCV 90 fL      MCH 28 9 pg      MCHC 32 3 g/dL      RDW 14 6 %      MPV 10 0 fL      Platelets 965 Thousands/uL      nRBC 0 /100 WBCs      Neutrophils Relative 62 %      Immat GRANS % 1 %      Lymphocytes Relative 25 %      Monocytes Relative 8 %      Eosinophils Relative 3 %      Basophils Relative 1 %      Neutrophils Absolute 5 44 Thousands/µL      Immature Grans Absolute 0 04 Thousand/uL      Lymphocytes Absolute 2 11 Thousands/µL      Monocytes Absolute 0 69 Thousand/µL      Eosinophils Absolute 0 26 Thousand/µL      Basophils Absolute 0 06 Thousands/µL     FLU/RSV/COVID - if FLU/RSV clinically relevant [[id number]] Collected: 10/19/22 2029    Lab Status: In process Specimen: Nares from Nasopharyngeal Swab Updated: 10/19/22 2037    UA w Reflex to Microscopic w Reflex to Culture [[id number]]  (Abnormal) Collected: 10/19/22 2022    Lab Status: Final result Specimen: Urine, Clean Catch Updated: 10/19/22 2034     Color, UA Straw     Clarity, UA Clear     Specific Gravity, UA 1 010     pH, UA 5 5     Leukocytes, UA Large     Nitrite, UA Negative     Protein, UA Negative mg/dl      Glucose, UA Negative mg/dl      Ketones, UA Negative mg/dl      Urobilinogen, UA 0 2 E U /dl      Bilirubin, UA Negative     Occult Blood, UA Trace-Intact                 XR chest 1 view portable    (Results Pending)   CTA ED chest PE study    (Results Pending)              Procedures  Procedures         ED Course  ED Course as of 10/19/22 2127   Wed Oct 19, 2022   2108 hs TnI 0hr: 5                               SBIRT 22yo+    Flowsheet Row Most Recent Value   SBIRT (23 yo +)    In order to provide better care to our patients, we are screening all of our patients for alcohol and drug use  Would it be okay to ask you these screening questions?  No Filed at: 10/19/2022 1922                    MDM  Number of Diagnoses or Management Options     Amount and/or Complexity of Data Reviewed  Clinical lab tests: ordered and reviewed  Tests in the radiology section of CPT®: ordered and reviewed  Decide to obtain previous medical records or to obtain history from someone other than the patient: yes  Review and summarize past medical records: yes  Independent visualization of images, tracings, or specimens: yes    Risk of Complications, Morbidity, and/or Mortality  Presenting problems: moderate  Diagnostic procedures: moderate  Management options: moderate    Patient Progress  Patient progress: stable      Disposition  Final diagnoses:   SOB (shortness of breath)     Time reflects when diagnosis was documented in both MDM as applicable and the Disposition within this note     Time User Action Codes Description Comment    10/19/2022  9:20 PM Van Woody [R06 02] SOB (shortness of breath)       ED Disposition     None      Follow-up Information     Follow up With Specialties Details Why DO [facility] Call   [address]  [address] [address]  [address] [phone]            Patient's Medications   Discharge Prescriptions    No medications on file       No discharge procedures on file      PDMP Review       Value Time User    PDMP Reviewed  Yes 11/25/2020  2:06 PM Soham Stevens MD          ED Provider  Electronically Signed by           Keke Londono PA-C  10/19/22 2121

## 2022-10-20 NOTE — ED CARE HANDOFF
Emergency Department Sign Out Note        Sign out and transfer of care from [facility]  See Separate Emergency Department note  The patient, Becki Rios, was evaluated by the previous provider for sob  Workup Completed:  Labs Reviewed   COMPREHENSIVE METABOLIC PANEL - Abnormal       Result Value Ref Range Status    Sodium 138  135 - 147 mmol/L Final    Potassium 4 1  3 5 - 5 3 mmol/L Final    Chloride 102  96 - 108 mmol/L Final    CO2 26  21 - 32 mmol/L Final    ANION GAP 10  4 - 13 mmol/L Final    BUN 31 (*) 5 - 25 mg/dL Final    Creatinine 1 17  0 60 - 1 30 mg/dL Final    Comment: Standardized to IDMS reference method    Glucose 105  65 - 140 mg/dL Final    Comment: If the patient is fasting, the ADA then defines impaired fasting glucose as > 100 mg/dL and diabetes as > or equal to 123 mg/dL  Specimen collection should occur prior to Sulfasalazine administration due to the potential for falsely depressed results  Specimen collection should occur prior to Sulfapyridine administration due to the potential for falsely elevated results  Calcium 8 4  8 3 - 10 1 mg/dL Final    Corrected Calcium 8 9  8 3 - 10 1 mg/dL Final    AST 41  5 - 45 U/L Final    Comment: Specimen collection should occur prior to Sulfasalazine administration due to the potential for falsely depressed results  ALT 48  12 - 78 U/L Final    Comment: Specimen collection should occur prior to Sulfasalazine administration due to the potential for falsely depressed results  Alkaline Phosphatase 94  46 - 116 U/L Final    Total Protein 7 6  6 4 - 8 4 g/dL Final    Albumin 3 4 (*) 3 5 - 5 0 g/dL Final    Total Bilirubin 0 39  0 20 - 1 00 mg/dL Final    Comment: Use of this assay is not recommended for patients undergoing treatment with eltrombopag due to the potential for falsely elevated results      eGFR 47  ml/min/1 73sq m Final    Narrative:     [place] guidelines for Chronic Kidney Disease (CKD):   • Stage 1 with normal or high GFR (GFR > 90 mL/min/1 73 square meters)  •  Stage 2 Mild CKD (GFR = 60-89 mL/min/1 73 square meters)  •  Stage 3A Moderate CKD (GFR = 45-59 mL/min/1 73 square meters)  •  Stage 3B Moderate CKD (GFR = 30-44 mL/min/1 73 square meters)  •  Stage 4 Severe CKD (GFR = 15-29 mL/min/1 73 square meters)  •  Stage 5 End Stage CKD (GFR <15 mL/min/1 73 square meters)  Note: GFR calculation is accurate only with a steady state creatinine   PROTIME-INR - Abnormal    Protime 15 6 (*) 11 6 - 14 5 seconds Final    INR 1 23 (*) 0 84 - 1 19 Final   NT-BNP PRO (BRAIN NATRIURETIC PEPTIDE) - Abnormal    NT-proBNP 206 (*) <125 pg/mL Final   UA W REFLEX TO MICROSCOPIC WITH REFLEX TO CULTURE - Abnormal    Color, UA Straw   Final    Clarity, UA Clear   Final    Specific [place], UA 1 010  1 003 - 1 030 Final    pH, UA 5 5  4 5, 5 0, 5 5, 6 0, 6 5, 7 0, 7 5, 8 0 Final    Leukocytes, UA Large (*) Negative Final    Nitrite, UA Negative  Negative Final    Protein, UA Negative  Negative mg/dl Final    Glucose, UA Negative  Negative mg/dl Final    Ketones, UA Negative  Negative mg/dl Final    Urobilinogen, UA 0 2  0 2, 1 0 E U /dl E U /dl Final    Bilirubin, UA Negative  Negative Final    Occult Blood, UA Trace-Intact (*) Negative Final   URINE MICROSCOPIC - Abnormal    RBC, UA 4-10 (*) None Seen, 0-1, 1-2, 2-4, 0-5 /hpf Final    WBC, UA 30-50 (*) None Seen, 0-1, 1-2, 0-5, 2-4 /hpf Final    Epithelial Cells Moderate (*) None Seen, Occasional /hpf Final    Bacteria, UA None Seen  None Seen, Occasional /hpf Final   COVID19, INFLUENZA A/B, RSV PCR, [facility] - Normal    SARS-CoV-2 Negative  Negative Final    INFLUENZA A PCR Negative  Negative Final    INFLUENZA B PCR Negative  Negative Final    RSV PCR Negative  Negative Final    Narrative:     FOR PEDIATRIC PATIENTS - copy/paste COVID Guidelines URL to browser: [email or web address]    SARS-CoV-2 assay is a Nucleic Acid Amplification assay intended for the  qualitative detection of nucleic acid from SARS-CoV-2 in nasopharyngeal  swabs  Results are for the presumptive identification of SARS-CoV-2 RNA  Positive results are indicative of infection with SARS-CoV-2, the virus  causing COVID-19, but do not rule out bacterial infection or co-infection  with other viruses  Laboratories within the [place] and its  territories are required to report all positive results to the appropriate  public health authorities  Negative results do not preclude SARS-CoV-2  infection and should not be used as the sole basis for treatment or other  patient management decisions  Negative results must be combined with  clinical observations, patient history, and epidemiological information  This test has not been FDA cleared or approved  This test has been authorized by FDA under an Emergency Use Authorization  (EUA)  This test is only authorized for the duration of time the  declaration that circumstances exist justifying the authorization of the  emergency use of an in vitro diagnostic tests for detection of SARS-CoV-2  virus and/or diagnosis of COVID-19 infection under section 564(b)(1) of  the Act, 21 U  S C  106HDG-0(D)(2), unless the authorization is terminated  or revoked sooner  The test has been validated but independent review by FDA  and CLIA is pending  Test performed using [employer]pert: This RT-PCR assay targets N2,  a region unique to SARS-CoV-2  A conserved region in the E-gene was chosen  for pan-Sarbecovirus detection which includes SARS-CoV-2  According to CMS-2020-01-R, this platform meets the definition of high-throughput technology     APTT - Normal    PTT 31  23 - 37 seconds Final    Comment: Therapeutic Heparin Range =  60-90 seconds   MAGNESIUM - Normal    Magnesium 1 8  1 6 - 2 6 mg/dL Final   LACTIC ACID, PLASMA - Normal    LACTIC ACID 1 0  0 5 - 2 0 mmol/L Final    Narrative:     Result may be elevated if tourniquet was used during collection  HS TROPONIN I 0HR - Normal    hs TnI 0hr 5  "Refer to ACS Flowchart"- see link ng/L Final    Comment:                                              Initial (time 0) result  If >=50 ng/L, Myocardial injury suggested ;  Type of myocardial injury and treatment strategy  to be determined  If 5-49 ng/L, a delta result at 2 hours and or 4 hours will be needed to further evaluate  If <4 ng/L, and chest pain has been >3 hours since onset, patient may qualify for discharge based on the HEART score in the ED  If <5 ng/L and <3hours since onset of chest pain, a delta result at 2 hours will be needed to further evaluate  HS Troponin 99th Percentile URL of a Health Population=12 ng/L with a 95% Confidence Interval of 8-18 ng/L  Second Troponin (time 2 hours)  If calculated delta >= 20 ng/L,  Myocardial injury suggested ; Type of myocardial injury and treatment strategy to be determined  If 5-49 ng/L and the calculated delta is 5-19 ng/L, consult medical service for evaluation  Continue evaluation for ischemia on ecg and other possible etiology and repeat hs troponin at 4 hours  If delta is <5 ng/L at 2 hours, consider discharge based on risk stratification via the HEART score (if in ED), or JUANITA risk score in IP/Observation  HS Troponin 99th Percentile URL of a Health Population=12 ng/L with a 95% Confidence Interval of 8-18 ng/L     URINE CULTURE   CBC AND DIFFERENTIAL    WBC 8 60  4 31 - 10 16 Thousand/uL Final    RBC 4 15  3 81 - 5 12 Million/uL Final    Hemoglobin 12 0  11 5 - 15 4 g/dL Final    Hematocrit 37 2  34 8 - 46 1 % Final    MCV 90  82 - 98 fL Final    MCH 28 9  26 8 - 34 3 pg Final    MCHC 32 3  31 4 - 37 4 g/dL Final    RDW 14 6  11 6 - 15 1 % Final    MPV 10 0  8 9 - 12 7 fL Final    Platelets 950  175 - 390 Thousands/uL Final    nRBC 0  /100 WBCs Final    Neutrophils Relative 62  43 - 75 % Final    Immat GRANS % 1  0 - 2 % Final    Lymphocytes Relative 25 14 - 44 % Final    Monocytes Relative 8  4 - 12 % Final    Eosinophils Relative 3  0 - 6 % Final    Basophils Relative 1  0 - 1 % Final    Neutrophils Absolute 5 44  1 85 - 7 62 Thousands/µL Final    Immature Grans Absolute 0 04  0 00 - 0 20 Thousand/uL Final    Lymphocytes Absolute 2 11  0 60 - 4 47 Thousands/µL Final    Monocytes Absolute 0 69  0 17 - 1 22 Thousand/µL Final    Eosinophils Absolute 0 26  0 00 - 0 61 Thousand/µL Final    Basophils Absolute 0 06  0 00 - 0 10 Thousands/µL Final         ED Course / Workup Pending (followup):     CTA ED chest PE study    Result Date: 10/19/2022  Narrative: CTA - CHEST WITH IV CONTRAST - PULMONARY ANGIOGRAM INDICATION:   dyspnea  COMPARISON: November 25, 2020  TECHNIQUE: CTA examination of the chest was performed using angiographic technique according to a protocol specifically tailored to evaluate for pulmonary embolism  Axial, sagittal, and coronal 2D reformatted images were created from the source data and  submitted for interpretation  In addition, coronal 3D MIP postprocessing was performed on the acquisition scanner  Radiation dose length product (DLP) for this visit:  638 mGy-cm   This examination, like all CT scans performed in the [facility], was performed utilizing techniques to minimize radiation dose exposure, including the use of iterative reconstruction and automated exposure control  IV Contrast:  100 mL of iohexol (OMNIPAQUE)  FINDINGS: PULMONARY ARTERIAL TREE:  No pulmonary embolus is seen  LUNGS:  Lungs are clear  There is no tracheal or endobronchial lesion  PLEURA:  Unremarkable  HEART/GREAT VESSELS:  Unremarkable for patient's age  No thoracic aortic aneurysm  MEDIASTINUM AND DILLAN:  Unremarkable  CHEST WALL AND LOWER NECK:   Subcentimeter axillary lymph nodes are visualized  VISUALIZED STRUCTURES IN THE UPPER ABDOMEN:  Nodular contour of the liver is seen suggestive of underlying cirrhosis  Hepatic steatosis    1 1 cm splenic artery aneurysm is seen  OSSEOUS STRUCTURES:  No acute fracture or destructive osseous lesion  Impression: No evidence of pulmonary artery embolus  Nodular contour of the liver suggestive of underlying cirrhosis  Clinical correlation is recommended  Workstation performed: [id number]                               ED Course as of 10/19/22 2248   Wed Oct 19, 2022   2122 Case discussed and care assumed from [facility] pending CTA chest r/o PE      2246 Patient seen evaluated by me in the ED now she remains clinically and hemodynamically stable normal O2 saturation on room air with no respiratory distress  Patient is exhibiting some symptoms of anxiety  Workup in the ED reveals no evidence of acute cardiopulmonary pathology no evidence of pulmonary embolism  Patient made aware results and findings in the ED and recommended prompt follow-up with primary care physician and cardiologist for further evaluation and treatment and obtain test results return precautions and anticipatory guidance discussed  Procedures  MDM        Disposition  Final diagnoses:   SOB (shortness of breath)   Cirrhosis ([address] )     Time reflects when diagnosis was documented in both MDM as applicable and the Disposition within this note     Time User Action Codes Description Comment    10/19/2022  9:20 PM Salma Leavitt Add [R06 02] SOB (shortness of breath)     10/19/2022 10:44 PM Shaan Lipoma Add [K74 60] Cirrhosis [facility])       ED Disposition     ED Disposition   Discharge    Condition   Stable    Date/Time   Wed Oct 19, 2022 10:44 PM    [address] discharge to home/self care  Follow-up Information     Follow up With Specialties Details Why DO [facility] Call   [address]  [address] [place] [address]  [facility]  [phone]          Patient's Medications   Discharge Prescriptions    No medications on file     No discharge procedures on file         ED Provider  Electronically Signed by     Judeen Lesches,   10/19/22 5957

## 2022-10-21 LAB — BACTERIA UR CULT: NORMAL

## 2022-11-29 ENCOUNTER — OFFICE VISIT (OUTPATIENT)
Dept: URGENT CARE | Facility: CLINIC | Age: 67
End: 2022-11-29

## 2022-11-29 ENCOUNTER — HOSPITAL ENCOUNTER (OUTPATIENT)
Dept: RADIOLOGY | Facility: CLINIC | Age: 67
Discharge: HOME | End: 2022-11-29

## 2022-11-29 VITALS
RESPIRATION RATE: 18 BRPM | SYSTOLIC BLOOD PRESSURE: 118 MMHG | DIASTOLIC BLOOD PRESSURE: 76 MMHG | HEIGHT: 67 IN | TEMPERATURE: 98.8 F | HEART RATE: 66 BPM | BODY MASS INDEX: 42.38 KG/M2 | WEIGHT: 270 LBS | OXYGEN SATURATION: 96 %

## 2022-11-29 DIAGNOSIS — S99.921A INJURY OF TOE ON RIGHT FOOT, INITIAL ENCOUNTER: ICD-10-CM

## 2022-11-29 DIAGNOSIS — S99.921A TOE INJURY, RIGHT, INITIAL ENCOUNTER: Primary | ICD-10-CM

## 2022-11-29 NOTE — PROGRESS NOTES
[facility]        NAME: Lobo Estrada is a 69 y o  female  : 1955    MRN: [id number]  DATE: 2022  TIME: 2:56 PM    Assessment and Plan   Toe injury, right, initial encounter [S99 921A]  1  Toe injury, right, initial encounter  XR toe right second min 2 views    Post Op Shoe    CANCELED: XR toe right second min 2 views        Orthopedic injury treatment    Date/Time: 2022 2:55 PM  Performed by: FRANCISCA Alvarez  Authorized by: Silvestre Wright MD     Patient Location:  [facility] Protocol:  Procedure performed by: Kenyon Parikh RN)  Consent: Verbal consent obtained  Consent given by: patient  Patient understanding: patient states understanding of the procedure being performed  Patient identity confirmed: verbally with patient      Injury location:  Toe  Location details:  Right second toe  Injury type: Soft tissue  Neurovascular status: Neurovascularly intact    Distal perfusion: normal    Neurological function: normal    Range of motion: normal    Immobilization: post-operative shoe  Neurovascular status: Neurovascularly intact    Distal perfusion: normal    Neurological function: normal    Range of motion: unchanged    Patient tolerance:  Patient tolerated the procedure well with no immediate complications      Preliminary x-ray read negative for acute fracture, final read pending  Postoperative ortho shoe applied by urgent care staff and patient encouraged to wear for support/comfort  Can buddy tape 2nd and 3rd toes together  OTC Tylenol as needed for pain and ice extremity  Follow up with PCP in 3-5 days or present to emergency department for worsening symptoms  Patient verbalized understanding of instructions given       Patient Instructions     Patient Instructions     Preliminary x-ray negative, final read pending  Wear post-operative ortho shoe for support/comfort  Can buddy tape second and third toes together  OTC Tylenol as needed for pain  Ice and Elevate extremity  Follow up with PCP in 3-5 days  Proceed to  ER if symptoms worsen  P R I C E  Treatment   AMBULATORY CARE:   P R I C E  treatment  is a 5-step process used to decrease swelling and pain caused by an injury  P R I C E  stands for protect, rest, ice, compress, and elevate  Start P R I C E  within 24 to 48 hours of an injury  Seek care immediately if:   · Your pain is severe  · You have severe swelling or deformity  · You have numbness in the injured area  Call your doctor if:   · Your pain and swelling do not go away after a few days  · You have questions or concerns about your condition or care  How to use P R I C E  treatment:       · Protect  your injury from more damage  Support the injured area with a brace or splint  Your healthcare provider will tell you how long to use the brace or splint  · Rest  your injured area as directed  You may need to stop using, or keep weight off, the injury for 48 hours or longer  Your healthcare provider may recommend crutches or another device  Return to your usual activities as directed  · Apply ice  on your injured area for 15 to 20 minutes every 4 hours or as directed  Use an ice pack, or put crushed ice in a plastic bag  Cover the bag with a towel before you apply it to your skin  Ice helps prevent tissue damage and decreases swelling and pain  · Compress  (keep pressure on) the injured area  Compression will help decrease swelling and support the injured area  Use an elastic bandage, air stirrup, splint, or sling as directed  If you use an elastic bandage, make sure the bandage is not too tight  You should be able to slip 2 fingers between the bandage and your skin  · Elevate  the injured area above the level of your heart as often as you can  This will help decrease swelling and pain  Prop the injured area on pillows or blankets to keep it elevated comfortably      Follow up with your doctor as directed:  Write down your questions so you remember to ask them during your visits  © Copyright [employer] 2022 Information is for End User's use only and may not be sold, redistributed or otherwise used for commercial purposes  All illustrations and images included in CareNotes® are the copyrighted property of A D A M , Inc  or [employer]  The above information is an  only  It is not intended as medical advice for individual conditions or treatments  Talk to your doctor, nurse or pharmacist before following any medical regimen to see if it is safe and effective for you  Chief Complaint     Chief Complaint   Patient presents with   • Toe Injury     Pt stated last night while vacuuming she hit her second right toe, noticeable discoloration and swollen  History of Present Illness       43-year-old female presents with complaints of right 2nd toe injury and pain x1 day  Patient states that yesterday while she was vacuuming, she moved the coffee table and when she went to pull it back, the table struck and bent her toe  She states swelling and bruising  She did take OTC Tylenol for her symptoms but did not ice the extremity  She is able to ambulate and bear weight, no decreased range of motion  Review of Systems   Review of Systems   Musculoskeletal: Positive for arthralgias, joint swelling and myalgias  Negative for gait problem  Skin: Positive for color change  Negative for wound  Neurological: Negative for weakness and numbness           Current Medications       Current Outpatient Medications:   •  ALPRAZolam (XANAX) 0 5 mg tablet, Take by mouth daily at bedtime as needed for anxiety, Disp: , Rfl:   •  Ascorbic Acid (Vitamin C) 500 MG CAPS, Take by mouth, Disp: , Rfl:   •  atorvastatin (LIPITOR) 10 mg tablet, Take 10 mg by mouth daily, Disp: , Rfl:   •  citalopram (CeleXA) 40 mg tablet, Take 40 mg by mouth daily , Disp: , Rfl:   •  Eliquis 5 MG, TAKE 1 TABLET TWICE A DAY, Disp: 180 tablet, Rfl: 3  •  levothyroxine 75 mcg tablet, Take 75 mcg by mouth daily , Disp: , Rfl:   •  magnesium oxide (MAG-OX) 400 mg tablet, Take by mouth, Disp: , Rfl:   •  olmesartan-hydrochlorothiazide (BENICAR HCT) 20-12 5 MG per tablet, Take 1 tablet by mouth daily, Disp: , Rfl:   •  Cholecalciferol (Vitamin D3) 125 MCG (5000 UT) TABS, Take 2,000 Units by mouth daily  (Patient not taking: Reported on 9/6/2022), Disp: , Rfl:   •  erythromycin (ILOTYCIN) ophthalmic ointment, APPLY TO BOTH EYES AT NIGHT BEFORE SLEEP (Patient not taking: Reported on 11/29/2022), Disp: , Rfl:   •  meclizine (ANTIVERT) 25 mg tablet, Take 1 tablet (25 mg total) by mouth 3 (three) times a day as needed for dizziness (Patient not taking: Reported on 11/29/2022), Disp: 30 tablet, Rfl: 0  •  metoprolol succinate (TOPROL-XL) 25 mg 24 hr tablet, Take 1 tablet (25 mg total) by mouth daily, Disp: 90 tablet, Rfl: 3  •  metroNIDAZOLE (METROGEL) 0 75 % vaginal gel, as needed  (Patient not taking: Reported on 11/29/2022), Disp: , Rfl:     Current Allergies     Allergies as of 11/29/2022 - Reviewed 11/29/2022   Allergen Reaction Noted   • Flagyl [metronidazole] Shortness Of Breath 11/29/2022   • Prednisone Shortness Of Breath 12/21/2018   • Contrast [iodinated diagnostic agents] Rash 12/21/2018   • Iodine - food allergy Rash 02/17/2018            The following portions of the patient's history were reviewed and updated as appropriate: allergies, current medications, past family history, past medical history, past social history, past surgical history and problem list      Past Medical History:   Diagnosis Date   • Anxiety    • Diabetes mellitus ([facility] [address] )    • Elevated d-dimer     2 seperate episodes of unclear etiology  • Hyperlipidemia    • Hypertension    • Hypothyroid    • Obesity    • Paroxysmal atrial fibrillation ([facility] [address] )     New onset 11/25/2020         Past Surgical History:   Procedure Laterality Date   • CATARACT EXTRACTION, BILATERAL     • HYSTERECTOMY     • KNEE ARTHROPLASTY     • SHOULDER OPEN ROTATOR CUFF REPAIR      x 2   • TONSILLECTOMY         Family History   Problem Relation Age of Onset   • Hypertension Sister    • Heart disease Paternal Aunt    • Other Mother         Encephalitis   • Other Father          in his 80s without significant problems   • Lung cancer Brother    • No Known Problems Brother          Medications have been verified  Objective   /76   Pulse 66   Temp 98 8 °F (37 1 °C)   Resp 18   Ht 5' 7" (1 702 m)   Wt 122 kg (270 lb)   LMP  (LMP Unknown)   SpO2 96%   BMI 42 29 kg/m²   No LMP recorded (lmp unknown)  Patient has had a hysterectomy  Physical Exam     Physical Exam  Vitals and nursing note reviewed  Constitutional:       General: She is not in acute distress  Appearance: She is not toxic-appearing  HENT:      Head: Normocephalic  Nose: Nose normal    Eyes:      Conjunctiva/sclera: Conjunctivae normal    Pulmonary:      Effort: Pulmonary effort is normal    Musculoskeletal:         General: Swelling and tenderness present  Right ankle: Normal  No tenderness  Normal range of motion  Right foot: Normal range of motion and normal capillary refill  Swelling, tenderness and bony tenderness present  Normal pulse  Legs:    Skin:     General: Skin is warm and dry  Capillary Refill: Capillary refill takes less than 2 seconds  Findings: Bruising present  Neurological:      Mental Status: She is alert and oriented to person, place, and time  Sensory: Sensation is intact  No sensory deficit  Motor: Motor function is intact  No weakness  Gait: Gait is intact   Gait normal    Psychiatric:         Mood and Affect: Mood normal          Behavior: Behavior normal

## 2022-11-29 NOTE — PATIENT INSTRUCTIONS
Preliminary x-ray negative, final read pending  Wear post-operative ortho shoe for support/comfort  Can buddy tape second and third toes together  OTC Tylenol as needed for pain  Ice and Elevate extremity  Follow up with PCP in 3-5 days  Proceed to  ER if symptoms worsen  P R I C E  Treatment   AMBULATORY CARE:   P R I C E  treatment  is a 5-step process used to decrease swelling and pain caused by an injury  P R I C E  stands for protect, rest, ice, compress, and elevate  Start P R I C E  within 24 to 48 hours of an injury  Seek care immediately if:   Your pain is severe  You have severe swelling or deformity  You have numbness in the injured area  Call your doctor if:   Your pain and swelling do not go away after a few days  You have questions or concerns about your condition or care  How to use P R I C E  treatment:       Protect  your injury from more damage  Support the injured area with a brace or splint  Your healthcare provider will tell you how long to use the brace or splint  Rest  your injured area as directed  You may need to stop using, or keep weight off, the injury for 48 hours or longer  Your healthcare provider may recommend crutches or another device  Return to your usual activities as directed  Apply ice  on your injured area for 15 to 20 minutes every 4 hours or as directed  Use an ice pack, or put crushed ice in a plastic bag  Cover the bag with a towel before you apply it to your skin  Ice helps prevent tissue damage and decreases swelling and pain  Compress  (keep pressure on) the injured area  Compression will help decrease swelling and support the injured area  Use an elastic bandage, air stirrup, splint, or sling as directed  If you use an elastic bandage, make sure the bandage is not too tight  You should be able to slip 2 fingers between the bandage and your skin  Elevate  the injured area above the level of your heart as often as you can   This will help decrease swelling and pain  Prop the injured area on pillows or blankets to keep it elevated comfortably  Follow up with your doctor as directed:  Write down your questions so you remember to ask them during your visits  © Copyright [employer] 2022 Information is for End User's use only and may not be sold, redistributed or otherwise used for commercial purposes  All illustrations and images included in CareNotes® are the copyrighted property of A D A M , Inc  or [facility] Bobby Lyman   The above information is an  only  It is not intended as medical advice for individual conditions or treatments  Talk to your doctor, nurse or pharmacist before following any medical regimen to see if it is safe and effective for you

## 2022-12-27 ENCOUNTER — OFFICE VISIT (OUTPATIENT)
Dept: CARDIOLOGY CLINIC | Facility: CLINIC | Age: 67
End: 2022-12-27

## 2022-12-27 VITALS
HEART RATE: 58 BPM | BODY MASS INDEX: 43.92 KG/M2 | SYSTOLIC BLOOD PRESSURE: 142 MMHG | WEIGHT: 279.8 LBS | OXYGEN SATURATION: 100 % | TEMPERATURE: 97.9 F | DIASTOLIC BLOOD PRESSURE: 80 MMHG | HEIGHT: 67 IN

## 2022-12-27 DIAGNOSIS — E78.2 MIXED HYPERLIPIDEMIA: ICD-10-CM

## 2022-12-27 DIAGNOSIS — I10 ESSENTIAL HYPERTENSION: ICD-10-CM

## 2022-12-27 DIAGNOSIS — I48.0 PAROXYSMAL ATRIAL FIBRILLATION (HCC): Primary | ICD-10-CM

## 2022-12-27 DIAGNOSIS — E03.8 HYPOTHYROIDISM DUE TO HASHIMOTO'S THYROIDITIS: ICD-10-CM

## 2022-12-27 DIAGNOSIS — E06.3 HYPOTHYROIDISM DUE TO HASHIMOTO'S THYROIDITIS: ICD-10-CM

## 2022-12-27 DIAGNOSIS — G47.33 OBSTRUCTIVE SLEEP APNEA SYNDROME: ICD-10-CM

## 2022-12-27 DIAGNOSIS — E66.01 MORBID OBESITY WITH BMI OF 40.0-44.9, ADULT (HCC): ICD-10-CM

## 2022-12-27 RX ORDER — VAGINAL CREAM APPLICATOR
1 EACH MISCELLANEOUS
COMMUNITY
Start: 2022-12-16 | End: 2023-01-15

## 2022-12-27 RX ORDER — NYSTATIN 100000 [USP'U]/G
POWDER TOPICAL
COMMUNITY
Start: 2022-10-11

## 2022-12-27 RX ORDER — CLOBETASOL PROPIONATE 0.05 MG/G
GEL TOPICAL
COMMUNITY
Start: 2022-12-23

## 2022-12-27 RX ORDER — OLMESARTAN MEDOXOMIL 20 MG/1
20 TABLET ORAL DAILY
Qty: 14 TABLET | Refills: 0 | Status: SHIPPED | OUTPATIENT
Start: 2022-12-27

## 2022-12-27 RX ORDER — CLINDAMYCIN PHOSPHATE 20 MG/G
CREAM VAGINAL
COMMUNITY
Start: 2022-11-18

## 2022-12-27 NOTE — PROGRESS NOTES
Cardiology Office Visit    Ish Bee  [id number]  1955    [facility] [address] [address] [phone]      Dear Juice Hyde DO,    I had the pleasure of seeing your patient at our [facility] today 12/27/2022  As you know she is a pleasant 69y o  year old female with a medical history as described below  Reason for office visit: Follow up  atrial fibrillation, hypertension and hyperlipidemia  1  Paroxysmal atrial fibrillation [facility])  Assessment & Plan:  Patient with new onset atrial fibrillation 11/25/2020  Patient presented to the emergency room after onset of “fluttering” and rapid heart rate  She converted spontaneously to  normal sinus rhythm  She has remained in normal sinus rhythm since and feels well  [id number] Score 3   Eliquis 5 mg twice daily for stroke prophylaxis  No bleeding issues  Aspirin 81 mg daily was discontinued  Echocardiogram and outpatient stress test 12/02/2020 were unremarkable  Home sleep study notable for severe sleep apnea being treated with CPAP  Continue magnesium 250 mg daily over-the-counter     Patient was instructed that if she has recurrent palpitations she can take an extra metoprolol succinate as well as Xanax and relax for period of time however if symptoms persist she should come back to the emergency room  48 hour Holter monitor 6/8/2022 showed NSR with 5 short AT runs  We discussed the need to decrease caffeine intake  Currently episodes of palpitations are very short-lived and well tolerated  2  Essential hypertension  Assessment & Plan:  Blood pressure remains elevated today and has been elevated at home in the 140's  She does note significant stress due to her husbands cancer and treatments  Home BP cuff was accurate when compared to manual reading at last office visit     I have recommended increasing olmesartan to 40 mg (I have given her an additional 20 mg to take with her olmesartan-HCTZ 20-12 5 as a trial)  If she tolerates this dose we can send in a new script for olmesartan-HCTZ 40-12 5mg daily  I have asked her to call me with an update on blood pressure in about 7 days  She has been taking this medication at night and I have asked her to transition this to the am    She will continue to monitor BP daily and report readings persistently > 140/90  Orders:  -     olmesartan (BENICAR) 20 mg tablet; Take 1 tablet (20 mg total) by mouth daily Take in addition to benicar 20-HCTZ 25 mg daily for the next 7 days  3  Mixed hyperlipidemia  Assessment & Plan:  Lipid panel 12/13/2021: C 176  T 177  H 65  L 76  Continue atorvastatin 10 mg daily  She is due to see PCP 1/2023 and labs to be done at that time  I asked her to make sure that a copy of labs is sent to the office for review  4  Morbid obesity with BMI of 40 0-44 9, adult [facility])  Assessment & Plan: Body mass index is 43 82 kg/m²  Weight loss would be beneficial   Continue dietary and exercise modifications  I did recommend reading The Obesity Code as I think this may help her make better dietary choices  5  Obstructive sleep apnea syndrome  Assessment & Plan:  Home sleep study 12/2020 revealed severe sleep apnea  Compliant with CPAP therapy  6  Hypothyroidism due to Hashimoto's thyroiditis  Assessment & Plan:  Managed by PCP  Goal TSH 1-2 if possible given PAF  HPI     Patient presented to [address] [place] [address] 11/25/2020 with chest pain, palpitations and shortness of breath  Patient was found to be in atrial fibrillation with a heart rate of 118 beats per minute  She was noted to have an elevated D-dimer and underwent CT scan to rule out pulmonary embolism which was negative  Patient reported that she had previously had an elevated D-dimer about 5 years ago unclear etiology    She was given IV lopressor and IV Cardizem in the ER and converted back to normal sinus rhythm  I saw the patient in consultation and recommended that she be discharged on metoprolol succinate 25 mg daily as well as magnesium 250 mg daily and eliquis 5 mg twice daily  I discussed with the patient at that time that she would need a stress test as well as echocardiogram and sleep study given new onset atrial fibrillation  TSH was mildly elevated and the hospitalist did increase her levothyroxine from 88 to 100 mcg with plans for repeat TSH in 4-6 weeks  Patient was very anxious about the onset of atrial fibrillation and I tried to reassure her about the diagnoses and treatment options  Patient had noted significant stress as her  has a limited prognosis due to cancer and had just undergone a very large surgery in [place]  She previously had noted only rare palpitations/'fluttering' in her chest      12/4/2020: Patient presents to the office today for follow up from her ER evaluation  She has been tolerating the medications started in the hospital without difficulty  She has had no recurrent palpitations or chest pain since her discharge home  She did see her PCP yesterday  Full note is not available for review  There was some question as to why she was discharged on magnesium as well as why her thyroid dose has been adjusted  I will be sure to send copy of this note to PCP for review  Patient had noted some increased dyspnea on exertion  She also told me that she had been seen by Cardiology in the past when she was first diagnosed with hypertension and underwent echocardiogram and stress test at that time  She does admit to snoring but has never undergone a sleep study  She typically drinks coffee in the morning and tea throughout the day  Echocardiogram 12/02/2020 was unremarkable with an ejection fraction of 60% and no significant valvular abnormalities    Nuclear stress test 12/02/2020 showed no scar or ischemia with an EF of 73%  We discussed the results of her testing  I had last seen the patient 11/02/2021 at which time she was doing well  I asked her to monitor her blood pressure intermittently at home  I also recommended that she have updated blood work including TSH with her PCP  She had opted to restart her CPAP and I agreed this was a good idea  5/5/2022:  Patient presents to the office today for follow-up  She bought a new CPAP machine  She feels like she gets much better sleep  Short lived episodes of palpitations  She joined [employer]  She does admit to short, quick twinges in her left chest  She does have a lot of stress as her husbands cancer has come back  He had surgery 2/2022 and he is doing 3 days of chemotherapy in the hospital every other week  She continues to be frustrated over her weight  No bleeding issues  Palpitations are manageable at this time  * Patient was last seen 9/6/2022 by Jermaine Taylor at which time she had noted some room spinning dizziness  Her son was diagnosed with atrial fibrillation around that time  * Patient was seen in the ER 10/19/2022 after she was carrying something light going up the stairs and she got very short of breath  Blood pressure was elevated at 164/78 at rest  She had been started on metronidazole for vaginitis  They thought this was possibly from the medication  12/27/2022: Patient presents to the office today for follow up  She is feeling well  Blood pressure has been running a bit high in the 140's at home  She was in [place] in the beginning of December and blood pressure was much better  She has had no further episodes of significant shortness of breath  Stable dyspnea on exertion  She is very frustrated with her weight  Her  does the grocery shopping and buys a lot of junk food  No palpitations recently  A few months ago she had some short lived fluttering  No lightheadedness or dizziness recently  No chest pain   Blood work with PCP 2023  Patient Active Problem List   Diagnosis   • Paroxysmal atrial fibrillation (HCC)   • Essential hypertension   • Hyperlipidemia   • Hypothyroidism due to Hashimoto's thyroiditis   • Morbid obesity with BMI of 40 0-44 9, adult ([facility])   • Anxiety and depression   • Elevated d-dimer   • Snoring   • Obstructive sleep apnea syndrome   • Obesity hypoventilation syndrome (HCC)     Past Medical History:   Diagnosis Date   • Anxiety    • Diabetes mellitus ([facility] )    • Elevated d-dimer     2 seperate episodes of unclear etiology  • Hyperlipidemia    • Hypertension    • Hypothyroid    • Obesity    • Paroxysmal atrial fibrillation ([facility] )     New onset 2020       Social History     Socioeconomic History   • Marital status: /Civil Union     Spouse name: Not on file   • Number of children: Not on file   • Years of education: Not on file   • Highest education level: Not on file   Occupational History   • Occupation: Retired teacher   Tobacco Use   • Smoking status: Never   • Smokeless tobacco: Never   Vaping Use   • Vaping Use: Never used   Substance and Sexual Activity   • Alcohol use: Not Currently   • Drug use: Never   • Sexual activity: Yes     Partners: Male   Other Topics Concern   • Not on file   Social History Narrative   • Not on file     Social Determinants of Health     Financial Resource Strain: Not on file   Food Insecurity: Not on file   Transportation Needs: Not on file   Physical Activity: Not on file   Stress: Not on file   Social Connections: Not on file   Intimate Partner Violence: Not on file   Housing Stability: Not on file      Family History   Problem Relation Age of Onset   • Hypertension Sister    • Heart disease Paternal Aunt    • Other Mother         Encephalitis   • Other Father          in his 80s without significant problems   • Lung cancer Brother    • No Known Problems Brother      Past Surgical History:   Procedure Laterality Date   • CATARACT EXTRACTION, BILATERAL     • HYSTERECTOMY     • KNEE ARTHROPLASTY     • SHOULDER OPEN ROTATOR CUFF REPAIR      x 2   • TONSILLECTOMY         Current Outpatient Medications:   •  ALPRAZolam (XANAX) 0 5 mg tablet, Take by mouth daily at bedtime as needed for anxiety, Disp: , Rfl:   •  Ascorbic Acid (Vitamin C) 500 MG CAPS, Take by mouth, Disp: , Rfl:   •  atorvastatin (LIPITOR) 10 mg tablet, Take 10 mg by mouth daily, Disp: , Rfl:   •  citalopram (CeleXA) 40 mg tablet, Take 40 mg by mouth daily , Disp: , Rfl:   •  clindamycin (CLEOCIN) 2 % vaginal cream, insert 1 applicatorful vaginally nightly for 7 days, Disp: , Rfl:   •  clobetasol (TEMOVATE) 0 05 % GEL, INSERT 5 GRAMS VAGINALLY USING APPLICATOR EVERY NIGHT FOR 3 WEEKS, Disp: , Rfl:   •  Eliquis 5 MG, TAKE 1 TABLET TWICE A DAY, Disp: 180 tablet, Rfl: 3  •  levothyroxine 75 mcg tablet, Take 75 mcg by mouth daily , Disp: , Rfl:   •  magnesium oxide (MAG-OX) 400 mg tablet, Take by mouth, Disp: , Rfl:   •  metoprolol succinate (TOPROL-XL) 25 mg 24 hr tablet, Take 1 tablet (25 mg total) by mouth daily, Disp: 90 tablet, Rfl: 3  •  Misc  Devices (Vaginal Cream Applicator) MISC, Insert 1 applicator into the vagina, Disp: , Rfl:   •  nystatin powder, APPLY 1 APPLICATION TOPICALLY IN THE MORNING, 1 APPLICATION AT NO     (REFER TO PRESCRIPTION NOTES)  , Disp: , Rfl:   •  olmesartan (BENICAR) 20 mg tablet, Take 1 tablet (20 mg total) by mouth daily Take in addition to benicar 20-HCTZ 25 mg daily for the next 7 days  , Disp: 14 tablet, Rfl: 0  •  olmesartan-hydrochlorothiazide (BENICAR HCT) 20-12 5 MG per tablet, Take 1 tablet by mouth daily, Disp: , Rfl:   •  Cholecalciferol (Vitamin D3) 125 MCG (5000 UT) TABS, Take 2,000 Units by mouth daily  (Patient not taking: Reported on 9/6/2022), Disp: , Rfl:   •  erythromycin (ILOTYCIN) ophthalmic ointment, APPLY TO BOTH EYES AT NIGHT BEFORE SLEEP (Patient not taking: Reported on 11/29/2022), Disp: , Rfl:   •  meclizine (ANTIVERT) 25 mg tablet, Take 1 tablet (25 mg total) by mouth 3 (three) times a day as needed for dizziness (Patient not taking: Reported on 11/29/2022), Disp: 30 tablet, Rfl: 0  •  metroNIDAZOLE (METROGEL) 0 75 % vaginal gel, as needed  (Patient not taking: Reported on 11/29/2022), Disp: , Rfl:        Allergies   Allergen Reactions   • Flagyl [Metronidazole] Shortness Of Breath     Pt requested addition to allergies   • Prednisone Shortness Of Breath   • Contrast [Iodinated Contrast Media] Rash   • Iodine - Food Allergy Rash       Cardiac Test Results:     ECG 10/19/2022: Sinus bradycardia  Otherwise normal ECG  Holter monitor (48 hours) 6/8/2022:   Min HR 47  Max HR 98  Avg HR 60  Rare PVC's  Rare PAC's    5 brief atrial runs with the longest lasting 5 beats  Palpitations correlated with PAC's/PVC's and atrial runs  ECG 6/7/2022: Sinus bradycardia  58 bpm      Lipid panel 12/13/2021: C 176  T 177  H 65  L 76  ECG 11/25/2020:  Atrial flutter/atrial fibrillation at 118 bpm     Echocardiogram 12/02/2020:  EF 60%  Normal diastolic function  Mild annular calcification of the mitral valve  Trace tricuspid regurgitation  [facility] nuclear stress test 12/02/2020:  No evidence of myocardial scar  No evidence of myocardial ischemia  EF 73%  Lipid panel 10/13/2020: C 166  T 154  H 64  L 71  Echocardiogram 11/05/2015:  EF 55-60%  Mild tricuspid regurgitation  Estimated PASP 38 mmHg  Review of Systems:    Review of Systems   Constitutional: Positive for fatigue  Negative for activity change and appetite change  HENT: Negative for congestion, hearing loss, tinnitus and trouble swallowing  Eyes: Negative for visual disturbance  Respiratory: Negative for cough, chest tightness, shortness of breath and wheezing  Dyspnea on exertion   Cardiovascular: Negative for chest pain, palpitations and leg swelling  Gastrointestinal: Negative for abdominal distention, abdominal pain, nausea and vomiting     Genitourinary: Negative for difficulty urinating  Musculoskeletal: Negative for arthralgias  Skin: Negative for rash  Neurological: Negative for dizziness, syncope and light-headedness  Hematological: Does not bruise/bleed easily  Psychiatric/Behavioral: Negative for confusion  The patient is nervous/anxious  Increased stress   All other systems reviewed and are negative  Vitals:    12/27/22 1018   BP: 142/80   BP Location: Left arm   Patient Position: Sitting   Cuff Size: Large   Pulse: 58   Temp: 97 9 °F (36 6 °C)   SpO2: 100%   Weight: 127 kg (279 lb 12 8 oz)   Height: 5' 7" (1 702 m)     Vitals:    12/27/22 1018   Weight: 127 kg (279 lb 12 8 oz)     Height: 5' 7" (170 2 cm)     Body mass index is 43 82 kg/m²  Physical Exam  Vitals reviewed  Constitutional:       Appearance: She is well-developed  HENT:      Head: Normocephalic and atraumatic  Eyes:      Conjunctiva/sclera: Conjunctivae normal       Pupils: Pupils are equal, round, and reactive to light  Neck:      Vascular: No JVD  Cardiovascular:      Rate and Rhythm: Normal rate and regular rhythm  Heart sounds: Normal heart sounds  No murmur heard  No friction rub  No gallop  Pulmonary:      Effort: Pulmonary effort is normal       Breath sounds: Normal breath sounds  Abdominal:      General: Bowel sounds are normal       Palpations: Abdomen is soft  Musculoskeletal:      Cervical back: Normal range of motion  Skin:     General: Skin is warm and dry  Neurological:      Mental Status: She is alert and oriented to person, place, and time     Psychiatric:         Behavior: Behavior normal

## 2022-12-27 NOTE — PATIENT INSTRUCTIONS
I would recommend a trial of benicar 20 mg daily in addition to your benicar 20 mg-HCTZ 12 5 mg daily  Call me in 7 days and let me know how you are doing with the higher dose  We can increase the benicar-HCTZ to 40 mg - 12 5 mg daily  I would try to move benicar up to the am instead of pm    Call me with any change in symptoms

## 2022-12-28 NOTE — ASSESSMENT & PLAN NOTE
Blood pressure remains elevated today and has been elevated at home in the 140's  She does note significant stress due to her husbands cancer and treatments  Home BP cuff was accurate when compared to manual reading at last office visit  I have recommended increasing olmesartan to 40 mg (I have given her an additional 20 mg to take with her olmesartan-HCTZ 20-12 5 as a trial)  If she tolerates this dose we can send in a new script for olmesartan-HCTZ 40-12 5mg daily  I have asked her to call me with an update on blood pressure in about 7 days  She has been taking this medication at night and I have asked her to transition this to the am    She will continue to monitor BP daily and report readings persistently > 140/90

## 2022-12-28 NOTE — ASSESSMENT & PLAN NOTE
Body mass index is 43 82 kg/m²  Weight loss would be beneficial   Continue dietary and exercise modifications  I did recommend reading The Obesity Code as I think this may help her make better dietary choices

## 2022-12-28 NOTE — ASSESSMENT & PLAN NOTE
Lipid panel 12/13/2021: C 176  T 177  H 65  L 76  Continue atorvastatin 10 mg daily  She is due to see PCP 1/2023 and labs to be done at that time  I asked her to make sure that a copy of labs is sent to the office for review

## 2022-12-28 NOTE — ASSESSMENT & PLAN NOTE
Patient with new onset atrial fibrillation 11/25/2020  Patient presented to the emergency room after onset of “fluttering” and rapid heart rate  She converted spontaneously to  normal sinus rhythm  She has remained in normal sinus rhythm since and feels well  [id number] Score 3   Eliquis 5 mg twice daily for stroke prophylaxis  No bleeding issues  Aspirin 81 mg daily was discontinued  Echocardiogram and outpatient stress test 12/02/2020 were unremarkable  Home sleep study notable for severe sleep apnea being treated with CPAP  Continue magnesium 250 mg daily over-the-counter     Patient was instructed that if she has recurrent palpitations she can take an extra metoprolol succinate as well as Xanax and relax for period of time however if symptoms persist she should come back to the emergency room  48 hour Holter monitor 6/8/2022 showed NSR with 5 short AT runs  We discussed the need to decrease caffeine intake  Currently episodes of palpitations are very short-lived and well tolerated

## 2023-01-05 DIAGNOSIS — I10 ESSENTIAL HYPERTENSION: ICD-10-CM

## 2023-01-05 NOTE — TELEPHONE ENCOUNTER
Pt called about her BP  States that it is still high on her wrist cuff  (cuff does seem to not be working properly ) she has been getting anywhere from 107//72 in a days span  She is getting a new cuff in the mail tomorrow that is a large upper arm cuff  She is coming in Monday to have it compared to our cuffs to see if accurate enough  Pt also states that her PCP is attempting to change med's  States that she told her again that her Yasir [place] is high that she should stop that magnesium supplement  She also told PCP that she was now on benicar/HCTZ 40-12 5 per [facility] and PCP upped it to 40-25 mg and sent to the pharm  Pt just wanted you aware

## 2023-01-09 ENCOUNTER — CLINICAL SUPPORT (OUTPATIENT)
Dept: CARDIOLOGY CLINIC | Facility: CLINIC | Age: 68
End: 2023-01-09

## 2023-01-09 VITALS
DIASTOLIC BLOOD PRESSURE: 69 MMHG | TEMPERATURE: 97.3 F | HEART RATE: 61 BPM | BODY MASS INDEX: 43.85 KG/M2 | OXYGEN SATURATION: 98 % | SYSTOLIC BLOOD PRESSURE: 140 MMHG | WEIGHT: 279.4 LBS | HEIGHT: 67 IN

## 2023-01-09 DIAGNOSIS — I10 PRIMARY HYPERTENSION: Primary | ICD-10-CM

## 2023-01-09 RX ORDER — OLMESARTAN MEDOXOMIL AND HYDROCHLOROTHIAZIDE 40/25 40; 25 MG/1; MG/1
TABLET ORAL
COMMUNITY
Start: 2023-01-03

## 2023-01-11 ENCOUNTER — TELEPHONE (OUTPATIENT)
Dept: CARDIOLOGY CLINIC | Facility: CLINIC | Age: 68
End: 2023-01-11

## 2023-01-11 DIAGNOSIS — I10 ESSENTIAL HYPERTENSION: Primary | ICD-10-CM

## 2023-01-11 RX ORDER — OLMESARTAN MEDOXOMIL 20 MG/1
TABLET ORAL
Qty: 14 TABLET | Refills: 0 | OUTPATIENT
Start: 2023-01-11

## 2023-01-11 NOTE — TELEPHONE ENCOUNTER
Pt is asking what she is supposed to be doing with her med's  States that she is taking the benicar 20/12 5 mg and the benicar 20 mg  States that she has the 20/12 5 mg left, but none of the extra 20mg    States that yesterday she got the script that Dr Moose Monique sent to her mail order  Its for 40/25 mg     Asking what she should be doing  States that she doesn't want to up it without CAM saying so  Spoke with CAM and she states that pt can take the 40/25 mg, but get labs (BMP) in two weeks  Pt is aware

## 2023-02-06 ENCOUNTER — OFFICE VISIT (OUTPATIENT)
Dept: URGENT CARE | Facility: CLINIC | Age: 68
End: 2023-02-06

## 2023-02-06 VITALS
BODY MASS INDEX: 43.16 KG/M2 | WEIGHT: 275 LBS | RESPIRATION RATE: 18 BRPM | OXYGEN SATURATION: 96 % | SYSTOLIC BLOOD PRESSURE: 148 MMHG | HEART RATE: 78 BPM | TEMPERATURE: 102 F | DIASTOLIC BLOOD PRESSURE: 72 MMHG | HEIGHT: 67 IN

## 2023-02-06 DIAGNOSIS — Z20.822 EXPOSURE TO COVID-19 VIRUS: ICD-10-CM

## 2023-02-06 DIAGNOSIS — R68.89 FLU-LIKE SYMPTOMS: Primary | ICD-10-CM

## 2023-02-06 RX ORDER — BENZONATATE 100 MG/1
100 CAPSULE ORAL 3 TIMES DAILY PRN
Qty: 20 CAPSULE | Refills: 0 | Status: SHIPPED | OUTPATIENT
Start: 2023-02-06 | End: 2023-02-13

## 2023-02-06 RX ORDER — ACETAMINOPHEN 325 MG/1
975 TABLET ORAL ONCE
Status: COMPLETED | OUTPATIENT
Start: 2023-02-06 | End: 2023-02-06

## 2023-02-06 RX ADMIN — ACETAMINOPHEN 975 MG: 325 TABLET ORAL at 15:39

## 2023-02-06 NOTE — PATIENT INSTRUCTIONS
COVID/Flu PCR pending  Results will be viewable via [employer]  Follow CDC guidelines for isolation/quarantine until results back and then as appropriate based on final results  Continue with supportive measures, OTC Tylenol, nasal decongestants, and cough suppressants (Tessalon Perles)  Cool mist humidifiers, throat lozenges, increased fluid intake and rest   Follow up with PCP in 3-5 days  Present to ER if symptoms worsen       Viral Syndrome   AMBULATORY CARE:   Viral syndrome  is a term used for symptoms of an infection caused by a virus  Viruses are spread easily from person to person through the air and on shared items  Signs and symptoms  may start slowly or suddenly and last hours to days  They can be mild to severe and can change over days or hours  You may have any of the following:  Fever and chills    A runny or stuffy nose    Cough, sore throat, or hoarseness    Headache, or pain and pressure around your eyes    Muscle aches and joint pain    Shortness of breath or wheezing    Abdominal pain, cramps, and diarrhea    Nausea, vomiting, or loss of appetite    Call your local emergency number (911 in the [address] [place] [address],[address]) or have someone else call if:   You have a seizure  You cannot be woken  You have chest pain or trouble breathing  Seek care immediately if:   You have a stiff neck, a bad headache, and sensitivity to light  You feel weak, dizzy, or confused  You stop urinating or urinate a lot less than usual     You cough up blood or thick yellow or green mucus  You have severe abdominal pain or your abdomen is larger than usual     Call your doctor if:   Your symptoms do not get better with treatment or get worse after 3 days  You have a rash or ear pain  You have burning when you urinate  You have questions or concerns about your condition or care  Treatment for viral syndrome  may include medicines to manage your symptoms  Antibiotics are not given for a viral infection   You may  need any of the following:  Acetaminophen  decreases pain and fever  It is available without a doctor's order  Ask how much to take and how often to take it  Follow directions  Read the labels of all other medicines you are using to see if they also contain acetaminophen, or ask your doctor or pharmacist  Acetaminophen can cause liver damage if not taken correctly  Do not use more than 4 grams (4,000 milligrams) total of acetaminophen in one day  NSAIDs , such as ibuprofen, help decrease swelling, pain, and fever  NSAIDs can cause stomach bleeding or kidney problems in certain people  If you take blood thinner medicine, always ask your healthcare provider if NSAIDs are safe for you  Always read the medicine label and follow directions  Cold medicine  helps decrease swelling, control a cough, and relieve chest or nasal congestion  Saline nasal spray  helps decrease nasal congestion  Manage your symptoms:   Drink liquids as directed to prevent dehydration  Ask how much liquid to drink each day and which liquids are best for you  Ask if you should drink an oral rehydration solution (ORS)  An ORS has the right amounts of water, salts, and sugar you need to replace body fluids  This may help prevent dehydration caused by vomiting or diarrhea  Do not drink liquids with caffeine  Liquids with caffeine can make dehydration worse  Get plenty of rest to help your body heal   Take naps throughout the day  Ask your healthcare provider when you can return to work and your normal activities  Use a cool mist humidifier to help you breathe easier  Ask your healthcare provider how to use a cool mist humidifier  Eat honey or use cough drops for a sore throat  Cough drops are available without a doctor's order  Follow directions for taking cough drops  Do not smoke or be close to anyone who is smoking  Nicotine and other chemicals in cigarettes and cigars can cause lung damage  Smoking can also delay healing  Ask your healthcare provider for information if you currently smoke and need help to quit  E-cigarettes or smokeless tobacco still contain nicotine  Talk to your healthcare provider before you use these products  Prevent the spread of germs:       Wash your hands often  Wash your hands several times each day  Wash after you use the bathroom, change a child's diaper, and before you prepare or eat food  Use soap and water every time  Rub your soapy hands together, lacing your fingers  Wash the front and back of your hands, and in between your fingers  Use the fingers of one hand to scrub under the fingernails of the other hand  Wash for at least 20 seconds  Rinse with warm, running water for several seconds  Then dry your hands with a clean towel or paper towel  Use hand  that contains alcohol if soap and water are not available  Do not touch your eyes, nose, or mouth without washing your hands first          Cover a sneeze or cough  Use a tissue that covers your mouth and nose  Throw the tissue away in a trash can right away  Use the bend of your arm if a tissue is not available  Wash your hands well with soap and water or use a hand   Stay away from others while you are sick  Avoid crowds as much as possible  Ask about vaccines you may need  Talk to your healthcare provider about your vaccine history  He or she will tell you which vaccines you need, and when to get them  Get the influenza (flu) vaccine as soon as recommended each year  The flu vaccine is available starting in September or October  Flu viruses change, so it is important to get a flu vaccine every year  Get the pneumonia vaccine if recommended  This vaccine is usually recommended every 5 years  Your provider will tell you when to get this vaccine, if needed  Follow up with your doctor as directed:  Write down your questions so you remember to ask them during your visits    © Copyright [employer] 2022 Information is for End User's use only and may not be sold, redistributed or otherwise used for commercial purposes  All illustrations and images included in CareNotes® are the copyrighted property of A D A M [employer] Inc  or [employer]  The above information is an  only  It is not intended as medical advice for individual conditions or treatments  Talk to your doctor, nurse or pharmacist before following any medical regimen to see if it is safe and effective for you

## 2023-02-06 NOTE — PROGRESS NOTES
[facility]        NAME: João Lucero is a 69 y o  female  : 1955    MRN: [id number]  DATE: 2023  TIME: 3:43 PM    Assessment and Plan   Flu-like symptoms [R68 89]  1  Flu-like symptoms  Cov/Flu-Collected at [facility]    acetaminophen (TYLENOL) tablet 975 mg    benzonatate (TESSALON PERLES) 100 mg capsule      2  Exposure to COVID-19 virus          Lungs clear on PE and vital signs stable  Patient febrile in clinic and so acetaminophen administered  COVID/Flu PCR pending  Results will be viewable via [employer]  Follow [facility] guidelines for isolation/quarantine until results back and then as appropriate based on final results  Encouraged continued supportive measures, including increased fluid intake and rest   Can use Tessalon Perles as needed for cough  Follow-up with PCP in 3 to 5 days or proceed to emergency department if symptoms worsen  Patient verbalized understanding of instructions given  Patient Instructions     Patient Instructions     COVID/Flu PCR pending  Results will be viewable via [employer]  Follow [facility] guidelines for isolation/quarantine until results back and then as appropriate based on final results  Continue with supportive measures, OTC Tylenol, nasal decongestants, and cough suppressants (Tessalon Perles)  Cool mist humidifiers, throat lozenges, increased fluid intake and rest   Follow up with PCP in 3-5 days  Present to ER if symptoms worsen       Viral Syndrome   AMBULATORY CARE:   Viral syndrome  is a term used for symptoms of an infection caused by a virus  Viruses are spread easily from person to person through the air and on shared items  Signs and symptoms  may start slowly or suddenly and last hours to days  They can be mild to severe and can change over days or hours   You may have any of the following:  · Fever and chills    · A runny or stuffy nose    · Cough, sore throat, or hoarseness    · Headache, or pain and pressure around your eyes    · Muscle aches and joint pain    · Shortness of breath or wheezing    · Abdominal pain, cramps, and diarrhea    · Nausea, vomiting, or loss of appetite    Call your local emergency number (911 in the [address] [facility],[address]) or have someone else call if:   · You have a seizure  · You cannot be woken  · You have chest pain or trouble breathing  Seek care immediately if:   · You have a stiff neck, a bad headache, and sensitivity to light  · You feel weak, dizzy, or confused  · You stop urinating or urinate a lot less than usual     · You cough up blood or thick yellow or green mucus  · You have severe abdominal pain or your abdomen is larger than usual     Call your doctor if:   · Your symptoms do not get better with treatment or get worse after 3 days  · You have a rash or ear pain  · You have burning when you urinate  · You have questions or concerns about your condition or care  Treatment for viral syndrome  may include medicines to manage your symptoms  Antibiotics are not given for a viral infection  You may  need any of the following:  · Acetaminophen  decreases pain and fever  It is available without a doctor's order  Ask how much to take and how often to take it  Follow directions  Read the labels of all other medicines you are using to see if they also contain acetaminophen, or ask your doctor or pharmacist  Acetaminophen can cause liver damage if not taken correctly  Do not use more than 4 grams (4,000 milligrams) total of acetaminophen in one day  · NSAIDs , such as ibuprofen, help decrease swelling, pain, and fever  NSAIDs can cause stomach bleeding or kidney problems in certain people  If you take blood thinner medicine, always ask your healthcare provider if NSAIDs are safe for you  Always read the medicine label and follow directions  · Cold medicine  helps decrease swelling, control a cough, and relieve chest or nasal congestion      · Saline nasal spray  helps decrease nasal congestion  Manage your symptoms:   · Drink liquids as directed to prevent dehydration  Ask how much liquid to drink each day and which liquids are best for you  Ask if you should drink an oral rehydration solution (ORS)  An ORS has the right amounts of water, salts, and sugar you need to replace body fluids  This may help prevent dehydration caused by vomiting or diarrhea  Do not drink liquids with caffeine  Liquids with caffeine can make dehydration worse  · Get plenty of rest to help your body heal   Take naps throughout the day  Ask your healthcare provider when you can return to work and your normal activities  · Use a cool mist humidifier to help you breathe easier  Ask your healthcare provider how to use a cool mist humidifier  · Eat honey or use cough drops for a sore throat  Cough drops are available without a doctor's order  Follow directions for taking cough drops  · Do not smoke or be close to anyone who is smoking  Nicotine and other chemicals in cigarettes and cigars can cause lung damage  Smoking can also delay healing  Ask your healthcare provider for information if you currently smoke and need help to quit  E-cigarettes or smokeless tobacco still contain nicotine  Talk to your healthcare provider before you use these products  Prevent the spread of germs:       1  Wash your hands often  Wash your hands several times each day  Wash after you use the bathroom, change a child's diaper, and before you prepare or eat food  Use soap and water every time  Rub your soapy hands together, lacing your fingers  Wash the front and back of your hands, and in between your fingers  Use the fingers of one hand to scrub under the fingernails of the other hand  Wash for at least 20 seconds  Rinse with warm, running water for several seconds  Then dry your hands with a clean towel or paper towel  Use hand  that contains alcohol if soap and water are not available   Do not touch your eyes, nose, or mouth without washing your hands first          2  Cover a sneeze or cough  Use a tissue that covers your mouth and nose  Throw the tissue away in a trash can right away  Use the bend of your arm if a tissue is not available  Wash your hands well with soap and water or use a hand   3  Stay away from others while you are sick  Avoid crowds as much as possible  4  Ask about vaccines you may need  Talk to your healthcare provider about your vaccine history  He or she will tell you which vaccines you need, and when to get them  ? Get the influenza (flu) vaccine as soon as recommended each year  The flu vaccine is available starting in September or October  Flu viruses change, so it is important to get a flu vaccine every year  ? Get the pneumonia vaccine if recommended  This vaccine is usually recommended every 5 years  Your provider will tell you when to get this vaccine, if needed  Follow up with your doctor as directed:  Write down your questions so you remember to ask them during your visits  © Copyright [employer] 2022 Information is for End User's use only and may not be sold, redistributed or otherwise used for commercial purposes  All illustrations and images included in CareNotes® are the copyrighted property of [employer] M [employer] Inc  or [address] [place] [address]  The above information is an  only  It is not intended as medical advice for individual conditions or treatments  Talk to your doctor, nurse or pharmacist before following any medical regimen to see if it is safe and effective for you  Chief Complaint     Chief Complaint   Patient presents with   • Cold Like Symptoms     C/o sore throat, cough, body aches, nasal congestion  Symptoms started x7 days ago  At home covid test negative           History of Present Illness       See 3year-old female with a past medical history significant for hypertension and A-fib presents with complaints of nasal congestion, sore throat, fever, chills, body aches, and cough  Patient states that she started with some symptoms earlier last week however have acutely worsened yesterday into today  States positive sick contact/exposure as  recently tested positive on Friday for COVID  She had a negative at home COVID test yesterday  She has been taking OTC Robitussin for her symptoms  Denies any chest pain, shortness of breath, or wheezing  She has not taken any OTC Tylenol or ibuprofen yet today  Review of Systems   Review of Systems   Constitutional: Positive for chills and fever  HENT: Positive for congestion, rhinorrhea and sore throat  Negative for ear discharge, ear pain, trouble swallowing and voice change  Eyes: Negative for discharge  Respiratory: Positive for cough  Negative for shortness of breath and wheezing  Cardiovascular: Negative for chest pain  Gastrointestinal: Negative for abdominal pain, diarrhea, nausea and vomiting  Musculoskeletal: Positive for myalgias  Skin: Negative for rash  Current Medications       Current Outpatient Medications:   •  ALPRAZolam (XANAX) 0 5 mg tablet, Take by mouth daily at bedtime as needed for anxiety, Disp: , Rfl:   •  atorvastatin (LIPITOR) 10 mg tablet, Take 10 mg by mouth daily, Disp: , Rfl:   •  benzonatate (TESSALON PERLES) 100 mg capsule, Take 1 capsule (100 mg total) by mouth 3 (three) times a day as needed for cough for up to 7 days, Disp: 20 capsule, Rfl: 0  •  citalopram (CeleXA) 40 mg tablet, Take 40 mg by mouth daily , Disp: , Rfl:   •  Eliquis 5 MG, TAKE 1 TABLET TWICE A DAY, Disp: 180 tablet, Rfl: 3  •  levothyroxine 75 mcg tablet, Take 75 mcg by mouth daily , Disp: , Rfl:   •  magnesium oxide (MAG-OX) 400 mg tablet, Take by mouth, Disp: , Rfl:   •  olmesartan (BENICAR) 20 mg tablet, Take 1 tablet (20 mg total) by mouth daily Take in addition to benicar 20-HCTZ 25 mg daily for the next 7 days  , Disp: 14 tablet, Rfl: 0  • Ascorbic Acid (Vitamin C) 500 MG CAPS, Take by mouth (Patient not taking: Reported on 2/6/2023), Disp: , Rfl:   •  Cholecalciferol (Vitamin D3) 125 MCG (5000 UT) TABS, Take 2,000 Units by mouth daily  (Patient not taking: Reported on 9/6/2022), Disp: , Rfl:   •  clindamycin (CLEOCIN) 2 % vaginal cream, insert 1 applicatorful vaginally nightly for 7 days, Disp: , Rfl:   •  clobetasol (TEMOVATE) 0 05 % GEL, INSERT 5 GRAMS VAGINALLY USING APPLICATOR EVERY NIGHT FOR 3 WEEKS, Disp: , Rfl:   •  erythromycin (ILOTYCIN) ophthalmic ointment, APPLY TO BOTH EYES AT NIGHT BEFORE SLEEP (Patient not taking: Reported on 11/29/2022), Disp: , Rfl:   •  meclizine (ANTIVERT) 25 mg tablet, Take 1 tablet (25 mg total) by mouth 3 (three) times a day as needed for dizziness (Patient not taking: Reported on 11/29/2022), Disp: 30 tablet, Rfl: 0  •  metoprolol succinate (TOPROL-XL) 25 mg 24 hr tablet, Take 1 tablet (25 mg total) by mouth daily, Disp: 90 tablet, Rfl: 3  •  metroNIDAZOLE (METROGEL) 0 75 % vaginal gel, as needed  (Patient not taking: Reported on 11/29/2022), Disp: , Rfl:   •  nystatin powder, APPLY 1 APPLICATION TOPICALLY IN THE MORNING, 1 APPLICATION AT NO     (REFER TO PRESCRIPTION NOTES)  , Disp: , Rfl:   •  olmesartan-hydrochlorothiazide (BENICAR HCT) 40-25 MG per tablet, , Disp: , Rfl:   No current facility-administered medications for this visit      Current Allergies     Allergies as of 02/06/2023 - Reviewed 02/06/2023   Allergen Reaction Noted   • Flagyl [metronidazole] Shortness Of Breath 11/29/2022   • Prednisone Shortness Of Breath 12/21/2018   • Contrast [iodinated contrast media] Rash 12/21/2018   • Iodine - food allergy Rash 02/17/2018            The following portions of the patient's history were reviewed and updated as appropriate: allergies, current medications, past family history, past medical history, past social history, past surgical history and problem list      Past Medical History:   Diagnosis Date   • Anxiety    • Diabetes mellitus ([facility] [address] )    • Elevated d-dimer     2 seperate episodes of unclear etiology  • Hyperlipidemia    • Hypertension    • Hypothyroid    • Obesity    • Paroxysmal atrial fibrillation ([facility] [address] )     New onset 2020  Past Surgical History:   Procedure Laterality Date   • CATARACT EXTRACTION, BILATERAL     • HYSTERECTOMY     • KNEE ARTHROPLASTY     • SHOULDER OPEN ROTATOR CUFF REPAIR      x 2   • TONSILLECTOMY         Family History   Problem Relation Age of Onset   • Hypertension Sister    • Heart disease Paternal Aunt    • Other Mother         Encephalitis   • Other Father          in his 80s without significant problems   • Lung cancer Brother    • No Known Problems Brother          Medications have been verified  Objective   /72   Pulse 78   Temp (!) 102 °F (38 9 °C)   Resp 18   Ht 5' 7" (1 702 m)   Wt 125 kg (275 lb)   LMP  (LMP Unknown)   SpO2 96%   BMI 43 07 kg/m²   No LMP recorded (lmp unknown)  Patient has had a hysterectomy  Physical Exam     Physical Exam  Vitals and nursing note reviewed  Constitutional:       General: She is not in acute distress  Appearance: She is not toxic-appearing  HENT:      Head: Normocephalic  Right Ear: Tympanic membrane, ear canal and external ear normal       Left Ear: Tympanic membrane, ear canal and external ear normal       Nose: Congestion present  Mouth/Throat:      Mouth: Mucous membranes are moist       Pharynx: Posterior oropharyngeal erythema present  Comments: Mucoid drainage in posterior pharynx  Eyes:      Conjunctiva/sclera: Conjunctivae normal    Cardiovascular:      Rate and Rhythm: Normal rate and regular rhythm  Heart sounds: Normal heart sounds  Pulmonary:      Effort: Pulmonary effort is normal  No respiratory distress  Breath sounds: Normal breath sounds  No stridor  No wheezing, rhonchi or rales  Lymphadenopathy:      Cervical: No cervical adenopathy     Skin: General: Skin is warm and dry  Neurological:      Mental Status: She is alert and oriented to person, place, and time  Gait: Gait is intact     Psychiatric:         Mood and Affect: Mood normal          Behavior: Behavior normal

## 2023-02-07 LAB
FLUAV RNA RESP QL NAA+PROBE: NEGATIVE
FLUBV RNA RESP QL NAA+PROBE: NEGATIVE
SARS-COV-2 RNA RESP QL NAA+PROBE: POSITIVE

## 2023-02-08 ENCOUNTER — TELEPHONE (OUTPATIENT)
Dept: CARDIOLOGY CLINIC | Facility: CLINIC | Age: 68
End: 2023-02-08

## 2023-02-08 NOTE — TELEPHONE ENCOUNTER
Pt lm stating that she would like to speak to someone about a medication that Crystal put her on    Pt did not leave the name of the medication  [phone]

## 2023-02-10 NOTE — TELEPHONE ENCOUNTER
Pt states that she was given paxlovid by PCP and has decided she is not taking it  She is doing better

## 2023-02-13 ENCOUNTER — TELEPHONE (OUTPATIENT)
Dept: SLEEP CENTER | Facility: CLINIC | Age: 68
End: 2023-02-13

## 2023-02-13 NOTE — TELEPHONE ENCOUNTER
Patient left message stating she is having an issue with her CPAP  Last office visit was 6/24/2021  Has follow up scheduled for 3/9/23  No sooner appointments in [place] currently available  Returned call and left message  Advised to call back to provide details of the issue she is having with CPAP  Since it has been over a year since she has been seen in the office, she would have to wait until 3/9/23 appointment to address any pressure change issues, supplies etc   Provided nurse line number in message

## 2023-03-09 ENCOUNTER — OFFICE VISIT (OUTPATIENT)
Dept: SLEEP CENTER | Facility: CLINIC | Age: 68
End: 2023-03-09

## 2023-03-09 VITALS
OXYGEN SATURATION: 93 % | TEMPERATURE: 97.8 F | SYSTOLIC BLOOD PRESSURE: 124 MMHG | DIASTOLIC BLOOD PRESSURE: 80 MMHG | BODY MASS INDEX: 44.2 KG/M2 | HEIGHT: 67 IN | HEART RATE: 78 BPM | WEIGHT: 281.6 LBS

## 2023-03-09 DIAGNOSIS — I10 ESSENTIAL HYPERTENSION: ICD-10-CM

## 2023-03-09 DIAGNOSIS — E66.01 MORBID OBESITY WITH BMI OF 40.0-44.9, ADULT (HCC): ICD-10-CM

## 2023-03-09 DIAGNOSIS — F32.A ANXIETY AND DEPRESSION: ICD-10-CM

## 2023-03-09 DIAGNOSIS — J30.9 ALLERGIC RHINITIS WITH POSTNASAL DRIP: ICD-10-CM

## 2023-03-09 DIAGNOSIS — I48.0 PAROXYSMAL ATRIAL FIBRILLATION (HCC): ICD-10-CM

## 2023-03-09 DIAGNOSIS — F41.9 ANXIETY AND DEPRESSION: ICD-10-CM

## 2023-03-09 DIAGNOSIS — R68.2 DRY MOUTH: ICD-10-CM

## 2023-03-09 DIAGNOSIS — R09.82 ALLERGIC RHINITIS WITH POSTNASAL DRIP: ICD-10-CM

## 2023-03-09 DIAGNOSIS — G47.33 OBSTRUCTIVE SLEEP APNEA SYNDROME: Primary | ICD-10-CM

## 2023-03-09 DIAGNOSIS — E66.2 OBESITY HYPOVENTILATION SYNDROME (HCC): ICD-10-CM

## 2023-03-09 NOTE — PATIENT INSTRUCTIONS

## 2023-03-09 NOTE — PROGRESS NOTES
Follow-Up Note - [address]  69 y o  female  YGA:[birth year]  DJL:[id number]  DOS:3/9/2023    CC: I saw this patient for follow-up in clinic today for Sleep disordered breathing, Coexisting Sleep and Medical Problems  Patient received ot a refurbished dream Station Version 1 machine from [place] several months ago  However, she is using a ResMed machine that she purchased out-of-pocket  Home sleep testing in March of 2021 demonstrated : CAROLINE (respiratory event index of) 46 9 /hour  Minimum oxygen saturation 81%  and 14 4 % of total sleep time was spent with saturations less than 90%  The snore index was 21 6%  The subsequent titration study demonstrated sleep disordered breathing was successfully remediated with PAP at 9 cm H2O  PFSH, Problem List, Medications & Allergies were reviewed in EMR  Interval changes: None reported  She  has a past medical history of Anxiety, Diabetes mellitus ([facility] [address] ), Elevated d-dimer, Hyperlipidemia, Hypertension, Hypothyroid, Obesity, and Paroxysmal atrial fibrillation ([facility] [address] )  She has a current medication list which includes the following prescription(s): alprazolam, atorvastatin, citalopram, clindamycin, eliquis, levothyroxine, magnesium oxide, nystatin, olmesartan, vitamin c, vitamin d3, erythromycin, meclizine, metoprolol succinate, metronidazole, and olmesartan-hydrochlorothiazide  PHYSIOLOGICAL DATA REVIEW AND INTERPRETATION: No data was available, but she reports regular use of the device for > 4 hours/night; she is unable to sleep without the device and notes when reviewing her emily, respiratory event index is typically 2 to 3/h at current pressure setting  Patient has not been using ozone based devices to sanitize the machine  SUBJECTIVE: With respect to use of PAP, Delfina Pratt reports benefiting from use   She is experiencing minimal adverse effects: dry mouth/throat and mask leaks ;   Sleep Routine: Delfina Pratt reports getting 9 hrs sleep; she has no difficulty initiating or maintaining sleep   She arises spontaneously and feels refreshed  Angelique Escobar denies excessive daytime sleepiness,  She rated [herself] at Total score: 3 /24 on the [place] Sleepiness Scale  Habits:[ reports that she has never smoked  She has never used smokeless tobacco ], [ reports that she does not currently use alcohol ], [ reports no history of drug use ], Caffeine use:none; Exercise routine: none  ROS: reviewed & significant for weight has been fluctuating in the range of a few pounds  She is frustrated as efforts at weight reduction have not been successful  Allergies are controlled  She has not had any recurrence of atrial fibrillation and is reporting no other cardiac or respiratory symptoms  Mood is stable on current medication  EXAM: /80 (BP Location: Right arm, Cuff Size: Large)   Pulse 78   Temp 97 8 °F (36 6 °C) (Temporal)   Ht 5' 7" (1 702 m)   Wt 128 kg (281 lb 9 6 oz)   LMP  (LMP Unknown)   SpO2 93%   BMI 44 10 kg/m²     Wt Readings from Last 3 Encounters:   03/09/23 128 kg (281 lb 9 6 oz)   02/06/23 125 kg (275 lb)   01/09/23 127 kg (279 lb 6 4 oz)      Patient is well groomed; well appearing  CNS: Alert, orientated, clear & coherent speech  Psych: cooperative and in no distress  Mental state: Appears normal   H&N: EOMI; NC/AT: No facial pressure marks, no rashes  Skin/Extrem: col & hydration normal; no edema  Resp: Respiratory effort is normal  Physical findings otherwise essentially unchanged from previous  IMPRESSION: Problem List Items & Comorbidities Addressed this Visit    1  Obstructive sleep apnea syndrome  PAP DME Resupply/Reorder    Ambulatory referral to Weight Management      2  Obesity hypoventilation syndrome ([address] )        3  Anxiety and depression        4  Dry mouth        5  Allergic rhinitis with postnasal drip        6  Paroxysmal atrial fibrillation (HCC)        7  Essential hypertension        8   Morbid obesity with BMI of 40 0-44 9, adult [facility])  Ambulatory referral to Weight Management          PLAN:  1  I reviewed results of prior studies and previous physiologic data with the patient  2  I discussed treatment options with risks and benefits  3  Treatment with  PAP is medically necessary and Mague Carr is agreable to continue use  4  Care of equipment, methods to improve comfort using PAP and importance of compliance with therapy were discussed  5  Pressure setting: continue 9 cmH2O  She has the emily on her phone but was unable to access it  She will send data when she is able to login  6  Rx provided to replace supplies and Care coordinated with DME provider  7  Discussed strategies for weight reduction  With her consent, I provided a referral to weight management program   8  Follow-up is advised in 1 year or sooner if needed to monitor progress, compliance and to adjust therapy  Thank you for allowing me to participate in the care of this patient  Sincerely,     Authenticated electronically on 34/71/45   Board Certified Specialist     Portions of the record may have been created with voice recognition software  Occasional wrong word or "sound a like" substitutions may have occurred due to the inherent limitations of voice recognition software  There may also be notations and random deletions of words or characters from malfunctioning software  Read the chart carefully and recognize, using context, where substitutions/deletions have occurred

## 2023-03-13 ENCOUNTER — TELEPHONE (OUTPATIENT)
Dept: SLEEP CENTER | Facility: CLINIC | Age: 68
End: 2023-03-13

## 2023-03-13 ENCOUNTER — TELEPHONE (OUTPATIENT)
Dept: BARIATRICS | Facility: CLINIC | Age: 68
End: 2023-03-13

## 2023-03-13 LAB

## 2023-03-13 NOTE — TELEPHONE ENCOUNTER
Ena called and states that there was a death in the family she needs to reschedule  She will call back to reschedule when she knows what is going on

## 2023-03-24 ENCOUNTER — HOSPITAL ENCOUNTER (EMERGENCY)
Facility: HOSPITAL | Age: 68
Discharge: HOME | End: 2023-03-25
Attending: EMERGENCY MEDICINE

## 2023-03-24 ENCOUNTER — APPOINTMENT (EMERGENCY)
Dept: CT IMAGING | Facility: HOSPITAL | Age: 68
End: 2023-03-24

## 2023-03-24 VITALS
BODY MASS INDEX: 44.71 KG/M2 | OXYGEN SATURATION: 98 % | DIASTOLIC BLOOD PRESSURE: 63 MMHG | RESPIRATION RATE: 16 BRPM | SYSTOLIC BLOOD PRESSURE: 155 MMHG | WEIGHT: 285.5 LBS | HEART RATE: 59 BPM | TEMPERATURE: 97.5 F

## 2023-03-24 DIAGNOSIS — U09.9 POST-COVID CHRONIC DYSPNEA: ICD-10-CM

## 2023-03-24 DIAGNOSIS — R06.09 POST-COVID CHRONIC DYSPNEA: ICD-10-CM

## 2023-03-24 DIAGNOSIS — R06.02 SOB (SHORTNESS OF BREATH): ICD-10-CM

## 2023-03-24 DIAGNOSIS — R06.00 DYSPNEA: Primary | ICD-10-CM

## 2023-03-24 LAB
ALBUMIN SERPL BCP-MCNC: 4 G/DL (ref 3.5–5)
ALP SERPL-CCNC: 74 U/L (ref 34–104)
ALT SERPL W P-5'-P-CCNC: 36 U/L (ref 7–52)
ANION GAP SERPL CALCULATED.3IONS-SCNC: 7 MMOL/L (ref 4–13)
AST SERPL W P-5'-P-CCNC: 38 U/L (ref 13–39)
BASOPHILS # BLD AUTO: 0.07 THOUSANDS/ÂΜL (ref 0–0.1)
BASOPHILS NFR BLD AUTO: 1 % (ref 0–1)
BILIRUB SERPL-MCNC: 0.66 MG/DL (ref 0.2–1)
BNP SERPL-MCNC: 198 PG/ML (ref 0–100)
BUN SERPL-MCNC: 20 MG/DL (ref 5–25)
CALCIUM SERPL-MCNC: 8.8 MG/DL (ref 8.4–10.2)
CARDIAC TROPONIN I PNL SERPL HS: 3 NG/L
CHLORIDE SERPL-SCNC: 106 MMOL/L (ref 96–108)
CO2 SERPL-SCNC: 27 MMOL/L (ref 21–32)
CREAT SERPL-MCNC: 0.99 MG/DL (ref 0.6–1.3)
EOSINOPHIL # BLD AUTO: 0.28 THOUSAND/ÂΜL (ref 0–0.61)
EOSINOPHIL NFR BLD AUTO: 3 % (ref 0–6)
ERYTHROCYTE [DISTWIDTH] IN BLOOD BY AUTOMATED COUNT: 14.8 % (ref 11.6–15.1)
GFR SERPL CREATININE-BSD FRML MDRD: 58 ML/MIN/1.73SQ M
GLUCOSE SERPL-MCNC: 106 MG/DL (ref 65–140)
HCT VFR BLD AUTO: 37.4 % (ref 34.8–46.1)
HGB BLD-MCNC: 12 G/DL (ref 11.5–15.4)
IMM GRANULOCYTES # BLD AUTO: 0.05 THOUSAND/UL (ref 0–0.2)
IMM GRANULOCYTES NFR BLD AUTO: 1 % (ref 0–2)
LYMPHOCYTES # BLD AUTO: 2.09 THOUSANDS/ÂΜL (ref 0.6–4.47)
LYMPHOCYTES NFR BLD AUTO: 25 % (ref 14–44)
MCH RBC QN AUTO: 28.8 PG (ref 26.8–34.3)
MCHC RBC AUTO-ENTMCNC: 32.1 G/DL (ref 31.4–37.4)
MCV RBC AUTO: 90 FL (ref 82–98)
MONOCYTES # BLD AUTO: 0.79 THOUSAND/ÂΜL (ref 0.17–1.22)
MONOCYTES NFR BLD AUTO: 9 % (ref 4–12)
NEUTROPHILS # BLD AUTO: 5.24 THOUSANDS/ÂΜL (ref 1.85–7.62)
NEUTS SEG NFR BLD AUTO: 61 % (ref 43–75)
NRBC BLD AUTO-RTO: 0 /100 WBCS
PLATELET # BLD AUTO: 254 THOUSANDS/UL (ref 149–390)
PMV BLD AUTO: 9.7 FL (ref 8.9–12.7)
POTASSIUM SERPL-SCNC: 4.3 MMOL/L (ref 3.5–5.3)
PROT SERPL-MCNC: 7.1 G/DL (ref 6.4–8.4)
RBC # BLD AUTO: 4.16 MILLION/UL (ref 3.81–5.12)
SODIUM SERPL-SCNC: 140 MMOL/L (ref 135–147)
WBC # BLD AUTO: 8.52 THOUSAND/UL (ref 4.31–10.16)

## 2023-03-24 RX ORDER — ALBUTEROL SULFATE 90 UG/1
2 AEROSOL, METERED RESPIRATORY (INHALATION) ONCE
Status: COMPLETED | OUTPATIENT
Start: 2023-03-25 | End: 2023-03-24

## 2023-03-24 RX ORDER — METHYLPREDNISOLONE SODIUM SUCCINATE 125 MG/2ML
125 INJECTION, POWDER, LYOPHILIZED, FOR SOLUTION INTRAMUSCULAR; INTRAVENOUS ONCE
Status: COMPLETED | OUTPATIENT
Start: 2023-03-24 | End: 2023-03-24

## 2023-03-24 RX ORDER — IODIXANOL 320 MG/ML
99 INJECTION, SOLUTION INTRAVASCULAR
Status: COMPLETED | OUTPATIENT
Start: 2023-03-24 | End: 2023-03-24

## 2023-03-24 RX ORDER — PREDNISONE 20 MG/1
TABLET ORAL
Qty: 20 TABLET | Refills: 0 | Status: SHIPPED | OUTPATIENT
Start: 2023-03-24 | End: 2023-04-05

## 2023-03-24 RX ORDER — IPRATROPIUM BROMIDE AND ALBUTEROL SULFATE 2.5; .5 MG/3ML; MG/3ML
3 SOLUTION RESPIRATORY (INHALATION) ONCE
Status: COMPLETED | OUTPATIENT
Start: 2023-03-24 | End: 2023-03-24

## 2023-03-24 RX ADMIN — IODIXANOL 99 ML: 320 INJECTION, SOLUTION INTRAVASCULAR at 22:00

## 2023-03-24 RX ADMIN — IPRATROPIUM BROMIDE AND ALBUTEROL SULFATE 3 ML: 2.5; .5 SOLUTION RESPIRATORY (INHALATION) at 20:45

## 2023-03-24 RX ADMIN — ALBUTEROL SULFATE 2 PUFF: 90 AEROSOL, METERED RESPIRATORY (INHALATION) at 23:56

## 2023-03-24 RX ADMIN — METHYLPREDNISOLONE SODIUM SUCCINATE 125 MG: 125 INJECTION, POWDER, FOR SOLUTION INTRAMUSCULAR; INTRAVENOUS at 20:56

## 2023-03-25 NOTE — ED CARE HANDOFF
Emergency Department Sign Out Note        Sign out and transfer of care from Dr Mundo Burnett  See Separate Emergency Department note  The patient, Too Rey, was evaluated by the previous provider for shortness of breath  Workup Completed:  Labs, EKG    ED Course / Workup Pending (followup):  CTA pending    CTA findings discussed at bedside, no evidence of PE, patient does report feeling better after breathing treatment, was provided with albuterol inhaler, as well as prednisone and referral for follow-up with pulmonology, advised to return if any additional concerns, patient acknowledges understanding and agreement with this plan                                         Disposition  Final diagnoses:   Dyspnea   SOB (shortness of breath)   Post-COVID chronic dyspnea     Time reflects when diagnosis was documented in both MDM as applicable and the Disposition within this note     Time User Action Codes Description Comment    3/24/2023 11:45 PM Jared Gaspar Add [R06 00] Dyspnea     3/24/2023 11:45 PM Yue Washington Add [R06 02] SOB (shortness of breath)     3/24/2023 11:46 PM Yue Washington Add [R06 09,  U09 9] Post-COVID chronic dyspnea       ED Disposition     ED Disposition   Discharge    Condition   Stable    Date/Time   Fri Mar 24, 2023 11:45 PM    Comment   Too Rey discharge to home/self care                 Follow-up Information     Follow up With Specialties Details Why Contact Info    Jimmie Li MD Pulmonary Disease, Pulmonology, [address] In 1 week  [address] [facility] [address] [facility]  [phone]          Discharge Medication List as of 3/24/2023 11:47 PM      START taking these medications    Details   predniSONE 20 mg tablet Multiple Dosages:Starting Fri 3/24/2023, Until Sun 3/26/2023 at 2359, THEN Starting Mon 3/27/2023, Until Wed 3/29/2023 at 2359, THEN Starting Thu 3/30/2023, Until Sat 4/1/2023 at 2359, THEN Starting Sun 4/2/2023, Until Tue 4/4/2023 at 2359Take 3 tab lets (60 mg total) by mouth daily for 3 days, THEN 2 tablets (40 mg total) daily for 3 days, THEN 1 tablet (20 mg total) daily for 3 days, THEN 0 5 tablets (10 mg total) daily for 3 days  , Normal         CONTINUE these medications which have NOT CHANGED    Details   ALPRAZolam (XANAX) 0 5 mg tablet Take by mouth daily at bedtime as needed for anxiety, Historical Med      Ascorbic Acid (Vitamin C) 500 MG CAPS Take by mouth, Historical Med      atorvastatin (LIPITOR) 10 mg tablet Take 10 mg by mouth daily, Historical Med      Cholecalciferol (Vitamin D3) 125 MCG (5000 UT) TABS Take 2,000 Units by mouth daily , Historical Med      citalopram (CeleXA) 40 mg tablet Take 40 mg by mouth daily , Historical Med      clindamycin (CLEOCIN) 2 % vaginal cream insert 1 applicatorful vaginally nightly for 7 days, Historical Med      Eliquis 5 MG TAKE 1 TABLET TWICE A DAY, Normal      erythromycin (ILOTYCIN) ophthalmic ointment APPLY TO BOTH EYES AT NIGHT BEFORE SLEEP, Historical Med      levothyroxine 75 mcg tablet Take 75 mcg by mouth daily , Historical Med      magnesium oxide (MAG-OX) 400 mg tablet Take by mouth, Historical Med      meclizine (ANTIVERT) 25 mg tablet Take 1 tablet (25 mg total) by mouth 3 (three) times a day as needed for dizziness, Starting Tue 9/6/2022, Normal      metoprolol succinate (TOPROL-XL) 25 mg 24 hr tablet Take 1 tablet (25 mg total) by mouth daily, Starting Fri 12/4/2020, Until Mon 1/9/2023, Normal      metroNIDAZOLE (METROGEL) 0 75 % vaginal gel as needed , Starting Mon 3/22/2021, Historical Med      nystatin powder APPLY 1 APPLICATION TOPICALLY IN THE MORNING, 1 APPLICATION AT NO     (REFER TO PRESCRIPTION NOTES)  , Historical Med      olmesartan (BENICAR) 20 mg tablet Take 1 tablet (20 mg total) by mouth daily Take in addition to benicar 20-HCTZ 25 mg daily for the next 7 days  , Starting Tue 12/27/2022, Normal      olmesartan-hydrochlorothiazide (BENICAR HCT) 40-25 MG per tablet Starting Tue 1/3/2023, Historical Med                  ED Provider  Electronically Signed by     Radha Mathis DO  03/25/23 1071

## 2023-03-25 NOTE — ED PROVIDER NOTES
History  Chief Complaint   Patient presents with   • Shortness of Breath     Sob for the past week with exertion  Had covid in feb     Patient with history of A-fib, on Eliquis, had COVID in February, complains of dyspnea on exertion over the past 5 days with some wheezing over that time  Denies fevers or chills  Denies chest pain  History provided by:  Patient   used: No    Shortness of Breath  Severity:  Moderate  Onset quality:  Gradual  Duration:  5 days  Timing:  Intermittent  Progression:  Unchanged  Chronicity:  New  Relieved by:  Nothing  Worsened by:  Exertion and activity  Ineffective treatments:  None tried  Associated symptoms: wheezing    Associated symptoms: no abdominal pain, no chest pain, no cough, no ear pain, no fever, no headaches, no hemoptysis, no neck pain, no rash, no sore throat, no sputum production, no syncope and no vomiting        Prior to Admission Medications   Prescriptions Last Dose Informant Patient Reported? Taking?    ALPRAZolam (XANAX) 0 5 mg tablet   Yes No   Sig: Take by mouth daily at bedtime as needed for anxiety   Ascorbic Acid (Vitamin C) 500 MG CAPS   Yes No   Sig: Take by mouth   Patient not taking: Reported on 2/6/2023   Cholecalciferol (Vitamin D3) 125 MCG (5000 UT) TABS   Yes No   Sig: Take 2,000 Units by mouth daily    Patient not taking: Reported on 9/6/2022   Eliquis 5 MG   No No   Sig: TAKE 1 TABLET TWICE A DAY   atorvastatin (LIPITOR) 10 mg tablet   Yes No   Sig: Take 10 mg by mouth daily   citalopram (CeleXA) 40 mg tablet   Yes No   Sig: Take 40 mg by mouth daily    clindamycin (CLEOCIN) 2 % vaginal cream   Yes No   Sig: insert 1 applicatorful vaginally nightly for 7 days   erythromycin (ILOTYCIN) ophthalmic ointment   Yes No   Sig: APPLY TO BOTH EYES AT NIGHT BEFORE SLEEP   Patient not taking: Reported on 11/29/2022   levothyroxine 75 mcg tablet   Yes No   Sig: Take 75 mcg by mouth daily    magnesium oxide (MAG-OX) 400 mg tablet   Yes No   Sig: Take by mouth   meclizine (ANTIVERT) 25 mg tablet   No No   Sig: Take 1 tablet (25 mg total) by mouth 3 (three) times a day as needed for dizziness   Patient not taking: Reported on 2022   metoprolol succinate (TOPROL-XL) 25 mg 24 hr tablet   No No   Sig: Take 1 tablet (25 mg total) by mouth daily   metroNIDAZOLE (METROGEL) 0 75 % vaginal gel   Yes No   Sig: as needed    Patient not taking: Reported on 2022   nystatin powder   Yes No   Sig: APPLY 1 APPLICATION TOPICALLY IN THE MORNING, 1 APPLICATION AT NO     (REFER TO PRESCRIPTION NOTES)  olmesartan (BENICAR) 20 mg tablet   No No   Sig: Take 1 tablet (20 mg total) by mouth daily Take in addition to benicar 20-HCTZ 25 mg daily for the next 7 days  olmesartan-hydrochlorothiazide (BENICAR HCT) 40-25 MG per tablet   Yes No   Patient not taking: Reported on 2023      Facility-Administered Medications: None       Past Medical History:   Diagnosis Date   • Anxiety    • Diabetes mellitus ([facility] )    • Elevated d-dimer     2 seperate episodes of unclear etiology  • Hyperlipidemia    • Hypertension    • Hypothyroid    • Obesity    • Paroxysmal atrial fibrillation ([facility] )     New onset 2020  Past Surgical History:   Procedure Laterality Date   • CATARACT EXTRACTION, BILATERAL     • HYSTERECTOMY     • KNEE ARTHROPLASTY     • SHOULDER OPEN ROTATOR CUFF REPAIR      x 2   • TONSILLECTOMY         Family History   Problem Relation Age of Onset   • Hypertension Sister    • Heart disease Paternal Aunt    • Other Mother         Encephalitis   • Other Father          in his 80s without significant problems   • Lung cancer Brother    • No Known Problems Brother      I have reviewed and agree with the history as documented      E-Cigarette/Vaping   • E-Cigarette Use Never User      E-Cigarette/Vaping Substances     Social History     Tobacco Use   • Smoking status: Never   • Smokeless tobacco: Never   Vaping Use   • Vaping Use: Never used Substance Use Topics   • Alcohol use: Not Currently   • Drug use: Never       Review of Systems   Constitutional: Negative for chills and fever  HENT: Negative for ear pain, hearing loss, sore throat, trouble swallowing and voice change  Eyes: Negative for pain and discharge  Respiratory: Positive for shortness of breath and wheezing  Negative for cough, hemoptysis and sputum production  Cardiovascular: Negative for chest pain, palpitations and syncope  Gastrointestinal: Negative for abdominal pain, blood in stool, constipation, diarrhea, nausea and vomiting  Genitourinary: Negative for dysuria, flank pain, frequency and hematuria  Musculoskeletal: Negative for joint swelling, neck pain and neck stiffness  Skin: Negative for rash and wound  Neurological: Negative for dizziness, seizures, syncope, facial asymmetry and headaches  Psychiatric/Behavioral: Negative for hallucinations, self-injury and suicidal ideas  All other systems reviewed and are negative  Physical Exam  Physical Exam  Vitals and nursing note reviewed  Constitutional:       General: She is not in acute distress  Appearance: She is well-developed  HENT:      Head: Normocephalic and atraumatic  Right Ear: External ear normal       Left Ear: External ear normal    Eyes:      General: No scleral icterus  Right eye: No discharge  Left eye: No discharge  Extraocular Movements: Extraocular movements intact  Conjunctiva/sclera: Conjunctivae normal    Cardiovascular:      Rate and Rhythm: Normal rate and regular rhythm  Heart sounds: Normal heart sounds  No murmur heard  Pulmonary:      Effort: Pulmonary effort is normal       Breath sounds: Normal breath sounds  No decreased breath sounds, wheezing, rhonchi or rales  Abdominal:      General: Bowel sounds are normal  There is no distension  Palpations: Abdomen is soft  Tenderness: There is no abdominal tenderness   There is no guarding or rebound  Musculoskeletal:         General: No deformity  Normal range of motion  Cervical back: Normal range of motion and neck supple  Skin:     General: Skin is warm and dry  Findings: No rash  Neurological:      General: No focal deficit present  Mental Status: She is alert and oriented to person, place, and time  Cranial Nerves: No cranial nerve deficit  Psychiatric:         Mood and Affect: Mood normal          Behavior: Behavior normal          Thought Content:  Thought content normal          Judgment: Judgment normal          Vital Signs  ED Triage Vitals   Temperature Pulse Respirations Blood Pressure SpO2   03/24/23 2028 03/24/23 2028 03/24/23 2028 03/24/23 2028 03/24/23 2028   97 5 °F (36 4 °C) 68 18 (!) 174/77 98 %      Temp Source Heart Rate Source Patient Position - Orthostatic VS BP Location FiO2 (%)   03/24/23 2028 03/24/23 2028 03/24/23 2100 03/24/23 2028 --   Temporal Monitor Sitting Left arm       Pain Score       03/24/23 2028       2           Vitals:    03/24/23 2028 03/24/23 2100   BP: (!) 174/77 155/63   Pulse: 68 59   Patient Position - Orthostatic VS:  Sitting         Visual Acuity      ED Medications  Medications   ipratropium-albuterol (DUO-NEB) 0 5-2 5 mg/3 mL inhalation solution 3 mL (3 mL Nebulization Given 3/24/23 2045)   methylPREDNISolone sodium succinate (Solu-MEDROL) injection 125 mg (125 mg Intravenous Given 3/24/23 2056)   iodixanol (VISIPAQUE) 320 MG/ML injection 99 mL (99 mL Intravenous Given 3/24/23 2200)   albuterol (PROVENTIL HFA,VENTOLIN HFA) inhaler 2 puff (2 puffs Inhalation Given 3/24/23 2356)       Diagnostic Studies  Results Reviewed     Procedure Component Value Units Date/Time    HS Troponin 0hr (reflex protocol) [[id number]]  (Normal) Collected: 03/24/23 2045    Lab Status: Final result Specimen: Blood from Arm, Right Updated: 03/24/23 2116     hs TnI 0hr 3 ng/L     B-Type Natriuretic Peptide(BNP) [[id number]]  (Abnormal) Collected: 03/24/23 2045    Lab Status: Final result Specimen: Blood from Arm, Right Updated: 03/24/23 2115      pg/mL     Comprehensive metabolic panel [[id number]] Collected: 03/24/23 2045    Lab Status: Final result Specimen: Blood from Arm, Right Updated: 03/24/23 2108     Sodium 140 mmol/L      Potassium 4 3 mmol/L      Chloride 106 mmol/L      CO2 27 mmol/L      ANION GAP 7 mmol/L      BUN 20 mg/dL      Creatinine 0 99 mg/dL      Glucose 106 mg/dL      Calcium 8 8 mg/dL      AST 38 U/L      ALT 36 U/L      Alkaline Phosphatase 74 U/L      Total Protein 7 1 g/dL      Albumin 4 0 g/dL      Total Bilirubin 0 66 mg/dL      eGFR 58 ml/min/1 73sq m     Narrative:      [place] guidelines for Chronic Kidney Disease (CKD):   •  Stage 1 with normal or high GFR (GFR > 90 mL/min/1 73 square meters)  •  Stage 2 Mild CKD (GFR = 60-89 mL/min/1 73 square meters)  •  Stage 3A Moderate CKD (GFR = 45-59 mL/min/1 73 square meters)  •  Stage 3B Moderate CKD (GFR = 30-44 mL/min/1 73 square meters)  •  Stage 4 Severe CKD (GFR = 15-29 mL/min/1 73 square meters)  •  Stage 5 End Stage CKD (GFR <15 mL/min/1 73 square meters)  Note: GFR calculation is accurate only with a steady state creatinine    CBC and differential [[id number]] Collected: 03/24/23 2045    Lab Status: Final result Specimen: Blood from Arm, Right Updated: 03/24/23 2051     WBC 8 52 Thousand/uL      RBC 4 16 Million/uL      Hemoglobin 12 0 g/dL      Hematocrit 37 4 %      MCV 90 fL      MCH 28 8 pg      MCHC 32 1 g/dL      RDW 14 8 %      MPV 9 7 fL      Platelets 927 Thousands/uL      nRBC 0 /100 WBCs      Neutrophils Relative 61 %      Immat GRANS % 1 %      Lymphocytes Relative 25 %      Monocytes Relative 9 %      Eosinophils Relative 3 %      Basophils Relative 1 %      Neutrophils Absolute 5 24 Thousands/µL      Immature Grans Absolute 0 05 Thousand/uL      Lymphocytes Absolute 2 09 Thousands/µL      Monocytes Absolute 0 79 Thousand/µL      Eosinophils Absolute 0 28 Thousand/µL      Basophils Absolute 0 07 Thousands/µL                  CTA ed chest pe study   Final Result by Patricia Black MD (03/24 9378)      1  Mildly limited evaluation, no evidence of pulmonary embolism   2  Possible left lower lobe airway debris, can be seen with bronchitis   3  Hepatic steatosis                  Workstation performed: [id number]                    Procedures  ECG 12 Lead Documentation Only    Date/Time: 3/24/2023 8:38 PM  Performed by: Kasey Lopez MD  Authorized by: Kasey Lopez MD     ECG reviewed by me, the ED Provider: yes    Patient location:  ED  Previous ECG:     Previous ECG:  Unavailable  Interpretation:     Interpretation: non-specific    Rate:     ECG rate:  59    ECG rate assessment: normal    Rhythm:     Rhythm: sinus bradycardia    Ectopy:     Ectopy: none    QRS:     QRS axis:  Normal    QRS intervals:  Normal  Conduction:     Conduction: normal    ST segments:     ST segments:  Normal  T waves:     T waves: normal               ED Course  ED Course as of 03/25/23 1123   Fri Mar 24, 2023   2159 Patient signed out to Dr Matteo Cota pending results of CTA chest                                              Medical Decision Making  Patient presents with dyspnea on exertion over the past 5 days after having had COVID several weeks ago  She denies chest pain or fevers  She has a history of paroxysmal atrial fibrillation  Based on the above, differential diagnosis includes but is not limited to pneumonia, pulmonary embolus, congestive heart failure  Patient noted to have normal saturations at rest in the emergency department without significant tachycardia  Initial EKG is sinus rhythm  Initial work-up obtained includes lab work, CTA chest to exclude possibility of pulmonary embolus  Patient signed out to Dr Matteo Cota at 10 PM pending results of the above      Amount and/or Complexity of Data Reviewed  External Data Reviewed: labs, radiology and notes  Labs: ordered  Decision-making details documented in ED Course  Radiology: ordered and independent interpretation performed  Decision-making details documented in ED Course  Risk  Prescription drug management  Disposition  Final diagnoses:   Dyspnea   SOB (shortness of breath)   Post-COVID chronic dyspnea     Time reflects when diagnosis was documented in both MDM as applicable and the Disposition within this note     Time User Action Codes Description Comment    3/24/2023 11:45 PM Maricruz Sacana Add [R06 00] Dyspnea     3/24/2023 11:45 PM Marciruz Sachs Add [R06 02] SOB (shortness of breath)     3/24/2023 11:46 PM Annette Washingtoneugene Cooper Add [R06 09,  U09 9] Post-COVID chronic dyspnea       ED Disposition     ED Disposition   Discharge    Condition   Stable    Date/Time   Fri Mar 24, 2023 11:45 PM    Comment   Angelica Begin discharge to home/self care  Follow-up Information     Follow up With Specialties Details Why Contact Info    Emil Montilla MD Pulmonary Disease, Pulmonology, [address] In 1 week  [address] [facility]  [phone]            Discharge Medication List as of 3/24/2023 11:47 PM      START taking these medications    Details   predniSONE 20 mg tablet Multiple Dosages:Starting Fri 3/24/2023, Until Sun 3/26/2023 at 2359, THEN Starting Mon 3/27/2023, Until Wed 3/29/2023 at 2359, THEN Starting Thu 3/30/2023, Until Sat 4/1/2023 at 2359, THEN Starting Sun 4/2/2023, Until Tue 4/4/2023 at 2359Take 3 tab lets (60 mg total) by mouth daily for 3 days, THEN 2 tablets (40 mg total) daily for 3 days, THEN 1 tablet (20 mg total) daily for 3 days, THEN 0 5 tablets (10 mg total) daily for 3 days  , Normal         CONTINUE these medications which have NOT CHANGED    Details   ALPRAZolam (XANAX) 0 5 mg tablet Take by mouth daily at bedtime as needed for anxiety, Historical Med      Ascorbic Acid (Vitamin C) 500 MG CAPS Take by mouth, Historical Med      atorvastatin (LIPITOR) 10 mg tablet Take 10 mg by mouth daily, Historical Med      Cholecalciferol (Vitamin D3) 125 MCG (5000 UT) TABS Take 2,000 Units by mouth daily , Historical Med      citalopram (CeleXA) 40 mg tablet Take 40 mg by mouth daily , Historical Med      clindamycin (CLEOCIN) 2 % vaginal cream insert 1 applicatorful vaginally nightly for 7 days, Historical Med      Eliquis 5 MG TAKE 1 TABLET TWICE A DAY, Normal      erythromycin (ILOTYCIN) ophthalmic ointment APPLY TO BOTH EYES AT NIGHT BEFORE SLEEP, Historical Med      levothyroxine 75 mcg tablet Take 75 mcg by mouth daily , Historical Med      magnesium oxide (MAG-OX) 400 mg tablet Take by mouth, Historical Med      meclizine (ANTIVERT) 25 mg tablet Take 1 tablet (25 mg total) by mouth 3 (three) times a day as needed for dizziness, Starting Tue 9/6/2022, Normal      metoprolol succinate (TOPROL-XL) 25 mg 24 hr tablet Take 1 tablet (25 mg total) by mouth daily, Starting Fri 12/4/2020, Until Mon 1/9/2023, Normal      metroNIDAZOLE (METROGEL) 0 75 % vaginal gel as needed , Starting Mon 3/22/2021, Historical Med      nystatin powder APPLY 1 APPLICATION TOPICALLY IN THE MORNING, 1 APPLICATION AT NO     (REFER TO PRESCRIPTION NOTES)  , Historical Med      olmesartan (BENICAR) 20 mg tablet Take 1 tablet (20 mg total) by mouth daily Take in addition to benicar 20-HCTZ 25 mg daily for the next 7 days  , Starting Tue 12/27/2022, Normal      olmesartan-hydrochlorothiazide (BENICAR HCT) 40-25 MG per tablet Starting Tue 1/3/2023, Historical Med                 PDMP Review       Value Time User    PDMP Reviewed  Yes 11/25/2020  2:06 PM Bear Garibay MD          ED Provider  Electronically Signed by           Anival Stuart MD  03/25/23 5060

## 2023-03-27 ENCOUNTER — TELEPHONE (OUTPATIENT)
Dept: CARDIOLOGY CLINIC | Facility: CLINIC | Age: 68
End: 2023-03-27

## 2023-03-27 LAB
ATRIAL RATE: 59 BPM
P AXIS: 70 DEGREES
PR INTERVAL: 174 MS
QRS AXIS: 1 DEGREES
QRSD INTERVAL: 94 MS
QT INTERVAL: 414 MS
QTC INTERVAL: 409 MS
T WAVE AXIS: 48 DEGREES
VENTRICULAR RATE: 59 BPM

## 2023-03-27 NOTE — TELEPHONE ENCOUNTER
[phone] Pt called and said that she was in the ER and was wondering if she should follow up sooner with the office then 5/1  Also would like to know if they see any of her results from the ER visit  Please call pt

## 2023-03-27 NOTE — TELEPHONE ENCOUNTER
I reviewed her ER visit  Looks like she was started on prednisone for post-covid inflammation and referred to pulmonology  You can arrange a follow up with me if she is not improving with the steroids    Thank you

## 2023-03-28 NOTE — TELEPHONE ENCOUNTER
Call to patient, she stated that she did schedule with pulmonary however the appt is a month out not in 1 week like the ER recommended  Pulmonary main number given to patient to find an appropriate date  Guy Barrera is concerned about her heart  She is asking that her EKG and elevated blood work be reviewed

## 2023-03-29 ENCOUNTER — OFFICE VISIT (OUTPATIENT)
Dept: CARDIOLOGY CLINIC | Facility: CLINIC | Age: 68
End: 2023-03-29

## 2023-03-29 VITALS
HEIGHT: 67 IN | BODY MASS INDEX: 44.07 KG/M2 | WEIGHT: 280.8 LBS | TEMPERATURE: 97.7 F | OXYGEN SATURATION: 94 % | DIASTOLIC BLOOD PRESSURE: 88 MMHG | HEART RATE: 60 BPM | SYSTOLIC BLOOD PRESSURE: 126 MMHG

## 2023-03-29 DIAGNOSIS — G47.33 OBSTRUCTIVE SLEEP APNEA SYNDROME: ICD-10-CM

## 2023-03-29 DIAGNOSIS — R06.09 DYSPNEA ON EXERTION: Primary | ICD-10-CM

## 2023-03-29 DIAGNOSIS — E78.2 MIXED HYPERLIPIDEMIA: ICD-10-CM

## 2023-03-29 DIAGNOSIS — I48.0 PAROXYSMAL ATRIAL FIBRILLATION (HCC): ICD-10-CM

## 2023-03-29 DIAGNOSIS — E06.3 HYPOTHYROIDISM DUE TO HASHIMOTO'S THYROIDITIS: ICD-10-CM

## 2023-03-29 DIAGNOSIS — E66.01 MORBID OBESITY WITH BMI OF 40.0-44.9, ADULT (HCC): ICD-10-CM

## 2023-03-29 DIAGNOSIS — E03.8 HYPOTHYROIDISM DUE TO HASHIMOTO'S THYROIDITIS: ICD-10-CM

## 2023-03-29 DIAGNOSIS — Z86.16 HISTORY OF COVID-19: ICD-10-CM

## 2023-03-29 DIAGNOSIS — I10 ESSENTIAL HYPERTENSION: ICD-10-CM

## 2023-03-29 PROBLEM — R06.83 SNORING: Status: RESOLVED | Noted: 2020-12-04 | Resolved: 2023-03-29

## 2023-03-29 PROBLEM — R79.89 ELEVATED D-DIMER: Status: RESOLVED | Noted: 2020-11-25 | Resolved: 2023-03-29

## 2023-03-29 RX ORDER — DESONIDE 0.5 MG/G
CREAM TOPICAL
COMMUNITY
Start: 2023-03-10

## 2023-03-29 NOTE — ASSESSMENT & PLAN NOTE
Patient presented to [address] [place] [address] ER 3/24/23 for dyspnea on exertion and wheezing since her Covid infection 2/2023  CTA chest was negative for PE but + for bronchitis    She was prescribed prednisone which she has not yet started and an albuterol inhaler  Appt with pulmonology scheduled for the end of April  She does not examine volume overloaded at this time  Weight is stable  Will obtain updated echocardiogram   Reviewed s/s to report - weight gain of 3 pounds in 1 day/5 pounds in 1 week, edema

## 2023-03-29 NOTE — ASSESSMENT & PLAN NOTE
Blood pressure is improved  Home BP cuff accuracy has been verified with excellent home readings  Continue Benicar 40/25 mg daily  Reviewed recent lab results  Encouraged 2 g sodium diet and weight control

## 2023-03-29 NOTE — PROGRESS NOTES
Cardiology Follow Up    Alexander Amador  1955  [id number]  [facility]  [address]  [address] [facility] [id number] [phone]  [phone]    Areatha Jeans presents for a follow up from her ER visit for shortness of breath  1  Dyspnea on exertion  Assessment & Plan:  Patient presented to [facility] ER 3/24/23 for dyspnea on exertion and wheezing since her Covid infection 2/2023  CTA chest was negative for PE but + for bronchitis    She was prescribed prednisone which she has not yet started and an albuterol inhaler  Appt with pulmonology scheduled for the end of April  She does not examine volume overloaded at this time  Weight is stable  Will obtain updated echocardiogram   Reviewed s/s to report - weight gain of 3 pounds in 1 day/5 pounds in 1 week, edema  Orders:  -     Echo complete w/ contrast if indicated; Future; Expected date: 03/29/2023    2  History of COVID-19  Assessment & Plan:  Covid infection 2/2023      3  Paroxysmal atrial fibrillation [facility])  Assessment & Plan:  Patient with new onset atrial fibrillation 11/25/2020  Patient presented to the emergency room after onset of “fluttering” and rapid heart rate  She converted spontaneously to  normal sinus rhythm  She has remained in normal sinus rhythm since and feels well  [id number] Score 3   Eliquis 5 mg twice daily for stroke prophylaxis  No bleeding issues  Aspirin 81 mg daily was discontinued  Echocardiogram and outpatient stress test 12/02/2020 were unremarkable  Home sleep study notable for severe sleep apnea being treated with CPAP  48 hour Holter monitor 6/8/2022 showed NSR with 5 short AT runs  ECG 3/24/2023 shows SB, rate 59 bpm    She reports no recent palpitations  Continue metoprolol succinate 25 mg daily and magnesium supplement  4  Essential hypertension  Assessment & Plan:  Blood pressure is improved    Home BP cuff accuracy has been verified with excellent home readings  Continue Benicar 40/25 mg daily  Reviewed recent lab results  Encouraged 2 g sodium diet and weight control  5  Mixed hyperlipidemia  Assessment & Plan:  Lipid panel 1/3/2023: C 178  T 151  H 71  L 77  Continue atorvastatin 10 mg daily  6  Obstructive sleep apnea syndrome  Assessment & Plan:  Home sleep study 12/2020 revealed severe sleep apnea  Compliant with CPAP therapy  Following with [facility] sleep medicine  7  Morbid obesity with BMI of 40 0-44 9, adult [facility])  Assessment & Plan: Body mass index is 43 98 kg/m²  Patient is scheduled to meet with [facility] weight management  8  Hypothyroidism due to Hashimoto's thyroiditis  Assessment & Plan:  Managed by PCP  Goal TSH 1-2 if possible given PAF  HPI  Roz Milian has a past medical history of paroxysmal atrial fibrillation, HTN, HLD, DEANNA on CPAP, hypothyroidism, anxiety/depression, and obesity      She initially presented to [address] [place] [address] 11/25/2020 with chest pain, palpitations, and shortness of breath  Found to be in atrial fibrillation with RVR, rate 118 bpm  CTA ruled out PE  She was treated with IV Lopressor and Cardiazem and spontaneously converted to NSR  She was evaluated by Dr Paul Vo during admission and ultimately discharged home on metoprolol succinate 25 mg daily and Eliquis 5 mg BID  Her TSH was mildly elevated and levothyroxine increased to 100 mch from 88 mcg during admission  Magnesium supplementation was initiated  She was under significant stress at the time with a recent cancer diagnosis for her spouse  He had just returned home from a complicated bowel surgery  Echo 12/2/2020 was unremarkable  Nuclear stress test 12/2/20 showed no scar or ischemia with EF 73%      She completed a sleep study 3/4/2021 which was positive for DEANNA   She was referred to sleep medicine and underwent in-lab study 4/8/2021 and CPAP therapy was initiated      She called our office 6/7/22 with complaint of increased palpitations  She was brought into the office where an ECG showed SB, rate 58 bpm  She reported that her symptoms felt like PVC's  48 hour Holter monitoring was obtained showing predominantly NSR, HR 47-98, avg 60 bpm with rare PAC's and PVC's  Symptoms were associated with PAC's/PVC's and brief runs of atrial tachycardia  She followed up most recently with Dr Kiersten Adamson on 12/27/2022 at which time her BP was running in the 140's at home  She denied shortness of breath with stable dyspnea on exertion  She denied recent palpitations, lightheadedness, dizziness, or chest pain  Benicar was increased to 40/25 mg daily  She presented to [address] [place] [address] ER 3/24/2023 with dyspnea on exertion with wheezing  She contracted Covid infection in February and noticed symptoms since that time  ECG showed SB, rate 59 bpm with no acute changes    CTA chest showed findings consistent with bronchitis and no PE  She was given a course of prednisone and referral to pulmonology  3/29/2023: Estelle Jean-Baptiste presents today for close follow up  She brings a list of questions regarding her CT, ECG, and lab results  She has not yet received the prednisone as it was sent to her mail order pharmacy  She got notification it should be arriving by tomorrow  She has been using the albuterol inhaler with improvement in her wheezing  She states that she was not feeling terrible but noticed that she was having more dyspnea when climbing the steps or walking around the store than prior to her Covid infection which prompted her to go to the ER  She denies chest pain, baseline shortness of breath, orthopnea, weight gain/edema  She states she has not had palpitations recently  She is using her CPAP faithfully and recently met with sleep medicine  She was referred to weight management      Medical Problems     Problem List     Anxiety and depression    Elevated d-dimer    Snoring    Obesity hypoventilation syndrome (HCC)    Paroxysmal atrial fibrillation ([facility] [address] ) Essential hypertension    Hyperlipidemia    Hypothyroidism due to Hashimoto's thyroiditis    Obstructive sleep apnea syndrome    Morbid obesity with BMI of 40 0-44 9, adult [facility])        Past Medical History:   Diagnosis Date   • Anxiety    • Diabetes mellitus ([facility] [address] )    • Elevated d-dimer     2 seperate episodes of unclear etiology  • Hyperlipidemia    • Hypertension    • Hypothyroid    • Obesity    • Paroxysmal atrial fibrillation ([facility] )     New onset 2020       Social History     Socioeconomic History   • Marital status: /Civil Union     Spouse name: Not on file   • Number of children: Not on file   • Years of education: Not on file   • Highest education level: Not on file   Occupational History   • Occupation: Retired teacher   Tobacco Use   • Smoking status: Never   • Smokeless tobacco: Never   Vaping Use   • Vaping Use: Never used   Substance and Sexual Activity   • Alcohol use: Not Currently   • Drug use: Never   • Sexual activity: Yes     Partners: Male   Other Topics Concern   • Not on file   Social History Narrative   • Not on file     Social Determinants of Health     Financial Resource Strain: Not on file   Food Insecurity: Not on file   Transportation Needs: Not on file   Physical Activity: Not on file   Stress: Not on file   Social Connections: Not on file   Intimate Partner Violence: Not on file   Housing Stability: Not on file      Family History   Problem Relation Age of Onset   • Hypertension Sister    • Heart disease Paternal Aunt    • Other Mother         Encephalitis   • Other Father          in his 80s without significant problems   • Lung cancer Brother    • No Known Problems Brother      Past Surgical History:   Procedure Laterality Date   • CATARACT EXTRACTION, BILATERAL     • HYSTERECTOMY     • KNEE ARTHROPLASTY     • SHOULDER OPEN ROTATOR CUFF REPAIR      x 2   • TONSILLECTOMY         Current Outpatient Medications:   •  ALPRAZolam (XANAX) 0 5 mg tablet, Take by mouth daily at bedtime as needed for anxiety, Disp: , Rfl:   •  atorvastatin (LIPITOR) 10 mg tablet, Take 10 mg by mouth daily, Disp: , Rfl:   •  citalopram (CeleXA) 40 mg tablet, Take 40 mg by mouth daily , Disp: , Rfl:   •  desonide (DESOWEN) 0 05 % cream, , Disp: , Rfl:   •  Eliquis 5 MG, TAKE 1 TABLET TWICE A DAY, Disp: 180 tablet, Rfl: 3  •  erythromycin (ILOTYCIN) ophthalmic ointment, , Disp: , Rfl:   •  levothyroxine 75 mcg tablet, Take 75 mcg by mouth daily , Disp: , Rfl:   •  magnesium oxide (MAG-OX) 400 mg tablet, Take by mouth, Disp: , Rfl:   •  metoprolol succinate (TOPROL-XL) 25 mg 24 hr tablet, Take 1 tablet (25 mg total) by mouth daily, Disp: 90 tablet, Rfl: 3  •  nystatin powder, APPLY 1 APPLICATION TOPICALLY IN THE MORNING, 1 APPLICATION AT NO     (REFER TO PRESCRIPTION NOTES)  , Disp: , Rfl:   •  olmesartan-hydrochlorothiazide (BENICAR HCT) 40-25 MG per tablet, , Disp: , Rfl:   •  meclizine (ANTIVERT) 25 mg tablet, Take 1 tablet (25 mg total) by mouth 3 (three) times a day as needed for dizziness (Patient not taking: Reported on 11/29/2022), Disp: 30 tablet, Rfl: 0  •  predniSONE 20 mg tablet, Take 3 tablets (60 mg total) by mouth daily for 3 days, THEN 2 tablets (40 mg total) daily for 3 days, THEN 1 tablet (20 mg total) daily for 3 days, THEN 0 5 tablets (10 mg total) daily for 3 days   (Patient not taking: Reported on 3/29/2023), Disp: 20 tablet, Rfl: 0  Allergies   Allergen Reactions   • Flagyl [Metronidazole] Shortness Of Breath     Pt requested addition to allergies   • Contrast [Iodinated Contrast Media] Rash   • Iodine - Food Allergy Rash       Labs:     Chemistry        Component Value Date/Time     11/06/2015 0655    K 4 3 03/24/2023 2045    K 3 9 11/06/2015 0655     03/24/2023 2045     11/06/2015 0655    CO2 27 03/24/2023 2045    CO2 26 3 11/06/2015 0655    BUN 20 03/24/2023 2045    BUN 15 11/06/2015 0655    CREATININE 0 99 03/24/2023 2045    CREATININE 0 86 11/06/2015 6201 Component Value Date/Time    CALCIUM 8 8 03/24/2023 2045    CALCIUM 8 7 11/06/2015 0655    ALKPHOS 74 03/24/2023 2045    ALKPHOS 96 11/06/2015 0655    AST 38 03/24/2023 2045    AST 21 11/06/2015 0655    ALT 36 03/24/2023 2045    ALT 32 11/06/2015 0655    BILITOT 0 34 11/06/2015 0655        Lipid panel 1/3/2023: C 178  T 151  H 71  L 77  Cardiac Test Results:   ECG 3/24/2023: Sinus bradycardia  Rate 59 bpm with no significant change from prior      ECG 10/19/2022: Sinus bradycardia  Otherwise normal ECG       Holter monitor (48 hours) 6/8/2022:   Min HR 47  Max HR 98  Avg HR 60  Rare PVC's  Rare PAC's    5 brief atrial runs with the longest lasting 5 beats  Palpitations correlated with PAC's/PVC's and atrial runs       ECG 6/7/2022: Sinus bradycardia  58 bpm       Lipid panel 12/13/2021: C 176  T 177  H 65  L 76       ECG 11/25/2020:  Atrial flutter/atrial fibrillation at 118 bpm      Echocardiogram 12/02/2020:  EF 60%  Normal diastolic function  Mild annular calcification of the mitral valve  Trace tricuspid regurgitation      Lexiscan nuclear stress test 12/02/2020:  No evidence of myocardial scar  No evidence of myocardial ischemia  EF 73%      Lipid panel 10/13/2020: C 166  T 154  H 64  L 71       Echocardiogram 11/05/2015:  EF 55-60%  Mild tricuspid regurgitation  Estimated PASP 38 mmHg  Review of Systems   Constitutional: Negative  Negative for weight gain  HENT: Negative  Cardiovascular: Positive for dyspnea on exertion  Negative for chest pain, irregular heartbeat, leg swelling, near-syncope, orthopnea, palpitations and syncope  Respiratory: Positive for cough and wheezing  Negative for shortness of breath and snoring  Endocrine: Negative  Skin: Negative  Musculoskeletal: Negative  Gastrointestinal: Negative  Genitourinary: Negative  Neurological: Negative  Negative for light-headedness  Psychiatric/Behavioral: Negative          Vitals:    03/29/23 0838   BP: "126/88   Pulse: 60   Temp: 97 7 °F (36 5 °C)   SpO2: 94%     Vitals:    03/29/23 0838   Weight: 127 kg (280 lb 12 8 oz)     Height: 5' 7\" (170 2 cm)   Body mass index is 43 98 kg/m²  Physical Exam  Vitals and nursing note reviewed  Constitutional:       General: She is not in acute distress  Appearance: She is well-developed  She is obese  She is not diaphoretic  HENT:      Head: Normocephalic and atraumatic  Neck:      Thyroid: No thyromegaly  Vascular: No carotid bruit or JVD  Cardiovascular:      Rate and Rhythm: Regular rhythm  Bradycardia present  No extrasystoles are present  Pulses: Intact distal pulses  Radial pulses are 2+ on the right side and 2+ on the left side  Heart sounds: Normal heart sounds, S1 normal and S2 normal  No murmur heard  Pulmonary:      Effort: Pulmonary effort is normal       Breath sounds: Examination of the right-lower field reveals decreased breath sounds  Examination of the left-lower field reveals decreased breath sounds  Decreased breath sounds present  No wheezing or rales  Abdominal:      General: There is no distension  Palpations: Abdomen is soft  Tenderness: There is no abdominal tenderness  Musculoskeletal:         General: Normal range of motion  Cervical back: Normal range of motion and neck supple  Lymphadenopathy:      Cervical: No cervical adenopathy  Skin:     General: Skin is warm and dry  Neurological:      Mental Status: She is alert and oriented to person, place, and time  Cranial Nerves: No cranial nerve deficit  Psychiatric:         Mood and Affect: Mood and affect normal          Speech: Speech normal          Behavior: Behavior normal  Behavior is cooperative           Cognition and Memory: Cognition normal                 "

## 2023-03-29 NOTE — ASSESSMENT & PLAN NOTE
Patient with new onset atrial fibrillation 11/25/2020  Patient presented to the emergency room after onset of “fluttering” and rapid heart rate  She converted spontaneously to  normal sinus rhythm  She has remained in normal sinus rhythm since and feels well  [id number] Score 3   Eliquis 5 mg twice daily for stroke prophylaxis  No bleeding issues  Aspirin 81 mg daily was discontinued  Echocardiogram and outpatient stress test 12/02/2020 were unremarkable  Home sleep study notable for severe sleep apnea being treated with CPAP  48 hour Holter monitor 6/8/2022 showed NSR with 5 short AT runs  ECG 3/24/2023 shows SB, rate 59 bpm    She reports no recent palpitations  Continue metoprolol succinate 25 mg daily and magnesium supplement

## 2023-03-29 NOTE — ASSESSMENT & PLAN NOTE
Home sleep study 12/2020 revealed severe sleep apnea  Compliant with CPAP therapy  Following with [facility] sleep medicine

## 2023-03-29 NOTE — PATIENT INSTRUCTIONS
We will obtain an updated echocardiogram to assess your heart structure and function  Monitor and report weight gain of 3 pounds in 1 day or 5 pounds in 1 week, edema, or any other concerns  Take prednisone with food  Contact us with any concerns  Keep appt with pulmonary and next appt with me

## 2023-04-03 ENCOUNTER — HOSPITAL ENCOUNTER (OUTPATIENT)
Dept: NON INVASIVE DIAGNOSTICS | Facility: HOSPITAL | Age: 68
Discharge: HOME | End: 2023-04-03

## 2023-04-03 VITALS
SYSTOLIC BLOOD PRESSURE: 126 MMHG | HEART RATE: 80 BPM | WEIGHT: 280 LBS | HEIGHT: 67 IN | BODY MASS INDEX: 43.95 KG/M2 | DIASTOLIC BLOOD PRESSURE: 88 MMHG

## 2023-04-03 DIAGNOSIS — R06.09 DYSPNEA ON EXERTION: ICD-10-CM

## 2023-04-03 LAB
AORTIC ROOT: 3.2 CM
APICAL FOUR CHAMBER EJECTION FRACTION: 53 %
E WAVE DECELERATION TIME: 134 MS
FRACTIONAL SHORTENING: 34 % (ref 28–44)
INTERVENTRICULAR SEPTUM IN DIASTOLE (PARASTERNAL SHORT AXIS VIEW): 1.6 CM
INTERVENTRICULAR SEPTUM: 1.6 CM (ref 0.6–1.1)
LAAS-AP2: 15.9 CM2
LAAS-AP4: 11.5 CM2
LEFT ATRIUM SIZE: 4 CM
LEFT INTERNAL DIMENSION IN SYSTOLE: 2.9 CM (ref 2.1–4)
LEFT VENTRICULAR INTERNAL DIMENSION IN DIASTOLE: 4.4 CM (ref 3.5–6)
LEFT VENTRICULAR POSTERIOR WALL IN END DIASTOLE: 1.5 CM
LEFT VENTRICULAR STROKE VOLUME: 58 ML
LVSV (TEICH): 58 ML
MV E'TISSUE VEL-SEP: 11 CM/S
MV PEAK A VEL: 0.7 M/S
MV PEAK E VEL: 69 CM/S
RIGHT ATRIUM AREA SYSTOLE A4C: 13.6 CM2
RIGHT VENTRICLE ID DIMENSION: 2.8 CM
SL CV LEFT ATRIUM LENGTH A2C: 4.7 CM
SL CV LV EF: 70
SL CV PED ECHO LEFT VENTRICLE DIASTOLIC VOLUME (MOD BIPLANE) 2D: 90 ML
SL CV PED ECHO LEFT VENTRICLE SYSTOLIC VOLUME (MOD BIPLANE) 2D: 32 ML
TR MAX PG: 28 MMHG
TR PEAK VELOCITY: 2.7 M/S
TRICUSPID ANNULAR PLANE SYSTOLIC EXCURSION: 2.1 CM
TRICUSPID VALVE PEAK REGURGITATION VELOCITY: 2.66 M/S

## 2023-04-26 ENCOUNTER — CONSULT (OUTPATIENT)
Dept: PULMONOLOGY | Facility: CLINIC | Age: 68
End: 2023-04-26

## 2023-04-26 VITALS
OXYGEN SATURATION: 97 % | HEIGHT: 67 IN | WEIGHT: 285 LBS | SYSTOLIC BLOOD PRESSURE: 122 MMHG | DIASTOLIC BLOOD PRESSURE: 78 MMHG | HEART RATE: 59 BPM | BODY MASS INDEX: 44.73 KG/M2

## 2023-04-26 DIAGNOSIS — R06.02 SHORTNESS OF BREATH: ICD-10-CM

## 2023-04-26 DIAGNOSIS — G47.33 OBSTRUCTIVE SLEEP APNEA SYNDROME: ICD-10-CM

## 2023-04-26 DIAGNOSIS — R06.09 POST-COVID CHRONIC DYSPNEA: Primary | ICD-10-CM

## 2023-04-26 DIAGNOSIS — E66.01 CLASS 3 OBESITY (HCC): ICD-10-CM

## 2023-04-26 DIAGNOSIS — J45.20 MILD INTERMITTENT REACTIVE AIRWAY DISEASE WITHOUT COMPLICATION: ICD-10-CM

## 2023-04-26 DIAGNOSIS — U09.9 POST-COVID CHRONIC DYSPNEA: Primary | ICD-10-CM

## 2023-04-26 PROBLEM — E66.813 CLASS 3 OBESITY: Status: ACTIVE | Noted: 2020-11-25

## 2023-04-26 RX ORDER — FLUTICASONE PROPIONATE AND SALMETEROL 100; 50 UG/1; UG/1
1 POWDER RESPIRATORY (INHALATION) 2 TIMES DAILY
Qty: 60 BLISTER | Refills: 3 | Status: SHIPPED | OUTPATIENT
Start: 2023-04-26 | End: 2023-05-26

## 2023-04-26 RX ORDER — ALBUTEROL SULFATE 90 UG/1
2 AEROSOL, METERED RESPIRATORY (INHALATION) EVERY 6 HOURS PRN
COMMUNITY

## 2023-04-26 RX ORDER — ALBUTEROL SULFATE 90 UG/1
2 AEROSOL, METERED RESPIRATORY (INHALATION) EVERY 6 HOURS PRN
Qty: 18 G | Refills: 2 | Status: SHIPPED | OUTPATIENT
Start: 2023-04-26

## 2023-04-26 NOTE — PROGRESS NOTES
Pulmonary Outpatient Note   Alexander Amador 69 y o  female MRN: [id number]  4/26/2023      Referring Physician: Jaspal Dutton DO    Reason for Consultation:    Chief Complaint   Patient presents with   • Shortness of Breath         Assessment/Plan:    1  Post-COVID chronic dyspnea  Assessment & Plan:  Patient with no previous lung disease has had some dyspnea on exertion, cough, wheeze since COVID in February  She is overall somewhat improving  She uses albuterol and this does provide some relief  Her chest imaging was reviewed as well as her echo- unremarkable  Plan  Check PFTs  Trial of Advair 100-50, 1 puff BID, rinse mouth after use    Follow-up 2-3 months    Orders:  -     Ambulatory Referral to Pulmonology  -     Complete PFT with post bronchodilator; Future    2  Mild intermittent reactive airway disease without complication  -     Complete PFT with post bronchodilator; Future  -     Fluticasone-Salmeterol (Advair Diskus) 100-50 mcg/dose inhaler; Inhale 1 puff 2 (two) times a day Rinse mouth after use  -     albuterol (Ventolin HFA) 90 mcg/act inhaler; Inhale 2 puffs every 6 (six) hours as needed for wheezing    3  Shortness of breath  -     Complete PFT with post bronchodilator; Future    4  Obstructive sleep apnea syndrome  Assessment & Plan:  Follows separately with sleep clinic and is doing well on CPAP  5  Class 3 obesity ([address] )  Assessment & Plan:  Patient reports she is going to weight loss clinic and is motivated to lose weight  She has struggled with eating the last few years in setting of stress from her 's cancer diagnosis            Health Maintenance  Immunization History   Administered Date(s) Administered   • COVID-19 MODERNA VACC 0 5 ML IM 03/31/2021, 03/31/2021, 04/28/2021, 04/28/2021, 11/24/2021   • INFLUENZA 10/29/2018, 10/08/2019, 12/13/2021   • Influenza Injectable, MDCK, Preservative Free, Quadrivalent, 0 5 mL 10/29/2018, 10/29/2018   • Influenza Split High Dose Preservative Free IM 12/03/2020   • Influenza, Seasonal Vaccine, Quadrivalent, Adjuvanted,  5e 12/13/2021   • Influenza, seasonal, injectable, preservative free 02/17/2018   • Pneumococcal Conjugate 13-Valent 11/20/2018   • Pneumococcal Polysaccharide PPV23 02/27/2020          Return in about 3 months (around 7/26/2023)  History of Present Illness   HPI:  Latoya Marshall is a 69 y o  female who presents for shortness of breath, class 3 obesity, atrial fibrillation on eliquis, severe DEANNA on CPAP, hypothyroid, hypertension who presents for shortness of breath  She had COVID in February- was vaccinated with boosters  She had 2 weeks of cough and wheezing  She did not receive Paxlovid or other treatment  Since February- cough lingered for a few weeks  She has been short of breath with minimal exertion since then  Went to ED for shortness of breath 3/24  Had a CTA chest- no PE  Had possible bronchitis at the time per imaging  Given prednisone- stopped after 2 doses due to high blood pressure  Currently she reports that she still has a little shortness of breath but improving  She has mild dry cough, worse in morning  She has occasional wheeze  She has an albuterol inhaler which she has been using every day- around once a day  She is going to a weight loss clinic  No chest pain  No chest tightness  Before COVID never had shortness of breath  Had occasional wheeze before COVID  Has had multiple sinus infections  Pulmonary history:    Childhood lung disease/premature birth: No    Family hx of lung disease:Brother with lung caner    # of ED/hospitalizations this year: 1    Dysphagia/Reflux: No    Leg swelling: No    Exposure history:    Exposure to pets/birds/farm animals: Dog    Medications, herbs, supplements: No    Social history:    Smoking: Never smoker  Alcohol, ilicit drugs (inhalational/ IV): No drugs    Occasional drink- not since 2020    Worked as: Retired- teacher    Occupational gas/asbestos/silica/chemicals/bio-fuels:         Review of Systems   Constitutional: Negative for chills and fever  HENT: Negative for ear pain and sore throat  Eyes: Negative for pain and visual disturbance  Respiratory: Positive for cough, shortness of breath and wheezing  Cardiovascular: Negative for chest pain and palpitations  Gastrointestinal: Negative for abdominal pain and vomiting  Genitourinary: Negative for dysuria and hematuria  Musculoskeletal: Negative for arthralgias and back pain  Skin: Negative for color change and rash  Neurological: Negative for seizures and syncope  All other systems reviewed and are negative  Historical Information   Past Medical History:   Diagnosis Date   • Anxiety    • Diabetes mellitus ([facility] )    • Elevated d-dimer     2 seperate episodes of unclear etiology  • Hyperlipidemia    • Hypertension    • Hypothyroid    • Obesity    • Paroxysmal atrial fibrillation ([facility] )     New onset 2020       Past Surgical History:   Procedure Laterality Date   • CATARACT EXTRACTION, BILATERAL     • HYSTERECTOMY     • KNEE ARTHROPLASTY     • SHOULDER OPEN ROTATOR CUFF REPAIR      x 2   • TONSILLECTOMY       Family History   Problem Relation Age of Onset   • Hypertension Sister    • Heart disease Paternal Aunt    • Other Mother         Encephalitis   • Other Father          in his 80s without significant problems   • Lung cancer Brother    • No Known Problems Brother          Meds/Allergies     Current Outpatient Medications:   •  albuterol (PROVENTIL HFA,VENTOLIN HFA) 90 mcg/act inhaler, Inhale 2 puffs every 6 (six) hours as needed for wheezing, Disp: , Rfl:   •  albuterol (Ventolin HFA) 90 mcg/act inhaler, Inhale 2 puffs every 6 (six) hours as needed for wheezing, Disp: 18 g, Rfl: 2  •  ALPRAZolam (XANAX) 0 5 mg tablet, Take by mouth daily at bedtime as needed for anxiety, Disp: , Rfl:   •  atorvastatin (LIPITOR) 10 mg tablet, Take "10 mg by mouth daily, Disp: , Rfl:   •  citalopram (CeleXA) 40 mg tablet, Take 40 mg by mouth daily , Disp: , Rfl:   •  desonide (DESOWEN) 0 05 % cream, , Disp: , Rfl:   •  Eliquis 5 MG, TAKE 1 TABLET TWICE A DAY, Disp: 180 tablet, Rfl: 3  •  erythromycin (ILOTYCIN) ophthalmic ointment, , Disp: , Rfl:   •  Fluticasone-Salmeterol (Advair Diskus) 100-50 mcg/dose inhaler, Inhale 1 puff 2 (two) times a day Rinse mouth after use , Disp: 60 blister, Rfl: 3  •  levothyroxine 75 mcg tablet, Take 75 mcg by mouth daily , Disp: , Rfl:   •  magnesium oxide (MAG-OX) 400 mg tablet, Take by mouth, Disp: , Rfl:   •  metoprolol succinate (TOPROL-XL) 25 mg 24 hr tablet, Take 1 tablet (25 mg total) by mouth daily, Disp: 90 tablet, Rfl: 3  •  nystatin powder, APPLY 1 APPLICATION TOPICALLY IN THE MORNING, 1 APPLICATION AT NO     (REFER TO PRESCRIPTION NOTES)  , Disp: , Rfl:   •  olmesartan-hydrochlorothiazide (BENICAR HCT) 40-25 MG per tablet, , Disp: , Rfl:   •  meclizine (ANTIVERT) 25 mg tablet, Take 1 tablet (25 mg total) by mouth 3 (three) times a day as needed for dizziness (Patient not taking: Reported on 4/26/2023), Disp: 30 tablet, Rfl: 0  Allergies   Allergen Reactions   • Flagyl [Metronidazole] Shortness Of Breath     Pt requested addition to allergies   • Contrast [Iodinated Contrast Media] Rash   • Iodine - Food Allergy Rash       Vitals: Blood pressure 122/78, pulse 59, height 5' 7\" (1 702 m), weight 129 kg (285 lb), SpO2 97 %  Body mass index is 44 64 kg/m²  Oxygen Therapy  SpO2: 97 %  Oxygen Therapy: None (Room air)      Physical Exam  Physical Exam  Vitals and nursing note reviewed  Constitutional:       General: She is not in acute distress  Appearance: She is well-developed  She is obese  HENT:      Head: Normocephalic and atraumatic  Eyes:      Conjunctiva/sclera: Conjunctivae normal    Cardiovascular:      Rate and Rhythm: Normal rate and regular rhythm  Heart sounds: No murmur heard    Pulmonary:      " Effort: Pulmonary effort is normal  No respiratory distress  Breath sounds: Normal breath sounds  Abdominal:      Palpations: Abdomen is soft  Tenderness: There is no abdominal tenderness  Musculoskeletal:         General: No swelling  Cervical back: Neck supple  Right lower leg: No edema  Left lower leg: No edema  Skin:     General: Skin is warm and dry  Capillary Refill: Capillary refill takes less than 2 seconds  Neurological:      Mental Status: She is alert  Psychiatric:         Mood and Affect: Mood normal          Labs: I have personally reviewed pertinent lab results  ABG: No results found for: PHART, KUT2SEU, PO2ART, ELW6ENZ, Y1CAHNPT, BEART, SOURCE,   BNP:   Lab Results   Component Value Date     (H) 03/24/2023   ,   CBC:  Lab Results   Component Value Date    WBC 8 52 03/24/2023    HGB 12 0 03/24/2023    HCT 37 4 03/24/2023    MCV 90 03/24/2023     03/24/2023    EOSPCT 3 03/24/2023    EOSABS 0 28 03/24/2023    NEUTOPHILPCT 61 03/24/2023    LYMPHOPCT 25 03/24/2023   ,   CMP:   Lab Results   Component Value Date    SODIUM 140 03/24/2023    K 4 3 03/24/2023     03/24/2023    CO2 27 03/24/2023    ANIONGAP 11 11/06/2015    BUN 20 03/24/2023    CREATININE 0 99 03/24/2023    GLUCOSE 83 11/06/2015    CALCIUM 8 8 03/24/2023    AST 38 03/24/2023    ALT 36 03/24/2023    ALKPHOS 74 03/24/2023    PROT 7 3 11/06/2015    BILITOT 0 34 11/06/2015    EGFR 58 03/24/2023   ,   PT/INR:   Lab Results   Component Value Date    INR 1 23 (H) 10/19/2022   ,   Troponin:   Lab Results   Component Value Date    TROPONINI <0 02 07/21/2021         Imaging and other studies: I have personally reviewed pertinent reports     and I have personally reviewed pertinent films in PACS    CTA Chest 3/24/23  Lungs are in expiration with areas of mosaic attenuation  Mildly limited evaluation, no evidence of pulmonary embolism  Possible left lower lobe airway debris, can be seen with bronchitis  Hepatic steatosis    CTA Chest 10/19/22  No evidence of pulmonary artery embolus  Lungs are clear  There is no tracheal or endobronchial lesion  Pulmonary function testing:   Pulmonary Functions Testing Results:    No results found for: FEV1, FVC, XBI5IDB, TLC, DLCO        EKG, Pathology, and Other Studies: I have personally reviewed pertinent reports  TTE 4/3/23    Left Ventricle: Left ventricular cavity size is normal  Wall thickness is normal  There is mild concentric hypertrophy  The left ventricular ejection fraction is 70%  Systolic function is normal  Wall motion is normal  Diastolic function is normal   •  Right Ventricle: Right ventricular cavity size is normal  Systolic function is normal  Wall thickness is mildly increased      CPAP Study 4/2021  1  Previously confirmed sleep disordered breathing that is successfully treated with positive airway pressure at an optimal setting of 9 cm H2O    2  Sleep maintenance insomnia    Home study 3/6/21  Impression  1  Severe obstructive sleep apnea   2  Severe hypoxia     [facility]

## 2023-04-26 NOTE — ASSESSMENT & PLAN NOTE
Patient reports she is going to weight loss clinic and is motivated to lose weight  She has struggled with eating the last few years in setting of stress from her 's cancer diagnosis

## 2023-04-26 NOTE — ASSESSMENT & PLAN NOTE
Patient with no previous lung disease has had some dyspnea on exertion, cough, wheeze since COVID in February  She is overall somewhat improving  She uses albuterol and this does provide some relief  Her chest imaging was reviewed as well as her echo- unremarkable      Plan  Check PFTs  Trial of Advair 100-50, 1 puff BID, rinse mouth after use    Follow-up 2-3 months

## 2023-05-04 ENCOUNTER — HOSPITAL ENCOUNTER (OUTPATIENT)
Dept: PULMONOLOGY | Facility: HOSPITAL | Age: 68
End: 2023-05-04
Attending: INTERNAL MEDICINE

## 2023-05-04 DIAGNOSIS — R06.02 SHORTNESS OF BREATH: ICD-10-CM

## 2023-05-04 DIAGNOSIS — R06.09 POST-COVID CHRONIC DYSPNEA: ICD-10-CM

## 2023-05-04 DIAGNOSIS — J45.20 MILD INTERMITTENT REACTIVE AIRWAY DISEASE WITHOUT COMPLICATION: ICD-10-CM

## 2023-05-04 DIAGNOSIS — U09.9 POST-COVID CHRONIC DYSPNEA: ICD-10-CM

## 2023-05-04 RX ORDER — ALBUTEROL SULFATE 2.5 MG/3ML
2.5 SOLUTION RESPIRATORY (INHALATION) ONCE AS NEEDED
Status: COMPLETED | OUTPATIENT
Start: 2023-05-04 | End: 2023-05-04

## 2023-05-04 RX ADMIN — ALBUTEROL SULFATE 2.5 MG: 2.5 SOLUTION RESPIRATORY (INHALATION) at 08:11

## 2023-05-08 ENCOUNTER — OFFICE VISIT (OUTPATIENT)
Dept: URGENT CARE | Facility: CLINIC | Age: 68
End: 2023-05-08

## 2023-05-08 VITALS
WEIGHT: 285 LBS | DIASTOLIC BLOOD PRESSURE: 74 MMHG | TEMPERATURE: 99.4 F | RESPIRATION RATE: 16 BRPM | OXYGEN SATURATION: 96 % | HEIGHT: 67 IN | BODY MASS INDEX: 44.73 KG/M2 | HEART RATE: 79 BPM | SYSTOLIC BLOOD PRESSURE: 136 MMHG

## 2023-05-08 DIAGNOSIS — H93.A2 PULSATILE TINNITUS OF LEFT EAR: Primary | ICD-10-CM

## 2023-05-08 NOTE — PROGRESS NOTES
[facility]        NAME: Martha Houser is a 69 y o  female  : 1955    MRN: [id number]  DATE: May 8, 2023  TIME: 5:27 PM    Assessment and Plan   Pulsatile tinnitus of left ear [Q44  A2]  1  Pulsatile tinnitus of left ear          No sign of OM today  Unclear etiology for patient's symptoms however encouraged to check blood pressure when symptoms occurring  Follow up with ENT  Follow up with PCP in 3-5 days or proceed to emergency department for worsening symptoms  Patient verbalized understanding of instructions given  Patient Instructions     Patient Instructions   No sign of ear infection  Monitor blood pressure when symptoms occur  OTC Tylenol as needed for pain  Follow up with ENT   Follow up with PCP in 3-5 days  Proceed to  ER if symptoms worsen  Tinnitus   AMBULATORY CARE:   Tinnitus  is when you hear ringing, clicking, buzzing, or hissing in one or both ears  You may also hear whistling, chirping, or pulsing  It may be soft or loud, and at a low or high pitch  Tinnitus that lasts for longer than 6 months is considered chronic  Tinnitus may be caused by problems with your hearing system, including the parts of your brain that sort out sounds  Tinnitus may also be caused by a health condition, such as Ménière disease  Call 911 if:   • You feel like hurting yourself or others because of the constant noise  Contact your healthcare provider if:   • You have headaches  • You are tired and have trouble concentrating or remembering things  • You have more anxiety or stress than usual     • You have deep sadness or depression  • You have trouble falling asleep or staying asleep  • Your symptoms do not go away or they get worse  • You have questions or concerns about your condition or care  Treatment for tinnitus  may not be needed  Your symptoms may only appear when you are anxious or stressed   Your healthcare provider may stop certain medicines that may be causing your tinnitus  You may also need medicines to help decrease your symptoms  The following can help treat or manage tinnitus:  • Counseling  can help you learn ways to relax, decrease stress, and make your tinnitus less noticeable  • Cognitive behavioral therapy  helps you understand your condition  Your therapist will help you learn to cope with tinnitus  You may also learn new ways to relax and retrain your behavior to decrease your symptoms  • Sound therapy,  such as white noise machines, may help cover your tinnitus with a pleasant sound  Sound therapy devices can help you fall asleep or help you relax  These devices can be worn in your ear or placed next to your bed at night  • Hearing aids or cochlear implants  may help if you have hearing loss  • Surgery  may be needed if your tinnitus is caused by abnormal blood vessels or a mass  • Do not smoke  Nicotine decreases blood flow to your ear and can make your tinnitus worse  Do not use e-cigarettes or smokeless tobacco in place of cigarettes or to help you quit  They still contain nicotine  Ask your healthcare provider for information if you currently smoke and need help quitting  • Decrease how much alcohol and caffeine you drink  Alcohol and caffeine can make your tinnitus worse  Prevent tinnitus:   • Avoid exposure to loud noise,  such as loud music or power tools  Occasional exposure can still cause tinnitus  Move away from the noise or turn down the volume  • Wear ear protection  when you are exposed to loud noises  Good ear protection includes ear plugs or headphones that reduce noise  Follow up with your doctor as directed:  Write down your questions so you remember to ask them during your visits  © Copyright Lora [employer] 2022 Information is for End User's use only and may not be sold, redistributed or otherwise used for commercial purposes  The above information is an  only   It is not intended as medical "advice for individual conditions or treatments  Talk to your doctor, nurse or pharmacist before following any medical regimen to see if it is safe and effective for you  Chief Complaint     Chief Complaint   Patient presents with   • Earache     Feels a pulsating noise in left ear x 2 days  Feels like  a butterfly is flying in left ear         History of Present Illness       68-year-old female with a past medical history significant for hypertension, A-fib (Eliquis), anxiety, and depression presents with complaints of left-sided ear pulsating sensation x3 days  Patient states that symptoms will be intermittent  She describes it as a \"fluttering \"sensation  Denies fever, chills, nasal congestion, cough, or ear discharge  She has not tried any OTC medications  States she has been monitoring her blood pressure at home but never measured when symptoms occurring  Called ENT today but unable to be seen  She is anxious as she is to be out of town with her  on Wednesday for an operation  + stress  Review of Systems   Review of Systems   Constitutional: Negative for chills and fever  HENT: Positive for tinnitus  Negative for congestion, ear discharge, ear pain, hearing loss, rhinorrhea, sore throat, trouble swallowing and voice change  Eyes: Negative for discharge  Respiratory: Negative for cough and shortness of breath  Cardiovascular: Negative for chest pain  Gastrointestinal: Negative for abdominal pain, diarrhea, nausea and vomiting  Musculoskeletal: Negative for myalgias  Skin: Negative for rash           Current Medications       Current Outpatient Medications:   •  albuterol (PROVENTIL HFA,VENTOLIN HFA) 90 mcg/act inhaler, Inhale 2 puffs every 6 (six) hours as needed for wheezing, Disp: , Rfl:   •  albuterol (Ventolin HFA) 90 mcg/act inhaler, Inhale 2 puffs every 6 (six) hours as needed for wheezing, Disp: 18 g, Rfl: 2  •  ALPRAZolam (XANAX) 0 5 mg tablet, Take by mouth " daily at bedtime as needed for anxiety, Disp: , Rfl:   •  atorvastatin (LIPITOR) 10 mg tablet, Take 10 mg by mouth daily, Disp: , Rfl:   •  citalopram (CeleXA) 40 mg tablet, Take 40 mg by mouth daily , Disp: , Rfl:   •  desonide (DESOWEN) 0 05 % cream, , Disp: , Rfl:   •  Eliquis 5 MG, TAKE 1 TABLET TWICE A DAY, Disp: 180 tablet, Rfl: 3  •  erythromycin (ILOTYCIN) ophthalmic ointment, , Disp: , Rfl:   •  Fluticasone-Salmeterol (Advair Diskus) 100-50 mcg/dose inhaler, Inhale 1 puff 2 (two) times a day Rinse mouth after use , Disp: 60 blister, Rfl: 3  •  levothyroxine 75 mcg tablet, Take 75 mcg by mouth daily , Disp: , Rfl:   •  magnesium oxide (MAG-OX) 400 mg tablet, Take by mouth, Disp: , Rfl:   •  metoprolol succinate (TOPROL-XL) 25 mg 24 hr tablet, Take 1 tablet (25 mg total) by mouth daily, Disp: 90 tablet, Rfl: 3  •  nystatin powder, APPLY 1 APPLICATION TOPICALLY IN THE MORNING, 1 APPLICATION AT NO     (REFER TO PRESCRIPTION NOTES)  , Disp: , Rfl:   •  olmesartan-hydrochlorothiazide (BENICAR HCT) 40-25 MG per tablet, , Disp: , Rfl:   •  meclizine (ANTIVERT) 25 mg tablet, Take 1 tablet (25 mg total) by mouth 3 (three) times a day as needed for dizziness (Patient not taking: Reported on 4/26/2023), Disp: 30 tablet, Rfl: 0    Current Allergies     Allergies as of 05/08/2023 - Reviewed 05/08/2023   Allergen Reaction Noted   • Flagyl [metronidazole] Shortness Of Breath 11/29/2022   • Contrast [iodinated contrast media] Rash 12/21/2018   • Iodine - food allergy Rash 02/17/2018            The following portions of the patient's history were reviewed and updated as appropriate: allergies, current medications, past family history, past medical history, past social history, past surgical history and problem list      Past Medical History:   Diagnosis Date   • Anxiety    • Diabetes mellitus ([facility] )    • Disease of thyroid gland    • Elevated d-dimer     2 seperate episodes of unclear etiology     • Hyperlipidemia    • "Hypertension    • Hypothyroid    • Obesity    • Paroxysmal atrial fibrillation ([facility] )     New onset 2020  Past Surgical History:   Procedure Laterality Date   • CATARACT EXTRACTION, BILATERAL     • HYSTERECTOMY     • KNEE ARTHROPLASTY     • SHOULDER OPEN ROTATOR CUFF REPAIR      x 2   • TONSILLECTOMY         Family History   Problem Relation Age of Onset   • Hypertension Sister    • Heart disease Paternal Aunt    • Other Mother         Encephalitis   • Other Father          in his 80s without significant problems   • Lung cancer Brother    • No Known Problems Brother          Medications have been verified  Objective   /74   Pulse 79   Temp 99 4 °F (37 4 °C)   Resp 16   Ht 5' 7\" (1 702 m)   Wt 129 kg (285 lb)   LMP  (LMP Unknown)   SpO2 96%   BMI 44 64 kg/m²   No LMP recorded (lmp unknown)  Patient has had a hysterectomy  Physical Exam     Physical Exam  Vitals and nursing note reviewed  Constitutional:       General: She is not in acute distress  Appearance: She is not toxic-appearing  HENT:      Head: Normocephalic  Right Ear: Tympanic membrane, ear canal and external ear normal       Left Ear: Tympanic membrane, ear canal and external ear normal       Nose: Nose normal       Mouth/Throat:      Mouth: Mucous membranes are moist       Pharynx: Oropharynx is clear  Eyes:      Conjunctiva/sclera: Conjunctivae normal    Cardiovascular:      Rate and Rhythm: Normal rate and regular rhythm  Heart sounds: Normal heart sounds  Pulmonary:      Effort: Pulmonary effort is normal  No respiratory distress  Breath sounds: Normal breath sounds  No stridor  No wheezing, rhonchi or rales  Skin:     General: Skin is warm and dry  Neurological:      Mental Status: She is alert and oriented to person, place, and time  Gait: Gait is intact     Psychiatric:         Mood and Affect: Mood normal          Behavior: Behavior normal                    "

## 2023-06-19 DIAGNOSIS — I48.0 PAROXYSMAL ATRIAL FIBRILLATION (HCC): ICD-10-CM

## 2023-06-19 RX ORDER — METOPROLOL SUCCINATE 25 MG/1
25 TABLET, EXTENDED RELEASE ORAL DAILY
Qty: 90 TABLET | Refills: 3 | Status: SHIPPED | OUTPATIENT
Start: 2023-06-19 | End: 2024-06-13

## 2023-07-11 ENCOUNTER — TELEPHONE (OUTPATIENT)
Dept: CARDIOLOGY CLINIC | Facility: CLINIC | Age: 68
End: 2023-07-11

## 2023-07-11 DIAGNOSIS — I48.0 PAROXYSMAL ATRIAL FIBRILLATION (HCC): ICD-10-CM

## 2023-07-11 RX ORDER — METOPROLOL SUCCINATE 25 MG/1
25 TABLET, EXTENDED RELEASE ORAL DAILY
Qty: 90 TABLET | Refills: 3 | Status: SHIPPED | OUTPATIENT
Start: 2023-07-11 | End: 2024-07-05

## 2023-07-11 NOTE — TELEPHONE ENCOUNTER
Pt needs [facility]ol.   Please call pt if there is any issue

## 2023-07-27 ENCOUNTER — OFFICE VISIT (OUTPATIENT)
Dept: CARDIOLOGY CLINIC | Facility: CLINIC | Age: 68
End: 2023-07-27
Payer: MEDICARE

## 2023-07-27 VITALS
SYSTOLIC BLOOD PRESSURE: 138 MMHG | TEMPERATURE: 95.7 F | HEART RATE: 61 BPM | DIASTOLIC BLOOD PRESSURE: 80 MMHG | OXYGEN SATURATION: 96 % | HEIGHT: 67 IN | WEIGHT: 283.4 LBS | BODY MASS INDEX: 44.48 KG/M2

## 2023-07-27 DIAGNOSIS — U09.9 POST-COVID CHRONIC DYSPNEA: ICD-10-CM

## 2023-07-27 DIAGNOSIS — I48.0 PAROXYSMAL ATRIAL FIBRILLATION (HCC): Primary | ICD-10-CM

## 2023-07-27 DIAGNOSIS — E66.01 CLASS 3 OBESITY (HCC): ICD-10-CM

## 2023-07-27 DIAGNOSIS — E06.3 HYPOTHYROIDISM DUE TO HASHIMOTO'S THYROIDITIS: ICD-10-CM

## 2023-07-27 DIAGNOSIS — G47.33 OBSTRUCTIVE SLEEP APNEA SYNDROME: ICD-10-CM

## 2023-07-27 DIAGNOSIS — I10 ESSENTIAL HYPERTENSION: ICD-10-CM

## 2023-07-27 DIAGNOSIS — R06.09 POST-COVID CHRONIC DYSPNEA: ICD-10-CM

## 2023-07-27 DIAGNOSIS — E78.2 MIXED HYPERLIPIDEMIA: ICD-10-CM

## 2023-07-27 DIAGNOSIS — E03.8 HYPOTHYROIDISM DUE TO HASHIMOTO'S THYROIDITIS: ICD-10-CM

## 2023-07-27 PROCEDURE — 99214 OFFICE O/P EST MOD 30 MIN: CPT | Performed by: NURSE PRACTITIONER

## 2023-07-27 NOTE — ASSESSMENT & PLAN NOTE
Patient with new onset atrial fibrillation 11/25/2020. Patient presented to the emergency room after onset of “fluttering” and rapid heart rate. She converted spontaneously to  normal sinus rhythm. She has remained in normal sinus rhythm since and feels well. [id number] Score 3   Eliquis 5 mg twice daily for stroke prophylaxis. Home sleep study notable for severe sleep apnea being treated with CPAP. 48 hour Holter monitor 6/8/2022 showed NSR with 5 short AT runs. Echocardiogram 4/3/2023 with preserved LVEF and no significant valvular abnormality. She reports no recent palpitations. Continue metoprolol succinate 25 mg daily and magnesium supplement.

## 2023-07-27 NOTE — PATIENT INSTRUCTIONS
Your echocardiogram shows normal heart structure and function. Continue current regimen. Contact us with any concerns.

## 2023-07-27 NOTE — ASSESSMENT & PLAN NOTE
Home sleep study 12/2020 revealed severe sleep apnea  Compliant with CPAP therapy. Following with [facility] sleep medicine.

## 2023-07-27 NOTE — ASSESSMENT & PLAN NOTE
Body mass index is 44.39 kg/m². Reviewed dietary and exercise modifications. She will be meeting with weight management through [facility].

## 2023-07-27 NOTE — PROGRESS NOTES
Cardiology Follow Up    Jacob Rhodes  1955  [id number]  [facility] [place] [address] [facility] [address] [facility] [id number] [phone]    Sirena Stauffer presents for routine follow up of paroxysmal a fib, HTN, HLD.    1. Paroxysmal atrial fibrillation [facility])  Assessment & Plan:  Patient with new onset atrial fibrillation 11/25/2020. Patient presented to the emergency room after onset of “fluttering” and rapid heart rate. She converted spontaneously to  normal sinus rhythm. She has remained in normal sinus rhythm since and feels well. [id number] Score 3   Eliquis 5 mg twice daily for stroke prophylaxis. Home sleep study notable for severe sleep apnea being treated with CPAP. 48 hour Holter monitor 6/8/2022 showed NSR with 5 short AT runs. Echocardiogram 4/3/2023 with preserved LVEF and no significant valvular abnormality. She reports no recent palpitations. Continue metoprolol succinate 25 mg daily and magnesium supplement. Orders:  -     Basic metabolic panel; Future; Expected date: 09/04/2023  -     apixaban (Eliquis) 5 mg; Take 1 tablet (5 mg total) by mouth 2 (two) times a day    2. Essential hypertension  Assessment & Plan:  Blood pressure is improved my recheck today. Home BP cuff accuracy has been verified with excellent home readings. Continue Benicar 40/25 mg daily. BMP in September. Encouraged 2 g sodium diet and weight control. 3. Mixed hyperlipidemia  Assessment & Plan:  Lipid panel 1/3/2023: C 178. T 151. H 71. L 77. Continue atorvastatin 10 mg daily. 4. Obstructive sleep apnea syndrome  Assessment & Plan:  Home sleep study 12/2020 revealed severe sleep apnea  Compliant with CPAP therapy. Following with [facility] sleep medicine. 5. Hypothyroidism due to Hashimoto's thyroiditis  Assessment & Plan:  Managed by PCP. Goal TSH 1-2 if possible given PAF. 6. Class 3 obesity (HCC)  Assessment & Plan:   Body mass index is 44.39 kg/m². Reviewed dietary and exercise modifications. She will be meeting with weight management through [facility]. 7. Post-COVID chronic dyspnea  Assessment & Plan:  Working with [facility] pulmonology       HPI  Estelle Posey has a past medical history of paroxysmal atrial fibrillation, HTN, HLD, DEANNA on CPAP, hypothyroidism, anxiety/depression, and obesity.     She initially presented to [address] [place] [address] 11/25/2020 with chest pain, palpitations, and shortness of breath. Found to be in atrial fibrillation with RVR, rate 118 bpm. CTA ruled out PE. She was treated with IV Lopressor and Cardiazem and spontaneously converted to NSR. She was evaluated by Dr. Alli Jane during admission and ultimately discharged home on metoprolol succinate 25 mg daily and Eliquis 5 mg BID. Her TSH was mildly elevated and levothyroxine increased to 100 mch from 88 mcg during admission. Magnesium supplementation was initiated. She was under significant stress at the time with a recent cancer diagnosis for her spouse. He had just returned home from a complicated bowel surgery.     Echo 12/2/2020 was unremarkable. Nuclear stress test 12/2/20 showed no scar or ischemia with EF 73%.     She completed a sleep study 3/4/2021 which was positive for DEANNA. She was referred to sleep medicine and underwent in-lab study 4/8/2021 and CPAP therapy was initiated.     She called our office 6/7/22 with complaint of increased palpitations. She was brought into the office where an ECG showed SB, rate 58 bpm. She reported that her symptoms felt like PVC's. 48 hour Holter monitoring was obtained showing predominantly NSR, HR 47-98, avg 60 bpm with rare PAC's and PVC's. Symptoms were associated with PAC's/PVC's and brief runs of atrial tachycardia.     She followed up with Dr. Alli Jane on 12/27/2022 at which time her BP was running in the 140's at home.   Benicar was increased to 40/25 mg daily.     She presented to [address] [place] [address] ER 3/24/2023 with dyspnea on exertion with wheezing. She contracted Covid infection in February and noticed symptoms since that time. ECG showed SB, rate 59 bpm with no acute changes. . CTA chest showed findings consistent with bronchitis and no PE. She was given a course of prednisone and referral to pulmonology.      She followed up with me 3/29/2023 where an echocardiogram was ordered. She was scheduled to meet with pulmonology in April. Echocardiogram 4/3/2023 showed preserved LVEF with no concerning findings. 7/27/2023: Jeremiah Gaines presents today for routine follow up. We reviewed the results of her echocardiogram in detail. She met with pulmonology and underwent PFT testing which showed some restrictive disease but no other findings. She started Advair and albuterol with improvement in symptoms. She states her dyspnea is much improved. She reports no chest pain, lightheadedness/dizziness. Rare palpitations - only 2 episodes associated with her CPAP falling off. She has been using her CPAP faithfully. Occasional lower leg swelling associated with the warm weather. She is monitoring her BP at home with readings 120-130's/70's. No unusual bleeding or bruising. Medical Problems     Problem List     Anxiety and depression    Obesity hypoventilation syndrome (HCC)    Paroxysmal atrial fibrillation ([address])    Essential hypertension    Hyperlipidemia    Hypothyroidism due to Hashimoto's thyroiditis    Obstructive sleep apnea syndrome    Class 3 obesity (HCC)    Post-COVID chronic dyspnea    History of COVID-19    Reactive airway disease    Shortness of breath        Past Medical History:   Diagnosis Date   • Anxiety    • Diabetes mellitus ([address])    • Disease of thyroid gland    • Elevated d-dimer     2 seperate episodes of unclear etiology. • Hyperlipidemia    • Hypertension    • Hypothyroid    • Obesity    • Paroxysmal atrial fibrillation ([address])     New onset 11/25/2020.      Social History     Socioeconomic History   • Marital status: /Civil [employer]     Spouse name: Not on file   • Number of children: Not on file   • Years of education: Not on file   • Highest education level: Not on file   Occupational History   • Occupation: Retired teacher   Tobacco Use   • Smoking status: Never   • Smokeless tobacco: Never   Vaping Use   • Vaping Use: Never used   Substance and Sexual Activity   • Alcohol use: Not Currently   • Drug use: Never   • Sexual activity: Yes     Partners: Male   Other Topics Concern   • Not on file   Social History Narrative   • Not on file     Social Determinants of Health     Financial Resource Strain: Not on file   Food Insecurity: Not on file   Transportation Needs: Not on file   Physical Activity: Not on file   Stress: Not on file   Social Connections: Not on file   Intimate Partner Violence: Not on file   Housing Stability: Not on file      Family History   Problem Relation Age of Onset   • Hypertension Sister    • Heart disease Paternal Aunt    • Other Mother         Encephalitis   • Other Father          in his 80s without significant problems   • Lung cancer Brother    • No Known Problems Brother      Past Surgical History:   Procedure Laterality Date   • CATARACT EXTRACTION, BILATERAL     • HYSTERECTOMY     • KNEE ARTHROPLASTY     • SHOULDER OPEN ROTATOR CUFF REPAIR      x 2   • TONSILLECTOMY         Current Outpatient Medications:   •  albuterol (PROVENTIL HFA,VENTOLIN HFA) 90 mcg/act inhaler, Inhale 2 puffs every 6 (six) hours as needed for wheezing, Disp: , Rfl:   •  ALPRAZolam (XANAX) 0.5 mg tablet, Take by mouth daily at bedtime as needed for anxiety, Disp: , Rfl:   •  apixaban (Eliquis) 5 mg, Take 1 tablet (5 mg total) by mouth 2 (two) times a day, Disp: 180 tablet, Rfl: 3  •  atorvastatin (LIPITOR) 10 mg tablet, Take 10 mg by mouth daily, Disp: , Rfl:   •  citalopram (CeleXA) 40 mg tablet, Take 40 mg by mouth daily , Disp: , Rfl:   •  desonide (DESOWEN) 0.05 % cream, , Disp: , Rfl:   •  erythromycin (ILOTYCIN) ophthalmic ointment, , Disp: , Rfl:   •  Fluticasone-Salmeterol (Advair Diskus) 100-50 mcg/dose inhaler, Inhale 1 puff 2 (two) times a day Rinse mouth after use., Disp: 60 blister, Rfl: 3  •  levothyroxine 75 mcg tablet, Take 75 mcg by mouth daily , Disp: , Rfl:   •  magnesium oxide (MAG-OX) 400 mg tablet, Take by mouth, Disp: , Rfl:   •  metoprolol succinate (TOPROL-XL) 25 mg 24 hr tablet, Take 1 tablet (25 mg total) by mouth daily, Disp: 90 tablet, Rfl: 3  •  nystatin powder, APPLY 1 APPLICATION TOPICALLY IN THE MORNING, 1 APPLICATION AT NO. ..  (REFER TO PRESCRIPTION NOTES). , Disp: , Rfl:   •  olmesartan-hydrochlorothiazide (BENICAR HCT) 40-25 MG per tablet, , Disp: , Rfl:   •  albuterol (Ventolin HFA) 90 mcg/act inhaler, Inhale 2 puffs every 6 (six) hours as needed for wheezing (Patient not taking: Reported on 7/27/2023), Disp: 18 g, Rfl: 2  •  meclizine (ANTIVERT) 25 mg tablet, Take 1 tablet (25 mg total) by mouth 3 (three) times a day as needed for dizziness (Patient not taking: Reported on 7/27/2023), Disp: 30 tablet, Rfl: 0  Allergies   Allergen Reactions   • Flagyl [Metronidazole] Shortness Of Breath     Pt requested addition to allergies   • Contrast [Iodinated Contrast Media] Rash   • Iodine - Food Allergy Rash       Labs:     Chemistry        Component Value Date/Time     11/06/2015 0655    K 4.3 03/24/2023 2045    K 3.9 11/06/2015 0655     03/24/2023 2045     11/06/2015 0655    CO2 27 03/24/2023 2045    CO2 26.3 11/06/2015 0655    BUN 20 03/24/2023 2045    BUN 15 11/06/2015 0655    CREATININE 0.99 03/24/2023 2045    CREATININE 0.86 11/06/2015 0655        Component Value Date/Time    CALCIUM 8.8 03/24/2023 2045    CALCIUM 8.7 11/06/2015 0655    ALKPHOS 74 03/24/2023 2045    ALKPHOS 96 11/06/2015 0655    AST 38 03/24/2023 2045    AST 21 11/06/2015 0655    ALT 36 03/24/2023 2045    ALT 32 11/06/2015 0655    BILITOT 0.34 11/06/2015 0655        Lipid panel 1/3/2023: C 178. T 151. H 71. L 77.     Cardiac Test Results:   Echocardiogram 4/3/2023:  EF 70%. Mild LVH. ECG 3/24/2023: Sinus bradycardia. Rate 59 bpm with no significant change from prior.     ECG 10/19/2022: Sinus bradycardia. Otherwise normal ECG.      Holter monitor (48 hours) 6/8/2022:   Min HR 47. Max HR 98. Avg HR 60. Rare PVC's. Rare PAC's.   5 brief atrial runs with the longest lasting 5 beats. Palpitations correlated with PAC's/PVC's and atrial runs.      ECG 6/7/2022: Sinus bradycardia. 58 bpm.      Lipid panel 12/13/2021: C 176. T 177. H 65. L 76.      ECG 11/25/2020:  Atrial flutter/atrial fibrillation at 118 bpm.     Echocardiogram 12/02/2020:  EF 60%. Normal diastolic function. Mild annular calcification of the mitral valve. Trace tricuspid regurgitation.     Lexiscan nuclear stress test 12/02/2020:  No evidence of myocardial scar. No evidence of myocardial ischemia. EF 73%.     Lipid panel 10/13/2020: C 166. T 154. H 64. L 71.      Echocardiogram 11/05/2015:  EF 55-60%. Mild tricuspid regurgitation. Estimated PASP 38 mmHg. Review of Systems   Constitutional: Negative. HENT: Negative. Cardiovascular: Positive for dyspnea on exertion, leg swelling and palpitations. Negative for chest pain, irregular heartbeat, near-syncope and orthopnea. Respiratory: Negative for cough and snoring. Endocrine: Negative. Skin: Negative. Musculoskeletal: Negative. Gastrointestinal: Negative. Genitourinary: Negative. Neurological: Negative. Psychiatric/Behavioral: Negative. Vitals:    07/27/23 1150   BP: 138/80   Pulse:    Temp:    SpO2:      Vitals:    07/27/23 1131   Weight: 129 kg (283 lb 6.4 oz)     Height: 5' 7" (170.2 cm)   Body mass index is 44.39 kg/m². Physical Exam  Vitals and nursing note reviewed. Constitutional:       General: She is not in acute distress. Appearance: She is well-developed. She is obese. She is not diaphoretic.    HENT:      Head: Normocephalic and atraumatic. Neck:      Thyroid: No thyromegaly. Vascular: No carotid bruit or JVD. Cardiovascular:      Rate and Rhythm: Normal rate and regular rhythm. No extrasystoles are present. Pulses: Intact distal pulses. Radial pulses are 2+ on the right side and 2+ on the left side. Heart sounds: Normal heart sounds, S1 normal and S2 normal. No murmur heard. Comments: Trace ankle edema  Pulmonary:      Effort: Pulmonary effort is normal.      Breath sounds: Normal breath sounds. Abdominal:      General: There is no distension. Palpations: Abdomen is soft. Tenderness: There is no abdominal tenderness. Musculoskeletal:         General: Normal range of motion. Cervical back: Normal range of motion and neck supple. Lymphadenopathy:      Cervical: No cervical adenopathy. Skin:     General: Skin is warm and dry. Neurological:      Mental Status: She is alert and oriented to person, place, and time. Cranial Nerves: No cranial nerve deficit. Psychiatric:         Mood and Affect: Mood and affect normal.         Speech: Speech normal.         Behavior: Behavior normal. Behavior is cooperative.          Cognition and Memory: Cognition and memory normal.

## 2023-07-27 NOTE — ASSESSMENT & PLAN NOTE
Blood pressure is improved my recheck today. Home BP cuff accuracy has been verified with excellent home readings. Continue Benicar 40/25 mg daily. BMP in September. Encouraged 2 g sodium diet and weight control.

## 2023-08-03 NOTE — PATIENT INSTRUCTIONS
Chief Complaint   Patient presents with   â¢ Follow-up     Follow-up multiple medical problems and discuss lab results   â¢ Telephonic Visit     Patient is here for follow up on multiple medical problems. 1. Hypertension  2. Coronary artery disease  3. Dyslipidemia  4. Heart failure  5. Atrial fibrillator  6. Anxiety disorder  7. Anemia  8. Thrombocytopenia  9. Alcohol abuse  10. Alcoholic liver cirrhosis  11. Hepatocellular carcinoma        HISTORY OF PRESENT ILLNESS:  The patient is a 66year old male who presents today through telephone visit accompanied by his wife for follow up of multiple medical problems:      1. Hypertension:  Blood pressure today is 124/76 at home per patient. Medications are reviewed in Smart Chart. Patient is tolerating medications well. Patient denies any chest pain, palpitations or shortness of breath. No dizziness, syncope or near syncope symptoms. No TIA (transient ischemic attack) or CVA (cerebrovascular accident)-like symptoms. Most recent lab work shows   Creatinine (mg/dL)   Date Value   07/07/2023 0.76   05/16/2023 0.89   ,   BUN (mg/dL)   Date Value   07/07/2023 7   05/16/2023 17   ,   Sodium (mmol/L)   Date Value   07/07/2023 122 (L)   05/16/2023 126 (L)   ,   Potassium (mmol/L)   Date Value   07/07/2023 4.1   05/16/2023 5.0   . The rest of lab work was reviewed per [employer] Chart. 2. Coronary artery disease:  Patient is clinically stable. He denies any chest pain or shortness of breath, no syncope or near syncopal symptoms. No TIA (transient ischemic attack) or CVA (cerebrovascular accident)-like symptoms and no walking claudications. He is tolerating his medications well. Medications reviewed in Epic.     3. Dyslipidemia:  Patient's most recent lipid panel shows   Cholesterol (mg/dL)   Date Value   11/02/2022 109   06/29/2022 104      LDL (mg/dL)   Date Value   11/02/2022 44   06/29/2022 42   ,   HDL (mg/dL)   Date Value   11/02/2022 56   06/29/2022 53   , Your labs were stable  Continue with current medications  You are up to date with cardiac testing  Contact us with any concerns  Triglycerides (mg/dL)   Date Value   11/02/2022 45   06/29/2022 47   ,   Cholesterol/ HDL Ratio (no units)   Date Value   11/02/2022 1.9   06/29/2022 2.0   ,   GPT/ALT (Units/L)   Date Value   07/07/2023 25   05/16/2023 17   ,   GOT/AST (Units/L)   Date Value   07/07/2023 34   05/16/2023 22    were reviewed and discussed with patient. Medications reviewed in Smart Chart, patient is tolerating medications well. Patient is trying to watch the diet. No abdominal pain or jaundice. No muscle aches or pains. 4. Heart failure:  Patient seems to be stable, he is following up with cardiologist, he has been taking his Lasix on a p.r.n. basis. He continues to have elevated BMP. Lab work was reviewed per [employer] chart and was discussed with patient and his wife who was attending the visit. 5. Atrial fibrillation:  Patient is currently not anticoagulated due to risk of fall, it was recommended by cardiologist that patient undergo Watchman atrial appendage closure device, however patient has declined. He denies any TIA or CVA like symptoms. 6.Thrombocytopenia:  Most recent CBC was reviewed in Smart Chart. Platelet count has been stable, no significant change. No bleeding problems or coagulopathy. No recent change in medications. 7. Anemia:  Most recent lab work shows   HGB (g/dL)   Date Value   05/16/2023 12.1 (L)   04/23/2023 12.8 (L)   ,   HCT (%)   Date Value   05/16/2023 36.3 (L)   04/23/2023 38.0 (L)   . Patient denies any melena, hematochezia, hematemesis or hemoptysis. No dizziness or lightheadedness, no syncope or near syncopal symptoms. Medications were reviewed in Smart Chart. 8. Generalized anxiety disorder:  Patient continues to be on alprazolam p.r.n. he has not overdosed or abused his medication. He did not tolerate Lexapro in the past.  He wishes to stay on the same regimen.     9. Alcohol abuse/alcoholic cirrhosis of the liver/hepatocellular carcinoma:  Patient was treated with embolization for hepatocellular carcinoma, unfortunately he continues to drink alcohol daily and is not willing to quit alcohol drinking. No abdominal pain melena or hematochezia. No hematemesis. CT ABDOMEN PELVIS W CONTRAST on 4/23/2023  Â   HISTORY:  Abdominal abscess/infection suspected. Concern for recurrent  perirectal abscess  Â   COMPARISONS:  12/16/2022. Pain on 5/3/2021  Â   TECHNIQUE: 3.75 mm thick axial images through the abdomen and pelvis after  intravenous administration of 100 cc of Omnipaque 300. Coronal and sagittal  reconstructions. Oral contrast was given. Â   FINDINGS:    Â   Lung bases: Minimal haziness within the visualized lung bases is probably  atelectasis. Prior cardiac revascularization. Â   Liver: There is a probable peripherally calcified cyst along the inferior  right lobe of the liver. Adjacent slightly hyperattenuating nodule  measuring 2.1 x 2.4 cm, also present on prior study. Liver is nodular and  heterogeneous. Findings raise concern for possible cirrhosis. Â   Spleen: Normal.  Â   Pancreas: Minimal haziness along the pancreas. Vague area of  low-attenuation along the tail the pancreas on series 3 with 3 image 33,  similar to prior study. Otherwise, pancreas enhances normally. Correlate  with laboratory findings and symptoms to exclude pancreatitis. Gallbladder:  Prior cholecystectomy. Â   Adrenal glands: Normal.  Â   Kidneys and bladder: Kidneys enhance symmetrically. No hydronephrosis or  hydroureter. Bladder is partially distended. Prostate calcifications are  noted. Â   Bowel: Small bowel and colon are normal in caliber. Uncomplicated  diverticulosis is noted. Appendix is normal in appearance. Â   Fluid and air: Minimal presacral edema, similar to prior study. Mesenteric  fat stranding is also similar to prior study and may be related to  scarring. No definitive free fluid. No free air.   Â   Lymph nodes: Multiple subcentimeter mesenteric and retroperitoneal lymph  nodes are noted. Mildly prominent gastrohepatic ligament lymph nodes on  series 3 with 3 image 23, similar to prior study. Â   Vessels: Varicosities are noted. Atherosclerotic calcifications. Collateralization is noted along the splenic vein. The splenic vein itself  is possibly chronically thrombosed this is similar to prior study. Â   Osseous structures and soft tissues: The previously seen. Rectal abscess  extending into the left ischial rectal fossa has mostly resolved. There is  minimal fat stranding and heterogeneity within this region measuring  approximately 2.6 x 1.3 cm. No definitive peripherally enhancing fluid  pockets to suggest an abscess at this time. Prior lumbar laminectomy. Multilevel degenerative disc disease and facet arthropathy. Air along the  proximal sacrum intrathecally may be related to disc herniations. Â   Â   IMPRESSION:   Â   1. The previously seen large abscess has mostly resolved. There is  heterogeneous soft tissue in the left ischial rectal fossa, which may be  residual inflammation or new inflammation. No definitive peripherally  enhancing fluid pocket in this region to suggest a abscess. 2. Findings concerning for hepatic cirrhosis. There is a solid appearing  nodule adjacent to a peripherally calcified cyst within the right lobe of  the liver. This nodule is indeterminate and was not definitively noted on  the dedicated multiphasic liver CT performed on 5/3/2021. Reevaluation with  multiphasic liver CT is recommended. This is an indeterminate nodule and  HCC is not excluded. 3. Interval development of subtle intrathecal air and the proximal sacrum,  which may be related to disc herniations. Correlate clinically to determine  if there has been a recent procedure. There has been a prior laminectomy at  L5. Lumbar spine can be further evaluated with MRI if necessary. 4. Additional findings as described.     Orders Only on 07/06/2023   Component Date Value Ref Range Status   â¢ NT-proBNP 07/07/2023 1,372 (H)  <=450 pg/mL Final   â¢ Sodium 07/07/2023 122 (L)  135 - 145 mmol/L Final   â¢ Potassium 07/07/2023 4.1  3.4 - 5.1 mmol/L Final   â¢ Chloride 07/07/2023 91 (L)  97 - 110 mmol/L Final   â¢ Carbon Dioxide 07/07/2023 22  21 - 32 mmol/L Final   â¢ Anion Gap 07/07/2023 13  7 - 19 mmol/L Final   â¢ Glucose 07/07/2023 96  70 - 99 mg/dL Final   â¢ BUN 07/07/2023 7  6 - 20 mg/dL Final   â¢ Creatinine 07/07/2023 0.76  0.67 - 1.17 mg/dL Final   â¢ Glomerular Filtration Rate 07/07/2023 >90  >=60 Final    eGFR results = or >60 mL/min/1.73m2 = Normal kidney function. Estimated GFR calculated using the CKD-EPI-R (2021) equation that does not include race in the creatinine calculation.    â¢ BUN/Cr 07/07/2023 9  7 - 25 Final   â¢ Calcium 07/07/2023 8.9  8.4 - 10.2 mg/dL Final   â¢ Bilirubin, Total 07/07/2023 1.2 (H)  0.2 - 1.0 mg/dL Final   â¢ GOT/AST 07/07/2023 34  <=37 Units/L Final   â¢ GPT/ALT 07/07/2023 25  <64 Units/L Final   â¢ Alkaline Phosphatase 07/07/2023 66  45 - 117 Units/L Final   â¢ Albumin 07/07/2023 3.1 (L)  3.6 - 5.1 g/dL Final   â¢ Protein, Total 07/07/2023 6.6  6.4 - 8.2 g/dL Final   â¢ Globulin 07/07/2023 3.5  2.0 - 4.0 g/dL Final   â¢ A/G Ratio 07/07/2023 0.9 (L)  1.0 - 2.4 Final   â¢ NUMBER OF PATIENTS 07/07/2023 6   Final   â¢ NUMBER OF STOPS 07/07/2023 6   Final   â¢ NUMBER  OF MILES 07/07/2023 41.7  Miles Final   â¢ AVERAGE MILES PER STOPS 07/07/2023 6.95  Miles Final   â¢ CHARGE TRIP AMOUNT 07/07/2023 12.95  $ Dollars Final   â¢ TRIP TYPE 07/07/2023 C   Final       Results for orders placed or performed during the hospital encounter of 04/19/23   Electrocardiogram 12-Lead   Result Value Ref Range    Ventricular Rate EKG/Min (BPM) 89     Atrial Rate (BPM) 326     QRS-Interval (MSEC) 104     QT-Interval (MSEC) 396     QTc 482     R Axis (Degrees) 38     T Axis (Degrees) 15     REPORT TEXT       Atrial fibrillation  with premature ventricular or aberrantly conducted complexes  Low voltage QRS  Nonspecific ST abnormality  When compared with ECG of  19-APR-2023 09:36,  Atrial fibrillation  has replaced  Sinus rhythm  Confirmed by Crystal Ibrahim MD, Noy Justice ([id number]) on 4/21/2023 9:57:46 AM         REVIEW OF SYSTEMS:  All systems are reviewed and are essentially negative except for as per history of present illness. PAST MEDICAL HISTORY:  Past Medical History:   Diagnosis Date   â¢ Acute recurrent pancreatitis     x 4 admissions [id number]   â¢ Alcohol-induced chronic pancreatitis (CMD)    â¢ Anxiety    â¢ Arthritis    â¢ Back pain     Chronic   â¢ Cool esophagus    â¢ BPH (benign prostatic hypertrophy)    â¢ CAD (coronary artery disease) 1996    heart attack    â¢ Cardiac failure congestive 2005   â¢ Chronic diarrhea    â¢ Dyslipidemia    â¢ Esophageal reflux    â¢ Fall 02/2019   â¢ Fracture 07/01/2016    Right Fibula    â¢ Gout    â¢ Hepatocellular carcinoma (CMD)    â¢ Hiatal Hernia    â¢ High cholesterol    â¢ History of diverticulitis of colon    â¢ Hypertension    â¢ Insomnia    â¢ Myocardial infarction (CMD)    â¢ Neuropathy     Lef leg, numbness   â¢ Pancreatic cyst    â¢ Trigeminal neuralgia     Right ear & jaw, controlled with Tegretol   â¢ Uses hearing aid     Bilateral    â¢ Wears glasses        PAST SURGICAL HISTORY:  Past Surgical History:   Procedure Laterality Date   â¢ Cardiac surgery  01/2005    Coronary Artery Bypass LAD   â¢ Colonoscopy  08/19/2011    Diverticulosis;repeat 10 yrs;08/2021, 5 prior colonoscopies   â¢ Coronary angiogram - cv  [id number]    Several, last procedure 5/2010.  RCA occluded, other vessels/graft free of diesease   â¢ Coronary artery bypass graft      x2   â¢ Cyst removal  2008    Neck   â¢ Ercp w/ plastic stent placement  [id number]    Multiple for pancreatitis   â¢ Esophagogastroduodenoscopy  06/20/2017    Dr. Sanchez Matters   â¢ Esophagogastroduodenoscopy  03/16/2012   â¢ Fracture surgery  07/05/2016    Right Ankle Surgery   â¢ Gi endoscopic ultrasound  [id number]    Multiple, last procedure 3/15/16   â¢ Hemorrhoidectomy int ext more column group  11/10/2016    with fissurectomy, Dr. Michelle Crenshaw   â¢ I&d perirectal abscess  2022   â¢ Inguinal hernia repair Left    â¢ Ir chemoembolization (tace)  02/10/2016   â¢ Knee arthroscopy w/ meniscal repair Right    â¢ Laminectomy,lumbar  2012    L4-S1   â¢ Removal gallbladder  2008    Lap Amrita   â¢ Rotator cuff repair Right 2005   â¢ Transurethral resection of prostate  2015    W/ biopsy - Dr. Kitchen Going   â¢ [place] tooth extraction         SOCIAL HISTORY:  Social History     Socioeconomic History   â¢ Marital status: /Civil Union     Spouse name: Not on file   â¢ Number of children: 2   â¢ Years of education: Not on file   â¢ Highest education level: Not on file   Occupational History   â¢ Occupation: retired     Comment: Retired , [employer]   Tobacco Use   â¢ Smoking status: Former     Current packs/day: 0.00     Average packs/day: 1.5 packs/day for 30.0 years (45.0 ttl pk-yrs)     Types: Cigarettes     Start date: 1966     Quit date: 1996     Years since quittin.6   â¢ Smokeless tobacco: Never   Vaping Use   â¢ Vaping Use: never used   Substance and Sexual Activity   â¢ Alcohol use: Yes     Alcohol/week: 35.0 standard drinks of alcohol     Types: 35 Standard drinks or equivalent per week     Comment: Pt has 3-4 drinks a day both beer alcohol and wine. â¢ Drug use: No   â¢ Sexual activity: Yes     Partners: Female     Comment:    Other Topics Concern   â¢  Service Yes   â¢ Blood Transfusions Yes   â¢ Caffeine Concern No   â¢ Occupational Exposure Yes   â¢ Hobby Hazards Yes     Comment: uses chemicals for cleaning.     â¢ Sleep Concern Yes     Comment: uses benadryl and alprazalam    â¢ Stress Concern Yes   â¢ Weight Concern Yes     Comment: over weight   â¢ Special Diet No   â¢ Back Care Not Asked   â¢ Exercise No   â¢ Bike Helmet Not Asked   â¢ Seat Belt No   â¢ Self-Exams Not Asked   Social History Narrative   â¢ Not on file     Social Determinants of Health Financial Resource Strain: Low Risk  (5/2/2023)    Financial Resource Strain    â¢ Social Determinants: Financial Resource Strain: None   Food Insecurity: No Food Insecurity (5/2/2023)    Food Insecurity    â¢ Social Determinants: Food Insecurity: Never   Transportation Needs: No Transportation Needs (6/7/2023)    OASIS : Transportation    â¢ Lack of Transportation (Medical): No    â¢ Lack of Transportation (Non-Medical): No    â¢ Patient Unable or Declines to Respond: No   Physical Activity: Not on file   Stress: High Risk (5/2/2023)    Stress    â¢ Social Determinants: Stress: Quite a bit   Social Connections: Feeling Socially Integrated (6/7/2023)    OASIS : Social Isolation    â¢ Frequency of experiencing loneliness or isolation: Rarely   Recent Concern: Social Connections - Somewhat Isolated (4/20/2023)    Social Connections    â¢ Social Determinants: Social Connections: 1 or 2 times a week   Intimate Partner Violence: Not At Risk (5/2/2023)    Intimate Partner Violence    â¢ Social Determinants: Intimate Partner Violence Past Fear: No    â¢ Social Determinants: Intimate Partner Violence Current Fear: No       FAMILY HISTORY:   Family History   Problem Relation Age of Onset   â¢ Cancer Father 79        throat   â¢ Gastrointestinal Father         Kidney problems due to MVA, ended up on dialysis (details unknown to pt)   â¢ Psychiatric Mother    â¢ Vision Loss Mother    â¢ Heart disease Mother    â¢ Osteoarthritis Other    â¢ Dementia/Alzheimers Other    â¢ Heart disease Other        Current Outpatient Medications   Medication Sig Dispense Refill   â¢ ALPRAZolam (XANAX) 0.5 MG tablet Take 1 tablet by mouth 3 times daily as needed for sleep or anxiety. 60 tablet 0   â¢ acetaminophen-codeine (TYLENOL NO.3) 300-30 MG per tablet Take 1-2 tablets by mouth every 6 hours as needed for pain. (No more than 3000 mg of acetaminophen in 24 hrs. )Indications: Mild to Moderate Pain 120 tablet 0   â¢ atorvastatin (LIPITOR) 40 MG tablet TAKE ONE AND ONE-HALF TABLETS DAILY 135 tablet 0   â¢ tamsulosin (FLOMAX) 0.4 MG Cap Take 1 capsule by mouth daily after a meal. 90 capsule 1   â¢ Melatonin 2.5 MG Chew Tab Chew 2 mg by mouth as needed (sleep aid). â¢ aspirin 81 MG EC tablet Take 1 tablet by mouth daily. 30 tablet 0   â¢ furosemide (LASIX) 80 MG tablet Take 1 tablet by mouth daily. Do not start before April 28, 2023. 30 tablet 0   â¢ carBAMazepine (TEGretol XR) 200 MG 12 hr tablet TAKE 1 TABLET THREE TIMES A DAY (Patient taking differently: Take 200 mg by mouth in the morning and 200 mg in the evening. Indications: Trigeminal Nerve Pain. May take an additional dose as needed) 270 tablet 1   â¢ fenofibrate (TRICOR) 145 MG tablet TAKE 1 TABLET DAILY (Patient taking differently: Take 145 mg by mouth every evening.) 90 tablet 1   â¢ esomeprazole (NexIUM) 40 MG capsule TAKE 1 CAPSULE IN THE MORNING AND 1 CAPSULE IN THE EVENING (Patient taking differently: Take 40 mg by mouth daily (before breakfast). ) 180 capsule 0   â¢ naLOXone (NARCAN) 4 MG/0.1ML nasal spray Spray the content of 1 device into 1 nostril. Call 911. May repeat with 2nd device in alternate nostril if no response in 2-3 minutes. 2 each 1   â¢ triamcinolone (ARISTOCORT) 0.1 % cream Apply to affected area twice daily for two weeks. 454 g 1   â¢ clotrimazole-betamethasone (LOTRISONE) 1-0.05 % cream Apply 1 application topically 2 times daily. 30 g 3   â¢ ammonium lactate (AMLACTIN) 12 % lotion Apply topically 2 times daily. 400 g 1   â¢ DISPENSE Knee high compression stockings 20-30 mmHg Dx: Right lower extremity edema 2 each 0     No current facility-administered medications for this visit.        ALLERGIES:   Allergen Reactions   â¢ Adhesive   (Environmental) RASH     Ekg patches   â¢ Altace Other (See Comments)     Elevated Creatinine level, passed out when given   â¢ Lyrica [Pregabalin] HIVES and ANAPHYLAXIS     Facial swelling   â¢ Thymine Other (See Comments)   â¢ Penicillins RASH     Tolerated zosyn 12/2022   â¢ Thimerosal RASH and SWELLING     Preservative in vaccines   â¢ Ciprofloxacin HIVES     IV Cipro given and pt had a localized reaction in arm with hives, redness, swelling. PHYSICAL EXAMINATION:   GENERAL:  The patient is awake, alert, oriented x3, in no acute distress. There were no vitals taken for this visit. This is a telephone visit. ASSESSMENT:     Orders Placed This Encounter   â¢ CBC with Automated Differential   â¢ Comprehensive Metabolic Panel   â¢ Urinalysis With Microscopy & Culture If Indicated   â¢ Lipid Panel With Reflex         (I10) Benign essential hypertension  (primary encounter diagnosis)  Plan: CBC with Automated Differential, Comprehensive         Metabolic Panel, Urinalysis With Microscopy &         Culture If Indicated, Lipid Panel With Reflex  Patient is clinically stable. Continue current management. Advised patient to monitor blood pressure. Reevaluate after lab work in 3 months.     (I25.10) Coronary artery disease involving native coronary artery of native heart without angina pectoris  Plan: Lipid Panel With Reflex  Patient is clinically stable. Continue current management and modify risk factors.     (E78.5) Dyslipidemia  Plan: Comprehensive Metabolic Panel, Lipid Panel With        Reflex  Advised patient to continue to watch diet. Continue current management. Reevaluate in 3 months.     (I50.9) Heart failure, unspecified HF chronicity, unspecified heart failure type (CMD)  Plan: Comprehensive Metabolic Panel  Clinically stable, appears to be compensated, he has been using to Lasix p.r.n.. He is to follow-up with cardiologist.    (I48.91) Atrial fibrillation, unspecified type (CMD)  Patient currently not I coagulated, discussed with patient management options. It was recommended by cardiologist that patient undergo Watchman atrial appendage closure device but patient has declined. He continues to be on aspirin.   He understands risk including risk of stroke and death.    (F41.1) Generalized anxiety disorder  Patient is doing well on current regimen. No recent flare-up of symptoms. No panic attack, no suicidal or homicidal thoughts. Continue current management and reevaluate next visit.    (D64.9) Anemia, unspecified type  Plan: CBC with Automated Differential  Stable. Continue current management. Repeat lab work in 3 months.    (D69.6) Thrombocytopenia (CMD)  Plan: CBC with Automated Differential  Stable. Continue current management.    (F10.10) Alcohol abuse  Discussed this thoroughly again today with patient and his wife recommended abstinence, patient does not seem to be interested in quitting alcohol.    (B35.32) Alcoholic cirrhosis of liver without ascites (CMD)  Advised patient to quit alcohol drinking. He is to follow-up with GI.    (C22.0) Hepatocellular carcinoma (CMD)  Plan: CBC with Automated Differential, Comprehensive         Metabolic Panel  Patient is to follow-up with hepatologist.    This visit is being performed via phone to discuss Follow-up (Follow-up multiple medical problems and discuss lab results) and Telephonic Visit    Clinician Location: [facility] Joellenkarl Maza is in [place] and his identity has been established. He was informed that consent to treat includes permission to submit charges to the applicable insurance on file. Kassidy Maurice was advised regarding the potential risk inherent in virtual visits, as the assessment may be limited due to what can be assessed virtually which potentially results in an incomplete assessment; as well as either of us may discontinue the visit if it is felt that the connections are not adequate for his/her situation. 38 minutes were spent in this encounter.

## 2023-08-07 ENCOUNTER — OFFICE VISIT (OUTPATIENT)
Dept: PULMONOLOGY | Facility: CLINIC | Age: 68
End: 2023-08-07
Payer: MEDICARE

## 2023-08-07 VITALS
BODY MASS INDEX: 44.73 KG/M2 | HEART RATE: 60 BPM | SYSTOLIC BLOOD PRESSURE: 138 MMHG | OXYGEN SATURATION: 95 % | DIASTOLIC BLOOD PRESSURE: 86 MMHG | HEIGHT: 67 IN | WEIGHT: 285 LBS

## 2023-08-07 DIAGNOSIS — R06.09 POST-COVID CHRONIC DYSPNEA: ICD-10-CM

## 2023-08-07 DIAGNOSIS — J45.20 MILD INTERMITTENT REACTIVE AIRWAY DISEASE WITHOUT COMPLICATION: Primary | ICD-10-CM

## 2023-08-07 DIAGNOSIS — I10 ESSENTIAL HYPERTENSION: ICD-10-CM

## 2023-08-07 DIAGNOSIS — E66.01 CLASS 3 OBESITY (HCC): ICD-10-CM

## 2023-08-07 DIAGNOSIS — U09.9 POST-COVID CHRONIC DYSPNEA: ICD-10-CM

## 2023-08-07 DIAGNOSIS — G47.33 OBSTRUCTIVE SLEEP APNEA SYNDROME: ICD-10-CM

## 2023-08-07 PROCEDURE — 99214 OFFICE O/P EST MOD 30 MIN: CPT | Performed by: INTERNAL MEDICINE

## 2023-08-07 RX ORDER — FLUTICASONE PROPIONATE AND SALMETEROL 100; 50 UG/1; UG/1
1 POWDER RESPIRATORY (INHALATION) 2 TIMES DAILY
Qty: 60 BLISTER | Refills: 5 | Status: SHIPPED | OUTPATIENT
Start: 2023-08-07 | End: 2023-09-06

## 2023-08-07 NOTE — ASSESSMENT & PLAN NOTE
Patient with no previous lung disease has had some dyspnea on exertion, cough, wheeze since COVID in February. She continues to improve. Her chest imaging was reviewed as well as her echo- unremarkable.     Plan  Continue Advair 100-50, 1 puff BID, rinse mouth after use  PRN albuterol  Follow-up 6 months

## 2023-08-07 NOTE — ASSESSMENT & PLAN NOTE
Patient reports she is going to weight loss clinic and is motivated to lose weight. She has struggled with eating the last few years in setting of stress from her 's cancer diagnosis.   Her weight is stable    Wt Readings from Last 3 Encounters:   08/07/23 129 kg (285 lb)   07/27/23 129 kg (283 lb 6.4 oz)   05/08/23 129 kg (285 lb)

## 2023-08-07 NOTE — PROGRESS NOTES
Pulmonary Outpatient Note   Hoda Reid 77 y.o. female MRN: [id number]  8/7/2023        Reason for Consultation:    Chief Complaint   Patient presents with   • Shortness of Breath         Assessment/Plan:    1. Mild intermittent reactive airway disease without complication  Assessment & Plan:  Patient with no previous lung disease has had some dyspnea on exertion, cough, wheeze since COVID in February. She continues to improve. Her chest imaging was reviewed as well as her echo- unremarkable. Plan  Continue Advair 100-50, 1 puff BID, rinse mouth after use  PRN albuterol  Follow-up 6 months    Orders:  -     Fluticasone-Salmeterol (Advair Diskus) 100-50 mcg/dose inhaler; Inhale 1 puff 2 (two) times a day Rinse mouth after use. 2. Post-COVID chronic dyspnea    3. Obstructive sleep apnea syndrome  Assessment & Plan:  Follows separately with sleep clinic and is doing well on CPAP. 4. Essential hypertension  Assessment & Plan:  /86  Medications reviewed      5. Class 3 obesity ([address])  Assessment & Plan:  Patient reports she is going to weight loss clinic and is motivated to lose weight. She has struggled with eating the last few years in setting of stress from her 's cancer diagnosis.   Her weight is stable    Wt Readings from Last 3 Encounters:   08/07/23 129 kg (285 lb)   07/27/23 129 kg (283 lb 6.4 oz)   05/08/23 129 kg (285 lb)               Health Maintenance  Immunization History   Administered Date(s) Administered   • COVID-19 MODERNA VACC 0.5 ML IM 03/31/2021, 03/31/2021, 04/28/2021, 04/28/2021, 11/24/2021   • COVID-19 Moderna Vac BIVALENT 12 Yr+ IM (BOOSTER ONLY) 0.5 ML 09/28/2022   • INFLUENZA 10/29/2018, 10/08/2019, 12/13/2021   • Influenza Injectable, MDCK, Preservative Free, Quadrivalent, 0.5 mL 10/29/2018, 10/29/2018   • Influenza Split High Dose Preservative Free IM 12/03/2020   • Influenza, Seasonal Vaccine, Quadrivalent, Adjuvanted, .5e 12/13/2021   • Influenza, seasonal, injectable, preservative free 02/17/2018   • Pneumococcal Conjugate 13-Valent 11/20/2018   • Pneumococcal Polysaccharide PPV23 02/27/2020          Return in about 6 months (around 2/7/2024). History of Present Illness   HPI:  Shannon Faria is a 77 y.o. female who presents for shortness of breath, class 3 obesity, atrial fibrillation on eliquis, severe DEANNA on CPAP, hypothyroid, hypertension who presents for shortness of breath. Since COVID had shortness of breath, cough, wheeze that had slowly been improving from February to April when I met her. In April I ordered PFTs which were overall unremarkable. I trialed Advair 100-50. Since April she has continud to improve. Some dyspnea with walking distances but better. Occasional wheeze but improved. No cough. She has been using Advair only intermittently. More often has used albuterol. Uses that maybe twice a week. She continues to use CPAP and is doing well with that and follows with sleep clinic separately. Wt Readings from Last 3 Encounters:   08/07/23 129 kg (285 lb)   07/27/23 129 kg (283 lb 6.4 oz)   05/08/23 129 kg (285 lb)         From April 2023 Consult  She had COVID in February- was vaccinated with boosters. She had 2 weeks of cough and wheezing. She did not receive Paxlovid or other treatment. Since February- cough lingered for a few weeks. She has been short of breath with minimal exertion since then. Went to ED for shortness of breath 3/24. Had a CTA chest- no PE. Had possible bronchitis at the time per imaging. Given prednisone- stopped after 2 doses due to high blood pressure. Currently she reports that she still has a little shortness of breath but improving. She has mild dry cough, worse in morning. She has occasional wheeze. She has an albuterol inhaler which she has been using every day- around once a day. She is going to a weight loss clinic. No chest pain.   No chest tightness. Before COVID never had shortness of breath. Had occasional wheeze before COVID. Has had multiple sinus infections. Pulmonary history:    Childhood lung disease/premature birth: No    Family hx of lung disease:Brother with lung caner    # of ED/hospitalizations this year: 1    Dysphagia/Reflux: No    Leg swelling: No    Exposure history:    Exposure to pets/birds/farm animals: Dog    Medications, herbs, supplements: No    Social history:    Smoking: Never smoker. Alcohol, ilicit drugs (inhalational/ IV): No drugs. Occasional drink- not since 2020    Worked as: Retired- teacher    Occupational gas/asbestos/silica/chemicals/bio-fuels:         Review of Systems   Constitutional: Negative for appetite change, chills and fever. HENT: Positive for postnasal drip. Negative for ear pain, rhinorrhea, sneezing, sore throat and trouble swallowing. Eyes: Negative for pain and visual disturbance. Respiratory: Positive for shortness of breath (but improved) and wheezing (improved). Negative for cough. Cardiovascular: Negative for chest pain and palpitations. Gastrointestinal: Negative for abdominal pain and vomiting. Genitourinary: Negative for dysuria and hematuria. Musculoskeletal: Positive for myalgias. Negative for arthralgias and back pain. Skin: Negative for color change and rash. Neurological: Negative for seizures, syncope and headaches. All other systems reviewed and are negative. Historical Information   Past Medical History:   Diagnosis Date   • Anxiety    • Diabetes mellitus ([address])    • Disease of thyroid gland    • Elevated d-dimer     2 seperate episodes of unclear etiology. • Hyperlipidemia    • Hypertension    • Hypothyroid    • Obesity    • Paroxysmal atrial fibrillation ([address])     New onset 11/25/2020.      Past Surgical History:   Procedure Laterality Date   • CATARACT EXTRACTION, BILATERAL     • HYSTERECTOMY     • KNEE ARTHROPLASTY     • SHOULDER OPEN ROTATOR CUFF REPAIR      x 2   • TONSILLECTOMY       Family History   Problem Relation Age of Onset   • Hypertension Sister    • Heart disease Paternal Aunt    • Other Mother         Encephalitis   • Other Father          in his 80s without significant problems   • Lung cancer Brother    • No Known Problems Brother          Meds/Allergies     Current Outpatient Medications:   •  albuterol (PROVENTIL HFA,VENTOLIN HFA) 90 mcg/act inhaler, Inhale 2 puffs every 6 (six) hours as needed for wheezing, Disp: , Rfl:   •  albuterol (Ventolin HFA) 90 mcg/act inhaler, Inhale 2 puffs every 6 (six) hours as needed for wheezing, Disp: 18 g, Rfl: 2  •  ALPRAZolam (XANAX) 0.5 mg tablet, Take by mouth daily at bedtime as needed for anxiety, Disp: , Rfl:   •  apixaban (Eliquis) 5 mg, Take 1 tablet (5 mg total) by mouth 2 (two) times a day, Disp: 180 tablet, Rfl: 3  •  atorvastatin (LIPITOR) 10 mg tablet, Take 10 mg by mouth daily, Disp: , Rfl:   •  citalopram (CeleXA) 40 mg tablet, Take 40 mg by mouth daily , Disp: , Rfl:   •  desonide (DESOWEN) 0.05 % cream, , Disp: , Rfl:   •  erythromycin (ILOTYCIN) ophthalmic ointment, , Disp: , Rfl:   •  Fluticasone-Salmeterol (Advair Diskus) 100-50 mcg/dose inhaler, Inhale 1 puff 2 (two) times a day Rinse mouth after use., Disp: 60 blister, Rfl: 5  •  levothyroxine 75 mcg tablet, Take 75 mcg by mouth daily , Disp: , Rfl:   •  magnesium oxide (MAG-OX) 400 mg tablet, Take by mouth, Disp: , Rfl:   •  metoprolol succinate (TOPROL-XL) 25 mg 24 hr tablet, Take 1 tablet (25 mg total) by mouth daily, Disp: 90 tablet, Rfl: 3  •  nystatin powder, APPLY 1 APPLICATION TOPICALLY IN THE MORNING, 1 APPLICATION AT NO. ..  (REFER TO PRESCRIPTION NOTES). , Disp: , Rfl:   •  olmesartan-hydrochlorothiazide (BENICAR HCT) 40-25 MG per tablet, , Disp: , Rfl:   •  meclizine (ANTIVERT) 25 mg tablet, Take 1 tablet (25 mg total) by mouth 3 (three) times a day as needed for dizziness (Patient not taking: Reported on 7/27/2023), Disp: 30 tablet, Rfl: 0  Allergies   Allergen Reactions   • Flagyl [Metronidazole] Shortness Of Breath     Pt requested addition to allergies   • Contrast [Iodinated Contrast Media] Rash   • Iodine - Food Allergy Rash       Vitals: Blood pressure 138/86, pulse 60, height 5' 7" (1.702 m), weight 129 kg (285 lb), SpO2 95 %. Body mass index is 44.64 kg/m². Oxygen Therapy  SpO2: 95 %  Oxygen Therapy: None (Room air)      Physical Exam  Physical Exam  Vitals and nursing note reviewed. Constitutional:       General: She is not in acute distress. Appearance: She is well-developed. HENT:      Head: Normocephalic and atraumatic. Eyes:      Conjunctiva/sclera: Conjunctivae normal.   Cardiovascular:      Rate and Rhythm: Normal rate and regular rhythm. Heart sounds: No murmur heard. Pulmonary:      Effort: Pulmonary effort is normal. No respiratory distress. Breath sounds: Normal breath sounds. Abdominal:      Palpations: Abdomen is soft. Tenderness: There is no abdominal tenderness. Musculoskeletal:         General: No swelling. Cervical back: Neck supple. Skin:     General: Skin is warm and dry. Capillary Refill: Capillary refill takes less than 2 seconds. Neurological:      Mental Status: She is alert. Psychiatric:         Mood and Affect: Mood normal.         Labs: I have personally reviewed pertinent lab results.     ABG: No results found for: "PHART", "KIK6ITC", "PO2ART", "ATT9XOB", "E8RUXISL", "BEART", "SOURCE",   BNP:   Lab Results   Component Value Date     (H) 03/24/2023   ,   CBC:  Lab Results   Component Value Date    WBC 8.52 03/24/2023    HGB 12.0 03/24/2023    HCT 37.4 03/24/2023    MCV 90 03/24/2023     03/24/2023    EOSPCT 3 03/24/2023    EOSABS 0.28 03/24/2023    NEUTOPHILPCT 61 03/24/2023    LYMPHOPCT 25 03/24/2023   ,   CMP:   Lab Results   Component Value Date    SODIUM 140 03/24/2023    K 4.3 03/24/2023     03/24/2023    CO2 27 2023    ANIONGAP 11 2015    BUN 20 2023    CREATININE 0.99 2023    GLUCOSE 83 2015    CALCIUM 8.8 2023    AST 38 2023    ALT 36 2023    ALKPHOS 74 2023    PROT 7.3 2015    BILITOT 0.34 2015    EGFR 58 2023   ,   PT/INR:   Lab Results   Component Value Date    INR 1.23 (H) 10/19/2022   ,   Troponin:   Lab Results   Component Value Date    TROPONINI <0.02 2021         Imaging and other studies: I have personally reviewed pertinent reports. and I have personally reviewed pertinent films in PACS    CTA Chest 3/24/23  Lungs are in expiration with areas of mosaic attenuation  Mildly limited evaluation, no evidence of pulmonary embolism  Possible left lower lobe airway debris, can be seen with bronchitis  Hepatic steatosis    CTA Chest 10/19/22  No evidence of pulmonary artery embolus  Lungs are clear. There is no tracheal or endobronchial lesion. Pulmonary function testin23  Spirometry:  FEV1/FVC Ratio is 80%. FEV1 is 1.96 L/79% of predicted. FVC is 2.44 L/76% of predicted. No significant bronchodilator response     Lung volumes:  RV 3.37 L/145% of predicted, TLC 5.96 L/108% of predicted, RV/TLC ratio 57%     Diffusing capacity:  80% of predicted     IMPRESSION:  1. Mild restrictive airflow imitation on spirometry with no obstruction  2. No significant bronchodilator response  3. Normal total lung capacity but increased residual volume indicating mild air trapping  4. Normal diffusion capacity  5. Mild restrictive and probably obstructive flow volume loop         EKG, Pathology, and Other Studies: I have personally reviewed pertinent reports. TTE 4/3/23    Left Ventricle: Left ventricular cavity size is normal. Wall thickness is normal. There is mild concentric hypertrophy. The left ventricular ejection fraction is 70%.  Systolic function is normal. Wall motion is normal. Diastolic function is normal.  •  Right Ventricle: Right ventricular cavity size is normal. Systolic function is normal. Wall thickness is mildly increased      CPAP Study 4/2021  1. Previously confirmed sleep disordered breathing that is successfully treated with positive airway pressure at an optimal setting of 9 cm H2O.   2. Sleep maintenance insomnia    Home study 3/6/21  Impression  1. Severe obstructive sleep apnea   2. Severe hypoxia     Lalit Martin M.D.   [facility]

## 2023-08-08 ENCOUNTER — TELEPHONE (OUTPATIENT)
Dept: CARDIOLOGY CLINIC | Facility: CLINIC | Age: 68
End: 2023-08-08

## 2023-08-08 NOTE — TELEPHONE ENCOUNTER
If she wants to come in to have it checked here that is fine. We can decide if med adjustment is needed based on what we get.   Thank you

## 2023-08-08 NOTE — TELEPHONE ENCOUNTER
Pt states that she is having high BP. States that it is around 140s over 80s. States that we did compare her cuff her, and it was accurate. She did also change the batteries in the machine. She did check it a lot through out the day. Feels okay.

## 2023-08-08 NOTE — TELEPHONE ENCOUNTER
Patient called and left message on office voicemail stating that she would like to stop by office and get her BP it has been high and she would like to check it here at the office    Patient can be reached at [phone]

## 2023-08-09 ENCOUNTER — CLINICAL SUPPORT (OUTPATIENT)
Dept: CARDIOLOGY CLINIC | Facility: CLINIC | Age: 68
End: 2023-08-09
Payer: MEDICARE

## 2023-08-09 VITALS
HEART RATE: 65 BPM | WEIGHT: 286 LBS | SYSTOLIC BLOOD PRESSURE: 108 MMHG | BODY MASS INDEX: 44.89 KG/M2 | DIASTOLIC BLOOD PRESSURE: 61 MMHG | OXYGEN SATURATION: 96 % | HEIGHT: 67 IN

## 2023-08-09 DIAGNOSIS — I10 ESSENTIAL HYPERTENSION: Primary | ICD-10-CM

## 2023-08-09 PROCEDURE — 99211 OFF/OP EST MAY X REQ PHY/QHP: CPT

## 2023-08-09 NOTE — PROGRESS NOTES
Pt is here for a BP check. First reading was me, second was her cuff. Pt is aware that they are very far off.

## 2023-08-24 ENCOUNTER — APPOINTMENT (OUTPATIENT)
Dept: LAB | Facility: HOSPITAL | Age: 68
End: 2023-08-24
Payer: MEDICARE

## 2023-08-24 DIAGNOSIS — M35.00 SJOGREN'S SYNDROME, WITH UNSPECIFIED ORGAN INVOLVEMENT (HCC): ICD-10-CM

## 2023-08-24 DIAGNOSIS — I48.0 PAROXYSMAL ATRIAL FIBRILLATION (HCC): ICD-10-CM

## 2023-08-24 LAB
ANA SER QL IA: NEGATIVE
ANION GAP SERPL CALCULATED.3IONS-SCNC: 9 MMOL/L
BUN SERPL-MCNC: 20 MG/DL (ref 5–25)
CALCIUM SERPL-MCNC: 9.6 MG/DL (ref 8.4–10.2)
CHLORIDE SERPL-SCNC: 104 MMOL/L (ref 96–108)
CO2 SERPL-SCNC: 26 MMOL/L (ref 21–32)
CREAT SERPL-MCNC: 1.07 MG/DL (ref 0.6–1.3)
CRP SERPL QL: 11.6 MG/L
ERYTHROCYTE [SEDIMENTATION RATE] IN BLOOD: 39 MM/HOUR (ref 0–29)
GFR SERPL CREATININE-BSD FRML MDRD: 52 ML/MIN/1.73SQ M
GLUCOSE P FAST SERPL-MCNC: 95 MG/DL (ref 65–99)
POTASSIUM SERPL-SCNC: 4.5 MMOL/L (ref 3.5–5.3)
SODIUM SERPL-SCNC: 139 MMOL/L (ref 135–147)

## 2023-08-24 PROCEDURE — 85652 RBC SED RATE AUTOMATED: CPT

## 2023-08-24 PROCEDURE — 36415 COLL VENOUS BLD VENIPUNCTURE: CPT

## 2023-08-24 PROCEDURE — 86140 C-REACTIVE PROTEIN: CPT

## 2023-08-24 PROCEDURE — 86235 NUCLEAR ANTIGEN ANTIBODY: CPT

## 2023-08-24 PROCEDURE — 86430 RHEUMATOID FACTOR TEST QUAL: CPT

## 2023-08-24 PROCEDURE — 86038 ANTINUCLEAR ANTIBODIES: CPT

## 2023-08-25 ENCOUNTER — TELEPHONE (OUTPATIENT)
Dept: CARDIOLOGY CLINIC | Facility: CLINIC | Age: 68
End: 2023-08-25

## 2023-08-25 LAB
ENA SS-A AB SER-ACNC: <0.2 AI (ref 0–0.9)
ENA SS-B AB SER-ACNC: <0.2 AI (ref 0–0.9)
RHEUMATOID FACT SER QL LA: NEGATIVE

## 2023-08-25 NOTE — TELEPHONE ENCOUNTER
----- Message from Mirna2 Lan Carlton sent at 8/24/2023  5:25 PM EDT -----  Please let patient know her blood work shows stable kidney function.

## 2023-09-01 ENCOUNTER — APPOINTMENT (EMERGENCY)
Dept: RADIOLOGY | Facility: HOSPITAL | Age: 68
End: 2023-09-01
Payer: MEDICARE

## 2023-09-01 ENCOUNTER — HOSPITAL ENCOUNTER (EMERGENCY)
Facility: HOSPITAL | Age: 68
Discharge: HOME | End: 2023-09-01
Attending: EMERGENCY MEDICINE
Payer: MEDICARE

## 2023-09-01 VITALS
RESPIRATION RATE: 20 BRPM | DIASTOLIC BLOOD PRESSURE: 70 MMHG | WEIGHT: 280 LBS | HEART RATE: 72 BPM | SYSTOLIC BLOOD PRESSURE: 167 MMHG | HEIGHT: 67 IN | TEMPERATURE: 99.1 F | BODY MASS INDEX: 43.95 KG/M2 | OXYGEN SATURATION: 95 %

## 2023-09-01 DIAGNOSIS — R07.9 CHEST PAIN: Primary | ICD-10-CM

## 2023-09-01 LAB
ALBUMIN SERPL BCP-MCNC: 3.9 G/DL (ref 3.5–5)
ALP SERPL-CCNC: 78 U/L (ref 34–104)
ALT SERPL W P-5'-P-CCNC: 31 U/L (ref 7–52)
ANION GAP SERPL CALCULATED.3IONS-SCNC: 9 MMOL/L
AST SERPL W P-5'-P-CCNC: 34 U/L (ref 13–39)
BASOPHILS # BLD AUTO: 0.05 THOUSANDS/ÂΜL (ref 0–0.1)
BASOPHILS NFR BLD AUTO: 1 % (ref 0–1)
BILIRUB SERPL-MCNC: 0.54 MG/DL (ref 0.2–1)
BUN SERPL-MCNC: 29 MG/DL (ref 5–25)
CALCIUM SERPL-MCNC: 9.2 MG/DL (ref 8.4–10.2)
CARDIAC TROPONIN I PNL SERPL HS: 2 NG/L
CHLORIDE SERPL-SCNC: 105 MMOL/L (ref 96–108)
CO2 SERPL-SCNC: 24 MMOL/L (ref 21–32)
CREAT SERPL-MCNC: 1.06 MG/DL (ref 0.6–1.3)
EOSINOPHIL # BLD AUTO: 0.23 THOUSAND/ÂΜL (ref 0–0.61)
EOSINOPHIL NFR BLD AUTO: 3 % (ref 0–6)
ERYTHROCYTE [DISTWIDTH] IN BLOOD BY AUTOMATED COUNT: 14.4 % (ref 11.6–15.1)
GFR SERPL CREATININE-BSD FRML MDRD: 53 ML/MIN/1.73SQ M
GLUCOSE SERPL-MCNC: 144 MG/DL (ref 65–140)
HCT VFR BLD AUTO: 36.6 % (ref 34.8–46.1)
HGB BLD-MCNC: 12.1 G/DL (ref 11.5–15.4)
IMM GRANULOCYTES # BLD AUTO: 0.03 THOUSAND/UL (ref 0–0.2)
IMM GRANULOCYTES NFR BLD AUTO: 0 % (ref 0–2)
LYMPHOCYTES # BLD AUTO: 1.92 THOUSANDS/ÂΜL (ref 0.6–4.47)
LYMPHOCYTES NFR BLD AUTO: 24 % (ref 14–44)
MCH RBC QN AUTO: 30 PG (ref 26.8–34.3)
MCHC RBC AUTO-ENTMCNC: 33.1 G/DL (ref 31.4–37.4)
MCV RBC AUTO: 91 FL (ref 82–98)
MONOCYTES # BLD AUTO: 0.78 THOUSAND/ÂΜL (ref 0.17–1.22)
MONOCYTES NFR BLD AUTO: 10 % (ref 4–12)
NEUTROPHILS # BLD AUTO: 4.85 THOUSANDS/ÂΜL (ref 1.85–7.62)
NEUTS SEG NFR BLD AUTO: 62 % (ref 43–75)
NRBC BLD AUTO-RTO: 0 /100 WBCS
PLATELET # BLD AUTO: 214 THOUSANDS/UL (ref 149–390)
PMV BLD AUTO: 10 FL (ref 8.9–12.7)
POTASSIUM SERPL-SCNC: 3.7 MMOL/L (ref 3.5–5.3)
PROT SERPL-MCNC: 6.9 G/DL (ref 6.4–8.4)
RBC # BLD AUTO: 4.04 MILLION/UL (ref 3.81–5.12)
SODIUM SERPL-SCNC: 138 MMOL/L (ref 135–147)
WBC # BLD AUTO: 7.86 THOUSAND/UL (ref 4.31–10.16)

## 2023-09-01 PROCEDURE — 99285 EMERGENCY DEPT VISIT HI MDM: CPT

## 2023-09-01 PROCEDURE — 71045 X-RAY EXAM CHEST 1 VIEW: CPT

## 2023-09-01 PROCEDURE — 84484 ASSAY OF TROPONIN QUANT: CPT | Performed by: EMERGENCY MEDICINE

## 2023-09-01 PROCEDURE — 85025 COMPLETE CBC W/AUTO DIFF WBC: CPT | Performed by: EMERGENCY MEDICINE

## 2023-09-01 PROCEDURE — 80053 COMPREHEN METABOLIC PANEL: CPT | Performed by: EMERGENCY MEDICINE

## 2023-09-01 PROCEDURE — 93005 ELECTROCARDIOGRAM TRACING: CPT

## 2023-09-01 PROCEDURE — 36415 COLL VENOUS BLD VENIPUNCTURE: CPT | Performed by: EMERGENCY MEDICINE

## 2023-09-02 NOTE — DISCHARGE INSTRUCTIONS
Please follow-up with cardiology. Please return with any worsening symptoms. Please continue your normal at home medications. Your imaging studies have been preliminarily reviewed by the emergency department. Further review by Radiology is pending at this time. If there is a discrepancy or a finding of additional concern identified, we will attempt to contact you at the number you have provided us. If you do not hear from us, follow-up with your primary care provider within 1-2 weeks is always recommended to ensure that all findings were normal or as initially reported. Your results may also be available on [email or web address]    Please also note that sometimes there are subtle abnormalities in your lab values that you may observe when you access your record online. These are frequently not worrisome and if they are of concern we will have discussed them with you. However, we always encourage that you discuss any concerns you may have or observe on your record with your primary care provider. Please also be aware that voice transcription will occasionally recognize words or grammar differently than what was spoken.

## 2023-09-02 NOTE — ED PROVIDER NOTES
History  Chief Complaint   Patient presents with   • Chest Pain     Pt has had chest pressure all day today. (Pt recently found out Tuesday that daughter has cancer) Denies N/V/D or constipation, HA, SOB. 64-year-old female presenting to the emergency room with chief complaint of chest pressure which occurred today. Patient reports that at one point the chest discomfort did radiate up to her left jaw. Patient reports that the chest discomfort has come on with anxiety related to family situation with her daughter which she found out earlier this week. Patient reports that she has been taking some extra Xanax at home today because she is felt upset. She reports that with the chest discomfort she did not have any shortness of breath, diaphoresis or nausea. She has no known coronary artery disease. She does have a history hypertension, sleep apnea, atrial fibrillation and hyper lipidemia. Patient does not smoke. History provided by:  Patient and spouse  Chest Pain  Pain location:  Substernal area  Pain quality: pressure    Pain radiates to:  L jaw  Pain radiates to the back: no    Pain severity:  Mild  Onset quality:  Gradual  Timing:  Constant  Associated symptoms: anxiety    Associated symptoms: no diaphoresis, no nausea, no palpitations, no shortness of breath and not vomiting    Risk factors: high cholesterol, hypertension and obesity    Risk factors: no coronary artery disease, no diabetes mellitus and no smoking        Prior to Admission Medications   Prescriptions Last Dose Informant Patient Reported? Taking? ALPRAZolam (XANAX) 0.5 mg tablet  Self Yes No   Sig: Take by mouth daily at bedtime as needed for anxiety   Fluticasone-Salmeterol (Advair Diskus) 100-50 mcg/dose inhaler   No No   Sig: Inhale 1 puff 2 (two) times a day Rinse mouth after use.    albuterol (PROVENTIL HFA,VENTOLIN HFA) 90 mcg/act inhaler   Yes No   Sig: Inhale 2 puffs every 6 (six) hours as needed for wheezing   albuterol (Ventolin HFA) 90 mcg/act inhaler   No No   Sig: Inhale 2 puffs every 6 (six) hours as needed for wheezing   apixaban (Eliquis) 5 mg   No No   Sig: Take 1 tablet (5 mg total) by mouth 2 (two) times a day   atorvastatin (LIPITOR) 10 mg tablet  Self Yes No   Sig: Take 10 mg by mouth daily   citalopram (CeleXA) 40 mg tablet  Self Yes No   Sig: Take 40 mg by mouth daily    desonide (DESOWEN) 0.05 % cream   Yes No   erythromycin (ILOTYCIN) ophthalmic ointment  Self Yes No   levothyroxine 75 mcg tablet  Self Yes No   Sig: Take 75 mcg by mouth daily    magnesium oxide (MAG-OX) 400 mg tablet  Self Yes No   Sig: Take by mouth   meclizine (ANTIVERT) 25 mg tablet  Self No No   Sig: Take 1 tablet (25 mg total) by mouth 3 (three) times a day as needed for dizziness   Patient not taking: Reported on 2023   metoprolol succinate (TOPROL-XL) 25 mg 24 hr tablet   No No   Sig: Take 1 tablet (25 mg total) by mouth daily   nystatin powder  Self Yes No   Sig: APPLY 1 APPLICATION TOPICALLY IN THE MORNING, 1 APPLICATION AT NO. ..  (REFER TO PRESCRIPTION NOTES). olmesartan-hydrochlorothiazide (BENICAR HCT) 40-25 MG per tablet  Self Yes No      Facility-Administered Medications: None       Past Medical History:   Diagnosis Date   • Anxiety    • Diabetes mellitus ([address])    • Disease of thyroid gland    • Elevated d-dimer     2 seperate episodes of unclear etiology. • Hyperlipidemia    • Hypertension    • Hypothyroid    • Obesity    • Paroxysmal atrial fibrillation ([address])     New onset 2020.        Past Surgical History:   Procedure Laterality Date   • CATARACT EXTRACTION, BILATERAL     • HYSTERECTOMY     • KNEE ARTHROPLASTY     • SHOULDER OPEN ROTATOR CUFF REPAIR      x 2   • TONSILLECTOMY         Family History   Problem Relation Age of Onset   • Hypertension Sister    • Heart disease Paternal Aunt    • Other Mother         Encephalitis   • Other Father          in his 80s without significant problems   • Lung cancer Brother • No Known Problems Brother      I have reviewed and agree with the history as documented. E-Cigarette/Vaping   • E-Cigarette Use Never User      E-Cigarette/Vaping Substances   • Nicotine No    • THC No    • CBD No    • Flavoring No    • Other No    • Unknown No      Social History     Tobacco Use   • Smoking status: Never   • Smokeless tobacco: Never   Vaping Use   • Vaping Use: Never used   Substance Use Topics   • Alcohol use: Not Currently   • Drug use: Never       Review of Systems   Constitutional: Negative. Negative for diaphoresis. Respiratory: Positive for chest tightness. Negative for shortness of breath and wheezing. Cardiovascular: Negative. Negative for chest pain, palpitations and leg swelling. Gastrointestinal: Negative. Negative for diarrhea, nausea and vomiting. Genitourinary: Negative. Psychiatric/Behavioral: The patient is nervous/anxious. All other systems reviewed and are negative. Physical Exam  Physical Exam  Vitals and nursing note reviewed. Constitutional:       General: She is awake. She is not in acute distress. Appearance: Normal appearance. She is well-developed. She is obese. She is not ill-appearing or toxic-appearing. HENT:      Head: Normocephalic and atraumatic. Right Ear: External ear normal.      Left Ear: External ear normal.      Nose: Nose normal.      Mouth/Throat:      Mouth: Mucous membranes are moist.   Eyes:      General: Lids are normal. No scleral icterus. Extraocular Movements: Extraocular movements intact. Pupils: Pupils are equal, round, and reactive to light. Cardiovascular:      Rate and Rhythm: Normal rate and regular rhythm. Heart sounds: Normal heart sounds. No murmur heard. Pulmonary:      Effort: Pulmonary effort is normal. No respiratory distress. Breath sounds: Normal breath sounds. No wheezing, rhonchi or rales. Abdominal:      General: Abdomen is flat. There is no distension.       Palpations: Abdomen is soft. Tenderness: There is no abdominal tenderness. There is no guarding or rebound. Musculoskeletal:         General: No swelling, tenderness or deformity. Normal range of motion. Cervical back: Normal range of motion and neck supple. Skin:     General: Skin is warm and dry. Coloration: Skin is not jaundiced or pale. Findings: No rash. Neurological:      Mental Status: She is alert and oriented to person, place, and time. Mental status is at baseline. Cranial Nerves: No cranial nerve deficit. Motor: No weakness. Psychiatric:         Attention and Perception: Attention normal.         Mood and Affect: Mood is anxious.          Speech: Speech normal.         Behavior: Behavior normal.         Vital Signs  ED Triage Vitals [09/01/23 2025]   Temperature Pulse Respirations Blood Pressure SpO2   99.1 °F (37.3 °C) 72 20 167/70 95 %      Temp Source Heart Rate Source Patient Position - Orthostatic VS BP Location FiO2 (%)   Temporal Monitor Lying Right arm --      Pain Score       --           Vitals:    09/01/23 2025   BP: 167/70   Pulse: 72   Patient Position - Orthostatic VS: Lying         Visual Acuity      ED Medications  Medications - No data to display    Diagnostic Studies  Results Reviewed     Procedure Component Value Units Date/Time    HS Troponin I 2hr [[id number]]     Lab Status: No result Specimen: Blood     HS Troponin 0hr (reflex protocol) [[id number]]  (Normal) Collected: 09/01/23 2106    Lab Status: Final result Specimen: Blood from Arm, Left Updated: 09/01/23 2134     hs TnI 0hr 2 ng/L     Comprehensive metabolic panel [[id number]]  (Abnormal) Collected: 09/01/23 2106    Lab Status: Final result Specimen: Blood from Arm, Left Updated: 09/01/23 2130     Sodium 138 mmol/L      Potassium 3.7 mmol/L      Chloride 105 mmol/L      CO2 24 mmol/L      ANION GAP 9 mmol/L      BUN 29 mg/dL      Creatinine 1.06 mg/dL      Glucose 144 mg/dL      Calcium 9.2 mg/dL      AST 34 U/L      ALT 31 U/L      Alkaline Phosphatase 78 U/L      Total Protein 6.9 g/dL      Albumin 3.9 g/dL      Total Bilirubin 0.54 mg/dL      eGFR 53 ml/min/1.73sq m     Narrative:      [place] guidelines for Chronic Kidney Disease (CKD):   •  Stage 1 with normal or high GFR (GFR > 90 mL/min/1.73 square meters)  •  Stage 2 Mild CKD (GFR = 60-89 mL/min/1.73 square meters)  •  Stage 3A Moderate CKD (GFR = 45-59 mL/min/1.73 square meters)  •  Stage 3B Moderate CKD (GFR = 30-44 mL/min/1.73 square meters)  •  Stage 4 Severe CKD (GFR = 15-29 mL/min/1.73 square meters)  •  Stage 5 End Stage CKD (GFR <15 mL/min/1.73 square meters)  Note: GFR calculation is accurate only with a steady state creatinine    CBC and differential [[id number]] Collected: 09/01/23 2106    Lab Status: Final result Specimen: Blood from Arm, Left Updated: 09/01/23 2111     WBC 7.86 Thousand/uL      RBC 4.04 Million/uL      Hemoglobin 12.1 g/dL      Hematocrit 36.6 %      MCV 91 fL      MCH 30.0 pg      MCHC 33.1 g/dL      RDW 14.4 %      MPV 10.0 fL      Platelets 483 Thousands/uL      nRBC 0 /100 WBCs      Neutrophils Relative 62 %      Immat GRANS % 0 %      Lymphocytes Relative 24 %      Monocytes Relative 10 %      Eosinophils Relative 3 %      Basophils Relative 1 %      Neutrophils Absolute 4.85 Thousands/µL      Immature Grans Absolute 0.03 Thousand/uL      Lymphocytes Absolute 1.92 Thousands/µL      Monocytes Absolute 0.78 Thousand/µL      Eosinophils Absolute 0.23 Thousand/µL      Basophils Absolute 0.05 Thousands/µL                  XR chest 1 view portable   ED Interpretation by Benito Nice DO (09/01 2136)   No active disease.                  Procedures  ECG 12 Lead Documentation Only    Date/Time: 9/1/2023 9:07 PM    Performed by: Benito Nice DO  Authorized by: Benito Nice DO    ECG reviewed by me, the ED Provider: yes    Patient location:  ED  Previous ECG:     Previous ECG:  Compared to current Comparison ECG info:  No change from prior EKGs. T wave abnormalities were present on previous EKGs. Similarity:  No change  Interpretation:     Interpretation: normal    Rate:     ECG rate assessment: normal    Rhythm:     Rhythm: sinus rhythm    Ectopy:     Ectopy: none    QRS:     QRS axis:  Normal  Conduction:     Conduction: normal    ST segments:     ST segments:  Non-specific  T waves:     T waves: inverted      Inverted:  V1 and V2             ED Course  ED Course as of 09/01/23 2139   [facility] Sep 01, 2023   2136 Patient has had chest discomfort for greater than 6 hours and has a negative troponin. Other etiology such as anxiety, more likely the diagnosis. Patient is stable for discharge. HEART Risk Score    Flowsheet Row Most Recent Value   Heart Score Risk Calculator    History 0 Filed at: 09/01/2023 2136   ECG 1 Filed at: 09/01/2023 2136   Age 2 Filed at: 09/01/2023 2136   Risk Factors 2 Filed at: 09/01/2023 2136   Troponin 0 Filed at: 09/01/2023 2136   HEART Score 5 Filed at: 09/01/2023 2136                        SBIRT 22yo+    Flowsheet Row Most Recent Value   Initial Alcohol Screen: US AUDIT-C     1. How often do you have a drink containing alcohol? 0 Filed at: 09/01/2023 2049   2. How many drinks containing alcohol do you have on a typical day you are drinking? 0 Filed at: 09/01/2023 2049   3b. FEMALE Any Age, or MALE 65+: How often do you have 4 or more drinks on one occassion? 0 Filed at: 09/01/2023 2049   Audit-C Score 0 Filed at: 09/01/2023 2049   CLEVE: How many times in the past year have you. .. Used an illegal drug or used a prescription medication for non-medical reasons? Never Filed at: 09/01/2023 2049                    Medical Decision Making  Patient presented to the emergency department and a MSE was performed. The patient was evaluated for complaint related to acute chest pain.   Patient is potentially at risk for, but not limited to, acute coronary syndrome, arrhythmia, myocardial infarction, pulmonary embolism, pneumothorax, infectious process of the lungs such as pneumonia, GERD, esophagitis, muscle strain, or costochondritis. Several of these diagnoses have been evaluated and ruled out by history and physical.  As needed, patient will be further evaluated with laboratory and imaging studies. Higher level diagnostics, such as CT imaging or ultrasound, may also be required. Please see work-up portion of the note for further evaluation of patient's risk. Socioeconomic factors were also considered as part of the decision-making process. Unless otherwise stated in the chart or patient is admitted as elsewhere documented, any previously prescribed medications will be maintained. Chest pain: acute illness or injury     Details: Troponin is 2. heart score 5. EKG unchanged. Alternative etiology most likely. Pain ongoing for greater than 6 hours. Patient stable for discharge. Follow-up with cardiology. Amount and/or Complexity of Data Reviewed  External Data Reviewed: ECG. Labs: ordered. Decision-making details documented in ED Course. Radiology: ordered and independent interpretation performed. Decision-making details documented in ED Course. ECG/medicine tests: ordered and independent interpretation performed. Decision-making details documented in ED Course. Risk  Decision regarding hospitalization. Disposition  Final diagnoses:   Chest pain     Time reflects when diagnosis was documented in both MDM as applicable and the Disposition within this note     Time User Action Codes Description Comment    9/1/2023  9:37 PM Nguyễn Babcock Add [R07.9] Chest pain       ED Disposition     ED Disposition   Discharge    Condition   Stable    Date/Time   Fri Sep 1, 2023  9:37 PM    Comment   Yamileth Tamez discharge to home/self care.                Follow-up Information     Follow up With Specialties Details Why [address] [id number], Nurse Practitioner   [address] [place] [id number]  [phone]            Patient's Medications   Discharge Prescriptions    No medications on file           PDMP Review       Value Time User    PDMP Reviewed  Yes 11/25/2020  2:06 PM Padmini Diaz MD          ED Provider  Electronically Signed by           Susan Trejo DO  09/01/23 [id number]

## 2023-09-05 LAB
ATRIAL RATE: 74 BPM
P AXIS: 56 DEGREES
PR INTERVAL: 174 MS
QRS AXIS: 11 DEGREES
QRSD INTERVAL: 94 MS
QT INTERVAL: 416 MS
QTC INTERVAL: 461 MS
T WAVE AXIS: 62 DEGREES
VENTRICULAR RATE: 74 BPM

## 2023-09-07 ENCOUNTER — TELEPHONE (OUTPATIENT)
Dept: CARDIOLOGY CLINIC | Facility: CLINIC | Age: 68
End: 2023-09-07

## 2023-09-07 NOTE — TELEPHONE ENCOUNTER
Pt called and said that she was in the ER on 9/1 has questions about her medications.   [phone]

## 2023-09-07 NOTE — TELEPHONE ENCOUNTER
Spoke with Pt, She didn't have any concerns or questions about her ,edications. She stated her HR normally runs high 50's to low 60's. It has been running in the 70's. She wants to know if this is ok.

## 2023-09-08 NOTE — TELEPHONE ENCOUNTER
I reviewed her ER visit. Looks like she is dealing with increased stress related to her daughter's diagnosis, which can contribute to higher resting heart rates. Please arrange for closer follow up with our office as she is not scheduled until January. Thanks!

## 2023-10-18 RX ORDER — ERGOCALCIFEROL 1.25 MG/1
1 CAPSULE ORAL WEEKLY
COMMUNITY
Start: 2023-08-30 | End: 2023-11-22

## 2023-10-19 ENCOUNTER — OFFICE VISIT (OUTPATIENT)
Dept: CARDIOLOGY CLINIC | Facility: CLINIC | Age: 68
End: 2023-10-19
Payer: MEDICARE

## 2023-10-19 VITALS
DIASTOLIC BLOOD PRESSURE: 78 MMHG | HEIGHT: 67 IN | BODY MASS INDEX: 44.42 KG/M2 | OXYGEN SATURATION: 95 % | WEIGHT: 283 LBS | HEART RATE: 61 BPM | TEMPERATURE: 98.2 F | SYSTOLIC BLOOD PRESSURE: 136 MMHG

## 2023-10-19 DIAGNOSIS — I10 ESSENTIAL HYPERTENSION: ICD-10-CM

## 2023-10-19 DIAGNOSIS — G47.33 OBSTRUCTIVE SLEEP APNEA SYNDROME: ICD-10-CM

## 2023-10-19 DIAGNOSIS — E78.2 MIXED HYPERLIPIDEMIA: ICD-10-CM

## 2023-10-19 DIAGNOSIS — E03.8 HYPOTHYROIDISM DUE TO HASHIMOTO'S THYROIDITIS: ICD-10-CM

## 2023-10-19 DIAGNOSIS — E66.01 CLASS 3 OBESITY (HCC): ICD-10-CM

## 2023-10-19 DIAGNOSIS — I48.0 PAROXYSMAL ATRIAL FIBRILLATION (HCC): Primary | ICD-10-CM

## 2023-10-19 DIAGNOSIS — E06.3 HYPOTHYROIDISM DUE TO HASHIMOTO'S THYROIDITIS: ICD-10-CM

## 2023-10-19 PROCEDURE — 99214 OFFICE O/P EST MOD 30 MIN: CPT | Performed by: NURSE PRACTITIONER

## 2023-10-19 RX ORDER — METRONIDAZOLE 10 MG/G
GEL TOPICAL
COMMUNITY
Start: 2023-10-02

## 2023-10-19 NOTE — PATIENT INSTRUCTIONS
Continue current regimen. Contact us immediately if any recurrent symptoms - would obtain updated stress test if recurrent symptoms. Continue to monitor blood pressure and notify me if > 140/80. Labs in January.

## 2023-10-22 NOTE — PROGRESS NOTES
Cardiology Follow Up    Migel Lee  1955  [id number]  [facility]  [address] [address] [phone]    Shira Morris presents for routine follow up of paroxysmal a fib, HTN, HLD.     1. Paroxysmal atrial fibrillation [facility])  Assessment & Plan:  Patient with new onset atrial fibrillation 11/25/2020. Patient presented to the emergency room after onset of “fluttering” and rapid heart rate. She converted spontaneously to  normal sinus rhythm. She has remained in normal sinus rhythm since and feels well. [id number] Score 3   Eliquis 5 mg twice daily for stroke prophylaxis. Home sleep study notable for severe sleep apnea being treated with CPAP. 48 hour Holter monitor 6/8/2022 showed NSR with 5 short AT runs. Echocardiogram 4/3/2023 with preserved LVEF and no significant valvular abnormality. She reports no recent palpitations. Continue metoprolol succinate 25 mg daily and magnesium supplement. We discussed s/s to report. Will monitor. 2. Essential hypertension  Assessment & Plan:  Blood pressure is acceptable today. Home BP cuff accuracy has been verified with excellent home readings. Continue Benicar 40/25 mg daily. Recent lab work reviewed. Encouraged 2 g sodium diet and weight control. Orders:  -     Comprehensive metabolic panel; Future; Expected date: 01/08/2024  -     CBC and differential; Future; Expected date: 01/08/2024    3. Mixed hyperlipidemia  Assessment & Plan:  Lipid panel 1/3/2023: C 178. T 151. H 71. L 77. Continue atorvastatin 10 mg daily. Orders:  -     Lipid Panel with Direct LDL reflex; Future; Expected date: 01/08/2024  -     Comprehensive metabolic panel; Future; Expected date: 01/08/2024    4. Obstructive sleep apnea syndrome  Assessment & Plan:  Home sleep study 12/2020 revealed severe sleep apnea  Compliant with CPAP therapy.   Following with [facility] sleep medicine. 5. Hypothyroidism due to Hashimoto's thyroiditis  Assessment & Plan:  Managed by PCP. Goal TSH 1-2 if possible given PAF. 6. Class 3 obesity (HCC)  Assessment & Plan: Body mass index is 44.32 kg/m². Reviewed dietary and exercise modifications. PAM Aguirre has a past medical history of paroxysmal atrial fibrillation, HTN, HLD, DEANNA on CPAP, hypothyroidism, anxiety/depression, and obesity. She initially presented to [facility] 11/25/2020 with chest pain, palpitations, and shortness of breath. Found to be in atrial fibrillation with RVR, rate 118 bpm. CTA ruled out PE. She was treated with IV Lopressor and Cardiazem and spontaneously converted to NSR. She was evaluated by Dr. Marissa Jara during admission and ultimately discharged home on metoprolol succinate 25 mg daily and Eliquis 5 mg BID. Her TSH was mildly elevated and levothyroxine increased to 100 mch from 88 mcg during admission. Magnesium supplementation was initiated. She was under significant stress at the time with a recent cancer diagnosis for her spouse. He had just returned home from a complicated bowel surgery. Echo 12/2/2020 was unremarkable. Nuclear stress test 12/2/20 showed no scar or ischemia with EF 73%. She completed a sleep study 3/4/2021 which was positive for DEANNA. She was referred to sleep medicine and underwent in-lab study 4/8/2021 and CPAP therapy was initiated. She called our office 6/7/22 with complaint of increased palpitations. She was brought into the office where an ECG showed SB, rate 58 bpm. She reported that her symptoms felt like PVC's. 48 hour Holter monitoring was obtained showing predominantly NSR, HR 47-98, avg 60 bpm with rare PAC's and PVC's. Symptoms were associated with PAC's/PVC's and brief runs of atrial tachycardia. She followed up with Dr. Marissa Jara on 12/27/2022 at which time her BP was running in the 140's at home. Benicar was increased to 40/25 mg daily.      She presented to [address] [place] [address] ER 3/24/2023 with dyspnea on exertion with wheezing. She contracted Covid infection in February and noticed symptoms since that time. ECG showed SB, rate 59 bpm with no acute changes. . CTA chest showed findings consistent with bronchitis and no PE. She was given a course of prednisone and referral to pulmonology. Echocardiogram 4/3/2023 showed preserved LVEF with no concerning findings. She followed up most recently 7/27/2023 at which time she had been undergoing work up through pulmonology. PFT's showed restrictive disease with no other findings. She was started on Advair and albuterol with improvement in her symptoms. She denied chest pain and rare palpitations. She had been using her CPAP faithfully. BP was well controlled at home. No changes were made to her regimen. 10/19/2023: Car Bowling presents for follow up. She was seen at [address] [place] [address] ER 9/1/2023 for evaluation of chest pressure and elevated HR. Her daughter had just been diagnosed with leukemia and was hospitalized. Her son in law was not allowing her to visit her daughter. She was under significant stress. She was having persistent chest pressure which lasted > 24 hours. ECG showed NSR. Lab work, including HS trop was negative. Chest xray clear. She states that her daughter is making good improvement in her cancer treatment. She is now able to visit with her. She has not had any recurrent chest pressure. She denies any exertional symptoms. No palpitations. She is taking her medications faithfully with no bleeding issues.      Medical Problems       Problem List       Anxiety and depression    Obesity hypoventilation syndrome (HCC)    Paroxysmal atrial fibrillation ([address])    Essential hypertension    Hyperlipidemia    Hypothyroidism due to Hashimoto's thyroiditis    Obstructive sleep apnea syndrome    Class 3 obesity (HCC)    Post-COVID chronic dyspnea    History of COVID-19    Reactive airway disease    Shortness of breath        Past Medical History:   Diagnosis Date    Anxiety     Diabetes mellitus ([facility])     Disease of thyroid gland     Elevated d-dimer     2 seperate episodes of unclear etiology. Hyperlipidemia     Hypertension     Hypothyroid     Obesity     Paroxysmal atrial fibrillation ([facility])     New onset 2020.      Social History     Socioeconomic History    Marital status: /Civil Union     Spouse name: Not on file    Number of children: Not on file    Years of education: Not on file    Highest education level: Not on file   Occupational History    Occupation: Retired teacher   Tobacco Use    Smoking status: Never    Smokeless tobacco: Never   Vaping Use    Vaping Use: Never used   Substance and Sexual Activity    Alcohol use: Not Currently    Drug use: Never    Sexual activity: Yes     Partners: Male   Other Topics Concern    Not on file   Social History Narrative    Not on file     Social Determinants of Health     Financial Resource Strain: Not on file   Food Insecurity: Not on file   Transportation Needs: Not on file   Physical Activity: Not on file   Stress: Not on file   Social Connections: Not on file   Intimate Partner Violence: Not on file   Housing Stability: Not on file      Family History   Problem Relation Age of Onset    Hypertension Sister     Heart disease Paternal Aunt     Other Mother         Encephalitis    Other Father          in his 80s without significant problems    Lung cancer Brother     No Known Problems Brother      Past Surgical History:   Procedure Laterality Date    CATARACT EXTRACTION, BILATERAL      HYSTERECTOMY      KNEE ARTHROPLASTY      SHOULDER OPEN ROTATOR CUFF REPAIR      x 2    TONSILLECTOMY         Current Outpatient Medications:     albuterol (PROVENTIL HFA,VENTOLIN HFA) 90 mcg/act inhaler, Inhale 2 puffs every 6 (six) hours as needed for wheezing, Disp: , Rfl:     ALPRAZolam (XANAX) 0.5 mg tablet, Take by mouth daily at bedtime as needed for anxiety, Disp: , Rfl:     apixaban (Eliquis) 5 mg, Take 1 tablet (5 mg total) by mouth 2 (two) times a day, Disp: 180 tablet, Rfl: 3    atorvastatin (LIPITOR) 10 mg tablet, Take 10 mg by mouth daily, Disp: , Rfl:     citalopram (CeleXA) 40 mg tablet, Take 40 mg by mouth daily , Disp: , Rfl:     desonide (DESOWEN) 0.05 % cream, , Disp: , Rfl:     ergocalciferol (ERGOCALCIFEROL) 1.25 MG (69877 UT) capsule, Take 1 capsule by mouth once a week, Disp: , Rfl:     erythromycin (ILOTYCIN) ophthalmic ointment, , Disp: , Rfl:     Fluticasone-Salmeterol (Advair Diskus) 100-50 mcg/dose inhaler, Inhale 1 puff 2 (two) times a day Rinse mouth after use., Disp: 60 blister, Rfl: 5    levothyroxine 75 mcg tablet, Take 75 mcg by mouth daily , Disp: , Rfl:     magnesium oxide (MAG-OX) 400 mg tablet, Take by mouth, Disp: , Rfl:     metoprolol succinate (TOPROL-XL) 25 mg 24 hr tablet, Take 1 tablet (25 mg total) by mouth daily, Disp: 90 tablet, Rfl: 3    metroNIDAZOLE (METROGEL) 1 % gel, , Disp: , Rfl:     nystatin powder, APPLY 1 APPLICATION TOPICALLY IN THE MORNING, 1 APPLICATION AT NO. ..  (REFER TO PRESCRIPTION NOTES). , Disp: , Rfl:     olmesartan-hydrochlorothiazide (BENICAR HCT) 40-25 MG per tablet, , Disp: , Rfl:     albuterol (Ventolin HFA) 90 mcg/act inhaler, Inhale 2 puffs every 6 (six) hours as needed for wheezing (Patient not taking: Reported on 10/19/2023), Disp: 18 g, Rfl: 2    meclizine (ANTIVERT) 25 mg tablet, Take 1 tablet (25 mg total) by mouth 3 (three) times a day as needed for dizziness (Patient not taking: Reported on 7/27/2023), Disp: 30 tablet, Rfl: 0  Allergies   Allergen Reactions    Flagyl [Metronidazole] Shortness Of Breath     Pt requested addition to allergies    Contrast [Iodinated Contrast Media] Rash    Iodine - Food Allergy Rash       Labs:     Chemistry        Component Value Date/Time     11/06/2015 0655    K 3.7 09/01/2023 2106    K 3.9 11/06/2015 0655     09/01/2023 2106     11/06/2015 0655    CO2 24 09/01/2023 2106    CO2 26.3 11/06/2015 0655    BUN 29 (H) 09/01/2023 2106    BUN 15 11/06/2015 0655    CREATININE 1.06 09/01/2023 2106    CREATININE 0.86 11/06/2015 0655        Component Value Date/Time    CALCIUM 9.2 09/01/2023 2106    CALCIUM 8.7 11/06/2015 0655    ALKPHOS 78 09/01/2023 2106    ALKPHOS 96 11/06/2015 0655    AST 34 09/01/2023 2106    AST 21 11/06/2015 0655    ALT 31 09/01/2023 2106    ALT 32 11/06/2015 0655    BILITOT 0.34 11/06/2015 0655        Lipid panel 1/3/2023: C 178. T 151. H 71. L 77. Cardiac Test Results:   Echocardiogram 4/3/2023:  EF 70%. Mild LVH. ECG 3/24/2023: Sinus bradycardia. Rate 59 bpm with no significant change from prior. ECG 10/19/2022: Sinus bradycardia. Otherwise normal ECG. Holter monitor (48 hours) 6/8/2022:   Min HR 47. Max HR 98. Avg HR 60. Rare PVC's. Rare PAC's.   5 brief atrial runs with the longest lasting 5 beats. Palpitations correlated with PAC's/PVC's and atrial runs. ECG 6/7/2022: Sinus bradycardia. 58 bpm.      Lipid panel 12/13/2021: C 176. T 177. H 65. L 76. ECG 11/25/2020:  Atrial flutter/atrial fibrillation at 118 bpm.     Echocardiogram 12/02/2020:  EF 60%. Normal diastolic function. Mild annular calcification of the mitral valve. Trace tricuspid regurgitation. Subhash Waggoner nuclear stress test 12/02/2020:  No evidence of myocardial scar. No evidence of myocardial ischemia. EF 73%. Lipid panel 10/13/2020: C 166. T 154. H 64. L 71. Echocardiogram 11/05/2015:  EF 55-60%. Mild tricuspid regurgitation. Estimated PASP 38 mmHg. Review of Systems   Constitutional: Negative. HENT: Negative. Cardiovascular:  Negative for chest pain, dyspnea on exertion, irregular heartbeat, leg swelling, near-syncope, orthopnea and palpitations. Respiratory:  Negative for cough and snoring. Endocrine: Negative. Skin: Negative. Musculoskeletal: Negative. Gastrointestinal: Negative. Genitourinary: Negative. Neurological: Negative. Psychiatric/Behavioral: Negative. Vitals:    10/19/23 0951   BP: 136/78   Pulse: 61   Temp: 98.2 °F (36.8 °C)   SpO2: 95%     Vitals:    10/19/23 0951   Weight: 128 kg (283 lb)     Height: 5' 7" (170.2 cm)   Body mass index is 44.32 kg/m². Physical Exam  Vitals and nursing note reviewed. Constitutional:       General: She is not in acute distress. Appearance: She is well-developed. She is obese. She is not diaphoretic. HENT:      Head: Normocephalic and atraumatic. Neck:      Thyroid: No thyromegaly. Vascular: No carotid bruit or JVD. Cardiovascular:      Rate and Rhythm: Normal rate and regular rhythm. No extrasystoles are present. Pulses: Intact distal pulses. Radial pulses are 2+ on the right side and 2+ on the left side. Heart sounds: Normal heart sounds, S1 normal and S2 normal. No murmur heard. Pulmonary:      Effort: Pulmonary effort is normal.      Breath sounds: Normal breath sounds. Abdominal:      General: There is no distension. Palpations: Abdomen is soft. Tenderness: There is no abdominal tenderness. Musculoskeletal:         General: Normal range of motion. Cervical back: Normal range of motion and neck supple. Lymphadenopathy:      Cervical: No cervical adenopathy. Skin:     General: Skin is warm and dry. Neurological:      Mental Status: She is alert and oriented to person, place, and time. Cranial Nerves: No cranial nerve deficit.    Psychiatric:         Behavior: Behavior normal.

## 2023-10-22 NOTE — ASSESSMENT & PLAN NOTE
Blood pressure is acceptable today. Home BP cuff accuracy has been verified with excellent home readings. Continue Benicar 40/25 mg daily. Recent lab work reviewed. Encouraged 2 g sodium diet and weight control.

## 2023-10-22 NOTE — ASSESSMENT & PLAN NOTE
Home sleep study 12/2020 revealed severe sleep apnea  Compliant with CPAP therapy. Following with [facility] sleep medicine.

## 2023-10-22 NOTE — ASSESSMENT & PLAN NOTE
Patient with new onset atrial fibrillation 11/25/2020. Patient presented to the emergency room after onset of “fluttering” and rapid heart rate. She converted spontaneously to  normal sinus rhythm. She has remained in normal sinus rhythm since and feels well. [id number] Score 3   Eliquis 5 mg twice daily for stroke prophylaxis. Home sleep study notable for severe sleep apnea being treated with CPAP. 48 hour Holter monitor 6/8/2022 showed NSR with 5 short AT runs. Echocardiogram 4/3/2023 with preserved LVEF and no significant valvular abnormality. She reports no recent palpitations. Continue metoprolol succinate 25 mg daily and magnesium supplement. We discussed s/s to report. Will monitor.

## 2024-01-11 ENCOUNTER — TELEPHONE (OUTPATIENT)
Age: 69
End: 2024-01-11

## 2024-02-01 ENCOUNTER — APPOINTMENT (EMERGENCY)
Dept: RADIOLOGY | Facility: HOSPITAL | Age: 69
End: 2024-02-01
Payer: MEDICARE

## 2024-02-01 ENCOUNTER — HOSPITAL ENCOUNTER (EMERGENCY)
Facility: HOSPITAL | Age: 69
Discharge: HOME | End: 2024-02-01
Attending: EMERGENCY MEDICINE
Payer: MEDICARE

## 2024-02-01 VITALS
TEMPERATURE: 98.4 F | DIASTOLIC BLOOD PRESSURE: 63 MMHG | OXYGEN SATURATION: 99 % | HEIGHT: 67 IN | WEIGHT: 293 LBS | RESPIRATION RATE: 17 BRPM | BODY MASS INDEX: 45.99 KG/M2 | HEART RATE: 56 BPM | SYSTOLIC BLOOD PRESSURE: 151 MMHG

## 2024-02-01 DIAGNOSIS — R00.2 PALPITATIONS: Primary | ICD-10-CM

## 2024-02-01 LAB
ALBUMIN SERPL BCP-MCNC: 4 G/DL (ref 3.5–5)
ALP SERPL-CCNC: 74 U/L (ref 34–104)
ALT SERPL W P-5'-P-CCNC: 21 U/L (ref 7–52)
ANION GAP SERPL CALCULATED.3IONS-SCNC: 13 MMOL/L
AST SERPL W P-5'-P-CCNC: 28 U/L (ref 13–39)
ATRIAL RATE: 71 BPM
BASOPHILS # BLD AUTO: 0.05 THOUSANDS/ÂΜL (ref 0–0.1)
BASOPHILS NFR BLD AUTO: 1 % (ref 0–1)
BILIRUB SERPL-MCNC: 0.66 MG/DL (ref 0.2–1)
BUN SERPL-MCNC: 25 MG/DL (ref 5–25)
CALCIUM SERPL-MCNC: 9.3 MG/DL (ref 8.4–10.2)
CARDIAC TROPONIN I PNL SERPL HS: 3 NG/L
CHLORIDE SERPL-SCNC: 101 MMOL/L (ref 96–108)
CO2 SERPL-SCNC: 22 MMOL/L (ref 21–32)
CREAT SERPL-MCNC: 1.06 MG/DL (ref 0.6–1.3)
EOSINOPHIL # BLD AUTO: 0.19 THOUSAND/ÂΜL (ref 0–0.61)
EOSINOPHIL NFR BLD AUTO: 3 % (ref 0–6)
ERYTHROCYTE [DISTWIDTH] IN BLOOD BY AUTOMATED COUNT: 14.1 % (ref 11.6–15.1)
GFR SERPL CREATININE-BSD FRML MDRD: 53 ML/MIN/1.73SQ M
GLUCOSE SERPL-MCNC: 116 MG/DL (ref 65–140)
HCT VFR BLD AUTO: 36.5 % (ref 34.8–46.1)
HGB BLD-MCNC: 11.9 G/DL (ref 11.5–15.4)
IMM GRANULOCYTES # BLD AUTO: 0.01 THOUSAND/UL (ref 0–0.2)
IMM GRANULOCYTES NFR BLD AUTO: 0 % (ref 0–2)
LYMPHOCYTES # BLD AUTO: 2.04 THOUSANDS/ÂΜL (ref 0.6–4.47)
LYMPHOCYTES NFR BLD AUTO: 31 % (ref 14–44)
MAGNESIUM SERPL-MCNC: 1.8 MG/DL (ref 1.9–2.7)
MCH RBC QN AUTO: 28.7 PG (ref 26.8–34.3)
MCHC RBC AUTO-ENTMCNC: 32.6 G/DL (ref 31.4–37.4)
MCV RBC AUTO: 88 FL (ref 82–98)
MONOCYTES # BLD AUTO: 0.51 THOUSAND/ÂΜL (ref 0.17–1.22)
MONOCYTES NFR BLD AUTO: 8 % (ref 4–12)
NEUTROPHILS # BLD AUTO: 3.82 THOUSANDS/ÂΜL (ref 1.85–7.62)
NEUTS SEG NFR BLD AUTO: 57 % (ref 43–75)
NRBC BLD AUTO-RTO: 0 /100 WBCS
P AXIS: 61 DEGREES
PLATELET # BLD AUTO: 202 THOUSANDS/UL (ref 149–390)
PMV BLD AUTO: 9.6 FL (ref 8.9–12.7)
POTASSIUM SERPL-SCNC: 3.6 MMOL/L (ref 3.5–5.3)
PR INTERVAL: 184 MS
PROT SERPL-MCNC: 7.6 G/DL (ref 6.4–8.4)
QRS AXIS: 7 DEGREES
QRSD INTERVAL: 100 MS
QT INTERVAL: 422 MS
QTC INTERVAL: 458 MS
RBC # BLD AUTO: 4.14 MILLION/UL (ref 3.81–5.12)
SODIUM SERPL-SCNC: 136 MMOL/L (ref 135–147)
T WAVE AXIS: 39 DEGREES
VENTRICULAR RATE: 71 BPM
WBC # BLD AUTO: 6.62 THOUSAND/UL (ref 4.31–10.16)

## 2024-02-01 PROCEDURE — 85025 COMPLETE CBC W/AUTO DIFF WBC: CPT | Performed by: EMERGENCY MEDICINE

## 2024-02-01 PROCEDURE — 96365 THER/PROPH/DIAG IV INF INIT: CPT

## 2024-02-01 PROCEDURE — 93005 ELECTROCARDIOGRAM TRACING: CPT

## 2024-02-01 PROCEDURE — 99285 EMERGENCY DEPT VISIT HI MDM: CPT

## 2024-02-01 PROCEDURE — 83735 ASSAY OF MAGNESIUM: CPT | Performed by: EMERGENCY MEDICINE

## 2024-02-01 PROCEDURE — 93010 ELECTROCARDIOGRAM REPORT: CPT | Performed by: INTERNAL MEDICINE

## 2024-02-01 PROCEDURE — 99284 EMERGENCY DEPT VISIT MOD MDM: CPT | Performed by: EMERGENCY MEDICINE

## 2024-02-01 PROCEDURE — 36415 COLL VENOUS BLD VENIPUNCTURE: CPT | Performed by: EMERGENCY MEDICINE

## 2024-02-01 PROCEDURE — 84484 ASSAY OF TROPONIN QUANT: CPT | Performed by: EMERGENCY MEDICINE

## 2024-02-01 PROCEDURE — 71045 X-RAY EXAM CHEST 1 VIEW: CPT

## 2024-02-01 PROCEDURE — 80053 COMPREHEN METABOLIC PANEL: CPT | Performed by: EMERGENCY MEDICINE

## 2024-02-01 RX ORDER — MAGNESIUM SULFATE 1 G/100ML
1 INJECTION INTRAVENOUS ONCE
Status: COMPLETED | OUTPATIENT
Start: 2024-02-01 | End: 2024-02-01

## 2024-02-01 RX ADMIN — MAGNESIUM SULFATE HEPTAHYDRATE 1 G: 1 INJECTION, SOLUTION INTRAVENOUS at 08:11

## 2024-02-01 NOTE — ED PROVIDER NOTES
History  Chief Complaint   Patient presents with    Chest Pain     Pt presented to this ED via EMS from home c/o chest pain w/rapid heart beat starting during night stating HR 's. Hx afib. Denies travel/sob/fevers/cough/n/v/d       History provided by:  Medical records, patient, spouse and EMS personnel  Palpitations  Palpitations quality:  Irregular (Heart rate fluctuating from )  Onset quality:  Gradual  Duration:  6 hours  Timing:  Constant  Progression:  Improving  Chronicity:  Recurrent  Context comment:  History of atrial fibrillation, paroxysmal, diagnosed 3 years ago, placed on Eliquis, Benicar and metoprolol.  For the past 6 hours patient had had palpitations.  At 6 AM she took her metoprolol.  Relieved by:  Beta blockers (Took usual dose of metoprolol 25 mg daily at 6 AM)  Worsened by:  Nothing  Associated symptoms: no back pain, no chest pain, no chest pressure, no cough, no diaphoresis, no dizziness, no hemoptysis, no leg pain, no lower extremity edema, no malaise/fatigue, no nausea, no near-syncope, no numbness, no orthopnea, no PND, no shortness of breath, no syncope, no vomiting and no weakness    Risk factors: no hx of PE        Prior to Admission Medications   Prescriptions Last Dose Informant Patient Reported? Taking?   ALPRAZolam (XANAX) 0.5 mg tablet  Self Yes No   Sig: Take by mouth daily at bedtime as needed for anxiety   Fluticasone-Salmeterol (Advair Diskus) 100-50 mcg/dose inhaler   No No   Sig: Inhale 1 puff 2 (two) times a day Rinse mouth after use.   albuterol (PROVENTIL HFA,VENTOLIN HFA) 90 mcg/act inhaler   Yes No   Sig: Inhale 2 puffs every 6 (six) hours as needed for wheezing   albuterol (Ventolin HFA) 90 mcg/act inhaler   No No   Sig: Inhale 2 puffs every 6 (six) hours as needed for wheezing   Patient not taking: Reported on 10/19/2023   apixaban (Eliquis) 5 mg   No No   Sig: Take 1 tablet (5 mg total) by mouth 2 (two) times a day   atorvastatin (LIPITOR) 10 mg tablet   Self Yes No   Sig: Take 10 mg by mouth daily   citalopram (CeleXA) 40 mg tablet  Self Yes No   Sig: Take 40 mg by mouth daily    desonide (DESOWEN) 0.05 % cream   Yes No   ergocalciferol (ERGOCALCIFEROL) 1.25 MG (91884 UT) capsule   Yes No   Sig: Take 1 capsule by mouth once a week   erythromycin (ILOTYCIN) ophthalmic ointment  Self Yes No   levothyroxine 75 mcg tablet  Self Yes No   Sig: Take 75 mcg by mouth daily    magnesium oxide (MAG-OX) 400 mg tablet  Self Yes No   Sig: Take by mouth   meclizine (ANTIVERT) 25 mg tablet  Self No No   Sig: Take 1 tablet (25 mg total) by mouth 3 (three) times a day as needed for dizziness   Patient not taking: Reported on 2023   metoprolol succinate (TOPROL-XL) 25 mg 24 hr tablet   No No   Sig: Take 1 tablet (25 mg total) by mouth daily   metroNIDAZOLE (METROGEL) 1 % gel   Yes No   nystatin powder  Self Yes No   Sig: APPLY 1 APPLICATION TOPICALLY IN THE MORNING, 1 APPLICATION AT NO...  (REFER TO PRESCRIPTION NOTES).   olmesartan-hydrochlorothiazide (BENICAR HCT) 40-25 MG per tablet  Self Yes No      Facility-Administered Medications: None       Past Medical History:   Diagnosis Date    Anxiety     Diabetes mellitus (HCC)     Disease of thyroid gland     Elevated d-dimer     2 seperate episodes of unclear etiology.    Hyperlipidemia     Hypertension     Hypothyroid     Obesity     Paroxysmal atrial fibrillation (HCC)     New onset 2020.       Past Surgical History:   Procedure Laterality Date    CATARACT EXTRACTION, BILATERAL      HYSTERECTOMY      KNEE ARTHROPLASTY      SHOULDER OPEN ROTATOR CUFF REPAIR      x 2    TONSILLECTOMY         Family History   Problem Relation Age of Onset    Hypertension Sister     Heart disease Paternal Aunt     Other Mother         Encephalitis    Other Father          in his 90s without significant problems    Lung cancer Brother     No Known Problems Brother      I have reviewed and agree with the history as  documented.    E-Cigarette/Vaping    E-Cigarette Use Never User      E-Cigarette/Vaping Substances    Nicotine No     THC No     CBD No     Flavoring No     Other No     Unknown No      Social History     Tobacco Use    Smoking status: Never    Smokeless tobacco: Never   Vaping Use    Vaping status: Never Used   Substance Use Topics    Alcohol use: Not Currently    Drug use: Never       Review of Systems   Constitutional:  Negative for chills, diaphoresis, fatigue, fever and malaise/fatigue.   HENT:  Negative for ear discharge, ear pain, rhinorrhea and sore throat.    Eyes:  Negative for pain and visual disturbance.   Respiratory:  Negative for cough, hemoptysis and shortness of breath.    Cardiovascular:  Positive for palpitations. Negative for chest pain, orthopnea, syncope, PND and near-syncope.   Gastrointestinal:  Negative for abdominal pain, diarrhea, nausea and vomiting.   Endocrine: Negative for polydipsia, polyphagia and polyuria.   Genitourinary:  Negative for difficulty urinating, dysuria, flank pain and hematuria.   Musculoskeletal:  Negative for arthralgias and back pain.   Skin:  Negative for color change and rash.   Allergic/Immunologic: Negative for immunocompromised state.   Neurological:  Negative for dizziness, seizures, syncope, weakness, numbness and headaches.   Psychiatric/Behavioral:  Negative for confusion and self-injury. The patient is not nervous/anxious.    All other systems reviewed and are negative.      Physical Exam  Physical Exam  Vitals and nursing note reviewed.   Constitutional:       General: She is not in acute distress.     Appearance: Normal appearance. She is obese. She is not ill-appearing, toxic-appearing or diaphoretic.   HENT:      Head: Normocephalic and atraumatic.      Nose: Nose normal. No congestion or rhinorrhea.      Mouth/Throat:      Mouth: Mucous membranes are moist.      Pharynx: Oropharynx is clear. No oropharyngeal exudate or posterior oropharyngeal  erythema.   Eyes:      General:         Right eye: No discharge.         Left eye: No discharge.   Cardiovascular:      Rate and Rhythm: Normal rate and regular rhythm.      Pulses: Normal pulses.      Heart sounds: Normal heart sounds. No murmur heard.     No gallop.   Pulmonary:      Effort: Pulmonary effort is normal. No respiratory distress.      Breath sounds: Normal breath sounds. No stridor. No wheezing, rhonchi or rales.   Chest:      Chest wall: No tenderness.   Abdominal:      General: Bowel sounds are normal. There is no distension.      Palpations: Abdomen is soft. There is no mass.      Tenderness: There is no abdominal tenderness. There is no right CVA tenderness, left CVA tenderness, guarding or rebound.      Hernia: No hernia is present.   Musculoskeletal:         General: Normal range of motion.      Cervical back: Normal range of motion and neck supple.   Skin:     General: Skin is warm and dry.      Capillary Refill: Capillary refill takes less than 2 seconds.   Neurological:      General: No focal deficit present.      Mental Status: She is alert and oriented to person, place, and time.      Cranial Nerves: No cranial nerve deficit.      Sensory: No sensory deficit.      Motor: No weakness.      Coordination: Coordination normal.      Gait: Gait normal.      Deep Tendon Reflexes: Reflexes normal.   Psychiatric:         Mood and Affect: Mood normal.         Behavior: Behavior normal.         Thought Content: Thought content normal.         Judgment: Judgment normal.         Vital Signs  ED Triage Vitals   Temperature Pulse Respirations Blood Pressure SpO2   02/01/24 0728 02/01/24 0728 02/01/24 0728 02/01/24 0728 02/01/24 0726   98.4 °F (36.9 °C) 68 18 102/54 100 %      Temp Source Heart Rate Source Patient Position - Orthostatic VS BP Location FiO2 (%)   02/01/24 0728 02/01/24 0728 02/01/24 0728 02/01/24 0728 --   Oral Monitor Lying Left arm       Pain Score       02/01/24 0728       No Pain            Vitals:    02/01/24 0728   BP: 102/54   Pulse: 68   Patient Position - Orthostatic VS: Lying         Visual Acuity      ED Medications  Medications   magnesium sulfate IVPB (premix) SOLN 1 g (1 g Intravenous New Bag 2/1/24 0811)       Diagnostic Studies  Results Reviewed       Procedure Component Value Units Date/Time    HS Troponin 0hr (reflex protocol) [[id number]]  (Normal) Collected: 02/01/24 0739    Lab Status: Final result Specimen: Blood from Arm, Right Updated: 02/01/24 0807     hs TnI 0hr 3 ng/L     Comprehensive metabolic panel [[id number]] Collected: 02/01/24 0739    Lab Status: Final result Specimen: Blood from Arm, Right Updated: 02/01/24 0804     Sodium 136 mmol/L      Potassium 3.6 mmol/L      Chloride 101 mmol/L      CO2 22 mmol/L      ANION GAP 13 mmol/L      BUN 25 mg/dL      Creatinine 1.06 mg/dL      Glucose 116 mg/dL      Calcium 9.3 mg/dL      AST 28 U/L      ALT 21 U/L      Alkaline Phosphatase 74 U/L      Total Protein 7.6 g/dL      Albumin 4.0 g/dL      Total Bilirubin 0.66 mg/dL      eGFR 53 ml/min/1.73sq m     Narrative:      [employer] guidelines for Chronic Kidney Disease (CKD):     Stage 1 with normal or high GFR (GFR > 90 mL/min/1.73 square meters)    Stage 2 Mild CKD (GFR = 60-89 mL/min/1.73 square meters)    Stage 3A Moderate CKD (GFR = 45-59 mL/min/1.73 square meters)    Stage 3B Moderate CKD (GFR = 30-44 mL/min/1.73 square meters)    Stage 4 Severe CKD (GFR = 15-29 mL/min/1.73 square meters)    Stage 5 End Stage CKD (GFR <15 mL/min/1.73 square meters)  Note: GFR calculation is accurate only with a steady state creatinine    Magnesium [[id number]]  (Abnormal) Collected: 02/01/24 0739    Lab Status: Final result Specimen: Blood from Arm, Right Updated: 02/01/24 0804     Magnesium 1.8 mg/dL     CBC and differential [[id number]] Collected: 02/01/24 0739    Lab Status: Final result Specimen: Blood from Arm, Right Updated: 02/01/24 0744     WBC 6.62 Thousand/uL       RBC 4.14 Million/uL      Hemoglobin 11.9 g/dL      Hematocrit 36.5 %      MCV 88 fL      MCH 28.7 pg      MCHC 32.6 g/dL      RDW 14.1 %      MPV 9.6 fL      Platelets 202 Thousands/uL      nRBC 0 /100 WBCs      Neutrophils Relative 57 %      Immat GRANS % 0 %      Lymphocytes Relative 31 %      Monocytes Relative 8 %      Eosinophils Relative 3 %      Basophils Relative 1 %      Neutrophils Absolute 3.82 Thousands/µL      Immature Grans Absolute 0.01 Thousand/uL      Lymphocytes Absolute 2.04 Thousands/µL      Monocytes Absolute 0.51 Thousand/µL      Eosinophils Absolute 0.19 Thousand/µL      Basophils Absolute 0.05 Thousands/µL                    XR chest 1 view portable    (Results Pending)              Procedures  Procedures         ED Course                                             Medical Decision Making  0726: Patient appears well, vital signs reviewed.  Placed on the monitor.  EKG shows normal sinus rhythm 71 bpm with ST depressions in V1, V2, V3, no acute ischemia.  Plan to complete basic labs including cardiac enzymes.  Patient denies chest pain.  Patient states her symptoms have significantly improved.  Took her usual metoprolol dose 25 mg at 6 AM.  The patient is on anticoagulants.  Check chest x-ray.    0830: Chest x-ray and labs reviewed.  Patient has remained stable throughout ED course.  Patient asymptomatic at this time.  Patient was noted to have hypomagnesemia.  Chronic history of such, takes mag oxide regularly, states she has not taken over the last 4 days.  Stable for discharge.  Patient has close follow-up with Shala Esteban next week.    Amount and/or Complexity of Data Reviewed  Labs: ordered.  Radiology: ordered and independent interpretation performed.     Details: Chest x-ray no acute pathology  ECG/medicine tests: ordered and independent interpretation performed.     Details: Normal sinus rhythm 71 bpm, ST depressions/T wave inversions V1, V2, V3.  This is unchanged from previous  EKG.  No acute ischemia.    Risk  Prescription drug management.             Disposition  Final diagnoses:   Palpitations     Time reflects when diagnosis was documented in both MDM as applicable and the Disposition within this note       Time User Action Codes Description Comment    2/1/2024  8:10 AM Tereso Bradshaw Add [R00.2] Palpitations           ED Disposition       ED Disposition   Discharge    Condition   Stable    Date/Time   Thu Feb 1, 2024  8:10 AM    Comment   Ramona [facility] [place] discharge to home/self care.                   Follow-up Information       Follow up With Specialties Details Why Contact Info    Evelia Gillespie DO  Schedule an appointment as soon as possible for a visit   [facility] [address] [place] [address]  [phone]              Patient's Medications   Discharge Prescriptions    No medications on file       No discharge procedures on file.    PDMP Review         Value Time User    PDMP Reviewed  Yes 11/25/2020  2:06 PM Marcus Cameron MD            ED Provider  Electronically Signed by             Tereso Bradshaw MD  02/01/24 0841

## 2024-02-06 ENCOUNTER — OFFICE VISIT (OUTPATIENT)
Dept: CARDIOLOGY CLINIC | Facility: CLINIC | Age: 69
End: 2024-02-06
Payer: MEDICARE

## 2024-02-06 ENCOUNTER — APPOINTMENT (OUTPATIENT)
Dept: LAB | Facility: HOSPITAL | Age: 69
End: 2024-02-06
Payer: MEDICARE

## 2024-02-06 VITALS
HEART RATE: 67 BPM | DIASTOLIC BLOOD PRESSURE: 70 MMHG | SYSTOLIC BLOOD PRESSURE: 122 MMHG | WEIGHT: 291.4 LBS | HEIGHT: 67 IN | BODY MASS INDEX: 45.74 KG/M2 | TEMPERATURE: 97.6 F | OXYGEN SATURATION: 94 %

## 2024-02-06 DIAGNOSIS — E66.01 CLASS 3 OBESITY (HCC): ICD-10-CM

## 2024-02-06 DIAGNOSIS — I10 ESSENTIAL HYPERTENSION: ICD-10-CM

## 2024-02-06 DIAGNOSIS — E03.8 HYPOTHYROIDISM DUE TO HASHIMOTO'S THYROIDITIS: ICD-10-CM

## 2024-02-06 DIAGNOSIS — E06.3 HYPOTHYROIDISM DUE TO HASHIMOTO'S THYROIDITIS: ICD-10-CM

## 2024-02-06 DIAGNOSIS — E78.2 MIXED HYPERLIPIDEMIA: ICD-10-CM

## 2024-02-06 DIAGNOSIS — G47.33 OBSTRUCTIVE SLEEP APNEA SYNDROME: ICD-10-CM

## 2024-02-06 DIAGNOSIS — I48.0 PAROXYSMAL ATRIAL FIBRILLATION (HCC): ICD-10-CM

## 2024-02-06 DIAGNOSIS — I48.0 PAROXYSMAL ATRIAL FIBRILLATION (HCC): Primary | ICD-10-CM

## 2024-02-06 LAB
ANION GAP SERPL CALCULATED.3IONS-SCNC: 9 MMOL/L
BUN SERPL-MCNC: 21 MG/DL (ref 5–25)
CALCIUM SERPL-MCNC: 9.7 MG/DL (ref 8.4–10.2)
CHLORIDE SERPL-SCNC: 103 MMOL/L (ref 96–108)
CHOLEST SERPL-MCNC: 160 MG/DL
CO2 SERPL-SCNC: 26 MMOL/L (ref 21–32)
CREAT SERPL-MCNC: 1.09 MG/DL (ref 0.6–1.3)
GFR SERPL CREATININE-BSD FRML MDRD: 51 ML/MIN/1.73SQ M
GLUCOSE P FAST SERPL-MCNC: 108 MG/DL (ref 65–99)
HDLC SERPL-MCNC: 67 MG/DL
LDLC SERPL CALC-MCNC: 62 MG/DL (ref 0–100)
POTASSIUM SERPL-SCNC: 4 MMOL/L (ref 3.5–5.3)
SODIUM SERPL-SCNC: 138 MMOL/L (ref 135–147)
TRIGL SERPL-MCNC: 156 MG/DL
TSH SERPL DL<=0.05 MIU/L-ACNC: 2.92 UIU/ML (ref 0.45–4.5)

## 2024-02-06 PROCEDURE — 80061 LIPID PANEL: CPT

## 2024-02-06 PROCEDURE — 84443 ASSAY THYROID STIM HORMONE: CPT

## 2024-02-06 PROCEDURE — 99214 OFFICE O/P EST MOD 30 MIN: CPT | Performed by: NURSE PRACTITIONER

## 2024-02-06 PROCEDURE — 36415 COLL VENOUS BLD VENIPUNCTURE: CPT

## 2024-02-06 PROCEDURE — 80048 BASIC METABOLIC PNL TOTAL CA: CPT

## 2024-02-06 PROCEDURE — 93000 ELECTROCARDIOGRAM COMPLETE: CPT | Performed by: NURSE PRACTITIONER

## 2024-02-06 RX ORDER — CLOBETASOL PROPIONATE 0.5 MG/G
OINTMENT TOPICAL
COMMUNITY
Start: 2024-02-02

## 2024-02-06 NOTE — ASSESSMENT & PLAN NOTE
Body mass index is 45.64 kg/m².  Reviewed dietary and exercise modifications.  Reviewed importance of weight loss for symptom control.

## 2024-02-06 NOTE — ASSESSMENT & PLAN NOTE
Blood pressure is acceptable today.  Home BP cuff accuracy has been verified with excellent home readings.  Continue Benicar 40/25 mg daily.  BMP today, consider potassium supplement if K+ < 4.  Encouraged 2 g sodium diet and weight control.

## 2024-02-06 NOTE — ASSESSMENT & PLAN NOTE
Lipid panel 1/3/2023: C 178. T 151. H 71. L 77.  Continue atorvastatin 10 mg daily.  Repeat lipid panel today

## 2024-02-06 NOTE — PATIENT INSTRUCTIONS
2 week ZIO monitor will be mailed to your house.  Please take metoprolol 1/2 tab (12.5 mg) twice daily to get overlapping coverage. Please be faithful with your magnesium supplement. We will recheck your level today, as well as potassium. I will let you know if you need a potassium supplement also.  Please discuss your CPAP with your sleep medicine doctor to make sure settings are effective.  Based on results of the ZIO we will discuss if additional treatment is needed.  Your ECG today shows normal rhythm.   
Oriented - self; Oriented - place; Oriented - time

## 2024-02-06 NOTE — ASSESSMENT & PLAN NOTE
Patient with new onset atrial fibrillation 11/25/2020.  Patient presented to the emergency room after onset of “fluttering” and rapid heart rate.  She converted spontaneously to  normal sinus rhythm.  She has remained in normal sinus rhythm since and feels well.  [id number] Score 3   Eliquis 5 mg twice daily for stroke prophylaxis.   Home sleep study notable for severe sleep apnea being treated with CPAP.   48 hour Holter monitor 6/8/2022 showed NSR with 5 short AT runs.   Echocardiogram 4/3/2023 with preserved LVEF and no significant valvular abnormality.  Breakthrough a fib episode on 2/1/24.  Change metoprolol succinate to 12.5 mg BID. Reviewed importance of mag/potassium replacement.  2 week ZIO monitor ordered. Will consider referral back to EP pending results.  ECG today shows NSR.

## 2024-02-06 NOTE — PROGRESS NOTES
Cardiology Follow Up    Ramona Cabral  1955  [id number]  [facility] [place] [address]  [address]LO[address]  [facility] [id number] [phone]    Ena presents for routine follow up of paroxysmal a fib, HTN, HLD.      1. Paroxysmal atrial fibrillation (HCC)  Assessment & Plan:  Patient with new onset atrial fibrillation 11/25/2020.  Patient presented to the emergency room after onset of “fluttering” and rapid heart rate.  She converted spontaneously to  normal sinus rhythm.  She has remained in normal sinus rhythm since and feels well.  [id number] Score 3   Eliquis 5 mg twice daily for stroke prophylaxis.   Home sleep study notable for severe sleep apnea being treated with CPAP.   48 hour Holter monitor 6/8/2022 showed NSR with 5 short AT runs.   Echocardiogram 4/3/2023 with preserved LVEF and no significant valvular abnormality.  Breakthrough a fib episode on 2/1/24.  Change metoprolol succinate to 12.5 mg BID. Reviewed importance of mag/potassium replacement.  2 week ZIO monitor ordered. Will consider referral back to EP pending results.  ECG today shows NSR.     Orders:  -     POCT ECG  -     TSH, 3rd generation with Free T4 reflex; Future  -     AMB extended holter monitor; Future; Expected date: 02/06/2024    2. Essential hypertension  Assessment & Plan:  Blood pressure is acceptable today.  Home BP cuff accuracy has been verified with excellent home readings.  Continue Benicar 40/25 mg daily.  BMP today, consider potassium supplement if K+ < 4.  Encouraged 2 g sodium diet and weight control.    Orders:  -     Basic metabolic panel; Future    3. Mixed hyperlipidemia  Assessment & Plan:  Lipid panel 1/3/2023: C 178. T 151. H 71. L 77.  Continue atorvastatin 10 mg daily.  Repeat lipid panel today    Orders:  -     Lipid Panel with Direct LDL reflex; Future    4. Obstructive sleep apnea syndrome  Assessment & Plan:  Home sleep study 12/2020 revealed  severe sleep apnea  Compliant with CPAP therapy.  Following with [facility] sleep medicine.      5. Hypothyroidism due to Hashimoto's thyroiditis  Assessment & Plan:  Managed by PCP.   Goal TSH 1-2 if possible given PAF.  Repeat TSH today      6. Class 3 obesity (HCC)  Assessment & Plan:  Body mass index is 45.64 kg/m².  Reviewed dietary and exercise modifications.  Reviewed importance of weight loss for symptom control.           PAM Sutherland has a past medical history of paroxysmal atrial fibrillation, HTN, HLD, DEANNA on CPAP, hypothyroidism, anxiety/depression, and obesity.     She initially presented to [facility] 11/25/2020 with chest pain, palpitations, and shortness of breath. Found to be in atrial fibrillation with RVR, rate 118 bpm. CTA ruled out PE. She was treated with IV Lopressor and Cardiazem and spontaneously converted to NSR. She was evaluated by Dr. Rojo during admission and ultimately discharged home on metoprolol succinate 25 mg daily and Eliquis 5 mg BID. Her TSH was mildly elevated and levothyroxine increased to 100 mch from 88 mcg during admission. Magnesium supplementation was initiated. She was under significant stress at the time with a recent cancer diagnosis for her spouse. He had just returned home from a complicated bowel surgery.     Echo 12/2/2020 was unremarkable.   Nuclear stress test 12/2/20 showed no scar or ischemia with EF 73%.     She completed a sleep study 3/4/2021 which was positive for DEANNA. She was referred to sleep medicine and underwent in-lab study 4/8/2021 and CPAP therapy was initiated.     She called our office 6/7/22 with complaint of increased palpitations. She was brought into the office where an ECG showed SB, rate 58 bpm. She reported that her symptoms felt like PVC's. 48 hour Holter monitoring was obtained showing predominantly NSR, HR 47-98, avg 60 bpm with rare PAC's and PVC's. Symptoms were associated with PAC's/PVC's and brief runs of atrial tachycardia.     She  followed up with Dr. Rojo on 12/27/2022 at which time her BP was running in the 140's at home.  Benicar was increased to 40/25 mg daily.     She presented to [facility] ER 3/24/2023 with dyspnea on exertion with wheezing. She contracted Covid infection in February and noticed symptoms since that time. ECG showed SB, rate 59 bpm with no acute changes. . CTA chest showed findings consistent with bronchitis and no PE. She was given a course of prednisone and referral to pulmonology.     Echocardiogram 4/3/2023 showed preserved LVEF with no concerning findings.      She followed up most recently 7/27/2023 at which time she had been undergoing work up through pulmonology. PFT's showed restrictive disease with no other findings. She was started on Advair and albuterol with improvement in her symptoms. She denied chest pain and rare palpitations. She had been using her CPAP faithfully. BP was well controlled at home. No changes were made to her regimen.    She followed up most recently on 10/19/2023. She had been evaluated at [facility] ER 9/1/2023 for chest pressure and elevated HR. Her daughter had recently been diagnosed with leukemia and was hospitalized. Her son in law was not allowing her to visit her daughter and she was significantly stressed. She had been having persistent chest pressure lasting > 24 hours. Her ECG showed SR and lab working, including HS trop were negative. Chest xray clear. Since that time her daughter was improving and she felt her stressed to be reduced. She denied any recurrent symptoms. She was taking her medications faithfully.    2/6/2024: Ena presents for routine follow up. She was evaluated at [facility] ER 2/1/2024 when she presented via EMS due to chest pain and heart racing which started overnight. She took her metoprolol at 6 am. Upon ER arrival her ECG showed SR at 71 bpm. Lab work was unrevealing other than magnesium of 1.8. Chest xray WNL. She was given IV mag 2 g and ultimately  "discharged home. She reports no recurrent symptoms today. She states she did not feel palpitations but did feel \"unsettled\" while her HR was irregular and rapid. She denies any missed doses of metoprolol. She states she had been out of her magnesium supplement for about 1 week and questions if this caused breakthrough A fib. She has been wearing her CPAP faithfully. She meets with sleep medicine in March 2024. She reports significant stress as her spouse was found to have metastatic cancer lesions in his lungs and her daughter is doing very poorly with her diagnosis of AML.     Medical Problems       Problem List       Anxiety and depression    Obesity hypoventilation syndrome (HCC)    Paroxysmal atrial fibrillation (HCC)    Essential hypertension    Hyperlipidemia    Hypothyroidism due to Hashimoto's thyroiditis    Obstructive sleep apnea syndrome    Class 3 obesity (HCC)    Post-COVID chronic dyspnea    History of COVID-19    Reactive airway disease    Shortness of breath        Past Medical History:   Diagnosis Date    Anxiety     Diabetes mellitus (HCC)     Disease of thyroid gland     Elevated d-dimer     2 seperate episodes of unclear etiology.    Hyperlipidemia     Hypertension     Hypothyroid     Obesity     Paroxysmal atrial fibrillation (HCC)     New onset 11/25/2020.     Social History     Socioeconomic History    Marital status: /Civil Union     Spouse name: Not on file    Number of children: Not on file    Years of education: Not on file    Highest education level: Not on file   Occupational History    Occupation: Retired teacher   Tobacco Use    Smoking status: Never    Smokeless tobacco: Never   Vaping Use    Vaping status: Never Used   Substance and Sexual Activity    Alcohol use: Not Currently    Drug use: Never    Sexual activity: Yes     Partners: Male   Other Topics Concern    Not on file   Social History Narrative    Not on file     Social Determinants of Health     Financial Resource " Strain: Low Risk  (11/26/2023)    Received from [facility]    Overall Financial Resource Strain (CARDIA)     Difficulty of Paying Living Expenses: Not very hard   Food Insecurity: No Food Insecurity (11/26/2023)    Received from [facility]    Hunger Vital Sign     Worried About Running Out of Food in the Last Year: Never true     Ran Out of Food in the Last Year: Never true   Transportation Needs: No Transportation Needs (11/26/2023)    Received from [facility]    PRAPARE - Transportation     Lack of Transportation (Medical): No     Lack of Transportation (Non-Medical): No   Physical Activity: Not on file   Stress: Stress Concern Present (11/26/2023)    Received from [facility]    [employer] [place] [employer] - Occupational Stress Questionnaire     Feeling of Stress : Very much   Social Connections: Socially Integrated (11/26/2023)    Received from [facility]    Social Connection and Isolation Panel [NHANES]     Frequency of Communication with Friends and Family: More than three times a week     Frequency of Social Gatherings with Friends and Family: Once a week     Attends Jewish Services: More than 4 times per year     Active Member of Clubs or Organizations: Yes     Attends Club or Organization Meetings: 1 to 4 times per year     Marital Status:    Intimate Partner Violence: Not At Risk (11/26/2023)    Received from [facility]    Humiliation, Afraid, Rape, and Kick questionnaire     Fear of Current or Ex-Partner: No     Emotionally Abused: No     Physically Abused: No     Sexually Abused: No   Housing Stability: Low Risk  (11/26/2023)    Received from [facility]    Housing Stability Vital Sign     Unable to Pay for Housing in the Last Year: No     Number of Places Lived in the Last Year: 1     Unstable Housing in the Last Year: No      Family History   Problem  Relation Age of Onset    Hypertension Sister     Heart disease Paternal Aunt     Other Mother         Encephalitis    Other Father          in his 90s without significant problems    Lung cancer Brother     No Known Problems Brother      Past Surgical History:   Procedure Laterality Date    CATARACT EXTRACTION, BILATERAL      HYSTERECTOMY      KNEE ARTHROPLASTY      SHOULDER OPEN ROTATOR CUFF REPAIR      x 2    TONSILLECTOMY         Current Outpatient Medications:     albuterol (PROVENTIL HFA,VENTOLIN HFA) 90 mcg/act inhaler, Inhale 2 puffs every 6 (six) hours as needed for wheezing, Disp: , Rfl:     ALPRAZolam (XANAX) 0.5 mg tablet, Take by mouth daily at bedtime as needed for anxiety, Disp: , Rfl:     apixaban (Eliquis) 5 mg, Take 1 tablet (5 mg total) by mouth 2 (two) times a day, Disp: 180 tablet, Rfl: 3    atorvastatin (LIPITOR) 10 mg tablet, Take 10 mg by mouth daily, Disp: , Rfl:     citalopram (CeleXA) 40 mg tablet, Take 40 mg by mouth daily , Disp: , Rfl:     clobetasol (TEMOVATE) 0.05 % ointment, , Disp: , Rfl:     desonide (DESOWEN) 0.05 % cream, , Disp: , Rfl:     levothyroxine 75 mcg tablet, Take 75 mcg by mouth daily , Disp: , Rfl:     magnesium oxide (MAG-OX) 400 mg tablet, Take by mouth, Disp: , Rfl:     metoprolol succinate (TOPROL-XL) 25 mg 24 hr tablet, Take 1 tablet (25 mg total) by mouth daily, Disp: 90 tablet, Rfl: 3    metroNIDAZOLE (METROGEL) 1 % gel, , Disp: , Rfl:     nystatin powder, APPLY 1 APPLICATION TOPICALLY IN THE MORNING, 1 APPLICATION AT NO...  (REFER TO PRESCRIPTION NOTES)., Disp: , Rfl:     olmesartan-hydrochlorothiazide (BENICAR HCT) 40-25 MG per tablet, , Disp: , Rfl:     albuterol (Ventolin HFA) 90 mcg/act inhaler, Inhale 2 puffs every 6 (six) hours as needed for wheezing (Patient not taking: Reported on 10/19/2023), Disp: 18 g, Rfl: 2    Fluticasone-Salmeterol (Advair Diskus) 100-50 mcg/dose inhaler, Inhale 1 puff 2 (two) times a day Rinse mouth after use. (Patient not  taking: Reported on 2/6/2024), Disp: 60 blister, Rfl: 5  Allergies   Allergen Reactions    Flagyl [Metronidazole] Shortness Of Breath     Pt requested addition to allergies    Contrast [Iodinated Contrast Media] Rash    Iodine - Food Allergy Rash       Labs:     Chemistry        Component Value Date/Time     11/06/2015 0655    K 3.6 02/01/2024 0739    K 4.4 01/03/2023 0940     02/01/2024 0739     01/03/2023 0940    CO2 22 02/01/2024 0739    CO2 24 01/03/2023 0940    BUN 25 02/01/2024 0739    BUN 29 (H) 01/03/2023 0940    CREATININE 1.06 02/01/2024 0739    CREATININE 1.09 01/03/2023 0940        Component Value Date/Time    CALCIUM 9.3 02/01/2024 0739    CALCIUM 9.0 01/03/2023 0940    ALKPHOS 74 02/01/2024 0739    ALKPHOS 105 01/03/2023 0940    AST 28 02/01/2024 0739    AST 36 01/03/2023 0940    ALT 21 02/01/2024 0739    ALT 41 01/03/2023 0940    BILITOT 0.34 11/06/2015 0655        Lipid panel 1/3/2023: C 178. T 151. H 71. L 77.     Cardiac Test Results:   ECG 2/6/2024: Normal sinus rhythm. Normal ECG. Rate 61 bpm.    Echocardiogram 4/3/2023:  EF 70%. Mild LVH.      ECG 3/24/2023: Sinus bradycardia. Rate 59 bpm with no significant change from prior.     ECG 10/19/2022: Sinus bradycardia. Otherwise normal ECG.      Holter monitor (48 hours) 6/8/2022:   Min HR 47. Max HR 98. Avg HR 60.   Rare PVC's. Rare PAC's.   5 brief atrial runs with the longest lasting 5 beats.   Palpitations correlated with PAC's/PVC's and atrial runs.      ECG 6/7/2022: Sinus bradycardia. 58 bpm.      Lipid panel 12/13/2021: C 176. T 177. H 65. L 76.      ECG 11/25/2020:  Atrial flutter/atrial fibrillation at 118 bpm.     Echocardiogram 12/02/2020:  EF 60%.  Normal diastolic function.  Mild annular calcification of the mitral valve.  Trace tricuspid regurgitation.     Lexiscan nuclear stress test 12/02/2020:  No evidence of myocardial scar.  No evidence of myocardial ischemia.  EF 73%.     Lipid panel 10/13/2020: C 166. T 154. H  "64. L 71.      Echocardiogram 11/05/2015:  EF 55-60%.  Mild tricuspid regurgitation.  Estimated PASP 38 mmHg.     Review of Systems   Constitutional: Negative.   HENT: Negative.     Cardiovascular:  Positive for palpitations. Negative for chest pain, dyspnea on exertion, irregular heartbeat, leg swelling, near-syncope and orthopnea.   Respiratory:  Negative for cough and snoring.    Endocrine: Negative.    Skin: Negative.    Musculoskeletal: Negative.    Gastrointestinal: Negative.    Genitourinary: Negative.    Neurological: Negative.    Psychiatric/Behavioral:  The patient is nervous/anxious.        Vitals:    02/06/24 0932   BP: 122/70   Pulse:    Temp:    SpO2:      Vitals:    02/06/24 0901   Weight: 132 kg (291 lb 6.4 oz)     Height: 5' 7\" (170.2 cm)   Body mass index is 45.64 kg/m².    Physical Exam  Vitals and nursing note reviewed.   Constitutional:       General: She is not in acute distress.     Appearance: She is well-developed. She is obese. She is not diaphoretic.   HENT:      Head: Normocephalic and atraumatic.   Neck:      Thyroid: No thyromegaly.      Vascular: No carotid bruit or JVD.   Cardiovascular:      Rate and Rhythm: Normal rate and regular rhythm. No extrasystoles are present.     Pulses: Intact distal pulses.           Radial pulses are 2+ on the right side and 2+ on the left side.      Heart sounds: Normal heart sounds, S1 normal and S2 normal. No murmur heard.     Comments: No edema  Pulmonary:      Effort: Pulmonary effort is normal.      Breath sounds: Normal breath sounds.   Abdominal:      General: There is no distension.      Palpations: Abdomen is soft.      Tenderness: There is no abdominal tenderness.   Musculoskeletal:         General: Normal range of motion.      Cervical back: Normal range of motion and neck supple.   Lymphadenopathy:      Cervical: No cervical adenopathy.   Skin:     General: Skin is warm and dry.   Neurological:      Mental Status: She is alert and oriented " to person, place, and time.      Cranial Nerves: No cranial nerve deficit.   Psychiatric:         Behavior: Behavior normal.

## 2024-02-06 NOTE — ASSESSMENT & PLAN NOTE
Home sleep study 12/2020 revealed severe sleep apnea  Compliant with CPAP therapy.  Following with [facility] sleep medicine.

## 2024-02-14 ENCOUNTER — TELEPHONE (OUTPATIENT)
Dept: CARDIOLOGY CLINIC | Facility: CLINIC | Age: 69
End: 2024-02-14

## 2024-02-14 NOTE — TELEPHONE ENCOUNTER
Pt lm stating that she would like to speak to someone about some BW. That was all she said.  [phone]

## 2024-02-23 ENCOUNTER — TELEPHONE (OUTPATIENT)
Dept: OTOLARYNGOLOGY | Facility: CLINIC | Age: 69
End: 2024-02-23

## 2024-02-23 NOTE — TELEPHONE ENCOUNTER
CLM that I was returning her call. Gave her phone number for [employer] [facility] and also sleep nurse line.

## 2024-03-08 ENCOUNTER — CLINICAL SUPPORT (OUTPATIENT)
Dept: CARDIOLOGY CLINIC | Facility: CLINIC | Age: 69
End: 2024-03-08
Payer: MEDICARE

## 2024-03-08 DIAGNOSIS — I48.0 PAROXYSMAL ATRIAL FIBRILLATION (HCC): ICD-10-CM

## 2024-03-08 PROCEDURE — 93248 EXT ECG>7D<15D REV&INTERPJ: CPT | Performed by: INTERNAL MEDICINE

## 2024-03-09 ENCOUNTER — OFFICE VISIT (OUTPATIENT)
Dept: URGENT CARE | Facility: CLINIC | Age: 69
End: 2024-03-09
Payer: MEDICARE

## 2024-03-09 VITALS
WEIGHT: 285 LBS | RESPIRATION RATE: 16 BRPM | TEMPERATURE: 97.4 F | BODY MASS INDEX: 44.73 KG/M2 | HEART RATE: 77 BPM | SYSTOLIC BLOOD PRESSURE: 138 MMHG | HEIGHT: 67 IN | DIASTOLIC BLOOD PRESSURE: 74 MMHG | OXYGEN SATURATION: 96 %

## 2024-03-09 DIAGNOSIS — B37.9 CANDIDIASIS: Primary | ICD-10-CM

## 2024-03-09 PROCEDURE — 99213 OFFICE O/P EST LOW 20 MIN: CPT

## 2024-03-09 PROCEDURE — G0463 HOSPITAL OUTPT CLINIC VISIT: HCPCS

## 2024-03-09 RX ORDER — KETOCONAZOLE 20 MG/G
1 CREAM TOPICAL 2 TIMES DAILY PRN
COMMUNITY
Start: 2024-02-22

## 2024-03-09 NOTE — PATIENT INSTRUCTIONS
Use topical preparations as prescribed   Keep skin clean and dry  Decrease moisture  Fan on cool and pat dry after showering    Follow up with PCP in 3-5 days.  Proceed to  ER if symptoms worsen.    If tests have been performed at [facility], our office will contact you with results if changes need to be made to the care plan discussed with you at the visit.  You can review your full results on [facility]'s MyChart.    Skin Yeast Infection   AMBULATORY CARE:   What do I need to know about a skin yeast infection?  Yeast is normally present on the skin. Infection happens when you have too much yeast, or when it gets into a cut on your skin. Certain types of mold and fungus can cause a yeast infection. A skin yeast infection can appear anywhere on your skin or nail beds. Skin yeast infections are usually found on warm, moist parts of the body. Examples include between skin folds or under the breasts.  Common symptoms include the following:  Signs and symptoms will depend on the type of yeast causing the infection, and where the infection is located.  Red, scaly skin    Changes in skin color, especially a beefy red color    Itching, dry skin    Painful, cracking skin at the corners of your mouth    Thick, discolored, chipping nails    Skin lesions that may be red or purple and round    Pus bumps    Seek care immediately if:   You have signs of infection, such as pus, warmth or red streaks coming from the wound, or a fever.      Call your doctor if:   Your symptoms worsen or do not get better within 7 to 10 days.    You have new or returning signs of a skin yeast infection after treatment.    You have questions or concerns about your condition or care.    Treatment for a skin yeast infection  may include antifungal medicine to treat the fungal infection. The medicine be given as a cream, ointment, or pill.  Care for the skin near the infection:  You may only have discolored patches of skin, or areas that are dry and  flaking. Care for these skin problems as directed by your healthcare provider. If you have painful skin or an open sore, you will need to protect the skin and prevent damage. You will also need to keep the skin dry as much as possible. Ask your healthcare provider how to care for your skin while the infection clears. The following are general guidelines for caring for painful or open skin:  Keep the skin clean.  Ask your healthcare provider if you should wash with mild soap and water. Do not use soap that contains alcohol. Alcohol can dry and irritate the skin and make symptoms worse. Your baby's healthcare provider may tell you to use diaper cream or ointment when you change his or her diaper. This will protect the skin and prevent moisture from collecting.    Keep the skin dry.  Pat the area dry with a towel. Do not rub, because this may irritate the skin. If you have a skin yeast infection between skin folds, lift the top part gently and hold it while you dry between your skin folds. Always dry your feet completely after you swim or bathe, including between your toes. Dry your skin if you are sweating from exercise or exposure to heat. Use a clean towel each time to prevent spreading or continuing the infection.    Keep the skin protected.  Ask your healthcare provider if you should cover the area with a bandage or leave it open. Check your skin each day to make sure you do not have new or worsening problems. You may need to have someone check the skin if you cannot see the area easily.    Prevent another skin yeast infection:   Do not share clothing or towels    Wear shower shoes if you need to use a public shower    Dry your feet completely after you bathe, and apply antifungal powder or cream as directed    Put on socks before you get dressed so you do not spread fungus from your feet    Wear light clothing that allows air to get to your skin    Manage your weight to prevent skin folds where yeast can  collect    Manage diabetes    Use antibiotics correctly to prevent antibiotic resistance    Change your baby's diaper often, and keep the area clean and dry as much as possible    Use a diaper cream or ointment that contains zinc oxide or dimethicone on your baby's diaper area as directed    Follow up with your doctor as directed:  Write down your questions so you remember to ask them during your visits.  © Copyright [employer] 2023 Information is for End User's use only and may not be sold, redistributed or otherwise used for commercial purposes.  The above information is an  only. It is not intended as medical advice for individual conditions or treatments. Talk to your doctor, nurse or pharmacist before following any medical regimen to see if it is safe and effective for you.

## 2024-03-09 NOTE — PROGRESS NOTES
[facility]        NAME: Ramona Cabral is a 68 y.o. female  : 1955    MRN: [id number]  DATE: 2024  TIME: 10:54 AM    Assessment and Plan   Candidiasis [B37.9]  1. Candidiasis          Skin findings resemble skin candidiasis. Symptoms improved with use of Ketoconazole and no signs of active skin infection. Encouraged continued supportive measures.  Decrease moisture and keep skin clean and dry. Follow up with PCP in 3-5 days or proceed to emergency department for worsening symptoms.  Patient verbalized understanding of instructions given.       Patient Instructions     Patient Instructions     Use topical preparations as prescribed   Keep skin clean and dry  Decrease moisture  Fan on cool and pat dry after showering    Follow up with PCP in 3-5 days.  Proceed to  ER if symptoms worsen.    If tests have been performed at [facility], our office will contact you with results if changes need to be made to the care plan discussed with you at the visit.  You can review your full results on [facility] MyChart.    Skin Yeast Infection   AMBULATORY CARE:   What do I need to know about a skin yeast infection?  Yeast is normally present on the skin. Infection happens when you have too much yeast, or when it gets into a cut on your skin. Certain types of mold and fungus can cause a yeast infection. A skin yeast infection can appear anywhere on your skin or nail beds. Skin yeast infections are usually found on warm, moist parts of the body. Examples include between skin folds or under the breasts.  Common symptoms include the following:  Signs and symptoms will depend on the type of yeast causing the infection, and where the infection is located.  Red, scaly skin    Changes in skin color, especially a beefy red color    Itching, dry skin    Painful, cracking skin at the corners of your mouth    Thick, discolored, chipping nails    Skin lesions that may be red or purple and round    Pus bumps    Seek  care immediately if:   You have signs of infection, such as pus, warmth or red streaks coming from the wound, or a fever.      Call your doctor if:   Your symptoms worsen or do not get better within 7 to 10 days.    You have new or returning signs of a skin yeast infection after treatment.    You have questions or concerns about your condition or care.    Treatment for a skin yeast infection  may include antifungal medicine to treat the fungal infection. The medicine be given as a cream, ointment, or pill.  Care for the skin near the infection:  You may only have discolored patches of skin, or areas that are dry and flaking. Care for these skin problems as directed by your healthcare provider. If you have painful skin or an open sore, you will need to protect the skin and prevent damage. You will also need to keep the skin dry as much as possible. Ask your healthcare provider how to care for your skin while the infection clears. The following are general guidelines for caring for painful or open skin:  Keep the skin clean.  Ask your healthcare provider if you should wash with mild soap and water. Do not use soap that contains alcohol. Alcohol can dry and irritate the skin and make symptoms worse. Your baby's healthcare provider may tell you to use diaper cream or ointment when you change his or her diaper. This will protect the skin and prevent moisture from collecting.    Keep the skin dry.  Pat the area dry with a towel. Do not rub, because this may irritate the skin. If you have a skin yeast infection between skin folds, lift the top part gently and hold it while you dry between your skin folds. Always dry your feet completely after you swim or bathe, including between your toes. Dry your skin if you are sweating from exercise or exposure to heat. Use a clean towel each time to prevent spreading or continuing the infection.    Keep the skin protected.  Ask your healthcare provider if you should cover the area  with a bandage or leave it open. Check your skin each day to make sure you do not have new or worsening problems. You may need to have someone check the skin if you cannot see the area easily.    Prevent another skin yeast infection:   Do not share clothing or towels    Wear shower shoes if you need to use a public shower    Dry your feet completely after you bathe, and apply antifungal powder or cream as directed    Put on socks before you get dressed so you do not spread fungus from your feet    Wear light clothing that allows air to get to your skin    Manage your weight to prevent skin folds where yeast can collect    Manage diabetes    Use antibiotics correctly to prevent antibiotic resistance    Change your baby's diaper often, and keep the area clean and dry as much as possible    Use a diaper cream or ointment that contains zinc oxide or dimethicone on your baby's diaper area as directed    Follow up with your doctor as directed:  Write down your questions so you remember to ask them during your visits.  © Copyright [employer] 2023 Information is for End User's use only and may not be sold, redistributed or otherwise used for commercial purposes.  The above information is an  only. It is not intended as medical advice for individual conditions or treatments. Talk to your doctor, nurse or pharmacist before following any medical regimen to see if it is safe and effective for you.        Chief Complaint     Chief Complaint   Patient presents with    Rash     C/o rash beneath abdominal fold. Reports the rash is chronic and sees dermatology for same but   Rash has become worse 2 days ago.         History of Present Illness       68-year-old female with a past medical history significant for hypertension and atrial fibrillation presents with complaints of rash to lower abdomen and groin.  Patient reports chronic skin candidiasis that she will normally treat with topical Monistat and then prescribed  ketoconazole cream for acute flare.  Patient reports increase in skin redness and rash x 2 days ago.  She has been applying the ketoconazole cream as prescribed by OB/GYN as well as attempting to decrease moisture and dry completely after showering.  Symptoms improving but patient is very concerned for cellulitis. Denies fever, chills, or flu-like symptoms.     Rash  This is a chronic problem. The current episode started in the past 7 days. The problem has been waxing and waning since onset. The affected locations include the abdomen and groin. The rash is characterized by burning, pain, redness and swelling. She was exposed to nothing. Pertinent negatives include no anorexia, congestion, cough, diarrhea, facial edema, fatigue, fever, joint pain, rhinorrhea, shortness of breath, sore throat or vomiting.       Review of Systems   Review of Systems   Constitutional:  Negative for fatigue and fever.   HENT:  Negative for congestion, rhinorrhea and sore throat.    Eyes:  Negative for pain and discharge.   Respiratory:  Negative for cough and shortness of breath.    Gastrointestinal:  Negative for abdominal pain, anorexia, diarrhea, nausea and vomiting.   Musculoskeletal:  Negative for joint pain.   Skin:  Positive for color change and rash.         Current Medications       Current Outpatient Medications:     albuterol (PROVENTIL HFA,VENTOLIN HFA) 90 mcg/act inhaler, Inhale 2 puffs every 6 (six) hours as needed for wheezing, Disp: , Rfl:     ALPRAZolam (XANAX) 0.5 mg tablet, Take by mouth daily at bedtime as needed for anxiety, Disp: , Rfl:     apixaban (Eliquis) 5 mg, Take 1 tablet (5 mg total) by mouth 2 (two) times a day, Disp: 180 tablet, Rfl: 3    atorvastatin (LIPITOR) 10 mg tablet, Take 10 mg by mouth daily, Disp: , Rfl:     citalopram (CeleXA) 40 mg tablet, Take 40 mg by mouth daily , Disp: , Rfl:     clobetasol (TEMOVATE) 0.05 % ointment, , Disp: , Rfl:     desonide (DESOWEN) 0.05 % cream, , Disp: , Rfl:      ketoconazole (NIZORAL) 2 % cream, Apply 1 Application topically 2 (two) times a day as needed, Disp: , Rfl:     levothyroxine 75 mcg tablet, Take 75 mcg by mouth daily , Disp: , Rfl:     magnesium oxide (MAG-OX) 400 mg tablet, Take by mouth, Disp: , Rfl:     metoprolol succinate (TOPROL-XL) 25 mg 24 hr tablet, Take 1 tablet (25 mg total) by mouth daily, Disp: 90 tablet, Rfl: 3    metroNIDAZOLE (METROGEL) 1 % gel, , Disp: , Rfl:     nystatin powder, if needed, Disp: , Rfl:     olmesartan-hydrochlorothiazide (BENICAR HCT) 40-25 MG per tablet, , Disp: , Rfl:     albuterol (Ventolin HFA) 90 mcg/act inhaler, Inhale 2 puffs every 6 (six) hours as needed for wheezing (Patient not taking: Reported on 10/19/2023), Disp: 18 g, Rfl: 2    Fluticasone-Salmeterol (Advair Diskus) 100-50 mcg/dose inhaler, Inhale 1 puff 2 (two) times a day Rinse mouth after use. (Patient not taking: Reported on 2/6/2024), Disp: 60 blister, Rfl: 5    Current Allergies     Allergies as of 03/09/2024 - Reviewed 03/09/2024   Allergen Reaction Noted    Flagyl [metronidazole] Shortness Of Breath 11/29/2022    Contrast [iodinated contrast media] Rash 12/21/2018    Iodine - food allergy Rash 02/17/2018            The following portions of the patient's history were reviewed and updated as appropriate: allergies, current medications, past family history, past medical history, past social history, past surgical history and problem list.     Past Medical History:   Diagnosis Date    Anxiety     Atrial fibrillation (HCC)     Disease of thyroid gland     Elevated d-dimer     2 seperate episodes of unclear etiology.    Hyperlipidemia     Hypertension     Hypothyroid     Obesity     Paroxysmal atrial fibrillation (HCC)     New onset 11/25/2020.       Past Surgical History:   Procedure Laterality Date    CATARACT EXTRACTION, BILATERAL      HYSTERECTOMY      KNEE ARTHROPLASTY      SHOULDER OPEN ROTATOR CUFF REPAIR      x 2    TONSILLECTOMY         Family History  "  Problem Relation Age of Onset    Hypertension Sister     Heart disease Paternal Aunt     Other Mother         Encephalitis    Other Father          in his 90s without significant problems    Lung cancer Brother     No Known Problems Brother          Medications have been verified.        Objective   /74   Pulse 77   Temp (!) 97.4 °F (36.3 °C)   Resp 16   Ht 5' 7\" (1.702 m)   Wt 129 kg (285 lb)   LMP  (LMP Unknown)   SpO2 96%   BMI 44.64 kg/m²   No LMP recorded (lmp unknown). Patient has had a hysterectomy.       Physical Exam     Physical Exam  Vitals and nursing note reviewed.   Constitutional:       General: She is not in acute distress.     Appearance: She is not toxic-appearing.   HENT:      Head: Normocephalic.      Nose: Nose normal.      Mouth/Throat:      Mouth: Mucous membranes are moist.      Pharynx: Oropharynx is clear.   Eyes:      Conjunctiva/sclera: Conjunctivae normal.   Pulmonary:      Effort: Pulmonary effort is normal.   Skin:     General: Skin is warm and dry.      Findings: Erythema and rash present.      Comments: Erythematous, shiny skin rash to pannus and bilateral groin without drainage    Neurological:      Mental Status: She is alert and oriented to person, place, and time.      Gait: Gait is intact.   Psychiatric:         Mood and Affect: Mood normal.         Behavior: Behavior normal.                   "

## 2024-03-10 ENCOUNTER — TELEPHONE (OUTPATIENT)
Dept: CARDIOLOGY CLINIC | Facility: CLINIC | Age: 69
End: 2024-03-10

## 2024-03-10 NOTE — TELEPHONE ENCOUNTER
Please let Ena know her ZIO showed no a fib. She had a few episodes of supraventricular tachycardia, longest 11 seconds. Given her avg HR is 55 bpm I do not want to increase the metoprolol. If she is experiencing frequent heart racing episodes I recommend a referral back to EP.   Thank you!

## 2024-03-14 ENCOUNTER — OFFICE VISIT (OUTPATIENT)
Dept: SLEEP CENTER | Facility: CLINIC | Age: 69
End: 2024-03-14
Payer: MEDICARE

## 2024-03-14 VITALS
HEIGHT: 67 IN | WEIGHT: 293 LBS | HEART RATE: 59 BPM | RESPIRATION RATE: 16 BRPM | BODY MASS INDEX: 45.99 KG/M2 | OXYGEN SATURATION: 97 % | TEMPERATURE: 97.9 F | SYSTOLIC BLOOD PRESSURE: 142 MMHG | DIASTOLIC BLOOD PRESSURE: 70 MMHG

## 2024-03-14 DIAGNOSIS — F43.9 STRESS AT HOME: ICD-10-CM

## 2024-03-14 DIAGNOSIS — I48.0 PAROXYSMAL ATRIAL FIBRILLATION (HCC): ICD-10-CM

## 2024-03-14 DIAGNOSIS — E66.01 MORBID OBESITY WITH BMI OF 40.0-44.9, ADULT (HCC): ICD-10-CM

## 2024-03-14 DIAGNOSIS — E66.2 OBESITY HYPOVENTILATION SYNDROME (HCC): ICD-10-CM

## 2024-03-14 DIAGNOSIS — F32.A ANXIETY AND DEPRESSION: ICD-10-CM

## 2024-03-14 DIAGNOSIS — J30.9 ALLERGIC RHINITIS WITH POSTNASAL DRIP: ICD-10-CM

## 2024-03-14 DIAGNOSIS — R09.82 ALLERGIC RHINITIS WITH POSTNASAL DRIP: ICD-10-CM

## 2024-03-14 DIAGNOSIS — I10 ESSENTIAL HYPERTENSION: ICD-10-CM

## 2024-03-14 DIAGNOSIS — R68.2 DRY MOUTH: ICD-10-CM

## 2024-03-14 DIAGNOSIS — F41.9 ANXIETY AND DEPRESSION: ICD-10-CM

## 2024-03-14 DIAGNOSIS — G47.33 OBSTRUCTIVE SLEEP APNEA SYNDROME: Primary | ICD-10-CM

## 2024-03-14 PROCEDURE — 99214 OFFICE O/P EST MOD 30 MIN: CPT | Performed by: INTERNAL MEDICINE

## 2024-03-14 PROCEDURE — G2211 COMPLEX E/M VISIT ADD ON: HCPCS | Performed by: INTERNAL MEDICINE

## 2024-03-14 NOTE — PATIENT INSTRUCTIONS

## 2024-03-14 NOTE — PROGRESS NOTES
Follow-Up Note - Sleep [facility]   Ramona Cabral  68 y.o. female  :1955  MRN:[id number]  DOS:3/14/2024    CC: I saw this patient for follow-up in clinic today for Sleep disordered breathing, Coexisting Sleep and Medical Problems.  She is using a ResMed machine.  Interval changes: None reported.    Home sleep testing in March of this year demonstrated : CAROLINE (respiratory event index of) 46.9 /hour.   Minimum oxygen saturation 81%  and 14.4 % of total sleep time was spent with saturations less than 90%.  The snore index was 21.6%.   The subsequent titration study demonstrated sleep disordered breathing was successfully remediated with PAP at 9 cm H2O.     PFSH, Problem List, Medications & Allergies were reviewed in EMR.   She  has a past medical history of Anxiety, Atrial fibrillation (HCC), Disease of thyroid gland, Elevated d-dimer, Hyperlipidemia, Hypertension, Hypothyroid, Obesity, and Paroxysmal atrial fibrillation (HCC).    She has a current medication list which includes the following prescription(s): albuterol, alprazolam, apixaban, atorvastatin, citalopram, clobetasol, desonide, ketoconazole, levothyroxine, magnesium oxide, metoprolol succinate, metronidazole, nystatin, olmesartan-hydrochlorothiazide, albuterol, and fluticasone-salmeterol.    PHYSIOLOGICAL DATA REVIEW : Using PAP > 4 hours/night 100%. Estimated CAROLINE 5.2/hour with pressure of 9cm H2O ; patient has not been using non FDA approved devices to sanitize the machine.  INTERPRETATION: Compliance is excellent; Pressure setting is:in acceptable range; ;   SUBJECTIVE: With respect to use of PAP, Ramona  is experiencing some adverse effects:dry nose.She derives benefit.  Is satisfied with sleep and daytime function.   Sleep Routine: Ramona reports getting 8 hrs sleep out of approximately 9 hours in bed.; she has difficulty initiating sleep, but not maintaining sleep .  She has stress at home and racing thoughts relating to health or family  "members (daughter diagnosed with leukemia and  has cancer).  She arises spontaneously and feels more refreshed since on Rx.Ramona reports significantly improved excessive daytime sleepiness,.  She rated herself at Total score: 4 /24 on the [place] Sleepiness Scale.   Other issues: None reported.     Habits: Reports that she has never smoked. She has never used smokeless tobacco.,  Reports that she does not currently use alcohol.,  Reports no history of drug use., Caffeine use:none; Exercise routine: none.      ROS: Significant for weight fluctuates in the range of a few pounds.  He has nasal congestion due to environmental allergies.  Presently she is reporting no respiratory or cardiac symptoms.  She is on Celexa for anxiety and depression..    EXAM: /70 (BP Location: Right arm, Patient Position: Sitting, Cuff Size: Extra-Large)   Pulse 59   Temp 97.9 °F (36.6 °C)   Resp 16   Ht 5' 7\" (1.702 m)   Wt 133 kg (293 lb)   LMP  (LMP Unknown)   SpO2 97%   BMI 45.89 kg/m²     Wt Readings from Last 3 Encounters:   03/14/24 133 kg (293 lb)   03/09/24 129 kg (285 lb)   02/06/24 132 kg (291 lb 6.4 oz)      Patient is well groomed; well appearing.   CNS: Alert, orientated, speech clear & coherent  Psych: cooperative and in no distress. Mental state: Appears normal.  H&N: EOMI; NC/AT: No facial pressure marks, no rashes.    Skin/Extrem: col & hydration normal; no edema  Resp: Respiratory effort is normal  Physical findings otherwise essentially unchanged from previous.    IMPRESSION: Problem List Items & Comorbidities Addressed this Visit    1. Obstructive sleep apnea syndrome  PAP DME Pressure Change    PAP DME Resupply/Reorder      2. Obesity hypoventilation syndrome (HCC)        3. Stress at home        4. Anxiety and depression        5. Dry mouth        6. Allergic rhinitis with postnasal drip        7. Paroxysmal atrial fibrillation (HCC)        8. Essential hypertension        9. Morbid obesity with " "BMI of 40.0-44.9, adult ([facility])            PLAN:  I reviewed results of prior studies and physiologic data with the patient.   I discussed treatment options with risks and benefits.  Treatment with  PAP is medically necessary and Ramona is agreable to continue use.   Care of equipment, methods to improve comfort using PAP and importance of compliance with therapy were discussed.  Pressure setting: change 9-11 cmH2O.    Rx provided to replace supplies and Care coordinated with DME provider.   Multi component Cognitive behavioral therapy for Insomnia undertaken - Sleep Restriction, Stimulus control, Relaxation techniques and Sleep hygiene were discussed.  Ensure adequate treatment of allergies.  Discussed strategies for weight reduction.  She has joined the weight management program.  Follow-up is advised in 1 year or sooner if needed to monitor progress, compliance and to adjust therapy.     Thank you for allowing me to participate in the care of this patient.    Sincerely,     Authenticated electronically on 03/14/24   Board Certified Specialist     Portions of the record may have been created with voice recognition software. Occasional wrong word or \"sound a like\" substitutions may have occurred due to the inherent limitations of voice recognition software. There may also be notations and random deletions of words or characters from malfunctioning software. Read the chart carefully and recognize, using context, where substitutions/deletions have occurred.    "

## 2024-03-18 ENCOUNTER — TELEPHONE (OUTPATIENT)
Dept: SLEEP CENTER | Facility: CLINIC | Age: 69
End: 2024-03-18

## 2024-03-18 LAB
DME PARACHUTE DELIVERY DATE REQUESTED: NORMAL
DME PARACHUTE ITEM DESCRIPTION: NORMAL
DME PARACHUTE ORDER STATUS: NORMAL
DME PARACHUTE SUPPLIER NAME: NORMAL
DME PARACHUTE SUPPLIER PHONE: NORMAL

## 2024-04-08 ENCOUNTER — OFFICE VISIT (OUTPATIENT)
Dept: BARIATRICS | Facility: CLINIC | Age: 69
End: 2024-04-08
Payer: MEDICARE

## 2024-04-08 ENCOUNTER — OFFICE VISIT (OUTPATIENT)
Dept: GASTROENTEROLOGY | Facility: CLINIC | Age: 69
End: 2024-04-08
Payer: MEDICARE

## 2024-04-08 ENCOUNTER — APPOINTMENT (OUTPATIENT)
Dept: LAB | Facility: MEDICAL CENTER | Age: 69
End: 2024-04-08
Payer: MEDICARE

## 2024-04-08 ENCOUNTER — TELEPHONE (OUTPATIENT)
Dept: GASTROENTEROLOGY | Facility: CLINIC | Age: 69
End: 2024-04-08

## 2024-04-08 ENCOUNTER — APPOINTMENT (OUTPATIENT)
Dept: RADIOLOGY | Facility: MEDICAL CENTER | Age: 69
End: 2024-04-08
Payer: MEDICARE

## 2024-04-08 VITALS
BODY MASS INDEX: 48.62 KG/M2 | HEIGHT: 65 IN | DIASTOLIC BLOOD PRESSURE: 80 MMHG | WEIGHT: 291.8 LBS | TEMPERATURE: 99.3 F | RESPIRATION RATE: 18 BRPM | HEART RATE: 62 BPM | SYSTOLIC BLOOD PRESSURE: 114 MMHG

## 2024-04-08 VITALS
SYSTOLIC BLOOD PRESSURE: 115 MMHG | BODY MASS INDEX: 47.09 KG/M2 | DIASTOLIC BLOOD PRESSURE: 68 MMHG | WEIGHT: 293 LBS | OXYGEN SATURATION: 98 % | HEART RATE: 68 BPM | HEIGHT: 66 IN | TEMPERATURE: 98.6 F

## 2024-04-08 DIAGNOSIS — E03.8 HYPOTHYROIDISM DUE TO HASHIMOTO'S THYROIDITIS: ICD-10-CM

## 2024-04-08 DIAGNOSIS — I10 ESSENTIAL HYPERTENSION: ICD-10-CM

## 2024-04-08 DIAGNOSIS — K59.00 CONSTIPATION, UNSPECIFIED CONSTIPATION TYPE: ICD-10-CM

## 2024-04-08 DIAGNOSIS — R10.30 LOWER ABDOMINAL PAIN: ICD-10-CM

## 2024-04-08 DIAGNOSIS — R19.4 CHANGE IN BOWEL HABITS: ICD-10-CM

## 2024-04-08 DIAGNOSIS — R19.7 DIARRHEA, UNSPECIFIED TYPE: ICD-10-CM

## 2024-04-08 DIAGNOSIS — E66.01 MORBID OBESITY (HCC): Primary | ICD-10-CM

## 2024-04-08 DIAGNOSIS — G47.33 OBSTRUCTIVE SLEEP APNEA SYNDROME: ICD-10-CM

## 2024-04-08 DIAGNOSIS — R10.30 LOWER ABDOMINAL PAIN: Primary | ICD-10-CM

## 2024-04-08 DIAGNOSIS — E78.2 MIXED HYPERLIPIDEMIA: ICD-10-CM

## 2024-04-08 DIAGNOSIS — K62.5 RECTAL BLEEDING: ICD-10-CM

## 2024-04-08 DIAGNOSIS — E06.3 HYPOTHYROIDISM DUE TO HASHIMOTO'S THYROIDITIS: ICD-10-CM

## 2024-04-08 PROCEDURE — 99204 OFFICE O/P NEW MOD 45 MIN: CPT | Performed by: DIETITIAN, REGISTERED

## 2024-04-08 PROCEDURE — 99204 OFFICE O/P NEW MOD 45 MIN: CPT | Performed by: PHYSICIAN ASSISTANT

## 2024-04-08 PROCEDURE — 82784 ASSAY IGA/IGD/IGG/IGM EACH: CPT

## 2024-04-08 PROCEDURE — 36415 COLL VENOUS BLD VENIPUNCTURE: CPT

## 2024-04-08 PROCEDURE — 86231 EMA EACH IG CLASS: CPT

## 2024-04-08 PROCEDURE — 86364 TISS TRNSGLTMNASE EA IG CLAS: CPT

## 2024-04-08 PROCEDURE — 86258 DGP ANTIBODY EACH IG CLASS: CPT

## 2024-04-08 PROCEDURE — 74022 RADEX COMPL AQT ABD SERIES: CPT

## 2024-04-08 NOTE — PATIENT INSTRUCTIONS
- Discussed options of HealthyCORE-Intensive Lifestyle Intervention Program, Very Low Calorie Diet-VLCD, and Conservative Program and the role of weight loss medications.  - Explained the importance of making lifestyle changes first before starting anti-obesity medications.  - Patient should demonstrate lifestyle changes first before anti-obesity medication initiated.   - Patient is interested in pursuing Conservative Program  - Initial weight loss goal of 5-10% weight loss for improved health as studies have shown this is where we see the greatest impact on improving health and decreasing risk of obesity related conditions.  - Weight loss can improve patient's co-morbid conditions and/or prevent weight-related complications.  - Labs reviewed: As below.      General Recommendations:  Nutrition:  Eat breakfast daily.  Do not skip meals.     Food log (ie.) www.Buddy.com, sparkpeople.com, loseit.com, calorieking.com, etc.    Practice mindful eating.  Be sure to set aside time to eat, eat slowly, and savor your food.    Hydration:    At least 64oz of water daily.  No sugar sweetened beverages.  No juice (eat the fruit instead).    Exercise:  Studies have shown that the ideal exercise goal is somewhere between 150 to 300 minutes of moderate intensity exercise a week.  Start with exercising 10 minutes every other day and gradually increase physical activity with a goal of at least 150 minutes of moderate intensity exercise a week, divided over at least 3 days a week.  An example of this would be exercising 30 minutes a day, 5 days a week.  Resistance training can increase muscle mass and increase our resting metabolic rate.   FULL BODY resistance training is recommended 2-3 times a week.  Do not do this on consecutive days to allow for muscle recovery.    Aim for a bare minimum 5000 steps, even on days you do not exercise.    Monitoring:   Weigh yourself daily.  If this causes undue stress, then just weigh yourself  once a week.  Weigh yourself the same time of the day with the same amount of clothing on.  Preferably this should be done after waking up, before you eat, and with no clothing or minimal clothing on.    Specific Goals:  Gradually increase physical activity to a goal of 5 days per week for 30 minutes of MODERATE intensity PLUS 2 days per week of FULL BODY resistance training  Goal protein intake of 60-80 grams per day  No sugary beverages. At least 64oz of water daily.    Calorie goal:  [id number] brien (Provided with meal plan to follow).    Return visit:    Calorie tracking  Physical activity  Body stats package with RN   visit to discuss stress and emotional eating  Return to clinic in 3 months

## 2024-04-08 NOTE — PROGRESS NOTES
[facility] - Outpatient Consultation New Patient  Ramona Cabral 68 y.o. female MRN: [id number]  Encounter: [id number]          ASSESSMENT AND PLAN:    1.  Lower abdominal pain  2.  Constipation  3.  Diarrhea  4.  Change in bowel habits  5.  Rectal bleeding  Patient reports several months' history of lower abdominal pain, bloating, and diarrhea/fecal urgency with intermittent constipation.  She usually has 2-3 BMs/day, sometimes skips a day with no BM.  Last week she had one episode of bright red rectal bleeding, which resolved on its own.  Has never occurred before and has not occurred since.  She denies any heartburn, acid reflux, nausea, vomiting, black/bloody stools, fecal incontinence, unintentional weight loss.  She drinks at least 64 oz water daily but admits she does not eat enough fiber in her diet.  She is dealing with a lot of stress unfortunately, due to  with cancer diagnosis (abdominal mucinous adenocarcinoma) and daughter recently diagnosed with AML and multiple hospitalizations.  CBC, CMP, TSH normal 2/2024.    -Check celiac disease antibody profile.  -Check abdominal XR to rule out constipation with overflow diarrhea.  Can consider low dose dicyclomine to help with abdominal pain if no constipation present.  -Schedule colonoscopy for further evaluation.    I obtained informed consent from the patient. The risks/benefits/alternatives of the procedure were discussed with the patient. Risks included, but not limited to, infection, bleeding, perforation, injury to organs in the abdomen, missed lesion and incomplete procedure were discussed. Patient was agreeable and electronic signature was obtained.      Follow up 3 months, can call for sooner visit as needed.    ________________________________________________________    HPI:  Ramona Cabral is a 68-year-old female with history of hypertension, hyperlipidemia, paroxysmal A-fib on Eliquis, hypothyroidism,  obstructive sleep apnea, obesity, anxiety and depression who presents for evaluation of abdominal pain.    Patient reports several months' history of lower abdominal pain, bloating, and diarrhea/fecal urgency with intermittent constipation.  She usually has 2-3 BMs/day, sometimes skips a day with no BM.  Last week she had one episode of bright red rectal bleeding, which resolved on its own.  Has never occurred before and has not occurred since.  She denies any heartburn, acid reflux, nausea, vomiting, black/bloody stools, fecal incontinence, unintentional weight loss.  She drinks at least 64 oz water daily but admits she does not eat enough fiber in her diet.  She is dealing with a lot of stress unfortunately, due to  with cancer diagnosis (abdominal mucinous adenocarcinoma) and daughter recently diagnosed with AML and multiple hospitalizations.       Last colonoscopy 2015, normal per patient, no history of colon polyps, no records available for review.    Answers submitted by the patient for this visit:  Abdominal Pain Questionnaire (Submitted on 4/7/2024)  Chief Complaint: Abdominal pain  Chronicity: recurrent  Onset: more than 1 month ago  Onset quality: gradual  Frequency: every several days  Episode duration: 8 Hours  Progression since onset: waxing and waning  Pain location: RLQ, generalized abdominal region, suprapubic region  Pain - numeric: 7/10  Pain quality: aching, dull, a sensation of fullness  anorexia: Yes  arthralgias: Yes  belching: No  constipation: Yes  diarrhea: Yes  dysuria: No  fever: No  flatus: No  frequency: No  headaches: No  hematochezia: No  hematuria: No  melena: No  myalgias: No  nausea: No  weight loss: No  vomiting: No  Relieved by: bowel movements, certain positions, recumbency        REVIEW OF SYSTEMS:  10 point ROS reviewed and negative, except as above    Historical Information   Past Medical History:   Diagnosis Date    Anxiety     Atrial fibrillation (HCC)     Disease of  thyroid gland     Elevated d-dimer     2 seperate episodes of unclear etiology.    Hyperlipidemia     Hypertension     Hypothyroid     Obesity     Paroxysmal atrial fibrillation (HCC)     New onset 2020.     Past Surgical History:   Procedure Laterality Date    CATARACT EXTRACTION, BILATERAL      HYSTERECTOMY      KNEE ARTHROPLASTY      SHOULDER OPEN ROTATOR CUFF REPAIR      x 2    TONSILLECTOMY       Social History   Social History     Substance and Sexual Activity   Alcohol Use Not Currently     Social History     Substance and Sexual Activity   Drug Use Never     Social History     Tobacco Use   Smoking Status Never   Smokeless Tobacco Never     Family History   Problem Relation Age of Onset    Hypertension Sister     Heart disease Paternal Aunt     Other Mother         Encephalitis    Other Father          in his 90s without significant problems    Lung cancer Brother     No Known Problems Brother        Meds/Allergies       Current Outpatient Medications:     albuterol (PROVENTIL HFA,VENTOLIN HFA) 90 mcg/act inhaler    albuterol (Ventolin HFA) 90 mcg/act inhaler    ALPRAZolam (XANAX) 0.5 mg tablet    apixaban (Eliquis) 5 mg    atorvastatin (LIPITOR) 10 mg tablet    citalopram (CeleXA) 40 mg tablet    clobetasol (TEMOVATE) 0.05 % ointment    desonide (DESOWEN) 0.05 % cream    Fluticasone-Salmeterol (Advair Diskus) 100-50 mcg/dose inhaler    ketoconazole (NIZORAL) 2 % cream    levothyroxine 75 mcg tablet    magnesium oxide (MAG-OX) 400 mg tablet    metoprolol succinate (TOPROL-XL) 25 mg 24 hr tablet    metroNIDAZOLE (METROGEL) 1 % gel    nystatin powder    olmesartan-hydrochlorothiazide (BENICAR HCT) 40-25 MG per tablet    Allergies   Allergen Reactions    Flagyl [Metronidazole] Shortness Of Breath     Pt requested addition to allergies    Contrast [Iodinated Contrast Media] Rash    Iodine - Food Allergy Rash           Objective   Wt Readings from Last 3 Encounters:   24 132 kg (291 lb 12.8 oz)    03/14/24 133 kg (293 lb)   03/09/24 129 kg (285 lb)     Temp Readings from Last 3 Encounters:   04/08/24 99.3 °F (37.4 °C)   03/14/24 97.9 °F (36.6 °C)   03/09/24 (!) 97.4 °F (36.3 °C)     BP Readings from Last 3 Encounters:   04/08/24 114/80   03/14/24 142/70   03/09/24 138/74     Pulse Readings from Last 3 Encounters:   04/08/24 62   03/14/24 59   03/09/24 77        PHYSICAL EXAM:     Physical Exam  Vitals reviewed.   Constitutional:       General: She is not in acute distress.     Appearance: She is well-developed.   HENT:      Head: Normocephalic and atraumatic.   Eyes:      Conjunctiva/sclera: Conjunctivae normal.   Cardiovascular:      Rate and Rhythm: Normal rate and regular rhythm.      Heart sounds: No murmur heard.  Pulmonary:      Effort: Pulmonary effort is normal. No respiratory distress.      Breath sounds: Normal breath sounds.   Abdominal:      General: Bowel sounds are normal. There is no distension.      Palpations: Abdomen is soft.      Tenderness: There is abdominal tenderness in the periumbilical area. There is no guarding.   Musculoskeletal:         General: No swelling.      Cervical back: Neck supple.   Skin:     General: Skin is warm and dry.   Neurological:      Mental Status: She is alert.   Psychiatric:         Mood and Affect: Mood normal.             Lab Results:   No visits with results within 1 Day(s) from this visit.   Latest known visit with results is:   Telephone on 03/18/2024   Component Date Value    Supplier Name 03/18/2024 [employer]     Supplier Phone Number 03/18/2024 [phone]     Order Status 03/18/2024 Pushed to Facility     Delivery Request Date 03/18/2024 03/18/2024     Item Description 03/18/2024 Pressure Change     Item Description 03/18/2024 PAP Accessory     Item Description 03/18/2024 PAP Mask, Fit to Comfort, Nasal Pillow, 1 per 3 months     Item Description 03/18/2024 PAP Headgear, 1 per 6 months     Item Description 03/18/2024 PAP Chinstrap,  1 per 6 months     Item Description 03/18/2024 PAP Humidifier, Heated     Item Description 03/18/2024 Disposable PAP Filter, 2 per 1 month     Item Description 03/18/2024 Non-Disposable PAP Filter, 1 per 6 months     Item Description 03/18/2024 Humidifier Water Chamber, 1 per 6 months     Item Description 03/18/2024 PAP Mask Interface Cushion, Nasal Pillow, 2 per 1 month     Item Description 03/18/2024 PAP Machine Tubing, Heated, 1 per 3 months        Lab Results   Component Value Date    WBC 6.62 02/01/2024    HGB 11.9 02/01/2024    HCT 36.5 02/01/2024    MCV 88 02/01/2024     02/01/2024       Lab Results   Component Value Date     11/06/2015    SODIUM 138 02/06/2024    K 4.0 02/06/2024     02/06/2024    CO2 26 02/06/2024    ANIONGAP 11 11/06/2015    AGAP 9 02/06/2024    BUN 21 02/06/2024    CREATININE 1.09 02/06/2024    GLUC 116 02/01/2024    GLUF 108 (H) 02/06/2024    CALCIUM 9.7 02/06/2024    AST 28 02/01/2024    ALT 21 02/01/2024    ALKPHOS 74 02/01/2024    PROT 7.3 11/06/2015    TP 7.6 02/01/2024    BILITOT 0.34 11/06/2015    TBILI 0.66 02/01/2024    EGFR 51 02/06/2024         Radiology Results:   No results found.

## 2024-04-08 NOTE — PROGRESS NOTES
Assessment/Plan:  Ena was seen today for consult.    Diagnoses and all orders for this visit:    Morbid obesity (HCC)    Essential hypertension    Obstructive sleep apnea syndrome    Hypothyroidism due to Hashimoto's thyroiditis    Mixed hyperlipidemia       - Discussed options of HealthyCORE-Intensive Lifestyle Intervention Program, Very Low Calorie Diet-VLCD, and Conservative Program and the role of weight loss medications.  - Explained the importance of making lifestyle changes first before starting anti-obesity medications.  - Patient should demonstrate lifestyle changes first before anti-obesity medication initiated.   - Patient is interested in pursuing Conservative Program  - Initial weight loss goal of 5-10% weight loss for improved health as studies have shown this is where we see the greatest impact on improving health and decreasing risk of obesity related conditions.  - Weight loss can improve patient's co-morbid conditions and/or prevent weight-related complications.  - Labs reviewed: As below.      General Recommendations:  Nutrition:  Eat breakfast daily.  Do not skip meals.     Food log (ie.) www.Copier How To.com, sparkpeople.com, loseit.com, calorieking.com, etc.    Practice mindful eating.  Be sure to set aside time to eat, eat slowly, and savor your food.    Hydration:    At least 64oz of water daily.  No sugar sweetened beverages.  No juice (eat the fruit instead).    Exercise:  Studies have shown that the ideal exercise goal is somewhere between 150 to 300 minutes of moderate intensity exercise a week.  Start with exercising 10 minutes every other day and gradually increase physical activity with a goal of at least 150 minutes of moderate intensity exercise a week, divided over at least 3 days a week.  An example of this would be exercising 30 minutes a day, 5 days a week.  Resistance training can increase muscle mass and increase our resting metabolic rate.   FULL BODY resistance training is  recommended 2-3 times a week.  Do not do this on consecutive days to allow for muscle recovery.    Aim for a bare minimum 5000 steps, even on days you do not exercise.    Monitoring:   Weigh yourself daily.  If this causes undue stress, then just weigh yourself once a week.  Weigh yourself the same time of the day with the same amount of clothing on.  Preferably this should be done after waking up, before you eat, and with no clothing or minimal clothing on.    Specific Goals:  Gradually increase physical activity to a goal of 5 days per week for 30 minutes of MODERATE intensity PLUS 2 days per week of FULL BODY resistance training  Goal protein intake of 60-80 grams per day  No sugary beverages. At least 64oz of water daily.    Calorie goal:  [id number] brien (Provided with meal plan to follow).    Return visit:    Calorie tracking  Physical activity  Body stats package with RN   visit to discuss stress and emotional eating  Return to clinic in 3 months       Total time spent reviewing chart, interviewing patient, examining patient, discussing plan, answering all questions, and documentin min.       ______________________________________________________________________        Subjective:   Chief Complaint   Patient presents with    Consult     MWM-Consult;GW-180lb- PT DX WITH SLEEP APNEA       HPI: Ramona Cabral  is a 68 y.o. female with history of afib, HTN, DEANNA, hypothyroidism, anxiety, depression,  and excess weight, here to pursue weight loss management.  Previous notes and records have been reviewed.    DEANNA - using CPAP  HTN - almesartan/HCTZ  HLD - atorvastatin. Recent lipid panel well controlled  Hypothyroidism - levothyroxine 75mcg. TSH WNL  Anxiety/depression - citalopram, xanax PRN  Afib - Eliquis      Fasting glucose elevated at 108    Reports 20 years ago she was 200lbs, did weight watchers and dropped to 160lbs. Gained over a 100 lbs over the past 20 years.  Got  and she  feels like this has made it difficult to adhere to previous diet.  will by foods that she tried to avoid, but once in the house has a difficult time from refraining.  Was stable at 230lbs for awhile   has terminal cancer, Daughter diagnoses with leukemia -  which is stressful. Stress eating    Right knee arthalgia- receiving injections through ortho. 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    Contraception: s/p hysterectomy, menopause    HPI  Wt Readings from Last 20 Encounters:   04/08/24 132 kg (291 lb 12.8 oz)   03/14/24 133 kg (293 lb)   03/09/24 129 kg (285 lb)   02/06/24 132 kg (291 lb 6.4 oz)   02/01/24 134 kg (294 lb 8.6 oz)   10/19/23 128 kg (283 lb)   09/01/23 127 kg (280 lb)   08/09/23 130 kg (286 lb)   08/07/23 129 kg (285 lb)   07/27/23 129 kg (283 lb 6.4 oz)   05/08/23 129 kg (285 lb)   04/26/23 129 kg (285 lb)   04/03/23 127 kg (280 lb)   03/29/23 127 kg (280 lb 12.8 oz)   03/24/23 129 kg (285 lb 7.9 oz)   03/09/23 128 kg (281 lb 9.6 oz)   02/06/23 125 kg (275 lb)   01/09/23 127 kg (279 lb 6.4 oz)   12/27/22 127 kg (279 lb 12.8 oz)   11/29/22 122 kg (270 lb)     Excess Weight:  Highest weight: current  Lowest Weight: 160 labs (20 years ago(  Current weight: 291 lbs (4/8/24)  What has been tried: Diet and Exercise and Commercial Weight Loss Programs-ie. Weight Watchers, Vicky Brewster, Nutrisystem, etc.       Food logging:  B/L: bagel + eggs, cheese, sometimes paredes VS muffin  S: skips  D: (dines out 4x/week), pasta, chicken  S: cookies, cakes    Hunger/Cravings: sweets/desserts  Dining out: 5x/week - tries to order salad with her meal  Hydration: Coffee - decaf coffee 1 cup, milk, no sugar    Water - 5 bottles of water    Body armour/sparking water - 1x/day  Alcohol: None (quit 3 years ago since afib)  Exercise: Walking. Has membership to [employer].   Sleep:  Has DEANNA - uses  "CPAP  Occupation: retired      Past Medical History:   Diagnosis Date    Anxiety     Atrial fibrillation (HCC)     Disease of thyroid gland     Elevated d-dimer     2 seperate episodes of unclear etiology.    Hyperlipidemia     Hypertension     Hypothyroid     Obesity     Paroxysmal atrial fibrillation (HCC)     New onset 11/25/2020.     Patient denies personal and family history of pancreatitis, thyroid cancer, MEN-2 tumors.  Denies any hx of glaucoma, seizures, kidney stones, gallstones.  Denies Hx of CAD, PAD, palpitations. + atrial fibrillation  Denies uncontrolled anxiety or depression, suicidal behavior or thinking , insomnia or sleep disturbance.     Past Surgical History:   Procedure Laterality Date    CATARACT EXTRACTION, BILATERAL      HYSTERECTOMY      KNEE ARTHROPLASTY      SHOULDER OPEN ROTATOR CUFF REPAIR      x 2    TONSILLECTOMY       The following portions of the patient's history were reviewed and updated as appropriate: allergies, current medications, past family history, past medical history, past social history, past surgical history, and problem list.    Review Of Systems:  Review of Systems   Constitutional:  Negative for fatigue and fever.   HENT:  Negative for sore throat, trouble swallowing and voice change.    Respiratory:  Negative for shortness of breath.    Cardiovascular:  Negative for chest pain.   Gastrointestinal:  Positive for constipation and diarrhea. Negative for abdominal pain, nausea and vomiting.        Seeing GI in the near future   Endocrine: Negative for cold intolerance and heat intolerance.   Genitourinary:  Negative for difficulty urinating.   Musculoskeletal:  Negative for arthralgias and back pain.   Psychiatric/Behavioral:  Negative for suicidal ideas. The patient is not nervous/anxious.         + anxiety/depression - on celexa   All other systems reviewed and are negative.      Objective:  /80   Pulse 62   Temp 99.3 °F (37.4 °C)   Resp 18   Ht 5' 5\" (1.651 " m)   Wt 132 kg (291 lb 12.8 oz)   LMP  (LMP Unknown)   BMI 48.56 kg/m²   Physical Exam  Vitals and nursing note reviewed.   Constitutional:       General: She is not in acute distress.     Appearance: Normal appearance. She is obese. She is not ill-appearing, toxic-appearing or diaphoretic.   HENT:      Head: Normocephalic and atraumatic.   Eyes:      General:         Right eye: No discharge.         Left eye: No discharge.      Conjunctiva/sclera: Conjunctivae normal.   Pulmonary:      Effort: Pulmonary effort is normal. No respiratory distress.   Musculoskeletal:         General: Normal range of motion.      Cervical back: Normal range of motion. No rigidity.      Right lower leg: No edema.      Left lower leg: No edema.   Skin:     Coloration: Skin is not pale.      Findings: No erythema or rash.   Neurological:      General: No focal deficit present.      Mental Status: She is alert.   Psychiatric:         Mood and Affect: Mood normal.         Labs and Imaging  Recent labs and imaging have been personally reviewed.  Lab Results   Component Value Date    WBC 6.62 02/01/2024    HGB 11.9 02/01/2024    HCT 36.5 02/01/2024    MCV 88 02/01/2024     02/01/2024     Lab Results   Component Value Date     11/06/2015    SODIUM 138 02/06/2024    K 4.0 02/06/2024     02/06/2024    CO2 26 02/06/2024    ANIONGAP 11 11/06/2015    AGAP 9 02/06/2024    BUN 21 02/06/2024    CREATININE 1.09 02/06/2024    GLUC 116 02/01/2024    GLUF 108 (H) 02/06/2024    CALCIUM 9.7 02/06/2024    AST 28 02/01/2024    ALT 21 02/01/2024    ALKPHOS 74 02/01/2024    PROT 7.3 11/06/2015    TP 7.6 02/01/2024    BILITOT 0.34 11/06/2015    TBILI 0.66 02/01/2024    EGFR 51 02/06/2024     Lab Results   Component Value Date    HGBA1C 6.2 (H) 11/05/2015     Lab Results   Component Value Date    SJS3HGRNOGDY 2.917 02/06/2024    TSH 2.39 01/03/2023     Lab Results   Component Value Date    CHOLESTEROL 160 02/06/2024     Lab Results    Component Value Date    HDL 67 02/06/2024     Lab Results   Component Value Date    TRIG 156 (H) 02/06/2024     Lab Results   Component Value Date    LDLCALC 62 02/06/2024

## 2024-04-08 NOTE — PATIENT INSTRUCTIONS
Ramona HARRIS [place]  4/8/2024     Recommended Total Fiber Intake**    AGE  MEN  WOMAN    19-50  38 grams/day  25 grams/day    Over 50  30 grams/day  21 grams/day    Fiber Sources in Common Foods   Use this guide to find out if you have enough fiber in you diet.    Food  Size of Serving  Fiber Grams/Servings  Calories/   Serving  Food  Size of Serving  Fiber Grams/Servings  Calories/   Serving    Fruits: (raw unless otherwise noted  Vegetables: (cooked, unless otherwise noted)    Apple (w/peel)  1 medium  3.7  81  Artichoke  1 globe  6.5  60    Apricots  1 cup  3.7  74  Asparagus  ½ cup  1.8  25    Banana  1 medium  2.7  105  Beans:    Blackberries  1 cup  7.2  75  Green (canned)  ½ cup  1.3  14    Blueberries  1 cup  3.9  81  Kidney  ½ cup  5.7  114    Cantaloupe  1 cup  1.3  56  Lima  ½ cup  6.1  85    Grapefruit  1 medium  2.8  82  Rodriguez  ½ cup  7.4  118    Grapes  1 cup  1.6  114  White  ½ cup  5.5  122    Orange  1 medium  3.1  62  Beets  ½ cup  1.6  37    Pear (with peel)  1 medium  4.0  98  Broccoli  ½ cup  2.8  26    Pineapple  1 cup  1.9  76  Cabbage, green  ½ cup  2.1  16    Plums  1 medium  1.0  36  Cabbage, green (raw)  ½ cup  0.8  9    Prunes (dried)  1 cup  11.4  386  Carrots  ½ cup  2.6  35    Raspberries  1 cup  8.4  60  Cauliflower  ½ cup  2.0  17    Strawberries  1 cup  3.4  45  Cauliflower (raw)  ½ cup  1.3  13    Watermelon  1 slice  0.8  51  Celery (raw)  ½ cup  1.0  10    GRAIN PRODUCTS AND OTHERS:  Corn  ½ cup  2.0  66    Bread:  Cucumber (raw)  ½ cup  0.4  7    Nepali  1 slice  0.8  68  Eggplant  ½ cup  1.2  13    Rye  1 slice  1.6  67  Green Peas  ½ cup  4.4  62    White  1 slice  0.6  67  Lettuce, iceberg (raw)  ½ cup  0.4  4    Whole Wheat  1 slice  2.0  70  Onions (raw)  ½ cup  1.4  30    Cereal:  Potato (baked with skin)  ½ cup  1.5  66    Bran  1 ounce  9.7  70  Spinach  ½ cup  2.7  25    Corn Flakes  1 ounce  1.0  110  Tomato  ½ cup  1.0  19    Oat Bran  1 ounce  4.3  69  Zucchini  ½  cup  1.3  14    Oatmeal  1 ounce  3.0  109  METAMUCIL:    Shredded Wheat  1 ounce  2.8  102  Capsules  6 capsules  3.0  10    Crackers:  Smooth Texture Orange (sugar free)  1 tsp  3.0  20    Adam  1 square  0.1  27  Smooth Texture Orange (with sugar)  1 tbsp  3.0  45    Saltine  1 regular  0.1  13  Wafers  2 wafers  3.0  120    Rice:    Brown  ½ cup  1.8  108    White  ½ cup  0.3  103    Spaghetti  2 ounces  2.1  225    Almonds (roasted)  ½ cup  6.4  351    Peanuts (roasted)  ½ cup  6.1  388      ** [place] [employer], The National Academy of Sciences, 2002   Track your fiber intake for five days. Use the Fiber Source Guide to find out how much fiber is in common food.     If you’re not getting your recommended amount of fiber each day, talk to your doctor about how you can increase the fiber in your diet. Example  Monday Tuesday Wednesday Thursday Friday    Food  Oatmeal    Fiber Grams  2.8    Food  Blueberries    Fiber Grams  3.9    Food  W.W. Bread    Fiber Grams  1.9    Food  W.W. Bread    Fiber Grams  1.9    Food  Apple    Fiber Grams  3.7    Food  Spaghetti    Fiber Grams  .14    Food  Corn    Fiber Grams  2.0    Food  White Bread    Fiber Grams  .6    Food    Fiber Grams    Food    Fiber Grams    Food    Fiber Grams    Food    Fiber Grams    Food    Fiber Grams    Food    Fiber Grams    Food    Fiber Grams    Food    Fiber Grams    Food    Fiber Grams    Food    Fiber Grams    Food    Fiber Grams    Food    Fiber Grams    Add numbers in each column to find your daily fiber intake.    Total Daily Fiber Intake  18.2      Too Low - Like most Americans, this example is not enough fiber. Talk to your doctor about how to add fiber to your diet.   Quick Fiber Facts    Most Americans consume only about half of the recommended fiber they need each day.    Fiber helps maintain normal bowel function, and helps prevent constipation and its potential complications. Straining and pressure from constipation may  lead to diverticular disease and hemorrhoids.    Stool softeners or stimulant laxatives only offer short-term relief of constipation, while dietary changes or fiber therapies help break the cycle of irregularity.    Diets low in saturated fat and cholesterol that include 7 grams of soluble fiber per day from psyllium husk, as in Metamucil, may reduce the risk of heart disease by lowering cholesterol. One adult dose of Metamucil has 2.4 grams of this soluable fiber.    Increase fiber intake gradually, giving the body time to adjust.

## 2024-04-08 NOTE — TELEPHONE ENCOUNTER
Procedure: colon  Date: July 1  Physician performing: Dr. Chawla  Location of procedure:    Instructions given to patient: Miralax  Diabetic: n/a  Clearances: Eliquis hold - [facility] ESPINOSA

## 2024-04-10 DIAGNOSIS — R10.30 LOWER ABDOMINAL PAIN: Primary | ICD-10-CM

## 2024-04-10 DIAGNOSIS — R19.7 DIARRHEA, UNSPECIFIED TYPE: ICD-10-CM

## 2024-04-10 LAB
ENDOMYSIUM IGA SER QL: NEGATIVE
GLIADIN PEPTIDE IGA SER-ACNC: 8 UNITS (ref 0–19)
GLIADIN PEPTIDE IGG SER-ACNC: 2 UNITS (ref 0–19)
IGA SERPL-MCNC: 412 MG/DL (ref 87–352)
TTG IGA SER-ACNC: <2 U/ML (ref 0–3)
TTG IGG SER-ACNC: 2 U/ML (ref 0–5)

## 2024-04-10 RX ORDER — DICYCLOMINE HYDROCHLORIDE 10 MG/1
10 CAPSULE ORAL 2 TIMES DAILY PRN
Qty: 60 CAPSULE | Refills: 3 | Status: SHIPPED | OUTPATIENT
Start: 2024-04-10

## 2024-04-11 NOTE — PROGRESS NOTES
"Weight Management Medical Nutrition Assessment    Ena presented for a body states package. Did not fast for today's appt, will return next week for REE. Today's weight is 289.8#, down -1.2# since initial provider visit. Per dietary recall patient consumes excess calories from large portion sizes. Struggles as her  can influence what she eats as he doesn't like vegetables and will snack after dinner.  will buy foods that she tried to avoid, but once in the house has a difficult time from refraining. and daughter are currently struggling with cancer. Reports she stress eats even if she is not hungry. Is meeting with B/H next week.  Provided lean protein options handout, protein bar options, 5984-9349 calories week at a glance. Developed and reviewed a low calorie meal plan.Wants to start food journaling on written log. Completed a body composition using SECA scale and reviewed results with patient. Will schedule follow up when she figures out schedule since she is unsure of her husbands cancer treatment schedule.       Patient seen by Medical Provider in past 6 months:  yes Ellie Michaud PA-C 4/8/24  Requested to schedule appointment with Medical Provider: No      Anthropometric Measurements  Start Weight (#): 291 lbs (4/8/24 [facility] provider visit)   Current Weight (#): 289.8  TBW % Change from start weight:0.4%  Ideal Body Weight (#): 125 (65\")  Goal Weight (#): 180  Highest: current  Lowest: 160    Weight Loss History  Previous weight loss attempts:  Diet and Exercise and Commercial Weight Loss Programs-ie. Weight Watchers, Vicky Brewster, Nutrisystem, etc.     Food and Nutrition Related History  Wake up: 8-9am   Bed Time: 11pm    Food Recall  Breakfast: 8-9am, sometimes later-- bagel + 2 eggs, 1 slice cheese, paredes, sometimes donut     Snack: skip  Lunch: 12-1pm bowl of cereal OR tuna sandwich OR skip if having late breakfast   Snack: skip OR 2-3 string cheese, crackers, pretzels, more " fruit in summer from [employer] market  Dinner: 6-7pm dines out 4x/week-- pasta, 6oz chicken (large portions) OR last night cracker barrel carrots, chicken, macncheese  Snack: cookies, cakes, cheese&crackers, pretzels ( is a nighttime snacker, makes her want to snack although is not hungry)      Beverages: Coffee - decaf coffee 1 cup, milk, no sugar, Water - 5 bottles of water, Body armour/sparking water - 1x/day  Alcohol:           None (quit 3 years ago since afib)     Weekends: Same   Cravings: sweets/desserts   Trouble area of day: dinner and snacking after dinner     Frequency of Eating out: 5x/week - tries to order salad with her meal   Food restrictions: soy (doctor recommended due to thyroid), sugar alcohols   Cooking: self   Food Shopping: self    Physical Activity Intake  Activity:Walking. Has membership to [employer].    Physical limitations/barriers to exercise: Right knee arthalgia- receiving injections through ortho. Feels like this restricts her ability to walk like she did previously    Estimated Needs  Energy  SECA: BMR:1852      X 1.3 -1000 =  1408  [facility] Energy Needs: BMR : 1841   1-2# loss weekly sedentary:  9281-3523             1-2# loss weekly lightly active: 1203-5784  Maintenance calories for sedentary activity level: 2209  Protein: 68-85      (1.2-1.5g/kg IBW)  Fluid: 66     (35mL/kg IBW)    Nutrition Diagnosis  Yes;    Overweight/obesity  related to Excess energy intake as evidenced by  BMI more than normative standard for age and sex (obesity-grade III 40+)       Nutrition Intervention    Nutrition Prescription  Calories:2253-2133  Protein: 75-85  Fluid: 66oz    Meal Plan (Duane/Pro/Carb)  Breakfast: 300/15  Snack:  Lunch: 400/20  Snack: 100-150/5-10  Dinner: 500/30  Snack: 100-150/5-10    Nutrition Education:    Calorie controlled menu  Lean protein food choices  Healthy snack options  Food journaling tips      Nutrition Counseling:  Strategies: meal planning, portion  sizes, healthy snack choices, hydration, fiber intake, protein intake, exercise, food journal      Monitoring and Evaluation:  Evaluation criteria:  Energy Intake  Meet protein needs  Maintain adequate hydration  Monitor weekly weight  Meal planning/preparation  Food journal   Decreased portions at mealtimes and snacks  Physical activity     Barriers to learning:none  Readiness to change: Preparation:  (Getting ready to change)   Comprehension: good  Expected Compliance: good

## 2024-04-17 ENCOUNTER — CLINICAL SUPPORT (OUTPATIENT)
Dept: BARIATRICS | Facility: CLINIC | Age: 69
End: 2024-04-17

## 2024-04-17 VITALS — BODY MASS INDEX: 48.28 KG/M2 | WEIGHT: 289.8 LBS | HEIGHT: 65 IN

## 2024-04-17 DIAGNOSIS — R63.5 ABNORMAL WEIGHT GAIN: Primary | ICD-10-CM

## 2024-04-17 PROCEDURE — RECHECK

## 2024-04-17 PROCEDURE — BODSTAT PR BODY STAT

## 2024-04-22 ENCOUNTER — TELEPHONE (OUTPATIENT)
Dept: BARIATRICS | Facility: CLINIC | Age: 69
End: 2024-04-22

## 2024-05-30 ENCOUNTER — TELEPHONE (OUTPATIENT)
Dept: BARIATRICS | Facility: CLINIC | Age: 69
End: 2024-05-30

## 2024-06-03 ENCOUNTER — TELEPHONE (OUTPATIENT)
Dept: GASTROENTEROLOGY | Facility: CLINIC | Age: 69
End: 2024-06-03

## 2024-06-03 NOTE — TELEPHONE ENCOUNTER
Our mutual patient, Ramona Cabral, is scheduled for the following   procedure: Colonoscopy   On: July 1st   With: Dr. Chawla    Ramona Cabral is taking the following blood thinner: Eliquis       Can this be stopped 2 days prior to the procedure?         Thank you,  Kaur Moulton [facility]

## 2024-06-06 RX ORDER — CLOTRIMAZOLE AND BETAMETHASONE DIPROPIONATE 10; .64 MG/G; MG/G
CREAM TOPICAL
COMMUNITY
Start: 2024-04-25

## 2024-06-06 RX ORDER — CLINDAMYCIN PHOSPHATE 20 MG/G
CREAM VAGINAL
COMMUNITY
Start: 2024-04-25

## 2024-06-06 RX ORDER — COLCHICINE 0.6 MG/1
TABLET ORAL
COMMUNITY
Start: 2024-04-25 | End: 2024-06-10 | Stop reason: ALTCHOICE

## 2024-06-07 ENCOUNTER — APPOINTMENT (EMERGENCY)
Dept: RADIOLOGY | Facility: HOSPITAL | Age: 69
End: 2024-06-07
Payer: MEDICARE

## 2024-06-07 ENCOUNTER — HOSPITAL ENCOUNTER (EMERGENCY)
Facility: HOSPITAL | Age: 69
Discharge: HOME | End: 2024-06-07
Attending: EMERGENCY MEDICINE
Payer: MEDICARE

## 2024-06-07 VITALS
SYSTOLIC BLOOD PRESSURE: 175 MMHG | HEIGHT: 67 IN | RESPIRATION RATE: 20 BRPM | TEMPERATURE: 98.2 F | WEIGHT: 285 LBS | DIASTOLIC BLOOD PRESSURE: 72 MMHG | BODY MASS INDEX: 44.73 KG/M2 | HEART RATE: 55 BPM | OXYGEN SATURATION: 94 %

## 2024-06-07 DIAGNOSIS — M79.605 LEFT LEG PAIN: Primary | ICD-10-CM

## 2024-06-07 PROCEDURE — 73502 X-RAY EXAM HIP UNI 2-3 VIEWS: CPT

## 2024-06-07 PROCEDURE — 73564 X-RAY EXAM KNEE 4 OR MORE: CPT

## 2024-06-07 PROCEDURE — 99285 EMERGENCY DEPT VISIT HI MDM: CPT | Performed by: PHYSICIAN ASSISTANT

## 2024-06-07 PROCEDURE — 99284 EMERGENCY DEPT VISIT MOD MDM: CPT

## 2024-06-07 RX ORDER — LIDOCAINE 50 MG/G
1 PATCH TOPICAL DAILY
Qty: 6 PATCH | Refills: 0 | Status: SHIPPED | OUTPATIENT
Start: 2024-06-07

## 2024-06-07 RX ORDER — LIDOCAINE 50 MG/G
2 PATCH TOPICAL ONCE
Status: DISCONTINUED | OUTPATIENT
Start: 2024-06-07 | End: 2024-06-07 | Stop reason: HOSPADM

## 2024-06-07 RX ORDER — ACETAMINOPHEN 325 MG/1
650 TABLET ORAL ONCE
Status: COMPLETED | OUTPATIENT
Start: 2024-06-07 | End: 2024-06-07

## 2024-06-07 RX ADMIN — ACETAMINOPHEN 650 MG: 325 TABLET, FILM COATED ORAL at 18:06

## 2024-06-07 RX ADMIN — LIDOCAINE 2 PATCH: 50 PATCH TOPICAL at 18:07

## 2024-06-07 NOTE — DISCHARGE INSTRUCTIONS
I recommend resting the leg.  Use Tylenol as needed.  Please return tomorrow for vascular duplex to ensure there is no blood clot in the leg.  Additionally will have you follow-up with our sports medicine physician.  Please return with any new or worsening symptoms  Your x-ray was reviewed today in the emergency department and there was no obvious abnormalities found, however, the imaging will be reviewed by the radiologist later this evening or the following day and if there is any abnormalities found you will get a phone call with those results.

## 2024-06-07 NOTE — ED PROVIDER NOTES
History  Chief Complaint   Patient presents with    Leg Pain     Pt arrives with c/o left thigh pain radiating up from knee since yesterday. Pt reports driving home from [place] today when pain intensified. Denies redness/swelling.      69-year-old female presents the emergency department for evaluation of left upper leg pain.  Patient states she has chronic knee pain, bilaterally.  States she has had injections in the knees last week.  Reports there is no redness or swelling.  Notes the last several days she has had pain in the left thigh.  Reports this is mainly lateral pain.  Radiates from the knee to the hip.  This significantly worsened today.  States she was traveling from [place] home when she had stopped at a store to use the bathroom.  States when she was attempting to sit down on the toilet she had significant pain.  She denies any pain in the calf.  Denies any redness or swelling.  Denies any bruising.  Denies any trauma or injury.  No history of DVT or PE.  Is on Eliquis.  States she was mainly concerned about a blood clot.        Prior to Admission Medications   Prescriptions Last Dose Informant Patient Reported? Taking?   ALPRAZolam (XANAX) 0.5 mg tablet  Self Yes No   Sig: Take by mouth daily at bedtime as needed for anxiety   Fluticasone-Salmeterol (Advair Diskus) 100-50 mcg/dose inhaler   No No   Sig: Inhale 1 puff 2 (two) times a day Rinse mouth after use.   albuterol (PROVENTIL HFA,VENTOLIN HFA) 90 mcg/act inhaler  Self Yes No   Sig: Inhale 2 puffs every 6 (six) hours as needed for wheezing   apixaban (Eliquis) 5 mg  Self No No   Sig: Take 1 tablet (5 mg total) by mouth 2 (two) times a day   atorvastatin (LIPITOR) 10 mg tablet  Self Yes No   Sig: Take 10 mg by mouth daily   citalopram (CeleXA) 40 mg tablet  Self Yes No   Sig: Take 40 mg by mouth daily    clindamycin (CLEOCIN) 2 % vaginal cream   Yes No   Sig: insert 1 applicatorful vaginally for 7 days   clobetasol (TEMOVATE) 0.05 %  ointment  Self Yes No   clotrimazole-betamethasone (LOTRISONE) 1-0.05 % cream   Yes No   Sig: apply topically to affected area OF GROIN twice a day   colchicine (COLCRYS) 0.6 mg tablet   Yes No   desonide (DESOWEN) 0.05 % cream  Self Yes No   dicyclomine (BENTYL) 10 mg capsule   No No   Sig: Take 1 capsule (10 mg total) by mouth 2 (two) times a day as needed (abdominal pain, diarrhea)   ketoconazole (NIZORAL) 2 % cream  Self Yes No   Sig: Apply 1 Application topically 2 (two) times a day as needed   levothyroxine 75 mcg tablet  Self Yes No   Sig: Take 75 mcg by mouth daily    magnesium oxide (MAG-OX) 400 mg tablet  Self Yes No   Sig: Take by mouth   metoprolol succinate (TOPROL-XL) 25 mg 24 hr tablet  Self No No   Sig: Take 1 tablet (25 mg total) by mouth daily   metroNIDAZOLE (METROGEL) 1 % gel  Self Yes No   nystatin powder  Self Yes No   Sig: if needed   olmesartan-hydrochlorothiazide (BENICAR HCT) 40-25 MG per tablet  Self Yes No      Facility-Administered Medications: None       Past Medical History:   Diagnosis Date    Anxiety     Atrial fibrillation (HCC)     Disease of thyroid gland     Elevated d-dimer     2 seperate episodes of unclear etiology.    Hyperlipidemia     Hypertension     Hypothyroid     Obesity     Paroxysmal atrial fibrillation (HCC)     New onset 2020.       Past Surgical History:   Procedure Laterality Date    CATARACT EXTRACTION, BILATERAL      HYSTERECTOMY      KNEE ARTHROPLASTY      SHOULDER OPEN ROTATOR CUFF REPAIR      x 2    TONSILLECTOMY         Family History   Problem Relation Age of Onset    Hypertension Sister     Heart disease Paternal Aunt     Other Mother         Encephalitis    Other Father          in his 90s without significant problems    Lung cancer Brother     No Known Problems Brother      I have reviewed and agree with the history as documented.    E-Cigarette/Vaping    E-Cigarette Use Never User      E-Cigarette/Vaping Substances    Nicotine No     THC No      CBD No     Flavoring No     Other No     Unknown No      Social History     Tobacco Use    Smoking status: Never    Smokeless tobacco: Never   Vaping Use    Vaping status: Never Used   Substance Use Topics    Alcohol use: Not Currently    Drug use: Never       Review of Systems   Constitutional: Negative.    Respiratory: Negative.     Cardiovascular: Negative.    Musculoskeletal:         Left leg pain   Skin: Negative.    Neurological: Negative.    All other systems reviewed and are negative.      Physical Exam  Physical Exam  Vitals and nursing note reviewed.   Constitutional:       General: She is not in acute distress.     Appearance: Normal appearance. She is not ill-appearing, toxic-appearing or diaphoretic.      Comments: Elevated BMI   HENT:      Head: Normocephalic.   Eyes:      Conjunctiva/sclera: Conjunctivae normal.   Cardiovascular:      Rate and Rhythm: Normal rate and regular rhythm.   Pulmonary:      Effort: Pulmonary effort is normal.      Breath sounds: Normal breath sounds. No stridor. No wheezing, rhonchi or rales.   Chest:      Chest wall: No tenderness.   Musculoskeletal:         General: Normal range of motion.        Legs:       Comments: Tenderness to the lateral aspect of the left upper leg.  No rashes, redness, swelling.  No open wounds.   Skin:     General: Skin is warm and dry.      Findings: No bruising or erythema.   Neurological:      General: No focal deficit present.      Mental Status: She is alert and oriented to person, place, and time.   Psychiatric:         Mood and Affect: Mood normal.         Vital Signs  ED Triage Vitals   Temperature Pulse Respirations Blood Pressure SpO2   06/07/24 1714 06/07/24 1714 06/07/24 1714 06/07/24 1715 06/07/24 1715   98.2 °F (36.8 °C) 76 20 162/71 91 %      Temp Source Heart Rate Source Patient Position - Orthostatic VS BP Location FiO2 (%)   06/07/24 1714 06/07/24 1714 06/07/24 1714 06/07/24 1714 --   Temporal Monitor Sitting Right arm        Pain Score       06/07/24 1714       8           Vitals:    06/07/24 1745 06/07/24 1800 06/07/24 1815 06/07/24 1830   BP:  128/59  (!) 175/72   Pulse: 60 56 57 55   Patient Position - Orthostatic VS:             Visual Acuity      ED Medications  Medications   lidocaine (LIDODERM) 5 % patch 2 patch (2 patches Topical Medication Applied 6/7/24 1807)   acetaminophen (TYLENOL) tablet 650 mg (650 mg Oral Given 6/7/24 1806)       Diagnostic Studies  Results Reviewed       None                   XR knee 4+ vw left injury   ED Interpretation by Edda Fitzpatrick PA-C (06/07 1933)   No acute osseous injury      XR hip/pelv 2-3 vws left if performed   ED Interpretation by Edda Fitzpatrick PA-C (06/07 1933)   No acute osseous injury      VAS VENOUS DUPLEX -LOWER LIMB UNILATERAL    (Results Pending)              Procedures  Procedures         ED Course  ED Course as of 06/07/24 2109 Fri Jun 07, 2024 1933 This is a late entry.  ED interpretation of x-rays were negative for acute osseous injury.  I discussed this with the patient.  We discussed symptomatic treatment at home.  We discussed possible IT band syndrome.  Also discussed return tomorrow for outpatient vascular duplex.  We discussed appropriate follow-up and strict return precautions and she verbalized understanding.  Reports she was having improvement of symptoms with lidocaine patches.  Additional sent lido patches to the pharmacy.  Patient was clinically and hemodynamically stable for discharge                                             Medical Decision Making  69-year-old female presented to the emergency department for evaluation of left lateral leg pain.  Vitals and medical record reviewed.  Patient at risk for the following but not limited to fracture, strain, IT band syndrome, cellulitis, DVT.  There is no redness or warmth, no open wounds, lower concern for infection.  ED interpretation of knee and hip x-ray negative for acute traumatic injury noted for  chronic changes.  Vascular duplex will be performed tomorrow to rule out DVT.  She is on Eliquis.  She will also follow-up with her PCP and sports medicine.  We discussed symptomatic treatment in the meantime, strict return precautions and appropriate follow-up and she verbalized understanding.  She is clinically and hemodynamically stable for discharge    Problems Addressed:  Left leg pain: acute illness or injury    Amount and/or Complexity of Data Reviewed  Radiology: ordered and independent interpretation performed.    Risk  OTC drugs.  Prescription drug management.             Disposition  Final diagnoses:   Left leg pain     Time reflects when diagnosis was documented in both MDM as applicable and the Disposition within this note       Time User Action Codes Description Comment    6/7/2024  6:51 PM Edda Fitzpatrick Add [M79.605] Left leg pain           ED Disposition       ED Disposition   Discharge    Condition   Stable    Date/Time   Fri Jun 7, 2024  6:51 PM    Comment   Ramona [facility] [place] discharge to home/self care.                   Follow-up Information       Follow up With Specialties Details Why Contact Info    Evelia Gillespie, DO    [address] [place] [facility] [address]  [phone]      Jordi Becker MD Orthopedic Surgery, Sports Medicine   [address]  [address]  [facility] [address]  [phone]              Discharge Medication List as of 6/7/2024  6:52 PM        START taking these medications    Details   lidocaine (Lidoderm) 5 % Apply 1 patch topically over 12 hours daily Remove & Discard patch within 12 hours or as directed by MD, Starting Fri 6/7/2024, Normal           CONTINUE these medications which have NOT CHANGED    Details   albuterol (PROVENTIL HFA,VENTOLIN HFA) 90 mcg/act inhaler Inhale 2 puffs every 6 (six) hours as needed for wheezing, Historical Med      ALPRAZolam (XANAX) 0.5 mg tablet Take by mouth daily at bedtime as needed for anxiety, Historical Med       apixaban (Eliquis) 5 mg Take 1 tablet (5 mg total) by mouth 2 (two) times a day, Starting Thu 7/27/2023, Normal      atorvastatin (LIPITOR) 10 mg tablet Take 10 mg by mouth daily, Historical Med      citalopram (CeleXA) 40 mg tablet Take 40 mg by mouth daily , Historical Med      clindamycin (CLEOCIN) 2 % vaginal cream insert 1 applicatorful vaginally for 7 days, Historical Med      clobetasol (TEMOVATE) 0.05 % ointment Historical Med      clotrimazole-betamethasone (LOTRISONE) 1-0.05 % cream apply topically to affected area OF GROIN twice a day, Historical Med      colchicine (COLCRYS) 0.6 mg tablet Historical Med      desonide (DESOWEN) 0.05 % cream Starting Fri 3/10/2023, Historical Med      dicyclomine (BENTYL) 10 mg capsule Take 1 capsule (10 mg total) by mouth 2 (two) times a day as needed (abdominal pain, diarrhea), Starting Wed 4/10/2024, Normal      Fluticasone-Salmeterol (Advair Diskus) 100-50 mcg/dose inhaler Inhale 1 puff 2 (two) times a day Rinse mouth after use., Starting Mon 8/7/2023, Until Thu 10/19/2023, Normal      ketoconazole (NIZORAL) 2 % cream Apply 1 Application topically 2 (two) times a day as needed, Starting Thu 2/22/2024, Historical Med      levothyroxine 75 mcg tablet Take 75 mcg by mouth daily , Historical Med      magnesium oxide (MAG-OX) 400 mg tablet Take by mouth, Historical Med      metoprolol succinate (TOPROL-XL) 25 mg 24 hr tablet Take 1 tablet (25 mg total) by mouth daily, Starting Tue 7/11/2023, Until Fri 7/5/2024, Normal      metroNIDAZOLE (METROGEL) 1 % gel Historical Med      nystatin powder if needed, Starting Tue 10/11/2022, Historical Med      olmesartan-hydrochlorothiazide (BENICAR HCT) 40-25 MG per tablet Starting Tue 1/3/2023, Historical Med             Outpatient Discharge Orders   Ambulatory Referral to Orthopedic Surgery   Standing Status: Future Standing Exp. Date: 06/07/25      VAS VENOUS DUPLEX -LOWER LIMB UNILATERAL   Standing Status: Future Standing Exp.  Date: 06/07/28       PDMP Review         Value Time User    PDMP Reviewed  Yes 11/25/2020  2:06 PM Marcus Cameron MD            ED Provider  Electronically Signed by             Edda Fitzpatrick PA-C  06/07/24 0811

## 2024-06-08 ENCOUNTER — HOSPITAL ENCOUNTER (OUTPATIENT)
Dept: NON INVASIVE DIAGNOSTICS | Facility: HOSPITAL | Age: 69
Discharge: HOME | End: 2024-06-08
Payer: MEDICARE

## 2024-06-08 DIAGNOSIS — M79.605 LEFT LEG PAIN: ICD-10-CM

## 2024-06-08 PROCEDURE — 93971 EXTREMITY STUDY: CPT | Performed by: SURGERY

## 2024-06-08 PROCEDURE — 93971 EXTREMITY STUDY: CPT

## 2024-06-10 ENCOUNTER — OFFICE VISIT (OUTPATIENT)
Dept: CARDIOLOGY CLINIC | Facility: CLINIC | Age: 69
End: 2024-06-10
Payer: MEDICARE

## 2024-06-10 VITALS
HEIGHT: 67 IN | OXYGEN SATURATION: 96 % | HEART RATE: 52 BPM | SYSTOLIC BLOOD PRESSURE: 118 MMHG | WEIGHT: 288.2 LBS | BODY MASS INDEX: 45.24 KG/M2 | DIASTOLIC BLOOD PRESSURE: 76 MMHG | RESPIRATION RATE: 20 BRPM

## 2024-06-10 DIAGNOSIS — I10 ESSENTIAL HYPERTENSION: ICD-10-CM

## 2024-06-10 DIAGNOSIS — E03.8 HYPOTHYROIDISM DUE TO HASHIMOTO'S THYROIDITIS: ICD-10-CM

## 2024-06-10 DIAGNOSIS — E66.01 CLASS 3 OBESITY (HCC): ICD-10-CM

## 2024-06-10 DIAGNOSIS — I48.0 PAROXYSMAL ATRIAL FIBRILLATION (HCC): Primary | ICD-10-CM

## 2024-06-10 DIAGNOSIS — N18.31 STAGE 3A CHRONIC KIDNEY DISEASE (HCC): ICD-10-CM

## 2024-06-10 DIAGNOSIS — E06.3 HYPOTHYROIDISM DUE TO HASHIMOTO'S THYROIDITIS: ICD-10-CM

## 2024-06-10 DIAGNOSIS — E78.2 MIXED HYPERLIPIDEMIA: ICD-10-CM

## 2024-06-10 DIAGNOSIS — G47.33 OBSTRUCTIVE SLEEP APNEA SYNDROME: ICD-10-CM

## 2024-06-10 PROCEDURE — 99214 OFFICE O/P EST MOD 30 MIN: CPT | Performed by: NURSE PRACTITIONER

## 2024-06-10 RX ORDER — METOPROLOL SUCCINATE 25 MG/1
25 TABLET, EXTENDED RELEASE ORAL DAILY
Qty: 90 TABLET | Refills: 3 | Status: SHIPPED | OUTPATIENT
Start: 2024-06-10 | End: 2025-06-05

## 2024-06-10 NOTE — PATIENT INSTRUCTIONS
Zio monitor showed no afib. Continue on metoprolol 1/2 tab twice daily and magnesium supplement. Continue CPAP use.  Contact me with any concerning symptoms.  Labs are stable.

## 2024-06-10 NOTE — ASSESSMENT & PLAN NOTE
Lab Results   Component Value Date    EGFR 51 02/06/2024    EGFR 53 02/01/2024    EGFR 53 09/01/2023    CREATININE 1.09 02/06/2024    CREATININE 1.06 02/01/2024    CREATININE 1.06 09/01/2023     Stable. Avoid nephrotoxic agents.

## 2024-06-10 NOTE — ASSESSMENT & PLAN NOTE
Body mass index is 45.14 kg/m².  Reviewed dietary and exercise modifications.  Reviewed importance of weight loss for symptom control.

## 2024-06-10 NOTE — ASSESSMENT & PLAN NOTE
Home sleep study 12/2020 revealed severe sleep apnea  Compliant with CPAP therapy.  Following with [facility] sleep medicine.

## 2024-06-10 NOTE — PROGRESS NOTES
Cardiology Follow Up    Ramona Cabral  1955  [id number]  [facility] [place] [address]  [address]  [facility] [address] [phone]    Ena presents for routine follow up of paroxysmal a fib, HTN, HLD.       1. Paroxysmal atrial fibrillation (HCC)  Assessment & Plan:  Patient with new onset atrial fibrillation 11/25/2020.  Patient presented to the emergency room after onset of “fluttering” and rapid heart rate.  She converted spontaneously to  normal sinus rhythm.  She has remained in normal sinus rhythm since and feels well.  [id number] Score 3   Eliquis 5 mg twice daily for stroke prophylaxis.   Home sleep study notable for severe sleep apnea being treated with CPAP.   48 hour Holter monitor 6/8/2022 showed NSR with 5 short AT runs.   Echocardiogram 4/3/2023 with preserved LVEF and no significant valvular abnormality.  Breakthrough a fib episode on 2/1/24.  ZIO 2/2024 shows SR with avg HR 55 bpm and no recurrent a fib.  Changed metoprolol succinate to 12.5 mg BID. Reviewed importance of mag/potassium replacement. Symptoms well controlled with this change.  Orders:  -     metoprolol succinate (TOPROL-XL) 25 mg 24 hr tablet; Take 1 tablet (25 mg total) by mouth daily  -     apixaban (Eliquis) 5 mg; Take 1 tablet (5 mg total) by mouth 2 (two) times a day  2. Essential hypertension  Assessment & Plan:  Blood pressure is acceptable today.  Home BP cuff accuracy has been verified with excellent home readings.  Continue Benicar 40/25 mg daily.  BMP today, consider potassium supplement if K+ < 4.  Encouraged 2 g sodium diet and weight control.  3. Mixed hyperlipidemia  Assessment & Plan:  Lipid panel 2/6/2024: C 160. T 156. H 67. L 62.  Continue atorvastatin 10 mg daily.  4. Obstructive sleep apnea syndrome  Assessment & Plan:  Home sleep study 12/2020 revealed severe sleep apnea  Compliant with CPAP therapy.  Following with [facility] sleep  medicine.  5. Class 3 obesity (HCC)  Assessment & Plan:  Body mass index is 45.14 kg/m².  Reviewed dietary and exercise modifications.  Reviewed importance of weight loss for symptom control.  6. Stage 3a chronic kidney disease (HCC)  Assessment & Plan:  Lab Results   Component Value Date    EGFR 51 02/06/2024    EGFR 53 02/01/2024    EGFR 53 09/01/2023    CREATININE 1.09 02/06/2024    CREATININE 1.06 02/01/2024    CREATININE 1.06 09/01/2023     Stable. Avoid nephrotoxic agents.  7. Hypothyroidism due to Hashimoto's thyroiditis  Assessment & Plan:  Managed by PCP.   Goal TSH 1-2 if possible given PAF.     PAM Sutherland has a past medical history of paroxysmal atrial fibrillation, HTN, HLD, DEANNA on CPAP, hypothyroidism, anxiety/depression, and obesity.     She initially presented to [facility] 11/25/2020 with chest pain, palpitations, and shortness of breath. Found to be in atrial fibrillation with RVR, rate 118 bpm. CTA ruled out PE. She was treated with IV Lopressor and Cardiazem and spontaneously converted to NSR. She was evaluated by Dr. Rojo during admission and ultimately discharged home on metoprolol succinate 25 mg daily and Eliquis 5 mg BID. Her TSH was mildly elevated and levothyroxine increased to 100 mch from 88 mcg during admission. Magnesium supplementation was initiated. She was under significant stress at the time with a recent cancer diagnosis for her spouse. He had just returned home from a complicated bowel surgery.     Echo 12/2/2020 was unremarkable.   Nuclear stress test 12/2/20 showed no scar or ischemia with EF 73%.     She completed a sleep study 3/4/2021 which was positive for DEANNA. She was referred to sleep medicine and underwent in-lab study 4/8/2021 and CPAP therapy was initiated.      Echocardiogram 4/3/2023 showed preserved LVEF with no concerning findings.      She followed up most recently on 2/6/2024. She had been evaluated at [facility] ER 2/1/2024 when she presented via EMS due to chest pain and  "heart racing which started overnight. She took her metoprolol at 6 am. Upon ER arrival her ECG showed SR at 71 bpm. Lab work was unrevealing other than magnesium of 1.8. Chest xray WNL. She was given IV mag 2 g and ultimately discharged home. She reports no recurrent symptoms today. She states she did not feel palpitations but did feel \"unsettled\" while her HR was irregular and rapid. She denies any missed doses of metoprolol. She states she had been out of her magnesium supplement for about 1 week and questions if this caused breakthrough A fib. She has been wearing her CPAP faithfully. Her metoprolol was changed to 12.5 mg BID and an updated ZIO was ordered.    ZIO showed sinus rhythm, HR , avg 55 bpm. 12 runs of SVT, longest 11.6 seconds with avg  bpm. Rare PAC's and rare PVC's. No atrial fibrillation detected.    6/10/2024: Ena presents for routine follow up. She is doing very well from a cardiac standpoint. She was seen at [facility] ER 6/7/24 for left leg pain. Venous duplex was negative for DVT. She was diagnosed with an IT band strain. She denies any recurrent palpitations since her last visit. We reviewed her ZIO results. She states her stress levels are reduced. She is using her CPAP faithfully. She completed labs 2/6/24. No chest pain, shortness of breath. No bleeding issues. She had an EMG which confirmed mild-mod carpal tunnel syndrome.     Medical Problems       Problem List       Anxiety and depression    Obesity hypoventilation syndrome (HCC)    Paroxysmal atrial fibrillation (HCC)    Essential hypertension    Hyperlipidemia    Hypothyroidism due to Hashimoto's thyroiditis    Obstructive sleep apnea syndrome    Class 3 obesity (HCC)    Post-COVID chronic dyspnea    History of COVID-19    Reactive airway disease    Shortness of breath        Past Medical History:   Diagnosis Date    Anxiety     Atrial fibrillation (HCC)     Disease of thyroid gland     Elevated d-dimer     2 seperate episodes " of unclear etiology.    Hyperlipidemia     Hypertension     Hypothyroid     Obesity     Paroxysmal atrial fibrillation (HCC)     New onset 11/25/2020.     Social History     Socioeconomic History    Marital status: /Civil Union     Spouse name: Not on file    Number of children: Not on file    Years of education: Not on file    Highest education level: Not on file   Occupational History    Occupation: Retired teacher   Tobacco Use    Smoking status: Never    Smokeless tobacco: Never   Vaping Use    Vaping status: Never Used   Substance and Sexual Activity    Alcohol use: Not Currently    Drug use: Never    Sexual activity: Yes     Partners: Male   Other Topics Concern    Not on file   Social History Narrative    Not on file     Social Determinants of Health     Financial Resource Strain: Low Risk  (3/10/2024)    [facility], [facility]    Overall Financial Resource Strain (CARDIA)     Difficulty of Paying Living Expenses: Not hard at all   Food Insecurity: No Food Insecurity (3/10/2024)    Received from [facility], [facility]    Hunger Vital Sign     Worried About Running Out of Food in the Last Year: Never true     Ran Out of Food in the Last Year: Never true   Transportation Needs: No Transportation Needs (3/10/2024)    Received from [facility], [facility]    PRAPARE - Transportation     Lack of Transportation (Medical): No     Lack of Transportation (Non-Medical): No   Physical Activity: Not on file   Stress: Stress Concern Present (3/10/2024)    Received from [facility], [facility]    [employer] [place] [employer] - Occupational Stress Questionnaire     Feeling of Stress : Very much   Social Connections: Socially Integrated (3/10/2024)    Received from [facility], [facility]    Social Connection and  Isolation Panel [NHANES]     Frequency of Communication with Friends and Family: More than three times a week     Frequency of Social Gatherings with Friends and Family: Once a week     Attends Muslim Services: More than 4 times per year     Active Member of Clubs or Organizations: Yes     Attends Club or Organization Meetings: 1 to 4 times per year     Marital Status:    Intimate Partner Violence: Not At Risk (3/10/2024)    [facility]    Humiliation, Afraid, Rape, and Kick questionnaire     Fear of Current or Ex-Partner: No     Emotionally Abused: No     Physically Abused: No     Sexually Abused: No   Housing Stability: Low Risk  (3/10/2024)    [facility]    Housing Stability Vital Sign     Unable to Pay for Housing in the Last Year: No     Number of Places Lived in the Last Year: 1     Unstable Housing in the Last Year: No      Family History   Problem Relation Age of Onset    Hypertension Sister     Heart disease Paternal Aunt     Other Mother         Encephalitis    Other Father          in his 90s without significant problems    Lung cancer Brother     No Known Problems Brother      Past Surgical History:   Procedure Laterality Date    CATARACT EXTRACTION, BILATERAL      HYSTERECTOMY      KNEE ARTHROPLASTY      SHOULDER OPEN ROTATOR CUFF REPAIR      x 2    TONSILLECTOMY         Current Outpatient Medications:     albuterol (PROVENTIL HFA,VENTOLIN HFA) 90 mcg/act inhaler, Inhale 2 puffs every 6 (six) hours as needed for wheezing, Disp: , Rfl:     ALPRAZolam (XANAX) 0.5 mg tablet, Take by mouth daily at bedtime as needed for anxiety, Disp: , Rfl:     apixaban (Eliquis) 5 mg, Take 1 tablet (5 mg total) by mouth 2 (two) times a day, Disp: 180 tablet, Rfl: 3    atorvastatin (LIPITOR) 10 mg tablet, Take 10 mg by mouth daily, Disp: , Rfl:     citalopram (CeleXA) 40 mg tablet, Take  40 mg by mouth daily , Disp: , Rfl:     clindamycin (CLEOCIN) 2 % vaginal cream, insert 1 applicatorful vaginally for 7 days, Disp: , Rfl:     clobetasol (TEMOVATE) 0.05 % ointment, , Disp: , Rfl:     clotrimazole-betamethasone (LOTRISONE) 1-0.05 % cream, apply topically to affected area OF GROIN twice a day, Disp: , Rfl:     desonide (DESOWEN) 0.05 % cream, , Disp: , Rfl:     dicyclomine (BENTYL) 10 mg capsule, Take 1 capsule (10 mg total) by mouth 2 (two) times a day as needed (abdominal pain, diarrhea), Disp: 60 capsule, Rfl: 3    ketoconazole (NIZORAL) 2 % cream, Apply 1 Application topically 2 (two) times a day as needed, Disp: , Rfl:     levothyroxine 75 mcg tablet, Take 75 mcg by mouth daily , Disp: , Rfl:     lidocaine (Lidoderm) 5 %, Apply 1 patch topically over 12 hours daily Remove & Discard patch within 12 hours or as directed by MD, Disp: 6 patch, Rfl: 0    magnesium oxide (MAG-OX) 400 mg tablet, Take by mouth, Disp: , Rfl:     metoprolol succinate (TOPROL-XL) 25 mg 24 hr tablet, Take 1 tablet (25 mg total) by mouth daily, Disp: 90 tablet, Rfl: 3    metroNIDAZOLE (METROGEL) 1 % gel, , Disp: , Rfl:     nystatin powder, if needed, Disp: , Rfl:     olmesartan-hydrochlorothiazide (BENICAR HCT) 40-25 MG per tablet, , Disp: , Rfl:   Allergies   Allergen Reactions    Flagyl [Metronidazole] Shortness Of Breath     Pt requested addition to allergies    Contrast [Iodinated Contrast Media] Rash    Iodine - Food Allergy Rash       Labs:     Chemistry        Component Value Date/Time     11/06/2015 0655    K 4.0 02/06/2024 0957    K 4.4 01/03/2023 0940     02/06/2024 0957     01/03/2023 0940    CO2 26 02/06/2024 0957    CO2 24 01/03/2023 0940    BUN 21 02/06/2024 0957    BUN 29 (H) 01/03/2023 0940    CREATININE 1.09 02/06/2024 0957    CREATININE 1.09 01/03/2023 0940        Component Value Date/Time    CALCIUM 9.7 02/06/2024 0957    CALCIUM 9.0 01/03/2023 0940    ALKPHOS 74 02/01/2024 0739     ALKPHOS 105 01/03/2023 0940    AST 28 02/01/2024 0739    AST 36 01/03/2023 0940    ALT 21 02/01/2024 0739    ALT 41 01/03/2023 0940    BILITOT 0.34 11/06/2015 0655        Lipid panel 2/6/2024: C 160. T 156.  H 67. L 62.     Cardiac Test Results:   ZIO 2/13-2/26/2024:  Sinus rhythm, HR , avg 55 bpm.  12 runs of SVT, longest 11.6 seconds with avg  bpm.  Rare PAC's/PVC's.    ECG 2/6/2024: Normal sinus rhythm. Normal ECG. Rate 61 bpm.     Echocardiogram 4/3/2023:  EF 70%. Mild LVH.      ECG 3/24/2023: Sinus bradycardia. Rate 59 bpm with no significant change from prior.     ECG 10/19/2022: Sinus bradycardia. Otherwise normal ECG.      Holter monitor (48 hours) 6/8/2022:   Min HR 47. Max HR 98. Avg HR 60.   Rare PVC's. Rare PAC's.   5 brief atrial runs with the longest lasting 5 beats.   Palpitations correlated with PAC's/PVC's and atrial runs.      ECG 6/7/2022: Sinus bradycardia. 58 bpm.      Lipid panel 12/13/2021: C 176. T 177. H 65. L 76.      ECG 11/25/2020:  Atrial flutter/atrial fibrillation at 118 bpm.     Echocardiogram 12/02/2020:  EF 60%.  Normal diastolic function.  Mild annular calcification of the mitral valve.  Trace tricuspid regurgitation.     Lexiscan nuclear stress test 12/02/2020:  No evidence of myocardial scar.  No evidence of myocardial ischemia.  EF 73%.     Lipid panel 10/13/2020: C 166. T 154. H 64. L 71.      Echocardiogram 11/05/2015:  EF 55-60%.  Mild tricuspid regurgitation.  Estimated PASP 38 mmHg.      Review of Systems   Constitutional: Negative.   HENT: Negative.     Cardiovascular:  Negative for chest pain, dyspnea on exertion, irregular heartbeat, leg swelling, near-syncope, orthopnea and palpitations.   Respiratory:  Negative for cough and snoring.    Endocrine: Negative.    Skin: Negative.    Musculoskeletal: Negative.    Gastrointestinal: Negative.    Genitourinary: Negative.    Neurological: Negative.    Psychiatric/Behavioral: Negative.         Vitals:    06/10/24  "1102   BP: 118/76   Pulse: (!) 52   Resp: 20   SpO2: 96%     Vitals:    06/10/24 1102   Weight: 131 kg (288 lb 3.2 oz)     Height: 5' 7\" (170.2 cm)   Body mass index is 45.14 kg/m².    Physical Exam  Vitals and nursing note reviewed.   Constitutional:       General: She is not in acute distress.     Appearance: She is well-developed. She is obese. She is not diaphoretic.   HENT:      Head: Normocephalic and atraumatic.   Neck:      Thyroid: No thyromegaly.      Vascular: No carotid bruit or JVD.   Cardiovascular:      Rate and Rhythm: Regular rhythm. Bradycardia present.      Pulses: Intact distal pulses.           Radial pulses are 2+ on the right side and 2+ on the left side.      Heart sounds: Normal heart sounds, S1 normal and S2 normal. No murmur heard.     Comments: No edema  Pulmonary:      Effort: Pulmonary effort is normal.      Breath sounds: Normal breath sounds.   Abdominal:      General: There is no distension.      Palpations: Abdomen is soft.      Tenderness: There is no abdominal tenderness.   Musculoskeletal:         General: Normal range of motion.      Cervical back: Normal range of motion and neck supple.   Lymphadenopathy:      Cervical: No cervical adenopathy.   Skin:     General: Skin is warm and dry.   Neurological:      Mental Status: She is alert and oriented to person, place, and time.      Cranial Nerves: No cranial nerve deficit.   Psychiatric:         Mood and Affect: Mood and affect normal.         Behavior: Behavior normal.                "

## 2024-06-10 NOTE — ASSESSMENT & PLAN NOTE
Patient with new onset atrial fibrillation 11/25/2020.  Patient presented to the emergency room after onset of “fluttering” and rapid heart rate.  She converted spontaneously to  normal sinus rhythm.  She has remained in normal sinus rhythm since and feels well.  [id number] Score 3   Eliquis 5 mg twice daily for stroke prophylaxis.   Home sleep study notable for severe sleep apnea being treated with CPAP.   48 hour Holter monitor 6/8/2022 showed NSR with 5 short AT runs.   Echocardiogram 4/3/2023 with preserved LVEF and no significant valvular abnormality.  Breakthrough a fib episode on 2/1/24.  ZIO 2/2024 shows SR with avg HR 55 bpm and no recurrent a fib.  Changed metoprolol succinate to 12.5 mg BID. Reviewed importance of mag/potassium replacement. Symptoms well controlled with this change.

## 2024-06-19 ENCOUNTER — TELEPHONE (OUTPATIENT)
Dept: GASTROENTEROLOGY | Facility: CLINIC | Age: 69
End: 2024-06-19

## 2024-06-19 NOTE — TELEPHONE ENCOUNTER
Spoke to pt - no questions at this time  Confirming Upcoming Procedure: colon on July 1  Physician performing: Dr. Chawla  Location of procedure:    Prep: miralax prep  Elquis hold pt aware

## 2024-06-30 RX ORDER — LIDOCAINE HYDROCHLORIDE 10 MG/ML
0.5 INJECTION, SOLUTION EPIDURAL; INFILTRATION; INTRACAUDAL; PERINEURAL ONCE AS NEEDED
Status: CANCELLED | OUTPATIENT
Start: 2024-06-30

## 2024-06-30 RX ORDER — SODIUM CHLORIDE, SODIUM LACTATE, POTASSIUM CHLORIDE, CALCIUM CHLORIDE 600; 310; 30; 20 MG/100ML; MG/100ML; MG/100ML; MG/100ML
125 INJECTION, SOLUTION INTRAVENOUS CONTINUOUS
Status: CANCELLED | OUTPATIENT
Start: 2024-06-30

## 2024-07-01 ENCOUNTER — ANESTHESIA EVENT (OUTPATIENT)
Dept: GASTROENTEROLOGY | Facility: HOSPITAL | Age: 69
End: 2024-07-01

## 2024-07-01 ENCOUNTER — HOSPITAL ENCOUNTER (OUTPATIENT)
Dept: GASTROENTEROLOGY | Facility: HOSPITAL | Age: 69
Setting detail: OUTPATIENT SURGERY
Discharge: HOME | End: 2024-07-01
Payer: MEDICARE

## 2024-07-01 ENCOUNTER — ANESTHESIA (OUTPATIENT)
Dept: GASTROENTEROLOGY | Facility: HOSPITAL | Age: 69
End: 2024-07-01

## 2024-07-01 VITALS
OXYGEN SATURATION: 98 % | RESPIRATION RATE: 15 BRPM | HEART RATE: 58 BPM | TEMPERATURE: 98.1 F | SYSTOLIC BLOOD PRESSURE: 112 MMHG | DIASTOLIC BLOOD PRESSURE: 66 MMHG

## 2024-07-01 DIAGNOSIS — R19.4 CHANGE IN BOWEL HABITS: ICD-10-CM

## 2024-07-01 DIAGNOSIS — K62.5 RECTAL BLEEDING: ICD-10-CM

## 2024-07-01 DIAGNOSIS — R10.30 LOWER ABDOMINAL PAIN: ICD-10-CM

## 2024-07-01 DIAGNOSIS — R19.7 DIARRHEA, UNSPECIFIED TYPE: ICD-10-CM

## 2024-07-01 PROCEDURE — 88305 TISSUE EXAM BY PATHOLOGIST: CPT | Performed by: PATHOLOGY

## 2024-07-01 PROCEDURE — 45380 COLONOSCOPY AND BIOPSY: CPT | Performed by: INTERNAL MEDICINE

## 2024-07-01 RX ORDER — PROPOFOL 10 MG/ML
INJECTION, EMULSION INTRAVENOUS CONTINUOUS PRN
Status: DISCONTINUED | OUTPATIENT
Start: 2024-07-01 | End: 2024-07-01

## 2024-07-01 RX ORDER — LIDOCAINE HYDROCHLORIDE 10 MG/ML
0.5 INJECTION, SOLUTION EPIDURAL; INFILTRATION; INTRACAUDAL; PERINEURAL ONCE AS NEEDED
Status: COMPLETED | OUTPATIENT
Start: 2024-07-01 | End: 2024-07-01

## 2024-07-01 RX ORDER — PHENYLEPHRINE HCL IN 0.9% NACL 1 MG/10 ML
SYRINGE (ML) INTRAVENOUS AS NEEDED
Status: DISCONTINUED | OUTPATIENT
Start: 2024-07-01 | End: 2024-07-01

## 2024-07-01 RX ORDER — PROPOFOL 10 MG/ML
INJECTION, EMULSION INTRAVENOUS AS NEEDED
Status: DISCONTINUED | OUTPATIENT
Start: 2024-07-01 | End: 2024-07-01

## 2024-07-01 RX ORDER — SODIUM CHLORIDE, SODIUM LACTATE, POTASSIUM CHLORIDE, CALCIUM CHLORIDE 600; 310; 30; 20 MG/100ML; MG/100ML; MG/100ML; MG/100ML
125 INJECTION, SOLUTION INTRAVENOUS CONTINUOUS
Status: DISCONTINUED | OUTPATIENT
Start: 2024-07-01 | End: 2024-07-05 | Stop reason: HOSPADM

## 2024-07-01 RX ORDER — EPHEDRINE SULFATE 50 MG/ML
INJECTION INTRAVENOUS AS NEEDED
Status: DISCONTINUED | OUTPATIENT
Start: 2024-07-01 | End: 2024-07-01

## 2024-07-01 RX ADMIN — Medication 100 MCG: at 08:24

## 2024-07-01 RX ADMIN — PROPOFOL 20 MG: 10 INJECTION, EMULSION INTRAVENOUS at 08:08

## 2024-07-01 RX ADMIN — PROPOFOL 80 MG: 10 INJECTION, EMULSION INTRAVENOUS at 08:07

## 2024-07-01 RX ADMIN — EPHEDRINE SULFATE 5 MG: 50 INJECTION INTRAVENOUS at 08:24

## 2024-07-01 RX ADMIN — SODIUM CHLORIDE, SODIUM LACTATE, POTASSIUM CHLORIDE, AND CALCIUM CHLORIDE: .6; .31; .03; .02 INJECTION, SOLUTION INTRAVENOUS at 08:02

## 2024-07-01 RX ADMIN — LIDOCAINE HYDROCHLORIDE 50 MG: 10 INJECTION, SOLUTION EPIDURAL; INFILTRATION; INTRACAUDAL; PERINEURAL at 08:06

## 2024-07-01 RX ADMIN — PROPOFOL 100 MCG/KG/MIN: 10 INJECTION, EMULSION INTRAVENOUS at 08:08

## 2024-07-01 NOTE — H&P
History and Physical -  [facility]  Ramona Cabral 69 y.o. female MRN: [id number]                  HPI: Ramona Cabral is a 69 y.o. year old female who presents for diarrhea, change in bowel habits, rectal bleeding      REVIEW OF SYSTEMS: Per the HPI, and otherwise unremarkable.    Historical Information   Past Medical History:   Diagnosis Date    Anxiety     Atrial fibrillation (HCC)     Disease of thyroid gland     Elevated d-dimer     2 seperate episodes of unclear etiology.    Hyperlipidemia     Hypertension     Hypothyroid     Obesity     Paroxysmal atrial fibrillation (HCC)     New onset 2020.     Past Surgical History:   Procedure Laterality Date    CATARACT EXTRACTION, BILATERAL      HYSTERECTOMY      KNEE ARTHROPLASTY      SHOULDER OPEN ROTATOR CUFF REPAIR      x 2    TONSILLECTOMY       Social History   Social History     Substance and Sexual Activity   Alcohol Use Not Currently     Social History     Substance and Sexual Activity   Drug Use Never     Social History     Tobacco Use   Smoking Status Never   Smokeless Tobacco Never     Family History   Problem Relation Age of Onset    Hypertension Sister     Heart disease Paternal Aunt     Other Mother         Encephalitis    Other Father          in his 90s without significant problems    Lung cancer Brother     No Known Problems Brother        Meds/Allergies     Not in a hospital admission.    Allergies   Allergen Reactions    Flagyl [Metronidazole] Shortness Of Breath     Pt requested addition to allergies    Contrast [Iodinated Contrast Media] Rash    Iodine - Food Allergy Rash       Objective     There were no vitals taken for this visit.      PHYSICAL EXAMINATION:    General Appearance:   Alert, cooperative, no distress   HEENT:  Normocephalic, atraumatic, anicteric. Neck supple, symmetrical, trachea midline.   Lungs:   Equal chest rise and unlabored breathing, normal effort, no coughing.   Cardiovascular:   No  visualized JVD.   Abdomen:   No abdominal distension.   Skin:   No jaundice, rashes, or lesions.    Musculoskeletal:   Normal range of motion visualized.   Psych:  Normal affect and normal insight.   Neuro:  Alert and appropriate.           ASSESSMENT/PLAN:  This is a 69 y.o. year old female here for colonoscopy, and she is stable and optimized for her procedure.

## 2024-07-01 NOTE — ANESTHESIA PREPROCEDURE EVALUATION
Procedure:  COLONOSCOPY    BMI 45    Afib on metoprolol    Relevant Problems   CARDIO   (+) Essential hypertension   (+) Hyperlipidemia   (+) Paroxysmal atrial fibrillation (HCC)      ENDO   (+) Hypothyroidism due to Hashimoto's thyroiditis      /RENAL   (+) Stage 3a chronic kidney disease (HCC)      NEURO/PSYCH   (+) Anxiety and depression      PULMONARY   (+) Obstructive sleep apnea syndrome   (+) Shortness of breath        Physical Exam    Airway    Mallampati score: III  TM Distance: >3 FB  Neck ROM: full     Dental   No notable dental hx     Cardiovascular  Rhythm: regular, Rate: normal, Cardiovascular exam normal    Pulmonary  Pulmonary exam normal Breath sounds clear to auscultation    Other Findings  post-pubertal.      Anesthesia Plan  ASA Score- 3     Anesthesia Type- IV sedation with anesthesia with ASA Monitors.         Additional Monitors:     Airway Plan:     Comment: Discussed risks/benefits, including medication reactions, awareness, aspiration, and serious/life threatening complications.    Plan to maintain native airway with IVGA, monitored with EtCO2.       Plan Factors-Exercise tolerance (METS): >4 METS.    Chart reviewed.    Patient summary reviewed.      Patient instructed to abstain from smoking on day of procedure. Patient did not smoke on day of surgery.            Induction- intravenous.    Postoperative Plan-         Informed Consent- Anesthetic plan and risks discussed with patient.  I personally reviewed this patient with the CRNA. Discussed and agreed on the Anesthesia Plan with the CRNA..

## 2024-07-01 NOTE — ANESTHESIA POSTPROCEDURE EVALUATION
Post-Op Assessment Note    CV Status:  Stable  Pain Score: 0    Pain management: adequate       Mental Status:  Alert and awake   Hydration Status:  Euvolemic   PONV Controlled:  Controlled   Airway Patency:  Patent     Post Op Vitals Reviewed: Yes    No anethesia notable event occurred.    Staff: CRNA               BP 92/52 (07/01/24 0831)    Temp      Pulse (!) 54 (07/01/24 0831)   Resp 16 (07/01/24 0831)    SpO2 98 % (07/01/24 0831)

## 2024-07-03 PROCEDURE — 88305 TISSUE EXAM BY PATHOLOGIST: CPT | Performed by: PATHOLOGY

## 2024-07-04 ENCOUNTER — OFFICE VISIT (OUTPATIENT)
Dept: URGENT CARE | Facility: CLINIC | Age: 69
End: 2024-07-04
Payer: MEDICARE

## 2024-07-04 VITALS
RESPIRATION RATE: 16 BRPM | TEMPERATURE: 99.2 F | HEART RATE: 60 BPM | HEIGHT: 67 IN | OXYGEN SATURATION: 96 % | BODY MASS INDEX: 45.14 KG/M2 | SYSTOLIC BLOOD PRESSURE: 98 MMHG | DIASTOLIC BLOOD PRESSURE: 60 MMHG

## 2024-07-04 DIAGNOSIS — Z01.30 BLOOD PRESSURE CHECK: Primary | ICD-10-CM

## 2024-07-04 PROCEDURE — G0463 HOSPITAL OUTPT CLINIC VISIT: HCPCS

## 2024-07-04 PROCEDURE — 99213 OFFICE O/P EST LOW 20 MIN: CPT

## 2024-07-04 NOTE — PROGRESS NOTES
[facility]        NAME: Ramona Cabral is a 69 y.o. female  : 1955    MRN: [id number]  DATE: 2024  TIME: 1:41 PM    Assessment and Plan   Blood pressure check [Z01.30]  1. Blood pressure check          Patient denies moderate to severe HA, dizziness, vision disturbances, chest pain, SOB, heart palpitations, decreased urine output, confusion, numbness or weakness. Patient was instructed to make appointment with her cardiologist or go to ED if became symptomatic.     Patient Instructions     Maintain a more accurate blood pressure recordings for next week  Please hold blood pressure medication of systolic BP is below    Follow up with Cardiology  Proceed to ED if your blood pressure is low and you feel symptomatic - ex: dizzy, lightheaded, weak, chest pain or shortness of breath.     Follow up with PCP in 3-5 days.  Proceed to  ER if symptoms worsen.    If tests are performed, our office will contact you with results only if changes need to made to the care plan discussed with you at the visit. You can review your full results on [facility].    Chief Complaint     Chief Complaint   Patient presents with    Hypotension     Stated her blood pressure has been 80/50-90/62 since getting a colonoscopy on . Pt denies any symptoms.         History of Present Illness       69 year old female arrives reporting random low blood pressure readings at home.  Patient reports she recently had a colonoscopy 5 days ago and had a horrible time with the prep.  Patient reports she did not drink a lot of liquids that day and possible may have dehydrated herself.  Patient denies any headache, dizziness, vision disturbances, chest pain, SOB, lightheaded, syncopal events, heart palpitations, decreased urine output, confusion, numbness or weakness. Patient reports she does not routinely check her blood pressure.  Patient reports that since having the colonoscopy she just didn't feel right so she  thought she would check it.         Review of Systems   Review of Systems   Constitutional:  Negative for activity change, chills and fever.   HENT:  Negative for congestion.    Eyes:  Negative for visual disturbance.   Respiratory:  Negative for cough and shortness of breath.    Cardiovascular:  Negative for chest pain and palpitations.   Gastrointestinal:  Negative for abdominal pain and vomiting.   Genitourinary:  Negative for difficulty urinating and dysuria.   Musculoskeletal:  Negative for myalgias.   Skin:  Negative for color change and rash.   Neurological:  Negative for dizziness, syncope, speech difficulty, weakness, light-headedness, numbness and headaches.   All other systems reviewed and are negative.        Current Medications       Current Outpatient Medications:     ALPRAZolam (XANAX) 0.5 mg tablet, Take by mouth daily at bedtime as needed for anxiety, Disp: , Rfl:     apixaban (Eliquis) 5 mg, Take 1 tablet (5 mg total) by mouth 2 (two) times a day, Disp: 180 tablet, Rfl: 3    atorvastatin (LIPITOR) 10 mg tablet, Take 10 mg by mouth daily, Disp: , Rfl:     citalopram (CeleXA) 40 mg tablet, Take 40 mg by mouth daily , Disp: , Rfl:     dicyclomine (BENTYL) 10 mg capsule, Take 1 capsule (10 mg total) by mouth 2 (two) times a day as needed (abdominal pain, diarrhea), Disp: 60 capsule, Rfl: 3    levothyroxine 75 mcg tablet, Take 75 mcg by mouth daily , Disp: , Rfl:     magnesium oxide (MAG-OX) 400 mg tablet, Take by mouth, Disp: , Rfl:     metoprolol succinate (TOPROL-XL) 25 mg 24 hr tablet, Take 1 tablet (25 mg total) by mouth daily (Patient taking differently: Take 12.5 mg by mouth 2 (two) times a day), Disp: 90 tablet, Rfl: 3    olmesartan-hydrochlorothiazide (BENICAR HCT) 40-25 MG per tablet, , Disp: , Rfl:     clindamycin (CLEOCIN) 2 % vaginal cream, insert 1 applicatorful vaginally for 7 days (Patient not taking: Reported on 7/4/2024), Disp: , Rfl:     clobetasol (TEMOVATE) 0.05 % ointment, , Disp:  , Rfl:     clotrimazole-betamethasone (LOTRISONE) 1-0.05 % cream, apply topically to affected area OF GROIN twice a day (Patient not taking: Reported on 2024), Disp: , Rfl:     desonide (DESOWEN) 0.05 % cream, , Disp: , Rfl:     ketoconazole (NIZORAL) 2 % cream, Apply 1 Application topically 2 (two) times a day as needed (Patient not taking: Reported on 2024), Disp: , Rfl:     metroNIDAZOLE (METROGEL) 1 % gel, , Disp: , Rfl:     nystatin powder, if needed (Patient not taking: Reported on 2024), Disp: , Rfl:   No current facility-administered medications for this visit.    Facility-Administered Medications Ordered in Other Visits:     lactated ringers infusion, 125 mL/hr, Intravenous, Continuous, Harsha Jorgensen MD, Stopped at 24 0847    Current Allergies     Allergies as of 2024 - Reviewed 2024   Allergen Reaction Noted    Flagyl [metronidazole] Shortness Of Breath 2022    Contrast [iodinated contrast media] Rash 2018    Iodine - food allergy Rash 2018            The following portions of the patient's history were reviewed and updated as appropriate: allergies, current medications, past family history, past medical history, past social history, past surgical history and problem list.     Past Medical History:   Diagnosis Date    Anxiety     Atrial fibrillation (HCC)     Disease of thyroid gland     Elevated d-dimer     2 seperate episodes of unclear etiology.    Hyperlipidemia     Hypertension     Hypothyroid     Obesity     Paroxysmal atrial fibrillation (HCC)     New onset 2020.       Past Surgical History:   Procedure Laterality Date    CATARACT EXTRACTION, BILATERAL      HYSTERECTOMY      KNEE ARTHROPLASTY      SHOULDER OPEN ROTATOR CUFF REPAIR      x 2    TONSILLECTOMY         Family History   Problem Relation Age of Onset    Hypertension Sister     Heart disease Paternal Aunt     Other Mother         Encephalitis    Other Father          in his 90s  "without significant problems    Lung cancer Brother     No Known Problems Brother          Medications have been verified.        Objective   /68   Pulse 66   Temp 99.2 °F (37.3 °C)   Resp 16   Ht 5' 7\" (1.702 m)   LMP  (LMP Unknown)   SpO2 96%   BMI 45.14 kg/m²        Physical Exam     Physical Exam  Vitals and nursing note reviewed.   Constitutional:       General: She is not in acute distress.     Appearance: Normal appearance. She is not ill-appearing.   HENT:      Head: Normocephalic.      Right Ear: External ear normal.      Left Ear: External ear normal.      Nose: Nose normal.   Eyes:      Pupils: Pupils are equal, round, and reactive to light.   Cardiovascular:      Rate and Rhythm: Normal rate and regular rhythm.      Pulses: Normal pulses.      Heart sounds: Normal heart sounds.   Pulmonary:      Effort: Pulmonary effort is normal. No respiratory distress.      Breath sounds: Normal breath sounds. No stridor. No wheezing, rhonchi or rales.   Chest:      Chest wall: No tenderness.   Musculoskeletal:         General: Normal range of motion.      Cervical back: Normal range of motion and neck supple.   Lymphadenopathy:      Cervical: No cervical adenopathy.   Skin:     General: Skin is warm and dry.      Capillary Refill: Capillary refill takes less than 2 seconds.   Neurological:      General: No focal deficit present.      Mental Status: She is alert and oriented to person, place, and time.   Psychiatric:         Mood and Affect: Mood normal.         Behavior: Behavior normal.                   "

## 2024-07-04 NOTE — PATIENT INSTRUCTIONS
"Maintain a more accurate blood pressure recordings for next week  Please hold blood pressure medication of systolic BP is below    Follow up with Cardiology  Proceed to ED if your blood pressure is low and you feel symptomatic - ex: dizzy, lightheaded, weak, chest pain or shortness of breath.     Follow up with PCP in 3-5 days.  Proceed to  ER if symptoms worsen.    If tests are performed, our office will contact you with results only if changes need to made to the care plan discussed with you at the visit. You can review your full results on [facility].    Patient Education     Checking your blood pressure at home   The Basics   Written by the doctors and editors at [facility]   How is blood pressure measured? -- It is usually measured with a device that goes around the upper arm. This is called a \"blood pressure cuff.\" This is often done in a doctor's office. But some people also check their blood pressure themselves, at home or at work. Doctors call this \"self-measured blood pressure monitoring.\"  Blood pressure is explained with 2 numbers. For instance, your blood pressure might be \"140 over 90.\" The first (top) number is the pressure inside your arteries when your heart is morgan. The second (bottom) number is the pressure inside your arteries when your heart is relaxed. The table shows how doctors and nurses define high and normal blood pressure (table 1).  If your blood pressure gets too high, it puts you at risk for heart attack, stroke, and kidney disease. High blood pressure does not usually cause symptoms. But it can be serious.  What is a home blood pressure meter? -- A home blood pressure meter (or \"monitor\") is a device you can use to check your blood pressure yourself. It has a cuff that goes around your upper arm (figure 1). Some devices have a cuff that goes around your wrist instead. But doctors aren't sure if these work as well. The meter also has a small screen, or dial, that " shows your blood pressure numbers.  There are also special meters you can wear for a day or 2. These are different because they automatically check your blood pressure throughout the day and night, even while you are sleeping. If your doctor thinks that you should use one of these devices, they will tell you how to wear it.  Why do I need to check my blood pressure myself? -- If your doctor knows or suspects that you have high blood pressure, they might want you to check it at home. There are a few reasons for this. Your doctor might want to look at:   Whether your blood pressure measures the same at home as it did in the doctor's office   How well your blood pressure medicines are working   Changes in your blood pressure, for example, if it goes up and down  People who check their own blood pressure usually do better at keeping it low.  How do I choose a home blood pressure meter? -- When choosing a home blood pressure meter, you will probably want to think about:   Cost - Some devices cost more than others. You should also check to see if your insurance will help pay for your device.   Size - It's important to make sure that the cuff fits your arm comfortably. Your doctor or nurse can help you with this.   How easy it is to use - Make sure that you understand how to use the device. You also need to be able to read the numbers on the screen.  You do not need a prescription to buy a home blood pressure meter. You can buy them at most pharmacies or online. Your doctor or nurse can help you choose the right device for you. They should also check your device about once every year to make sure that it is working correctly.  How do I check my blood pressure at home? -- Once you have a home blood pressure meter, your doctor or nurse should check it to make sure that it fits you and works correctly.  When it's time to check your blood pressure:   Go to the bathroom and empty your bladder first. Having a full bladder can  temporarily increase your blood pressure, making the results inaccurate.   Sit in a chair with your feet flat on the ground.   Try to breathe normally and stay calm.   Attach the cuff to your arm. Place the cuff directly on your skin, not over your clothing. The cuff should be tight enough to not slip down, but not uncomfortably tight.   Sit and relax for about 3 to 5 minutes with the cuff on.   Follow the directions that came with your device to start measuring your blood pressure. This might involve squeezing the bulb at the end of the tube to inflate the cuff (fill it with air). With some monitors, you press a button to inflate the cuff. When the cuff fills with air, it feels like someone is squeezing your arm, but it should not hurt. Then, you will slowly deflate the cuff (let the air out of it), or it will deflate by itself. The screen or dial will show your blood pressure numbers.   Stay seated and relax for 1 minute, then measure your blood pressure again.  How often should I check my blood pressure? -- It depends. Different people need to follow different schedules. Your doctor or nurse will tell you how often to check your blood pressure, and when. Some people need to check their blood pressure twice a day, in the morning and evening.  Your doctor or nurse will probably tell you to keep track of your blood pressure for at least a few days (table 2). Then, they will look at the numbers. This is because it's normal for your blood pressure to change a bit from day to day. For example, the numbers might change depending on whether you recently had caffeine, just exercised, or feel stressed. Checking your blood pressure over several days, or longer, gives your doctor or nurse a better idea of what is average for you.  How should I keep track of my blood pressure? -- Some blood pressure meters will record your numbers for you, or send them to your computer or smartphone. If yours does not do this, you need to  "write them down. Your doctor or nurse can help you figure out the best way to keep track of the numbers.  What if my blood pressure is high? -- Your doctor or nurse will tell you what to do if your blood pressure is high when you check it at home. If you get a number that is higher than normal, measure it again to see if it is still high. If it is very high (above a certain number, which your doctor or nurse will tell you to watch out for), call your doctor right away.  If your blood pressure is only a little high, your doctor or nurse might tell you to keep checking it for a few more days or weeks, and then call if it does not go back down. Then, they can help you decide what to do next.  All topics are updated as new evidence becomes available and our peer review process is complete.  This topic retrieved from [employer] on: Mar 29, 2024.  Topic 918184 Version 11.0  Release: 32.2.4 - C32.87  © 2024 UpToDate, Inc. and/or its affiliates. All rights reserved.  table 1: Definition of normal and high blood pressure  Level  Top number  Bottom number    High 130 or above 80 or above   Elevated 120 to 129 79 or below   Normal 119 or below 79 or below   These definitions are from the American College of Cardiology/American Heart Association. Other expert groups might use slightly different definitions.  \"Elevated blood pressure\" is a term doctor or nurses use as a warning. It means you do not yet have high blood pressure, but your blood pressure is not as low as it should be for good health.  Graphic 31526 Version 6.0  figure 1: Using a home blood pressure meter     This is an example of a person using a home blood pressure meter. Blood pressure is explained with 2 numbers. For instance, your blood pressure might be \"140 over 90.\" The \"systolic\" (first) number is the pressure inside your arteries when your heart is morgan. The \"diastolic\" (second) number is the pressure inside your arteries when your heart is relaxed. " Most home blood pressure monitors also show your pulse. Your pulse is the number of times your heart beats in 1 minute.  Graphic 497452 Version 2.0  table 2: 7-day diary for checking blood pressure at home  Day 1  Day 2  Day 3  Day 4  Day 5  Day 6  Day 7    Morning  1st read Morning  1st read Morning  1st read Morning  1st read Morning  1st read Morning  1st read Morning  1st read   Systolic: __________ Systolic: __________ Systolic: __________ Systolic: __________ Systolic: __________ Systolic: __________ Systolic: __________   Diastolic: __________ Diastolic: __________ Diastolic: __________ Diastolic: __________ Diastolic: __________ Diastolic: __________ Diastolic: __________   Pulse: __________ Pulse: __________ Pulse: __________ Pulse: __________ Pulse: __________ Pulse: __________ Pulse: __________   Morning  2nd read Morning  2nd read Morning  2nd read Morning  2nd read Morning  2nd read Morning  2nd read Morning  2nd read   Systolic: __________ Systolic: __________ Systolic: __________ Systolic: __________ Systolic: __________ Systolic: __________ Systolic: __________   Diastolic: __________ Diastolic: __________ Diastolic: __________ Diastolic: __________ Diastolic: __________ Diastolic: __________ Diastolic: __________   Pulse: __________ Pulse: __________ Pulse: __________ Pulse: __________ Pulse: __________ Pulse: __________ Pulse: __________   Evening  1st read Evening  1st read Evening  1st read Evening  1st read Evening  1st read Evening  1st read Evening  1st read   Systolic: __________ Systolic: __________ Systolic: __________ Systolic: __________ Systolic: __________ Systolic: __________ Systolic: __________   Diastolic: __________ Diastolic: __________ Diastolic: __________ Diastolic: __________ Diastolic: __________ Diastolic: __________ Diastolic: __________   Pulse: __________ Pulse: __________ Pulse: __________ Pulse: __________ Pulse: __________ Pulse: __________ Pulse: __________   Evening   "2nd read Evening  2nd read Evening  2nd read Evening  2nd read Evening  2nd read Evening  2nd read Evening  2nd read   Systolic: __________ Systolic: __________ Systolic: __________ Systolic: __________ Systolic: __________ Systolic: __________ Systolic: __________   Diastolic: __________ Diastolic: __________ Diastolic: __________ Diastolic: __________ Diastolic: __________ Diastolic: __________ Diastolic: __________   Pulse: __________ Pulse: __________ Pulse: __________ Pulse: __________ Pulse: __________ Pulse: __________ Pulse: __________   Notes    Notes    Notes    Notes    Notes    Notes    Notes      ____________________ ____________________ ____________________ ____________________ ____________________ ____________________ ____________________   ____________________ ____________________ ____________________ ____________________ ____________________ ____________________ ____________________   ____________________ ____________________ ____________________ ____________________ ____________________ ____________________ ____________________   Patient name: ______________________________     Patient ID: ________________________________    Primary care provider: _______________________    Average BP: _______________________________    You can use this table to keep track of your blood pressure (BP) and pulse.  When looking at your home meter, you will probably see at least 3 numbers:  Your \"systolic\" blood pressure: This is the top number of a BP measurement. For example, if your BP is \"140 over 90,\" 140 is the systolic.  Your \"diastolic\" blood pressure: This is the bottom number of a BP measurement. If your BP is \"140 over 90,\" 90 is the diastolic.  Your pulse: This is the number of times your heart beats in 1 minute.  BP: blood pressure.  Graphic 469672 Version 2.0  Consumer Information Use and Disclaimer   Disclaimer: This generalized information is a limited summary of diagnosis, treatment, and/or medication " information. It is not meant to be comprehensive and should be used as a tool to help the user understand and/or assess potential diagnostic and treatment options. It does NOT include all information about conditions, treatments, medications, side effects, or risks that may apply to a specific patient. It is not intended to be medical advice or a substitute for the medical advice, diagnosis, or treatment of a health care provider based on the health care provider's examination and assessment of a patient's specific and unique circumstances. Patients must speak with a health care provider for complete information about their health, medical questions, and treatment options, including any risks or benefits regarding use of medications. This information does not endorse any treatments or medications as safe, effective, or approved for treating a specific patient. UpToDate, Inc. and its affiliates disclaim any warranty or liability relating to this information or the use thereof.The use of this information is governed by the Terms of Use, available at https://www.[employer].com/en/know/clinical-effectiveness-terms. 2024© UpToDate, Inc. and its affiliates and/or licensors. All rights reserved.  Copyright   © 2024 UpToDate, Inc. and/or its affiliates. All rights reserved.

## 2024-07-05 ENCOUNTER — NURSE TRIAGE (OUTPATIENT)
Age: 69
End: 2024-07-05

## 2024-07-05 NOTE — TELEPHONE ENCOUNTER
"Reason for Disposition  • Wants doctor to measure BP    Answer Assessment - Initial Assessment Questions  1. BLOOD PRESSURE: \"What is the blood pressure?\" \"Did you take at least two measurements 5 minutes apart?\"      90/60, 80/50   2. ONSET: \"When did you take your blood pressure?\"      Yesterday   3. HOW: \"How did you obtain the blood pressure?\" (e.g., visiting nurse, automatic home BP monitor)      Automatic BP cuff  4. HISTORY: \"Do you have a history of low blood pressure?\" \"What is your blood pressure normally?\"      Denies   5. MEDICATIONS: \"Are you taking any medications for blood pressure?\" If yes: \"Have they been changed recently?\"      Metoprolol  6. PULSE RATE: \"Do you know what your pulse rate is?\"       50's  7. OTHER SYMPTOMS: \"Have you been sick recently?\" \"Have you had a recent injury?\"      Colonoscopy on Monday. Had to do prep on Sunday  8. PREGNANCY: \"Is there any chance you are pregnant?\" \"When was your last menstrual period?\"      Denies    Protocols used: Low Blood Pressure-ADULT-[place]    "

## 2024-07-05 NOTE — TELEPHONE ENCOUNTER
"Answer Assessment - Initial Assessment Questions  1. LOCATION: \"Which ankle is swollen?\" \"Where is the swelling?\"      Bilateral ankles and feet.   2. ONSET: \"When did the swelling start?\"      Tuesday   3. SIZE: \"How large is the swelling?\"      Notes puffiness. Sunday during colonoscopy prep did not drink fluids. Monday colonoscopy, states it could be dehydration   4. PAIN: \"Is there any pain?\" If Yes, ask: \"How bad is it?\" (Scale 1-10; or mild, moderate, severe)    - NONE (0): no pain.    - MILD (1-3): doesn't interfere with normal activities.     - MODERATE (4-7): interferes with normal activities (e.g., work or school) or awakens from sleep, limping.     - SEVERE (8-10): excruciating pain, unable to do any normal activities, unable to walk.       Denies   5. CAUSE: \"What do you think caused the ankle swelling?\"      Unsure. Dehydration    6. OTHER SYMPTOMS: \"Do you have any other symptoms?\" (e.g., fever, chest pain, difficulty breathing, calf pain)      Denies   7. PREGNANCY: \"Is there any chance you are pregnant?\" \"When was your last menstrual period?\"      Denies, postmenopausal    Protocols used: Ankle Swelling-ADULT-[place]    "

## 2024-07-05 NOTE — TELEPHONE ENCOUNTER
Received call from pt.  She had a colonoscopy Monday.  On Sunday she did the prep for it, and states she did not drink any clear liquids, only consumed the prep.    Pt on Monday going into Tuesday did not feel well, felt lethargic and had bilateral feet and ankle swelling.  She went to urgent care yesterday as her BP at home was 90/60 and 80/60.  Pt denies dizziness.  She believes she was dehydrated.  After Wednesday, ankle edema resolved. Pt states she has been having ankle itchiness at night after taking her socks off and feels that her ankles now are stiff.  Denies pain.    BP yesterday afternoon was 123/60, last night 132/67, and this morning 138/77. Pulse ranging 56-58.  Pt denies symptoms today.     Pt is scheduled for a visit with Shala ESPINOSA 7/11.  Advised pt to monitor sx, if any worsening to go to ED for evaluation.

## 2024-07-22 ENCOUNTER — OFFICE VISIT (OUTPATIENT)
Dept: CARDIOLOGY CLINIC | Facility: CLINIC | Age: 69
End: 2024-07-22
Payer: MEDICARE

## 2024-07-22 VITALS
HEART RATE: 57 BPM | WEIGHT: 287.2 LBS | HEIGHT: 67 IN | OXYGEN SATURATION: 97 % | RESPIRATION RATE: 20 BRPM | BODY MASS INDEX: 45.08 KG/M2 | SYSTOLIC BLOOD PRESSURE: 120 MMHG | DIASTOLIC BLOOD PRESSURE: 68 MMHG | TEMPERATURE: 97.9 F

## 2024-07-22 DIAGNOSIS — E78.2 MIXED HYPERLIPIDEMIA: ICD-10-CM

## 2024-07-22 DIAGNOSIS — G47.33 OBSTRUCTIVE SLEEP APNEA SYNDROME: ICD-10-CM

## 2024-07-22 DIAGNOSIS — E66.01 CLASS 3 OBESITY (HCC): ICD-10-CM

## 2024-07-22 DIAGNOSIS — I48.0 PAROXYSMAL ATRIAL FIBRILLATION (HCC): Primary | ICD-10-CM

## 2024-07-22 DIAGNOSIS — I10 ESSENTIAL HYPERTENSION: ICD-10-CM

## 2024-07-22 DIAGNOSIS — N18.31 STAGE 3A CHRONIC KIDNEY DISEASE (HCC): ICD-10-CM

## 2024-07-22 PROCEDURE — 99214 OFFICE O/P EST MOD 30 MIN: CPT | Performed by: NURSE PRACTITIONER

## 2024-07-22 RX ORDER — COLCHICINE 0.6 MG/1
TABLET ORAL
COMMUNITY
Start: 2024-06-26

## 2024-07-22 NOTE — PROGRESS NOTES
Cardiology Follow Up    Ramona Cabral  1955  [id number]  [facility] [place] [address]  [address]  [facility] [address] [phone]    Ena presents for follow up from her urgent care visit with hypotension.    1. Paroxysmal atrial fibrillation (HCC)  Assessment & Plan:  Patient with new onset atrial fibrillation 11/25/2020.  Patient presented to the emergency room after onset of “fluttering” and rapid heart rate.  She converted spontaneously to  normal sinus rhythm.  She has remained in normal sinus rhythm since and feels well.  [id number] Score 3   Eliquis 5 mg twice daily for stroke prophylaxis.   Home sleep study notable for severe sleep apnea being treated with CPAP.   48 hour Holter monitor 6/8/2022 showed NSR with 5 short AT runs.   Echocardiogram 4/3/2023 with preserved LVEF and no significant valvular abnormality.  Breakthrough a fib episode on 2/1/24.  ZIO 2/2024 shows SR with avg HR 55 bpm and no recurrent a fib.  Changed metoprolol succinate to 12.5 mg BID. Reviewed importance of mag/potassium replacement. Symptoms well controlled with this change.  2. Essential hypertension  Assessment & Plan:  Blood pressure is acceptable today.  Home BP cuff accuracy has been verified with excellent home readings.  Continue Benicar 40/25 mg daily.  Encouraged 2 g sodium diet and weight control.  3. Mixed hyperlipidemia  Assessment & Plan:  Lipid panel 2/6/2024: C 160. T 156. H 67. L 62.  Continue atorvastatin 10 mg daily.  4. Obstructive sleep apnea syndrome  Assessment & Plan:  Home sleep study 12/2020 revealed severe sleep apnea  Compliant with CPAP therapy.  Following with [facility] sleep medicine.  5. Stage 3a chronic kidney disease (HCC)  Assessment & Plan:  Lab Results   Component Value Date    EGFR 51 02/06/2024    EGFR 53 02/01/2024    EGFR 53 09/01/2023    CREATININE 1.09 02/06/2024    CREATININE 1.06 02/01/2024    CREATININE 1.06  09/01/2023     Stable. Avoid nephrotoxic agents.  6. Class 3 obesity (HCC)  Assessment & Plan:  Body mass index is 44.98 kg/m².  Reviewed dietary and exercise modifications.  Reviewed importance of weight loss for symptom control.  She has met with weight management and declined prescription treatment.  I have offered referral to PT for treatment of deconditioning, which she will consider.     PAM Sutherland has a past medical history of paroxysmal atrial fibrillation, HTN, HLD, DEANNA on CPAP, hypothyroidism, anxiety/depression, and obesity.     She initially presented to [facility] 11/25/2020 with chest pain, palpitations, and shortness of breath. Found to be in atrial fibrillation with RVR, rate 118 bpm. CTA ruled out PE. She was treated with IV Lopressor and Cardiazem and spontaneously converted to NSR. She was evaluated by Dr. Rojo during admission and ultimately discharged home on metoprolol succinate 25 mg daily and Eliquis 5 mg BID. Her TSH was mildly elevated and levothyroxine increased to 100 mch from 88 mcg during admission. Magnesium supplementation was initiated. She was under significant stress at the time with a recent cancer diagnosis for her spouse. He had just returned home from a complicated bowel surgery.     Echo 12/2/2020 was unremarkable.   Nuclear stress test 12/2/20 showed no scar or ischemia with EF 73%.     She completed a sleep study 3/4/2021 which was positive for DEANNA. She was referred to sleep medicine and underwent in-lab study 4/8/2021 and CPAP therapy was initiated.      Echocardiogram 4/3/2023 showed preserved LVEF with no concerning findings.      She was evaluated at [facility] ER 2/1/2024 when she presented via EMS due to chest pain and heart racing which started overnight. She took her metoprolol at 6 am. Upon ER arrival her ECG showed SR at 71 bpm. Lab work was unrevealing other than magnesium of 1.8. Chest xray WNL. She was given IV mag 2 g and ultimately discharged home. She followed up with  "me at which time she reported no recurrent symptoms today. She states she did not feel palpitations but did feel \"unsettled\" while her HR was irregular and rapid. She denies any missed doses of metoprolol. She states she had been out of her magnesium supplement for about 1 week and questions if this caused breakthrough A fib. She has been wearing her CPAP faithfully. Her metoprolol was changed to 12.5 mg BID and an updated ZIO was ordered.     ZIO showed sinus rhythm, HR , avg 55 bpm. 12 runs of SVT, longest 11.6 seconds with avg  bpm. Rare PAC's and rare PVC's. No atrial fibrillation detected.    She followed up 6/10/2024 at which time she was doing very well. We reviewed her ZIO results. She states her stress levels are reduced. She is using her CPAP faithfully. She completed labs 2/6/24. No chest pain, shortness of breath. No bleeding issues. She had an EMG which confirmed mild-mod carpal tunnel syndrome.     She was evaluated at a local urgent care on 7/4/2024 for evaluation of her blood pressure. She had undergone colonoscopy on 7/1/2024. She admits that she did not drink anything except for the colonoscopy prep. BP was in the 80-90's systolic on her home BP cuff. She felt fatigued but no overt lightheadedness or syncope/near syncope. No chest pain. BP at urgent care was 128/68.     7/22/2024: Ena presents for close follow up as she was instructed to do so. She brings a list of home BP readings over the past 2 weeks which showed excellent BP control in the 110-130 systolic range. HR's are staying in the 50-60's. She denies any recurrent episodes and feels she was dehydrated due to the colonoscopy prep. Her colonoscopy showed no concerning findings and she was told she would not need any more. She feels well from a cardiac standpoint. She has noticed some \"puffiness\" to her ankles in recent weeks which itches and gets blotchy at times. She has compression socks at home but does not use them. "     Medical Problems       Problem List       Anxiety and depression    Obesity hypoventilation syndrome (HCC)    Paroxysmal atrial fibrillation (HCC)    Essential hypertension    Hyperlipidemia    Hypothyroidism due to Hashimoto's thyroiditis    Obstructive sleep apnea syndrome    Class 3 obesity (HCC)    Post-COVID chronic dyspnea    History of COVID-19    Reactive airway disease    Shortness of breath    Stage 3a chronic kidney disease (HCC)        Past Medical History:   Diagnosis Date    Anxiety     Atrial fibrillation (HCC)     Disease of thyroid gland     Elevated d-dimer     2 seperate episodes of unclear etiology.    Hyperlipidemia     Hypertension     Hypothyroid     Obesity     Paroxysmal atrial fibrillation (HCC)     New onset 11/25/2020.     Social History     Socioeconomic History    Marital status: /Civil Union     Spouse name: Not on file    Number of children: Not on file    Years of education: Not on file    Highest education level: Not on file   Occupational History    Occupation: Retired teacher   Tobacco Use    Smoking status: Never    Smokeless tobacco: Never   Vaping Use    Vaping status: Never Used   Substance and Sexual Activity    Alcohol use: Not Currently    Drug use: Never    Sexual activity: Yes     Partners: Male   Other Topics Concern    Not on file   Social History Narrative    Not on file     Social Determinants of Health     Financial Resource Strain: Low Risk  (3/10/2024)    [facility]    Overall Financial Resource Strain (CARDIA)     Difficulty of Paying Living Expenses: Not hard at all   Food Insecurity: No Food Insecurity (3/10/2024)    [facility] from [facility], [facility]    Hunger Vital Sign     Worried About Running Out of Food in the Last Year: Never true     Ran Out of Food in the Last Year: Never true   Transportation Needs: No Transportation Needs (3/10/2024)     Received from [facility], [facility]    PRAPARE - Transportation     Lack of Transportation (Medical): No     Lack of Transportation (Non-Medical): No   Physical Activity: Not on file   Stress: Stress Concern Present (3/10/2024)    Received from [facility], [facility]    [place] [place] [employer] - Occupational Stress Questionnaire     Feeling of Stress : Very much   Social Connections: Unknown (2024)    Received from [facility]     How often do you feel lonely or isolated from those around you? (Adult - for ages 18 years and over): Not on file   Intimate Partner Violence: Not At Risk (3/10/2024)    Received from [facility], [facility]    Humiliation, Afraid, Rape, and Kick questionnaire     Fear of Current or Ex-Partner: No     Emotionally Abused: No     Physically Abused: No     Sexually Abused: No   Housing Stability: Low Risk  (3/10/2024)    Received from [facility], [facility]    Housing Stability Vital Sign     Unable to Pay for Housing in the Last Year: No     Number of Places Lived in the Last Year: 1     Unstable Housing in the Last Year: No      Family History   Problem Relation Age of Onset    Hypertension Sister     Heart disease Paternal Aunt     Other Mother         Encephalitis    Other Father          in his 90s without significant problems    Lung cancer Brother     No Known Problems Brother      Past Surgical History:   Procedure Laterality Date    CATARACT EXTRACTION, BILATERAL      HYSTERECTOMY      KNEE ARTHROPLASTY      SHOULDER OPEN ROTATOR CUFF REPAIR      x 2    TONSILLECTOMY         Current Outpatient Medications:     ALPRAZolam (XANAX) 0.5 mg tablet, Take by mouth daily at bedtime as needed for anxiety, Disp: , Rfl:     apixaban (Eliquis) 5 mg, Take 1 tablet (5 mg total) by mouth 2 (two) times a day,  Disp: 180 tablet, Rfl: 3    atorvastatin (LIPITOR) 10 mg tablet, Take 10 mg by mouth daily, Disp: , Rfl:     citalopram (CeleXA) 40 mg tablet, Take 40 mg by mouth daily , Disp: , Rfl:     clindamycin (CLEOCIN) 2 % vaginal cream, , Disp: , Rfl:     clobetasol (TEMOVATE) 0.05 % ointment, , Disp: , Rfl:     colchicine (COLCRYS) 0.6 mg tablet, TAKE 2 TABLETS BY MOUTH NOW AND THEN 1 TABLET IN AN HOUR THEN 1 TABLET  ONCE DAILY FOR 10 DAYS, Disp: , Rfl:     desonide (DESOWEN) 0.05 % cream, , Disp: , Rfl:     levothyroxine 75 mcg tablet, Take 75 mcg by mouth daily , Disp: , Rfl:     magnesium oxide (MAG-OX) 400 mg tablet, Take by mouth, Disp: , Rfl:     metoprolol succinate (TOPROL-XL) 25 mg 24 hr tablet, Take 1 tablet (25 mg total) by mouth daily (Patient taking differently: Take 12.5 mg by mouth 2 (two) times a day), Disp: 90 tablet, Rfl: 3    metroNIDAZOLE (METROGEL) 1 % gel, , Disp: , Rfl:     nystatin powder, if needed, Disp: , Rfl:     olmesartan-hydrochlorothiazide (BENICAR HCT) 40-25 MG per tablet, , Disp: , Rfl:     clotrimazole-betamethasone (LOTRISONE) 1-0.05 % cream, apply topically to affected area OF GROIN twice a day (Patient not taking: Reported on 7/22/2024), Disp: , Rfl:     dicyclomine (BENTYL) 10 mg capsule, Take 1 capsule (10 mg total) by mouth 2 (two) times a day as needed (abdominal pain, diarrhea) (Patient not taking: Reported on 7/22/2024), Disp: 60 capsule, Rfl: 3    ketoconazole (NIZORAL) 2 % cream, Apply 1 Application topically 2 (two) times a day as needed (Patient not taking: Reported on 7/4/2024), Disp: , Rfl:   Allergies   Allergen Reactions    Flagyl [Metronidazole] Shortness Of Breath     Pt requested addition to allergies    Contrast [Iodinated Contrast Media] Rash    Iodine - Food Allergy Rash       Labs:     Chemistry        Component Value Date/Time     11/06/2015 0655    K 4.0 02/06/2024 0957    K 4.4 01/03/2023 0940     02/06/2024 0957     01/03/2023 0940    CO2 26  02/06/2024 0957    CO2 24 01/03/2023 0940    BUN 21 02/06/2024 0957    BUN 29 (H) 01/03/2023 0940    CREATININE 1.09 02/06/2024 0957    CREATININE 1.09 01/03/2023 0940        Component Value Date/Time    CALCIUM 9.7 02/06/2024 0957    CALCIUM 9.0 01/03/2023 0940    ALKPHOS 74 02/01/2024 0739    ALKPHOS 105 01/03/2023 0940    AST 28 02/01/2024 0739    AST 36 01/03/2023 0940    ALT 21 02/01/2024 0739    ALT 41 01/03/2023 0940    BILITOT 0.34 11/06/2015 0655        Lipid panel 2/6/2024: C 160. T 156.  H 67. L 62.      Cardiac Test Results:   ZIO 2/13-2/26/2024:  Sinus rhythm, HR , avg 55 bpm.  12 runs of SVT, longest 11.6 seconds with avg  bpm.  Rare PAC's/PVC's.     ECG 2/6/2024: Normal sinus rhythm. Normal ECG. Rate 61 bpm.     Echocardiogram 4/3/2023:  EF 70%. Mild LVH.      ECG 3/24/2023: Sinus bradycardia. Rate 59 bpm with no significant change from prior.     ECG 10/19/2022: Sinus bradycardia. Otherwise normal ECG.      Holter monitor (48 hours) 6/8/2022:   Min HR 47. Max HR 98. Avg HR 60.   Rare PVC's. Rare PAC's.   5 brief atrial runs with the longest lasting 5 beats.   Palpitations correlated with PAC's/PVC's and atrial runs.      ECG 6/7/2022: Sinus bradycardia. 58 bpm.      Lipid panel 12/13/2021: C 176. T 177. H 65. L 76.      ECG 11/25/2020:  Atrial flutter/atrial fibrillation at 118 bpm.     Echocardiogram 12/02/2020:  EF 60%.  Normal diastolic function.  Mild annular calcification of the mitral valve.  Trace tricuspid regurgitation.     Lexiscan nuclear stress test 12/02/2020:  No evidence of myocardial scar.  No evidence of myocardial ischemia.  EF 73%.     Lipid panel 10/13/2020: C 166. T 154. H 64. L 71.      Echocardiogram 11/05/2015:  EF 55-60%.  Mild tricuspid regurgitation.  Estimated PASP 38 mmHg.     Review of Systems   Constitutional: Negative.   HENT: Negative.     Cardiovascular:  Positive for leg swelling (stable ankle edema). Negative for chest pain, dyspnea on exertion,  "irregular heartbeat, near-syncope, orthopnea and palpitations.   Respiratory:  Negative for cough and snoring.    Endocrine: Negative.    Skin: Negative.    Musculoskeletal: Negative.    Gastrointestinal: Negative.    Genitourinary: Negative.    Neurological: Negative.    Psychiatric/Behavioral: Negative.         Vitals:    07/22/24 0857   BP: 120/68   Pulse: 57   Resp: 20   Temp: 97.9 °F (36.6 °C)   SpO2: 97%     Vitals:    07/22/24 0857   Weight: 130 kg (287 lb 3.2 oz)     Height: 5' 7\" (170.2 cm)   Body mass index is 44.98 kg/m².    Physical Exam  Vitals and nursing note reviewed.   Constitutional:       General: She is not in acute distress.     Appearance: She is well-developed. She is obese. She is not diaphoretic.   HENT:      Head: Normocephalic and atraumatic.   Neck:      Thyroid: No thyromegaly.      Vascular: No carotid bruit or JVD.   Cardiovascular:      Rate and Rhythm: Normal rate and regular rhythm. No extrasystoles are present.     Pulses: Intact distal pulses.           Radial pulses are 2+ on the right side and 2+ on the left side.      Heart sounds: Normal heart sounds, S1 normal and S2 normal. No murmur heard.     Comments: Trivial ankle edema, no pitting  Pulmonary:      Effort: Pulmonary effort is normal.      Breath sounds: Normal breath sounds.   Abdominal:      General: There is no distension.      Palpations: Abdomen is soft.      Tenderness: There is no abdominal tenderness.   Musculoskeletal:         General: Normal range of motion.      Cervical back: Normal range of motion and neck supple.   Lymphadenopathy:      Cervical: No cervical adenopathy.   Skin:     General: Skin is warm and dry.   Neurological:      Mental Status: She is alert and oriented to person, place, and time.      Cranial Nerves: No cranial nerve deficit.   Psychiatric:         Mood and Affect: Mood and affect normal.         Behavior: Behavior normal.                "

## 2024-07-22 NOTE — ASSESSMENT & PLAN NOTE
Blood pressure is acceptable today.  Home BP cuff accuracy has been verified with excellent home readings.  Continue Benicar 40/25 mg daily.  Encouraged 2 g sodium diet and weight control.

## 2024-07-22 NOTE — ASSESSMENT & PLAN NOTE
Body mass index is 44.98 kg/m².  Reviewed dietary and exercise modifications.  Reviewed importance of weight loss for symptom control.  She has met with weight management and declined prescription treatment.  I have offered referral to PT for treatment of deconditioning, which she will consider.

## 2024-07-22 NOTE — ASSESSMENT & PLAN NOTE
Patient with new onset atrial fibrillation 11/25/2020.  Patient presented to the emergency room after onset of “fluttering” and rapid heart rate.  She converted spontaneously to  normal sinus rhythm.  She has remained in normal sinus rhythm since and feels well.  [id number] Score 3   Eliquis 5 mg twice daily for stroke prophylaxis.   Home sleep study notable for severe sleep apnea being treated with CPAP.   48 hour Holter monitor 6/8/2022 showed NSR with 5 short AT runs.   Echocardiogram 4/3/2023 with preserved LVEF and no significant valvular abnormality.  Breakthrough a fib episode on 2/1/24.  ZIO 2/2024 shows SR with avg HR 55 bpm and no recurrent a fib.  Changed metoprolol succinate to 12.5 mg BID. Reviewed importance of mag/potassium replacement. Symptoms well controlled with this change.

## 2024-07-22 NOTE — PATIENT INSTRUCTIONS
Blood pressure is perfect.  Continue current medication but contact our office if having recurrent episodes.

## 2024-07-22 NOTE — ASSESSMENT & PLAN NOTE
Home sleep study 12/2020 revealed severe sleep apnea  Compliant with CPAP therapy.  Following with [facility] sleep medicine.

## 2024-07-30 ENCOUNTER — TELEPHONE (OUTPATIENT)
Dept: GASTROENTEROLOGY | Facility: CLINIC | Age: 69
End: 2024-07-30

## 2024-08-18 DIAGNOSIS — R10.30 LOWER ABDOMINAL PAIN: ICD-10-CM

## 2024-08-18 DIAGNOSIS — R19.7 DIARRHEA, UNSPECIFIED TYPE: ICD-10-CM

## 2024-08-19 RX ORDER — DICYCLOMINE HYDROCHLORIDE 10 MG/1
CAPSULE ORAL
Qty: 60 CAPSULE | Refills: 5 | Status: SHIPPED | OUTPATIENT
Start: 2024-08-19

## 2024-08-29 ENCOUNTER — HOSPITAL ENCOUNTER (EMERGENCY)
Facility: HOSPITAL | Age: 69
Discharge: HOME | End: 2024-08-29
Attending: EMERGENCY MEDICINE | Admitting: EMERGENCY MEDICINE
Payer: MEDICARE

## 2024-08-29 ENCOUNTER — APPOINTMENT (EMERGENCY)
Dept: CT IMAGING | Facility: HOSPITAL | Age: 69
End: 2024-08-29
Payer: MEDICARE

## 2024-08-29 VITALS
WEIGHT: 289 LBS | OXYGEN SATURATION: 97 % | SYSTOLIC BLOOD PRESSURE: 130 MMHG | HEIGHT: 67 IN | BODY MASS INDEX: 45.36 KG/M2 | RESPIRATION RATE: 18 BRPM | TEMPERATURE: 97.5 F | DIASTOLIC BLOOD PRESSURE: 59 MMHG | HEART RATE: 49 BPM

## 2024-08-29 DIAGNOSIS — S09.90XA INJURY OF HEAD, INITIAL ENCOUNTER: ICD-10-CM
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
## 2024-08-29 PROCEDURE — 99284 EMERGENCY DEPT VISIT MOD MDM: CPT

## 2024-08-29 PROCEDURE — 70450 CT HEAD/BRAIN W/O DYE: CPT

## 2024-08-29 PROCEDURE — 72125 CT NECK SPINE W/O DYE: CPT

## 2024-08-29 NOTE — ED PROVIDER NOTES
History  Chief Complaint   Patient presents with    Fall     Pt presented to this ED after falling OOB hitting right side face upon nightstand landing on both knees at 0900 today.pt c/o posterior neck pain w/nausea and right side facial soreness since. Pt took tylenol w/o relief.  Denies loc / +eliquis     68 y/o F presents for evaluation to the ED after a fall on Eliquis for A-fib.  Patient slipped off the bed and hit her head.  Denies any hip pain.  No chest pain shortness of breath.  No back pain.  No rib pain. No n/v. No pelvic pain. No vomiting.  Patient ambulated after fall with no problems.      History provided by:  Patient  Fall  Associated symptoms: no abdominal pain, no blindness, no chest pain, no difficulty breathing, no headaches, no hearing loss, no nausea, no neck pain, no seizures and no vomiting        Prior to Admission Medications   Prescriptions Last Dose Informant Patient Reported? Taking?   ALPRAZolam (XANAX) 0.5 mg tablet  Self Yes No   Sig: Take by mouth daily at bedtime as needed for anxiety   apixaban (Eliquis) 5 mg   No No   Sig: Take 1 tablet (5 mg total) by mouth 2 (two) times a day   atorvastatin (LIPITOR) 10 mg tablet  Self Yes No   Sig: Take 10 mg by mouth daily   citalopram (CeleXA) 40 mg tablet  Self Yes No   Sig: Take 40 mg by mouth daily    clindamycin (CLEOCIN) 2 % vaginal cream   Yes No   clobetasol (TEMOVATE) 0.05 % ointment  Self Yes No   clotrimazole-betamethasone (LOTRISONE) 1-0.05 % cream   Yes No   Sig: apply topically to affected area OF GROIN twice a day   Patient not taking: Reported on 7/22/2024   colchicine (COLCRYS) 0.6 mg tablet   Yes No   Sig: TAKE 2 TABLETS BY MOUTH NOW AND THEN 1 TABLET IN AN HOUR THEN 1 TABLET  ONCE DAILY FOR 10 DAYS   desonide (DESOWEN) 0.05 % cream  Self Yes No   dicyclomine (BENTYL) 10 mg capsule   No No   Sig: take 1 capsule by mouth twice a day if needed for abdominal pain and diarrhea   ketoconazole (NIZORAL) 2 % cream  Self Yes No   Sig:  Apply 1 Application topically 2 (two) times a day as needed   Patient not taking: Reported on 2024   levothyroxine 75 mcg tablet  Self Yes No   Sig: Take 75 mcg by mouth daily    magnesium oxide (MAG-OX) 400 mg tablet  Self Yes No   Sig: Take by mouth   metoprolol succinate (TOPROL-XL) 25 mg 24 hr tablet   No No   Sig: Take 1 tablet (25 mg total) by mouth daily   Patient taking differently: Take 12.5 mg by mouth 2 (two) times a day   metroNIDAZOLE (METROGEL) 1 % gel  Self Yes No   nystatin powder  Self Yes No   Sig: if needed   olmesartan-hydrochlorothiazide (BENICAR HCT) 40-25 MG per tablet  Self Yes No      Facility-Administered Medications: None       Past Medical History:   Diagnosis Date    Anxiety     Atrial fibrillation (HCC)     Disease of thyroid gland     Elevated d-dimer     2 seperate episodes of unclear etiology.    Hyperlipidemia     Hypertension     Hypothyroid     Obesity     Paroxysmal atrial fibrillation (HCC)     New onset 2020.       Past Surgical History:   Procedure Laterality Date    CATARACT EXTRACTION, BILATERAL      HYSTERECTOMY      KNEE ARTHROPLASTY      SHOULDER OPEN ROTATOR CUFF REPAIR      x 2    TONSILLECTOMY         Family History   Problem Relation Age of Onset    Hypertension Sister     Heart disease Paternal Aunt     Other Mother         Encephalitis    Other Father          in his 90s without significant problems    Lung cancer Brother     No Known Problems Brother      I have reviewed and agree with the history as documented.    E-Cigarette/Vaping    E-Cigarette Use Never User      E-Cigarette/Vaping Substances    Nicotine No     THC No     CBD No     Flavoring No     Other No     Unknown No      Social History     Tobacco Use    Smoking status: Never    Smokeless tobacco: Never   Vaping Use    Vaping status: Never Used   Substance Use Topics    Alcohol use: Not Currently    Drug use: Never       Review of Systems   Constitutional:  Negative for activity change,  appetite change, chills and fever.   HENT:  Negative for congestion, dental problem, drooling, ear pain, hearing loss, rhinorrhea, sinus pain and voice change.    Eyes:  Negative for blindness, photophobia, pain, discharge and redness.   Respiratory:  Negative for apnea, cough and wheezing.    Cardiovascular:  Negative for chest pain.   Gastrointestinal:  Negative for abdominal pain, nausea and vomiting.   Endocrine: Negative for cold intolerance and heat intolerance.   Genitourinary:  Negative for difficulty urinating, dyspareunia, enuresis, frequency and hematuria.   Musculoskeletal:  Negative for arthralgias and neck pain.   Skin:  Negative for color change and pallor.   Allergic/Immunologic: Negative for environmental allergies and food allergies.   Neurological:  Negative for seizures and headaches.   Hematological:  Negative for adenopathy.   Psychiatric/Behavioral:  Negative for agitation, confusion and self-injury. The patient is not hyperactive.        Physical Exam  Physical Exam  HENT:      Head: Normocephalic.      Nose: Nose normal.      Mouth/Throat:      Mouth: Mucous membranes are moist.   Eyes:      Extraocular Movements: Extraocular movements intact.      Pupils: Pupils are equal, round, and reactive to light.   Cardiovascular:      Rate and Rhythm: Rhythm irregular.      Heart sounds: No murmur heard.     No friction rub.   Pulmonary:      Effort: No respiratory distress.   Abdominal:      Palpations: Abdomen is soft.   Musculoskeletal:         General: Normal range of motion.      Cervical back: Normal range of motion and neck supple. No rigidity or tenderness.   Skin:     General: Skin is warm.      Capillary Refill: Capillary refill takes less than 2 seconds.   Neurological:      General: No focal deficit present.      Mental Status: She is alert.      Cranial Nerves: No cranial nerve deficit.      Motor: No weakness.   Psychiatric:         Mood and Affect: Mood normal.         Vital Signs  ED  Triage Vitals [08/29/24 1740]   Temperature Pulse Respirations Blood Pressure SpO2   97.5 °F (36.4 °C) 58 18 160/72 97 %      Temp src Heart Rate Source Patient Position - Orthostatic VS BP Location FiO2 (%)   -- Monitor Sitting Left arm --      Pain Score       5           Vitals:    08/29/24 1740 08/29/24 1750   BP: 160/72 (!) 189/79   Pulse: 58 (!) 54   Patient Position - Orthostatic VS: Sitting Lying         Visual Acuity  Visual Acuity      Flowsheet Row Most Recent Value   L Pupil Size (mm) 3   R Pupil Size (mm) 3            ED Medications  Medications - No data to display    Diagnostic Studies  Results Reviewed       None                   CT head without contrast   Final Result by Fransico Calvo MD (08/29 1813)      No acute intracranial abnormality.                  Workstation performed: [id number]         CT spine cervical without contrast   Final Result by Fransico Calvo MD (08/29 1816)      No acute cervical spine fracture or traumatic malalignment.                  Workstation performed: [id number]                    Procedures  Procedures         ED Course     69-year-old female presents after head injury from a fall.  Patient is on Eliquis.  Immediately evaluated by me on arrival.  CT head and neck obtained.  CT head is normal.  CT cervical spine with no acute fractures.  Patient's feel comfortable going home.  Will discharge with return precautions.                                              Medical Decision Making  69-year-old female presents after head injury from a fall.  Patient is on Eliquis.  Immediately evaluated by me on arrival.  CT head and neck obtained.  CT head is normal.  CT cervical spine with no acute fractures.  Patient's feel comfortable going home.  Will discharge with return precautions.    Amount and/or Complexity of Data Reviewed  External Data Reviewed: radiology.  Radiology: ordered.                 Disposition  Final diagnoses:   Fall, initial encounter   Injury  of head, initial encounter     Time reflects when diagnosis was documented in both MDM as applicable and the Disposition within this note       Time User Action Codes Description Comment    8/29/2024  6:38 PM Kendell De Los Santos [W19.XXXA] Fall, initial encounter     8/29/2024  6:38 PM Kendell De Los Santos Add [S09.90XA] Injury of head, initial encounter           ED Disposition       ED Disposition   Discharge    Condition   Stable    Date/Time   u Aug 29, 2024  6:38 PM    Comment   Ram[place] discharge to home/self care.                   Follow-up Information       Follow up With Specialties Details Why Contact Info    Evelia Gillespie DO    [address] [facility] [address]  [phone]              Patient's Medications   Discharge Prescriptions    No medications on file       No discharge procedures on file.    PDMP Review         Value Time User    PDMP Reviewed  Yes 11/25/2020  2:06 PM Marcus Cameron MD            ED Provider  Electronically Signed by             Kendell De Los Santos DO  08/29/24 7928

## 2024-09-16 NOTE — PROGRESS NOTES
[facility] - Outpatient Follow-up Note  Ramona Cabral 69 y.o. female MRN: [id number]  Encounter: [id number]          ASSESSMENT AND PLAN:    Ramona Cabral is a 69 y.o. female with hypertension, hyperlipidemia, paroxysmal A-fib on Eliquis, hypothyroidism, DEANNA who presents for follow-up of abdominal pain. She is doing mostly well but still with episodes.     Colonoscopy from July 2024 with 2 cecal polyps, diverticula in the sigmoid, hemorrhoids.    CT abdomen pelvis from 2021 with no abdominal aortic aneurysm, diverticulosis noted, mild hepatic steatosis with focal fatty sparing.    Abdominal x-ray for obstruction series from April with nonobstructive bowel gas pattern and no significant stool burden but some stool seen in the ascending colon.    Hip x-ray with mild bilateral hip osteo arthrosis no acute osseous abnormality noted.    Celiac panel with elevated IgA but otherwise normal.    Most recent BMP with elevated glucose but otherwise normal.  CMP with slightly elevated glucose but normal electrolytes, creatinine, liver test.  CBC with normal white blood cell count, hemoglobin, MCV, platelets.  TSH normal.    1. Abdominal pain, unspecified abdominal location    2. Diarrhea, unspecified type        No orders of the defined types were placed in this encounter.    Continue dicyclomine and okay to take up to 4 times a day.  Drink at least 8 cups of water per day  No need for routine surveillance colonoscopies or can reconsider in the future  Avoid trigger foods (such as nuts).   Low FODMAP diet handout given to the patient  ______________________________________________________________________    SUBJECTIVE:    Ramona Cabral is a 69 y.o. female who presents with complaint of abdominal pain.     She can feel well for days. She still has some moments when she has episodes of urgency or abdominal cramping. Not painful. Then she feels fine again. She only takes bentyl as  needed if a lot of cramping. She believes anxiety plays a role. Bms can be normal and sometimes urgent. No blood.     Answers submitted by the patient for this visit:  Abdominal Pain Questionnaire (Submitted on 9/17/2024)  Chief Complaint: Abdominal pain  Chronicity: recurrent  Onset: more than 1 year ago  Onset quality: undetermined  Frequency: intermittently  Progression since onset: gradually improving  Pain location: periumbilical region  Pain - numeric: 2/10  Pain quality: dull  Radiates to: periumbilical region  anorexia: No  arthralgias: Yes  belching: No  constipation: No  diarrhea: No  dysuria: No  fever: No  flatus: No  frequency: No  headaches: No  hematochezia: No  hematuria: No  melena: No  myalgias: No  nausea: No  weight loss: No  vomiting: No  Aggravated by: nothing  Diagnostic workup: lower endoscopy      REVIEW OF SYSTEMS IS OTHERWISE NEGATIVE.  10 point ROS reviewed and negative, except as above      Historical Information   Past Medical History:   Diagnosis Date    Anxiety     Atrial fibrillation (HCC)     Disease of thyroid gland     Elevated d-dimer     2 seperate episodes of unclear etiology.    Fatty liver Unknown    GERD (gastroesophageal reflux disease) Unknown    Hyperlipidemia     Hypertension     Hypothyroid     Obesity     Paroxysmal atrial fibrillation (HCC)     New onset 11/25/2020.     Past Surgical History:   Procedure Laterality Date    CATARACT EXTRACTION, BILATERAL      COLONOSCOPY      HYSTERECTOMY      KNEE ARTHROPLASTY      SHOULDER OPEN ROTATOR CUFF REPAIR      x 2    TONSILLECTOMY      UPPER GASTROINTESTINAL ENDOSCOPY       Social History   Social History     Substance and Sexual Activity   Alcohol Use Not Currently     Social History     Substance and Sexual Activity   Drug Use Never     Social History     Tobacco Use   Smoking Status Never   Smokeless Tobacco Never     Family History   Problem Relation Age of Onset    Hypertension Sister     Arthritis Sister     Heart  "disease Sister     Heart disease Paternal Aunt     Other Mother         Encephalitis    Other Father          in his 90s without significant problems    Arthritis Father     Hearing loss Father     Heart disease Father     Lung cancer Brother     Hypertension Brother     Cancer Brother     Diabetes Maternal Aunt     Diabetes Maternal Uncle        Meds/Allergies       Current Outpatient Medications:     ALPRAZolam (XANAX) 0.5 mg tablet    apixaban (Eliquis) 5 mg    atorvastatin (LIPITOR) 10 mg tablet    citalopram (CeleXA) 40 mg tablet    clindamycin (CLEOCIN) 2 % vaginal cream    clobetasol (TEMOVATE) 0.05 % ointment    clotrimazole-betamethasone (LOTRISONE) 1-0.05 % cream    desonide (DESOWEN) 0.05 % cream    dicyclomine (BENTYL) 10 mg capsule    ketoconazole (NIZORAL) 2 % cream    levothyroxine 75 mcg tablet    magnesium oxide (MAG-OX) 400 mg tablet    metoprolol succinate (TOPROL-XL) 25 mg 24 hr tablet    metroNIDAZOLE (METROGEL) 1 % gel    nystatin powder    olmesartan-hydrochlorothiazide (BENICAR HCT) 40-25 MG per tablet    Allergies   Allergen Reactions    Flagyl [Metronidazole] Shortness Of Breath     Pt requested addition to allergies    Contrast [Iodinated Contrast Media] Rash    Iodine - Food Allergy Rash           Objective     Blood pressure 122/64, pulse 58, temperature 97.7 °F (36.5 °C), temperature source Tympanic, height 5' 7\" (1.702 m), weight 129 kg (285 lb), SpO2 96%. Body mass index is 44.64 kg/m².    PHYSICAL EXAMINATION:    General Appearance:   Alert, cooperative, no distress   HEENT:  Normocephalic, atraumatic, anicteric. Neck supple, symmetrical, trachea midline.   Lungs:   Equal chest rise and unlabored breathing, normal effort, no coughing.   Cardiovascular:   No visualized JVD.   Abdomen:   No abdominal distension.   Skin:   No jaundice, rashes, or lesions.    Musculoskeletal:   Normal range of motion visualized.   Psych:  Normal affect and normal insight.   Neuro:  Alert and " appropriate.         Lab Results:   No visits with results within 1 Day(s) from this visit.   Latest known visit with results is:   Hospital Outpatient Visit on 07/01/2024   Component Date Value    Case Report 07/01/2024                      Value:Surgical Pathology Report                         Case: [id number]                                  Authorizing Provider:  Douglas Chawla MD    Collected:           07/01/2024 0817              Ordering Location:     [facility] Received:            07/01/2024 1649                                     Heart Endoscopy                                                              Pathologist:           Trevor Godinez MD                                                                 Specimens:   A) - Large Intestine, Cecum, bx, cecal polyp x2                                                     B) - Colon, bx, random, r/o microscopic colitis                                            Final Diagnosis 07/01/2024                      Value:A. Large Intestine, cecal polyp x2:  - Tubular adenoma involving two of five fragments of colonic mucosa.  - Negative for high-grade dysplasia and malignancy.     B. Colon, bx, random, r/o microscopic colitis:  - Colonic mucosa with no significant histopathologic abnormality.  - No evidence of microscopic colitis or acute inflammation.  - Negative for dysplasia and malignancy.      Additional Information 07/01/2024                      Value:All reported additional testing was performed with appropriately reactive controls.  These tests were developed and their performance characteristics determined by [facility] or appropriate performing facility, though some tests may be performed on tissues which have not been validated for performance characteristics (such as staining performed on alcohol exposed cell  "blocks and decalcified tissues).  Results should be interpreted with caution and in the context of the patients’ clinical condition. These tests may not be cleared or approved by the U.S. Food and Drug Administration, though the FDA has determined that such clearance or approval is not necessary. These tests are used for clinical purposes and they should not be regarded as investigational or for research. This laboratory has been approved by [facility], designated as a high-complexity laboratory and is qualified to perform these tests.  .  Interpretation performed at [facility], [facility] [address]      Synoptic Checklist 07/01/2024                      Value:                            COLON/RECTUM POLYP FORM - GI - All Specimens                                                                                     :    Adenoma(s)      Gross Description 07/01/2024                      Value:A. The specimen is received in formalin, labeled with the patient's name and hospital number, and is designated \" biopsy, cecal polyp x 2.\"  It consists of 3 tan soft tissue fragments, measuring 0.2-0.5 cm in greatest dimension.  The specimen is submitted in toto in 1 screened cassette.  B. The specimen is received in formalin, labeled with the patient's name and hospital number, and is designated \" colon biopsy, random, rule out microscopic colitis.\"  It consists of 5 tan soft tissue fragments, measuring 0.1-0.4 cm in greatest dimension.  The specimen is submitted in toto in 1 screened cassette.    Note: The estimated total formalin fixation time based upon information provided by the submitting clinician and the standard processing schedule is under 72 hours.    MScheib         Lab Results   Component Value Date    WBC 6.62 02/01/2024    HGB 11.9 02/01/2024    HCT 36.5 02/01/2024    MCV 88 02/01/2024     02/01/2024       Lab Results   Component Value Date     11/06/2015    " "SODIUM 138 02/06/2024    K 4.0 02/06/2024     02/06/2024    CO2 26 02/06/2024    ANIONGAP 11 11/06/2015    AGAP 9 02/06/2024    BUN 21 02/06/2024    CREATININE 1.09 02/06/2024    GLUC 116 02/01/2024    GLUF 108 (H) 02/06/2024    CALCIUM 9.7 02/06/2024    AST 28 02/01/2024    ALT 21 02/01/2024    ALKPHOS 74 02/01/2024    PROT 7.3 11/06/2015    TP 7.6 02/01/2024    BILITOT 0.34 11/06/2015    TBILI 0.66 02/01/2024    EGFR 51 02/06/2024       Lab Results   Component Value Date    CRP 11.6 (H) 08/24/2023       Lab Results   Component Value Date    HGR4GLHGEPFZ 2.917 02/06/2024    TSH 2.39 01/03/2023       No results found for: \"IRON\", \"TIBC\", \"FERRITIN\"    Radiology Results:   CT spine cervical without contrast    Result Date: 8/29/2024  Narrative: CT CERVICAL SPINE - WITHOUT CONTRAST INDICATION:   fall. COMPARISON:  None. TECHNIQUE:  CT examination of the cervical spine was performed without intravenous contrast.  Contiguous axial images were obtained. Multiplanar 2D reformatted images were created from the source data. Radiation dose length product (DLP) for this visit:  332 mGy-cm .  This examination, like all CT scans performed in the [facility], was performed utilizing techniques to minimize radiation dose exposure, including the use of iterative reconstruction and automated exposure control. IMAGE QUALITY:  Diagnostic. FINDINGS: ALIGNMENT: C4 anterolisthesis of 3 to 4 mm is likely secondary to chronic disc and facet degenerative change. VERTEBRAE:  No acute cervical spine fracture. DEGENERATIVE CHANGES: Posterior disc osteophyte complexes at C5-6 and C6-7 result in mild central canal stenosis and neural foraminal narrowing. PREVERTEBRAL AND PARASPINAL SOFT TISSUES: No swelling or hematoma THORACIC INLET: Clear lung apices.     Impression: No acute cervical spine fracture or traumatic malalignment. Workstation performed: [id number]     CT head without contrast    Result Date: " 8/29/2024  Narrative: CT BRAIN - WITHOUT CONTRAST INDICATION:   Headache and head trauma. COMPARISON: 11/4/2015. TECHNIQUE:  CT examination of the brain was performed.  Multiplanar 2D reformatted images were created from the source data. Radiation dose length product (DLP) for this visit:  880 mGy-cm .  This examination, like all CT scans performed in the [facility], was performed utilizing techniques to minimize radiation dose exposure, including the use of iterative reconstruction and automated exposure control. IMAGE QUALITY:  Diagnostic. FINDINGS: PARENCHYMA: Minimal periventricular and subcortical hypoattenuating foci compatible with microangiopathic change. No acute intracranial hemorrhage or mass effect. VENTRICLES AND EXTRA-AXIAL SPACES: No hydrocephalus or extra-axial collection. VISUALIZED ORBITS: Intact. PARANASAL SINUSES: Clear. CALVARIUM AND EXTRACRANIAL SOFT TISSUES: No lytic or blastic lesion or fracture.     Impression: No acute intracranial abnormality. Workstation performed: [id number]

## 2024-09-17 ENCOUNTER — OFFICE VISIT (OUTPATIENT)
Age: 69
End: 2024-09-17
Payer: MEDICARE

## 2024-09-17 VITALS
OXYGEN SATURATION: 96 % | SYSTOLIC BLOOD PRESSURE: 122 MMHG | HEART RATE: 58 BPM | WEIGHT: 285 LBS | DIASTOLIC BLOOD PRESSURE: 64 MMHG | HEIGHT: 67 IN | TEMPERATURE: 97.7 F | BODY MASS INDEX: 44.73 KG/M2

## 2024-09-17 DIAGNOSIS — R19.7 DIARRHEA, UNSPECIFIED TYPE: ICD-10-CM

## 2024-09-17 DIAGNOSIS — R10.9 ABDOMINAL PAIN, UNSPECIFIED ABDOMINAL LOCATION: Primary | ICD-10-CM

## 2024-09-17 PROCEDURE — 99213 OFFICE O/P EST LOW 20 MIN: CPT | Performed by: INTERNAL MEDICINE

## 2024-09-21 DIAGNOSIS — R19.7 DIARRHEA, UNSPECIFIED TYPE: ICD-10-CM

## 2024-09-21 DIAGNOSIS — R10.30 LOWER ABDOMINAL PAIN: ICD-10-CM

## 2024-09-23 RX ORDER — DICYCLOMINE HYDROCHLORIDE 10 MG/1
CAPSULE ORAL
Qty: 180 CAPSULE | Refills: 1 | Status: SHIPPED | OUTPATIENT
Start: 2024-09-23

## 2024-10-17 ENCOUNTER — NURSE TRIAGE (OUTPATIENT)
Age: 69
End: 2024-10-17

## 2024-10-17 NOTE — TELEPHONE ENCOUNTER
Per secure message from Dr Gonzalez: okay to skip tonights dose.    Called pt and gave provider's message. Pt understood. Advised to follow up tomorrow on how she is feeling.

## 2024-10-17 NOTE — TELEPHONE ENCOUNTER
"Chief Complaint: elevated HR    History of Present Illness (HPI): Pt called in stating that her  had to be taken by ambulance to the hospital around 10am. She states as she was following behind the ambulance around 10am she noticed that her HR was 140. She takes Metoprolol 12.5mg BID. She took the first dose of Metoprolol around 9am So at 1030 am she took a full tablet of Metoprolol 25mg and Xanax. She stated about 15minutes later her HR was back around to 78-80. She monitored her HR through the day and it was between 60-70. She states it is normally high 50s while on the phone she checked her HR and it was 65.     Pt also reports SOB during episode of elevated HR, SOB has subsided.     Pt is wondering if she should take her evening dose of Metoprolol 12.5mg since she already took 2 doses of metoprolol today.    VS/Weight:   10am-   1030 am HR ranging 78-80s  Throughout the date HR ranging 60-70s  5:20pm-HR 65    Pain: No    Risk Factors: Arrhythmia- Afib    Recent Testing: Other Zio 3/8/24    Medicine: Metoprolol     Upcoming Office Visit: Yes    Last Office Visit: 6/10/24      Answer Assessment - Initial Assessment Questions  1. DESCRIPTION: \"Please describe your heart rate or heartbeat that you are having\" (e.g., fast/slow, regular/irregular, skipped or extra beats, \"palpitations\")      Elevated heart rate 140  2. ONSET: \"When did it start?\" (Minutes, hours or days)       Around 10am  3. DURATION: \"How long does it last\" (e.g., seconds, minutes, hours)      30minutes   4. PATTERN \"Does it come and go, or has it been constant since it started?\"  \"Does it get worse with exertion?\"   \"Are you feeling it now?\"      Happened during a stress moment   6. HEART RATE: \"Can you tell me your heart rate?\" \"How many beats in 15 seconds?\"  (Note: not all patients can do this)        65  7. RECURRENT SYMPTOM: \"Have you ever had this before?\" If Yes, ask: \"When was the last time?\" and \"What happened that time?\"      " " Yes on going   8. CAUSE: \"What do you think is causing the palpitations?\"      Ambulance was there at her house to take her  to the hospital   9. CARDIAC HISTORY: \"Do you have any history of heart disease?\" (e.g., heart attack, angina, bypass surgery, angioplasty, arrhythmia)       Afib   10. OTHER SYMPTOMS: \"Do you have any other symptoms?\" (e.g., dizziness, chest pain, sweating, difficulty breathing)        SOB    Protocols used: Heart Rate and Heartbeat Questions-ADULT-[place]    "

## 2024-12-06 ENCOUNTER — TELEPHONE (OUTPATIENT)
Age: 69
End: 2024-12-06

## 2024-12-06 NOTE — TELEPHONE ENCOUNTER
Pt called concerned that she is experiencing increased ecchymotic areas on b/l calves. Pt is taking Eliquis 5 mg BID  Pt stated she just started using Therworx ointment at bedtime to ease the muscle cramping. Pt stated she deep massages the ointment into the calves, and also just purchased new haile stockings and stated they seem too tight which could have attributed to increased bruising.   I instructed pt to gingerly apply any lotion to the legs, and bring compression stockings to the appt with Mine Esteban on 12/13/24. Pt verbalized understanding  Pt advised to call back with any additional concerns

## 2024-12-13 ENCOUNTER — OFFICE VISIT (OUTPATIENT)
Dept: CARDIOLOGY CLINIC | Facility: CLINIC | Age: 69
End: 2024-12-13
Payer: MEDICARE

## 2024-12-13 VITALS
HEIGHT: 67 IN | TEMPERATURE: 98.3 F | SYSTOLIC BLOOD PRESSURE: 118 MMHG | DIASTOLIC BLOOD PRESSURE: 64 MMHG | OXYGEN SATURATION: 96 % | HEART RATE: 62 BPM | WEIGHT: 289.4 LBS | BODY MASS INDEX: 45.42 KG/M2

## 2024-12-13 DIAGNOSIS — E78.2 MIXED HYPERLIPIDEMIA: ICD-10-CM

## 2024-12-13 DIAGNOSIS — I48.0 PAROXYSMAL ATRIAL FIBRILLATION (HCC): Primary | ICD-10-CM

## 2024-12-13 DIAGNOSIS — I10 ESSENTIAL HYPERTENSION: ICD-10-CM

## 2024-12-13 DIAGNOSIS — N18.31 STAGE 3A CHRONIC KIDNEY DISEASE (HCC): ICD-10-CM

## 2024-12-13 DIAGNOSIS — E06.3 HYPOTHYROIDISM DUE TO HASHIMOTO'S THYROIDITIS: ICD-10-CM

## 2024-12-13 DIAGNOSIS — G47.33 OBSTRUCTIVE SLEEP APNEA SYNDROME: ICD-10-CM

## 2024-12-13 PROCEDURE — 99214 OFFICE O/P EST MOD 30 MIN: CPT | Performed by: NURSE PRACTITIONER

## 2024-12-13 NOTE — ASSESSMENT & PLAN NOTE
Patient with new onset atrial fibrillation 11/25/2020.  Patient presented to the emergency room after onset of “fluttering” and rapid heart rate.  She converted spontaneously to  normal sinus rhythm.  She has remained in normal sinus rhythm since and feels well.  [id number] Score 3   Eliquis 5 mg twice daily for stroke prophylaxis.   Home sleep study notable for severe sleep apnea being treated with CPAP.   48 hour Holter monitor 6/8/2022 showed NSR with 5 short AT runs.   Echocardiogram 4/3/2023 with preserved LVEF and no significant valvular abnormality.  Breakthrough a fib episode on 2/1/24.  ZIO 2/2024 shows SR with avg HR 55 bpm and no recurrent a fib.  Changed metoprolol succinate to 12.5 mg BID. Reviewed importance of mag/potassium replacement. Symptoms well controlled with this change.

## 2024-12-13 NOTE — ASSESSMENT & PLAN NOTE
Lipid panel 2/6/2024: C 160. T 156. H 67. L 62.  Continue atorvastatin 10 mg daily.  Recheck lipid panel prior to next visit.

## 2024-12-13 NOTE — ASSESSMENT & PLAN NOTE
Home sleep study 12/2020 revealed severe sleep apnea  Compliant with CPAP therapy.  Following with [facility] sleep medicine.

## 2024-12-13 NOTE — PROGRESS NOTES
Cardiology Follow Up    Ramona Cabral  1955  [id number]  [facility] [place] [address]  [address]  [facility] [id number] [phone]    Ena presents for routine follow up of PAF, HTN, HLD.    1. Paroxysmal atrial fibrillation (HCC)  Assessment & Plan:  Patient with new onset atrial fibrillation 11/25/2020.  Patient presented to the emergency room after onset of “fluttering” and rapid heart rate.  She converted spontaneously to  normal sinus rhythm.  She has remained in normal sinus rhythm since and feels well.  [id number] Score 3   Eliquis 5 mg twice daily for stroke prophylaxis.   Home sleep study notable for severe sleep apnea being treated with CPAP.   48 hour Holter monitor 6/8/2022 showed NSR with 5 short AT runs.   Echocardiogram 4/3/2023 with preserved LVEF and no significant valvular abnormality.  Breakthrough a fib episode on 2/1/24.  ZIO 2/2024 shows SR with avg HR 55 bpm and no recurrent a fib.  Changed metoprolol succinate to 12.5 mg BID. Reviewed importance of mag/potassium replacement. Symptoms well controlled with this change.  Orders:  -     CBC and Platelet; Future  -     Magnesium; Future  2. Essential hypertension  Assessment & Plan:  Blood pressure is acceptable today.  Home BP cuff accuracy has been verified with excellent home readings.  Continue Benicar 40/25 mg daily.  Encouraged 2 g sodium diet and weight control.  Orders:  -     CBC and Platelet; Future  3. Mixed hyperlipidemia  Assessment & Plan:  Lipid panel 2/6/2024: C 160. T 156. H 67. L 62.  Continue atorvastatin 10 mg daily.  Recheck lipid panel prior to next visit.  Orders:  -     Comprehensive metabolic panel; Future  -     Lipid Panel with Direct LDL reflex; Future; Expected date: 03/13/2025  4. Obstructive sleep apnea syndrome  Assessment & Plan:  Home sleep study 12/2020 revealed severe sleep apnea  Compliant with CPAP therapy.  Following with [facility]  sleep medicine.  5. Stage 3a chronic kidney disease (HCC)  Assessment & Plan:  Lab Results   Component Value Date    EGFR 51 02/06/2024    EGFR 53 02/01/2024    EGFR 53 09/01/2023    CREATININE 1.09 02/06/2024    CREATININE 1.06 02/01/2024    CREATININE 1.06 09/01/2023     Stable. Avoid nephrotoxic agents.  6. Hypothyroidism due to Hashimoto's thyroiditis  Assessment & Plan:  Managed by PCP.   Goal TSH 1-2 if possible given PAF.  Orders:  -     TSH, 3rd generation with Free T4 reflex; Future     HPI  Ena has a past medical history of paroxysmal atrial fibrillation, HTN, HLD, DEANNA on CPAP, hypothyroidism, anxiety/depression, and obesity.     She initially presented to [facility] 11/25/2020 with chest pain, palpitations, and shortness of breath. Found to be in atrial fibrillation with RVR, rate 118 bpm. CTA ruled out PE. She was treated with IV Lopressor and Cardiazem and spontaneously converted to NSR. She was evaluated by Dr. Rojo during admission and ultimately discharged home on metoprolol succinate 25 mg daily and Eliquis 5 mg BID. Her TSH was mildly elevated and levothyroxine increased to 100 mch from 88 mcg during admission. Magnesium supplementation was initiated. She was under significant stress at the time with a recent cancer diagnosis for her spouse. He had just returned home from a complicated bowel surgery.     Echo 12/2/2020 was unremarkable.   Nuclear stress test 12/2/20 showed no scar or ischemia with EF 73%.     She completed a sleep study 3/4/2021 which was positive for DEANNA. She was referred to sleep medicine and underwent in-lab study 4/8/2021 and CPAP therapy was initiated.      Echocardiogram 4/3/2023 showed preserved LVEF with no concerning findings.      She was evaluated at [facility] ER 2/1/2024 when she presented via EMS due to chest pain and heart racing which started overnight. She took her metoprolol at 6 am. Upon ER arrival her ECG showed SR at 71 bpm. Lab work was unrevealing other than  "magnesium of 1.8. Chest xray WNL. She was given IV mag 2 g and ultimately discharged home. She followed up with me at which time she reported no recurrent symptoms today. She states she did not feel palpitations but did feel \"unsettled\" while her HR was irregular and rapid. She denies any missed doses of metoprolol. She states she had been out of her magnesium supplement for about 1 week and questions if this caused breakthrough A fib. She has been wearing her CPAP faithfully. Her metoprolol was changed to 12.5 mg BID and an updated ZIO was ordered.     ZIO showed sinus rhythm, HR , avg 55 bpm. 12 runs of SVT, longest 11.6 seconds with avg  bpm. Rare PAC's and rare PVC's. No atrial fibrillation detected.     12/13/2024: Ena presents for routine follow up. She is doing well. BP is excellent. She reports rare, short lived palpitations. She is using her CPAP faithfully. She is getting some leaks and current AHI is between 5-6 typically. She plans to discuss this with sleep medicine. She asks for labs. She will not see her PCP until March of next year. Last blood work was 2/2024. She denies chest pain, shortness of breath. She recently started wearing compression socks and notices a significant improvement. Her legs are less swollen, achy and tired. She denies any bleeding issues. She notes her muscle cramps in her legs are resolved since wearing the compression socks faithfully.     Medical Problems       Problem List       Anxiety and depression    Obesity hypoventilation syndrome (HCC)    Paroxysmal atrial fibrillation (HCC)    Essential hypertension    Hyperlipidemia    Hypothyroidism due to Hashimoto's thyroiditis    Obstructive sleep apnea syndrome    Class 3 obesity    Post-COVID chronic dyspnea    History of COVID-19    Reactive airway disease    Shortness of breath    Stage 3a chronic kidney disease (HCC)        Past Medical History:   Diagnosis Date    Anxiety     Atrial fibrillation (HCC)     " Disease of thyroid gland     Elevated d-dimer     2 seperate episodes of unclear etiology.    Fatty liver Unknown    GERD (gastroesophageal reflux disease) Unknown    Hyperlipidemia     Hypertension     Hypothyroid     Obesity     Paroxysmal atrial fibrillation (HCC)     New onset 11/25/2020.     Social History     Socioeconomic History    Marital status: /Civil Union     Spouse name: Not on file    Number of children: Not on file    Years of education: Not on file    Highest education level: Not on file   Occupational History    Occupation: Retired teacher   Tobacco Use    Smoking status: Never    Smokeless tobacco: Never   Vaping Use    Vaping status: Never Used   Substance and Sexual Activity    Alcohol use: Not Currently    Drug use: Never    Sexual activity: Not Currently     Partners: Male   Other Topics Concern    Not on file   Social History Narrative    Not on file     Social Drivers of Health     Financial Resource Strain: Low Risk  (3/10/2024)    Received from [facility], [facility]    Overall Financial Resource Strain (CARDIA)     Difficulty of Paying Living Expenses: Not hard at all   Food Insecurity: No Food Insecurity (3/10/2024)    Received from [facility] [address] [facility], [facility]    Hunger Vital Sign     Worried About Running Out of Food in the Last Year: Never true     Ran Out of Food in the Last Year: Never true   Transportation Needs: No Transportation Needs (3/10/2024)    Received from [facility], [facility]    PRAPARE - Transportation     Lack of Transportation (Medical): No     Lack of Transportation (Non-Medical): No   Physical Activity: Not on file   Stress: Stress Concern Present (3/10/2024)    Received from [facility], [facility]    [employer] [place] [employer] - Occupational Stress Questionnaire     Feeling of Stress : Very much   Social  Connections: Unknown (2024)    Received from [facility]     How often do you feel lonely or isolated from those around you? (Adult - for ages 18 years and over): Not on file   Intimate Partner Violence: Not At Risk (3/10/2024)    [facility]    Humiliation, Afraid, Rape, and Kick questionnaire     Fear of Current or Ex-Partner: No     Emotionally Abused: No     Physically Abused: No     Sexually Abused: No   Housing Stability: Low Risk  (3/10/2024)    [facility]    Housing Stability Vital Sign     Unable to Pay for Housing in the Last Year: No     Number of Places Lived in the Last Year: 1     Unstable Housing in the Last Year: No      Family History   Problem Relation Age of Onset    Hypertension Sister     Arthritis Sister     Heart disease Sister     Heart disease Paternal Aunt     Other Mother         Encephalitis    Other Father          in his 90s without significant problems    Arthritis Father     Hearing loss Father     Heart disease Father     Lung cancer Brother     Hypertension Brother     Cancer Brother     Diabetes Maternal Aunt     Diabetes Maternal Uncle      Past Surgical History:   Procedure Laterality Date    CATARACT EXTRACTION, BILATERAL      COLONOSCOPY      HYSTERECTOMY      KNEE ARTHROPLASTY      SHOULDER OPEN ROTATOR CUFF REPAIR      x 2    TONSILLECTOMY      UPPER GASTROINTESTINAL ENDOSCOPY         Current Outpatient Medications:     ALPRAZolam (XANAX) 0.5 mg tablet, Take by mouth daily at bedtime as needed for anxiety, Disp: , Rfl:     apixaban (Eliquis) 5 mg, Take 1 tablet (5 mg total) by mouth 2 (two) times a day, Disp: 180 tablet, Rfl: 3    atorvastatin (LIPITOR) 10 mg tablet, Take 10 mg by mouth daily, Disp: , Rfl:     citalopram (CeleXA) 40 mg tablet, Take 40 mg by mouth daily , Disp: , Rfl:     clindamycin (CLEOCIN) 2 % vaginal  cream, , Disp: , Rfl:     clobetasol (TEMOVATE) 0.05 % ointment, , Disp: , Rfl:     clotrimazole-betamethasone (LOTRISONE) 1-0.05 % cream, , Disp: , Rfl:     desonide (DESOWEN) 0.05 % cream, , Disp: , Rfl:     dicyclomine (BENTYL) 10 mg capsule, take 1 capsule by mouth twice a day if needed for abdominal pain and diarrhea, Disp: 180 capsule, Rfl: 1    ketoconazole (NIZORAL) 2 % cream, Apply 1 Application topically 2 (two) times a day as needed, Disp: , Rfl:     levothyroxine 75 mcg tablet, Take 75 mcg by mouth daily , Disp: , Rfl:     magnesium oxide (MAG-OX) 400 mg tablet, Take by mouth, Disp: , Rfl:     metoprolol succinate (TOPROL-XL) 25 mg 24 hr tablet, Take 1 tablet (25 mg total) by mouth daily, Disp: 90 tablet, Rfl: 3    metroNIDAZOLE (METROGEL) 1 % gel, , Disp: , Rfl:     nystatin powder, if needed, Disp: , Rfl:     olmesartan-hydrochlorothiazide (BENICAR HCT) 40-25 MG per tablet, , Disp: , Rfl:   Allergies   Allergen Reactions    Flagyl [Metronidazole] Shortness Of Breath     Pt requested addition to allergies    Contrast [Iodinated Contrast Media] Rash    Iodine - Food Allergy Rash       Labs:     Chemistry        Component Value Date/Time     11/06/2015 0655    K 4.0 02/06/2024 0957    K 4.4 01/03/2023 0940     02/06/2024 0957     01/03/2023 0940    CO2 26 02/06/2024 0957    CO2 24 01/03/2023 0940    BUN 21 02/06/2024 0957    BUN 29 (H) 01/03/2023 0940    CREATININE 1.09 02/06/2024 0957    CREATININE 1.09 01/03/2023 0940        Component Value Date/Time    CALCIUM 9.7 02/06/2024 0957    CALCIUM 9.0 01/03/2023 0940    ALKPHOS 74 02/01/2024 0739    ALKPHOS 105 01/03/2023 0940    AST 28 02/01/2024 0739    AST 36 01/03/2023 0940    ALT 21 02/01/2024 0739    ALT 41 01/03/2023 0940    BILITOT 0.34 11/06/2015 0655        Lipid panel 2/6/2024: C 160. T 156.  H 67. L 62.      Cardiac Test Results:   [facility] 2/13-2/26/2024:  Sinus rhythm, HR , avg 55 bpm.  12 runs of SVT, longest 11.6 seconds  "with avg  bpm.  Rare PAC's/PVC's.     ECG 2/6/2024: Normal sinus rhythm. Normal ECG. Rate 61 bpm.     Echocardiogram 4/3/2023:  EF 70%. Mild LVH.      ECG 3/24/2023: Sinus bradycardia. Rate 59 bpm with no significant change from prior.     ECG 10/19/2022: Sinus bradycardia. Otherwise normal ECG.      Holter monitor (48 hours) 6/8/2022:   Min HR 47. Max HR 98. Avg HR 60.   Rare PVC's. Rare PAC's.   5 brief atrial runs with the longest lasting 5 beats.   Palpitations correlated with PAC's/PVC's and atrial runs.      ECG 6/7/2022: Sinus bradycardia. 58 bpm.      Lipid panel 12/13/2021: C 176. T 177. H 65. L 76.      ECG 11/25/2020:  Atrial flutter/atrial fibrillation at 118 bpm.     Echocardiogram 12/02/2020:  EF 60%.  Normal diastolic function.  Mild annular calcification of the mitral valve.  Trace tricuspid regurgitation.     Lexiscan nuclear stress test 12/02/2020:  No evidence of myocardial scar.  No evidence of myocardial ischemia.  EF 73%.     Lipid panel 10/13/2020: C 166. T 154. H 64. L 71.      Echocardiogram 11/05/2015:  EF 55-60%.  Mild tricuspid regurgitation.  Estimated PASP 38 mmHg.     Review of Systems   Constitutional: Negative.   HENT: Negative.     Cardiovascular:  Negative for chest pain, dyspnea on exertion, irregular heartbeat, leg swelling, near-syncope, orthopnea and palpitations.   Respiratory:  Negative for cough and snoring.    Endocrine: Negative.    Skin: Negative.    Musculoskeletal: Negative.    Gastrointestinal: Negative.    Genitourinary: Negative.    Neurological: Negative.    Psychiatric/Behavioral: Negative.         Vitals:    12/13/24 1138   BP: 118/64   Pulse: 62   Temp: 98.3 °F (36.8 °C)   SpO2: 96%     Vitals:    12/13/24 1138   Weight: 131 kg (289 lb 6.4 oz)     Height: 5' 7\" (170.2 cm)   Body mass index is 45.33 kg/m².    Physical Exam  Vitals and nursing note reviewed.   Constitutional:       General: She is not in acute distress.     Appearance: She is well-developed. " She is obese. She is not diaphoretic.   HENT:      Head: Normocephalic and atraumatic.   Neck:      Thyroid: No thyromegaly.      Vascular: No carotid bruit or JVD.   Cardiovascular:      Rate and Rhythm: Normal rate and regular rhythm.      Pulses: Intact distal pulses.           Radial pulses are 2+ on the right side and 2+ on the left side.      Heart sounds: Normal heart sounds, S1 normal and S2 normal. No murmur heard.  Pulmonary:      Effort: Pulmonary effort is normal.      Breath sounds: Normal breath sounds.   Abdominal:      General: There is no distension.      Palpations: Abdomen is soft.      Tenderness: There is no abdominal tenderness.   Musculoskeletal:         General: Normal range of motion.      Cervical back: Normal range of motion and neck supple.   Lymphadenopathy:      Cervical: No cervical adenopathy.   Skin:     General: Skin is warm and dry.   Neurological:      Mental Status: She is alert and oriented to person, place, and time.      Cranial Nerves: No cranial nerve deficit.   Psychiatric:         Behavior: Behavior normal.

## 2024-12-13 NOTE — PATIENT INSTRUCTIONS
Magnesium oxide, gluconate or glycinate    For compression socks - mild compression is around 10-15mmHg, mod 20-30 mmHg, heavy > 30    Labs in January. Stop Biotin 2 weeks before lab work because that can inaccurately affect thyroid report.

## 2025-01-24 ENCOUNTER — RESULTS FOLLOW-UP (OUTPATIENT)
Dept: CARDIOLOGY CLINIC | Facility: CLINIC | Age: 70
End: 2025-01-24

## 2025-01-24 ENCOUNTER — APPOINTMENT (OUTPATIENT)
Dept: LAB | Facility: HOSPITAL | Age: 70
End: 2025-01-24
Payer: MEDICARE

## 2025-01-24 ENCOUNTER — APPOINTMENT (EMERGENCY)
Dept: RADIOLOGY | Facility: HOSPITAL | Age: 70
End: 2025-01-24
Payer: MEDICARE

## 2025-01-24 ENCOUNTER — HOSPITAL ENCOUNTER (EMERGENCY)
Facility: HOSPITAL | Age: 70
Discharge: HOME | End: 2025-01-24
Attending: STUDENT IN AN ORGANIZED HEALTH CARE EDUCATION/TRAINING PROGRAM
Payer: MEDICARE

## 2025-01-24 VITALS
SYSTOLIC BLOOD PRESSURE: 116 MMHG | HEIGHT: 67 IN | OXYGEN SATURATION: 96 % | DIASTOLIC BLOOD PRESSURE: 58 MMHG | WEIGHT: 293 LBS | BODY MASS INDEX: 45.99 KG/M2 | HEART RATE: 57 BPM | RESPIRATION RATE: 18 BRPM | TEMPERATURE: 98.1 F

## 2025-01-24 DIAGNOSIS — I48.0 PAROXYSMAL ATRIAL FIBRILLATION (HCC): ICD-10-CM

## 2025-01-24 DIAGNOSIS — I10 ESSENTIAL HYPERTENSION: ICD-10-CM

## 2025-01-24 DIAGNOSIS — E78.2 MIXED HYPERLIPIDEMIA: ICD-10-CM

## 2025-01-24 DIAGNOSIS — E06.3 HYPOTHYROIDISM DUE TO HASHIMOTO'S THYROIDITIS: ICD-10-CM

## 2025-01-24 DIAGNOSIS — S80.01XA CONTUSION OF RIGHT KNEE, INITIAL ENCOUNTER: Primary | ICD-10-CM

## 2025-01-24 LAB
ALBUMIN SERPL BCG-MCNC: 3.9 G/DL (ref 3.5–5)
ALP SERPL-CCNC: 72 U/L (ref 34–104)
ALT SERPL W P-5'-P-CCNC: 22 U/L (ref 7–52)
ANION GAP SERPL CALCULATED.3IONS-SCNC: 4 MMOL/L (ref 4–13)
AST SERPL W P-5'-P-CCNC: 25 U/L (ref 13–39)
BILIRUB SERPL-MCNC: 0.54 MG/DL (ref 0.2–1)
BUN SERPL-MCNC: 24 MG/DL (ref 5–25)
CALCIUM SERPL-MCNC: 9.8 MG/DL (ref 8.4–10.2)
CHLORIDE SERPL-SCNC: 106 MMOL/L (ref 96–108)
CHOLEST SERPL-MCNC: 165 MG/DL (ref ?–200)
CO2 SERPL-SCNC: 29 MMOL/L (ref 21–32)
CREAT SERPL-MCNC: 1.1 MG/DL (ref 0.6–1.3)
ERYTHROCYTE [DISTWIDTH] IN BLOOD BY AUTOMATED COUNT: 14.6 % (ref 11.6–15.1)
GFR SERPL CREATININE-BSD FRML MDRD: 50 ML/MIN/1.73SQ M
GLUCOSE P FAST SERPL-MCNC: 99 MG/DL (ref 65–99)
HCT VFR BLD AUTO: 38.9 % (ref 34.8–46.1)
HDLC SERPL-MCNC: 64 MG/DL
HGB BLD-MCNC: 12.4 G/DL (ref 11.5–15.4)
LDLC SERPL CALC-MCNC: 81 MG/DL (ref 0–100)
MAGNESIUM SERPL-MCNC: 1.9 MG/DL (ref 1.9–2.7)
MCH RBC QN AUTO: 29.2 PG (ref 26.8–34.3)
MCHC RBC AUTO-ENTMCNC: 31.9 G/DL (ref 31.4–37.4)
MCV RBC AUTO: 92 FL (ref 82–98)
PLATELET # BLD AUTO: 228 THOUSANDS/UL (ref 149–390)
PMV BLD AUTO: 9.5 FL (ref 8.9–12.7)
POTASSIUM SERPL-SCNC: 4.8 MMOL/L (ref 3.5–5.3)
PROT SERPL-MCNC: 7.3 G/DL (ref 6.4–8.4)
RBC # BLD AUTO: 4.25 MILLION/UL (ref 3.81–5.12)
SODIUM SERPL-SCNC: 139 MMOL/L (ref 135–147)
TRIGL SERPL-MCNC: 102 MG/DL (ref ?–150)
TSH SERPL DL<=0.05 MIU/L-ACNC: 2.53 UIU/ML (ref 0.45–4.5)
WBC # BLD AUTO: 7.47 THOUSAND/UL (ref 4.31–10.16)

## 2025-01-24 PROCEDURE — 80053 COMPREHEN METABOLIC PANEL: CPT

## 2025-01-24 PROCEDURE — 83735 ASSAY OF MAGNESIUM: CPT

## 2025-01-24 PROCEDURE — 99284 EMERGENCY DEPT VISIT MOD MDM: CPT

## 2025-01-24 PROCEDURE — 85027 COMPLETE CBC AUTOMATED: CPT

## 2025-01-24 PROCEDURE — 36415 COLL VENOUS BLD VENIPUNCTURE: CPT

## 2025-01-24 PROCEDURE — 84443 ASSAY THYROID STIM HORMONE: CPT

## 2025-01-24 PROCEDURE — 73564 X-RAY EXAM KNEE 4 OR MORE: CPT

## 2025-01-24 PROCEDURE — 99284 EMERGENCY DEPT VISIT MOD MDM: CPT | Performed by: PHYSICIAN ASSISTANT

## 2025-01-24 PROCEDURE — 80061 LIPID PANEL: CPT

## 2025-01-24 RX ORDER — OXYCODONE AND ACETAMINOPHEN 5; 325 MG/1; MG/1
1 TABLET ORAL EVERY 6 HOURS PRN
Qty: 12 TABLET | Refills: 0 | Status: SHIPPED | OUTPATIENT
Start: 2025-01-24

## 2025-01-24 RX ORDER — ACETAMINOPHEN 325 MG/1
975 TABLET ORAL ONCE
Status: DISCONTINUED | OUTPATIENT
Start: 2025-01-24 | End: 2025-01-24

## 2025-01-24 RX ORDER — OXYCODONE AND ACETAMINOPHEN 5; 325 MG/1; MG/1
1 TABLET ORAL ONCE
Refills: 0 | Status: COMPLETED | OUTPATIENT
Start: 2025-01-24 | End: 2025-01-24

## 2025-01-24 RX ADMIN — OXYCODONE HYDROCHLORIDE AND ACETAMINOPHEN 1 TABLET: 5; 325 TABLET ORAL at 11:14

## 2025-01-24 NOTE — ED PROVIDER NOTES
Time reflects when diagnosis was documented in both MDM as applicable and the Disposition within this note       Time User Action Codes Description Comment    1/24/2025 11:53 AM Delfina Eliasna Add [S80.01XA] Contusion of right knee, initial encounter           ED Disposition       ED Disposition   Discharge    Condition   Stable    Date/Time   Fri Jan 24, 2025 11:53 AM    Comment   Ramona H [place] discharge to home/self care.                   Assessment & Plan       Medical Decision Making  The patient is a 69 year old female who presents to the Emergency Department today with a chief complaint of right knee pain.  Patient prior to arrival lost her footing and fell directly onto her right knee.  Did not hit her head.  Is on Eliquis.  Noted to have bruising and pain.  Does have severe osteoarthritis of both knees is scheduled to have a knee replacement in the summer.    He has pain and come plaints of pain in the right knee.  Bruising and swelling to the right knee.  X-ray does not show any acute fracture or severe osteoarthritis.  Patient given Ace wrap.  Given walker for supportive care.  Give pain medication.  Patient was discharged home does follow-up with orthopedics will make follow-up appointment.  In agreement treatment plan    Amount and/or Complexity of Data Reviewed  Radiology: ordered and independent interpretation performed. Decision-making details documented in ED Course.    Risk  Prescription drug management.             Medications   oxyCODONE-acetaminophen (PERCOCET) 5-325 mg per tablet 1 tablet (1 tablet Oral Given 1/24/25 1114)       ED Risk Strat Scores                                              History of Present Illness       Chief Complaint   Patient presents with    Fall     Pt was in hospital lobby and turned quickly, causing them to fall to knees then side- denies HS- Pt takes BT- reports right knee pain and left rib pain        Past Medical History:   Diagnosis Date    Anxiety      Atrial fibrillation (HCC)     Disease of thyroid gland     Elevated d-dimer     2 seperate episodes of unclear etiology.    Fatty liver Unknown    GERD (gastroesophageal reflux disease) Unknown    Hyperlipidemia     Hypertension     Hypothyroid     Obesity     Paroxysmal atrial fibrillation (HCC)     New onset 2020.      Past Surgical History:   Procedure Laterality Date    CATARACT EXTRACTION, BILATERAL      COLONOSCOPY      HYSTERECTOMY      KNEE ARTHROPLASTY      SHOULDER OPEN ROTATOR CUFF REPAIR      x 2    TONSILLECTOMY      UPPER GASTROINTESTINAL ENDOSCOPY        Family History   Problem Relation Age of Onset    Hypertension Sister     Arthritis Sister     Heart disease Sister     Heart disease Paternal Aunt     Other Mother         Encephalitis    Other Father          in his 90s without significant problems    Arthritis Father     Hearing loss Father     Heart disease Father     Lung cancer Brother     Hypertension Brother     Cancer Brother     Diabetes Maternal Aunt     Diabetes Maternal Uncle       Social History     Tobacco Use    Smoking status: Never    Smokeless tobacco: Never   Vaping Use    Vaping status: Never Used   Substance Use Topics    Alcohol use: Not Currently    Drug use: Never      E-Cigarette/Vaping    E-Cigarette Use Never User       E-Cigarette/Vaping Substances    Nicotine No     THC No     CBD No     Flavoring No     Other No     Unknown No       I have reviewed and agree with the history as documented.     The patient is a 69 year old female who presents to the Emergency Department today with a chief complaint of right knee pain.  Patient prior to arrival lost her footing and fell directly onto her right knee.  Did not hit her head.  Is on Eliquis.  Noted to have bruising and pain.  Does have severe osteoarthritis of both knees is scheduled to have a knee replacement in the summer.          Review of Systems   All other systems reviewed and are negative.          Objective        ED Triage Vitals [01/24/25 1019]   Temperature Pulse Blood Pressure Respirations SpO2 Patient Position - Orthostatic VS   98.1 °F (36.7 °C) 64 (!) 182/81 18 97 % Sitting      Temp Source Heart Rate Source BP Location FiO2 (%) Pain Score    Temporal Monitor Right arm -- 10 - Worst Possible Pain      Vitals      Date and Time Temp Pulse SpO2 Resp BP Pain Score FACES Pain Rating User   01/24/25 1200 -- 57 96 % -- 116/58 -- -- MB   01/24/25 1145 -- 55 95 % -- 117/58 4 -- MB   01/24/25 1130 -- 55 97 % -- 124/60 -- -- MB   01/24/25 1114 -- -- -- -- -- 8 -- MB   01/24/25 1100 -- 57 97 % -- 154/73 -- -- MB   01/24/25 1030 -- 56 95 % -- 150/57 -- -- MB   01/24/25 1019 98.1 °F (36.7 °C) 64 97 % 18 182/81 10 - Worst Possible Pain -- SS            Physical Exam  Vitals and nursing note reviewed.   Constitutional:       General: She is not in acute distress.     Appearance: She is well-developed.   HENT:      Head: Normocephalic and atraumatic.   Eyes:      Extraocular Movements: Extraocular movements intact.      Pupils: Pupils are equal, round, and reactive to light.   Cardiovascular:      Rate and Rhythm: Normal rate and regular rhythm.   Pulmonary:      Effort: Pulmonary effort is normal.      Breath sounds: Normal breath sounds.   Abdominal:      General: Bowel sounds are normal.      Palpations: Abdomen is soft.      Tenderness: There is no abdominal tenderness.   Musculoskeletal:      Cervical back: Normal range of motion and neck supple.      Right hip: Normal.      Right knee: Swelling and ecchymosis present.      Right lower leg: Normal.      Right ankle: Normal.        Legs:    Skin:     General: Skin is warm and dry.      Capillary Refill: Capillary refill takes less than 2 seconds.   Neurological:      General: No focal deficit present.      Mental Status: She is alert and oriented to person, place, and time.      Coordination: Coordination normal.   Psychiatric:         Behavior: Behavior normal.          Results Reviewed       None            XR knee 4+ vw right injury   ED Interpretation by Scott Elias PA-C (01/24 1152)   No acute fractures      Final Interpretation by Galindo Barnes DO (01/24 1219)      No acute osseous abnormality.      Degenerative changes as described.         Computerized Assisted Algorithm (CAA) may have been used to analyze all applicable images.         Workstation performed: [id number]             Procedures    ED Medication and Procedure Management   Prior to Admission Medications   Prescriptions Last Dose Informant Patient Reported? Taking?   ALPRAZolam (XANAX) 0.5 mg tablet  Self Yes No   Sig: Take by mouth daily at bedtime as needed for anxiety   apixaban (Eliquis) 5 mg  Self No No   Sig: Take 1 tablet (5 mg total) by mouth 2 (two) times a day   atorvastatin (LIPITOR) 10 mg tablet  Self Yes No   Sig: Take 10 mg by mouth daily   citalopram (CeleXA) 40 mg tablet  Self Yes No   Sig: Take 40 mg by mouth daily    clindamycin (CLEOCIN) 2 % vaginal cream  Self Yes No   clobetasol (TEMOVATE) 0.05 % ointment  Self Yes No   clotrimazole-betamethasone (LOTRISONE) 1-0.05 % cream  Self Yes No   desonide (DESOWEN) 0.05 % cream  Self Yes No   dicyclomine (BENTYL) 10 mg capsule   No No   Sig: take 1 capsule by mouth twice a day if needed for abdominal pain and diarrhea   ketoconazole (NIZORAL) 2 % cream  Self Yes No   Sig: Apply 1 Application topically 2 (two) times a day as needed   levothyroxine 75 mcg tablet  Self Yes No   Sig: Take 75 mcg by mouth daily    magnesium oxide (MAG-OX) 400 mg tablet  Self Yes No   Sig: Take by mouth   metoprolol succinate (TOPROL-XL) 25 mg 24 hr tablet  Self No No   Sig: Take 1 tablet (25 mg total) by mouth daily   metroNIDAZOLE (METROGEL) 1 % gel  Self Yes No   nystatin powder  Self Yes No   Sig: if needed   olmesartan-hydrochlorothiazide (BENICAR HCT) 40-25 MG per tablet  Self Yes No      Facility-Administered Medications: None     Discharge  Medication List as of 1/24/2025 11:55 AM        START taking these medications    Details   oxyCODONE-acetaminophen (Percocet) 5-325 mg per tablet Take 1 tablet by mouth every 6 (six) hours as needed for severe pain Max Daily Amount: 4 tablets, Starting Fri 1/24/2025, Normal           CONTINUE these medications which have NOT CHANGED    Details   ALPRAZolam (XANAX) 0.5 mg tablet Take by mouth daily at bedtime as needed for anxiety, Historical Med      apixaban (Eliquis) 5 mg Take 1 tablet (5 mg total) by mouth 2 (two) times a day, Starting Mon 6/10/2024, Normal      atorvastatin (LIPITOR) 10 mg tablet Take 10 mg by mouth daily, Historical Med      citalopram (CeleXA) 40 mg tablet Take 40 mg by mouth daily , Historical Med      clindamycin (CLEOCIN) 2 % vaginal cream Historical Med      clobetasol (TEMOVATE) 0.05 % ointment Historical Med      clotrimazole-betamethasone (LOTRISONE) 1-0.05 % cream Historical Med      desonide (DESOWEN) 0.05 % cream Starting Fri 3/10/2023, Historical Med      dicyclomine (BENTYL) 10 mg capsule take 1 capsule by mouth twice a day if needed for abdominal pain and diarrhea, Normal      ketoconazole (NIZORAL) 2 % cream Apply 1 Application topically 2 (two) times a day as needed, Starting Thu 2/22/2024, Historical Med      levothyroxine 75 mcg tablet Take 75 mcg by mouth daily , Historical Med      magnesium oxide (MAG-OX) 400 mg tablet Take by mouth, Historical Med      metoprolol succinate (TOPROL-XL) 25 mg 24 hr tablet Take 1 tablet (25 mg total) by mouth daily, Starting Mon 6/10/2024, Until Thu 6/5/2025, Normal      metroNIDAZOLE (METROGEL) 1 % gel Historical Med      nystatin powder if needed, Starting Tue 10/11/2022, Historical Med      olmesartan-hydrochlorothiazide (BENICAR HCT) 40-25 MG per tablet Starting Tue 1/3/2023, Historical Med           No discharge procedures on file.  ED SEPSIS DOCUMENTATION   Time reflects when diagnosis was documented in both MDM as applicable and the  Disposition within this note       Time User Action Codes Description Comment    1/24/2025 11:53 AM Scott Elias Add [S80.01XA] Contusion of right knee, initial encounter                  Scott Elias PA-C  01/24/25 9672

## 2025-02-10 DIAGNOSIS — I10 ESSENTIAL HYPERTENSION: Primary | ICD-10-CM

## 2025-02-10 RX ORDER — OLMESARTAN MEDOXOMIL AND HYDROCHLOROTHIAZIDE 40/25 40; 25 MG/1; MG/1
TABLET ORAL
Status: CANCELLED | OUTPATIENT
Start: 2025-02-10

## 2025-02-10 NOTE — TELEPHONE ENCOUNTER
Medication: Benicar     Dose/Frequency: 40/25 mg daily    Quantity: 30    Pharmacy: [facility]    Office:   [] PCP/Provider -   [x] Speciality/Provider -     Does the patient have enough for 3 days?   [] Yes   [x] No - Send as HP to POD     Received call needs a short supply sent to [facility], [facility].    She is completely out

## 2025-02-11 RX ORDER — OLMESARTAN MEDOXOMIL AND HYDROCHLOROTHIAZIDE 40/25 40; 25 MG/1; MG/1
1 TABLET ORAL DAILY
Qty: 30 TABLET | Refills: 0 | Status: SHIPPED | OUTPATIENT
Start: 2025-02-11 | End: 2025-03-13

## 2025-03-09 ENCOUNTER — OFFICE VISIT (OUTPATIENT)
Dept: URGENT CARE | Facility: CLINIC | Age: 70
End: 2025-03-09
Payer: MEDICARE

## 2025-03-09 ENCOUNTER — APPOINTMENT (OUTPATIENT)
Dept: RADIOLOGY | Facility: CLINIC | Age: 70
End: 2025-03-09
Payer: MEDICARE

## 2025-03-09 VITALS
DIASTOLIC BLOOD PRESSURE: 74 MMHG | WEIGHT: 293 LBS | BODY MASS INDEX: 45.99 KG/M2 | TEMPERATURE: 99.4 F | OXYGEN SATURATION: 94 % | HEART RATE: 72 BPM | SYSTOLIC BLOOD PRESSURE: 130 MMHG | RESPIRATION RATE: 18 BRPM | HEIGHT: 67 IN

## 2025-03-09 DIAGNOSIS — M79.672 LEFT FOOT PAIN: ICD-10-CM

## 2025-03-09 DIAGNOSIS — M10.9 ACUTE GOUT INVOLVING TOE OF LEFT FOOT, UNSPECIFIED CAUSE: Primary | ICD-10-CM

## 2025-03-09 PROCEDURE — G0463 HOSPITAL OUTPT CLINIC VISIT: HCPCS

## 2025-03-09 PROCEDURE — 99213 OFFICE O/P EST LOW 20 MIN: CPT

## 2025-03-09 PROCEDURE — 73630 X-RAY EXAM OF FOOT: CPT

## 2025-03-09 RX ORDER — PREDNISONE 20 MG/1
40 TABLET ORAL DAILY
Qty: 10 TABLET | Refills: 0 | Status: SHIPPED | OUTPATIENT
Start: 2025-03-09 | End: 2025-03-14

## 2025-03-09 NOTE — PATIENT INSTRUCTIONS
Preliminary XR read negative, final read pending  Take oral steroids as prescribed  OTC Tylenol as needed for pain  Rest and Elevation  Follow up with Podiatry as scheduled   Follow up with PCP in 3-5 days.  Proceed to  ER if symptoms worsen.    If tests have been performed at [facility], our office will contact you with results if changes need to be made to the care plan discussed with you at the visit.  You can review your full results on [facility] MyChart.

## 2025-03-09 NOTE — PROGRESS NOTES
[facility]        NAME: Ramona Cabral is a 69 y.o. female  : 1955    MRN: [id number]  DATE: 2025  TIME: 2:13 PM    Assessment and Plan   Acute gout involving toe of left foot, unspecified cause [M10.9]  1. Acute gout involving toe of left foot, unspecified cause  XR foot 3+ vw left    predniSONE 20 mg tablet        Preliminary XR read negative for acute osseous abnormality, final read pending. Concern for acute gout flare and will treat with oral steroids. Encouraged continued supportive measures.  Follow up with Podiatry as scheduled. Follow up with PCP in 3-5 days or proceed to emergency department for worsening symptoms.  Patient verbalized understanding of instructions given.       Patient Instructions     Preliminary XR read negative, final read pending  Take oral steroids as prescribed  OTC Tylenol as needed for pain  Rest and Elevation  Follow up with Podiatry as scheduled   Follow up with PCP in 3-5 days.  Proceed to  ER if symptoms worsen.    If tests have been performed at [facility], our office will contact you with results if changes need to be made to the care plan discussed with you at the visit.  You can review your full results on [facility] MyChart.    Chief Complaint     Chief Complaint   Patient presents with    Foot Pain     Severe pain in left foot for 3 days. Denies any recent injury. Pt did fall on January and did injure left knee but did not have an xray          History of Present Illness       69-year-old female with a past medical history significant for hypertension presents with  for complaints of the left foot and great toe pain x 3 days.  Denies known injury or trauma.  Reports increased swelling as well as redness but no drainage or open wounds.  No fever, chills, or flulike symptoms.  Distant history of gout. She is scheduled to see Podiatry on Tuesday for right toe pain.         Review of Systems   Review of Systems   Constitutional:  Negative  for chills and fever.   HENT:  Negative for congestion, ear discharge, ear pain, rhinorrhea and sore throat.    Eyes:  Negative for discharge.   Respiratory:  Negative for cough, shortness of breath and wheezing.    Cardiovascular:  Negative for chest pain.   Gastrointestinal:  Negative for abdominal pain, diarrhea, nausea and vomiting.   Musculoskeletal:  Positive for arthralgias and joint swelling.   Skin:  Positive for color change. Negative for rash.   Neurological:  Negative for weakness and numbness.         Current Medications       Current Outpatient Medications:     ALPRAZolam (XANAX) 0.5 mg tablet, Take by mouth daily at bedtime as needed for anxiety, Disp: , Rfl:     apixaban (Eliquis) 5 mg, Take 1 tablet (5 mg total) by mouth 2 (two) times a day, Disp: 180 tablet, Rfl: 3    atorvastatin (LIPITOR) 10 mg tablet, Take 10 mg by mouth daily, Disp: , Rfl:     citalopram (CeleXA) 40 mg tablet, Take 40 mg by mouth daily , Disp: , Rfl:     clobetasol (TEMOVATE) 0.05 % ointment, , Disp: , Rfl:     clotrimazole-betamethasone (LOTRISONE) 1-0.05 % cream, , Disp: , Rfl:     desonide (DESOWEN) 0.05 % cream, , Disp: , Rfl:     dicyclomine (BENTYL) 10 mg capsule, take 1 capsule by mouth twice a day if needed for abdominal pain and diarrhea, Disp: 180 capsule, Rfl: 1    ketoconazole (NIZORAL) 2 % cream, Apply 1 Application topically 2 (two) times a day as needed, Disp: , Rfl:     levothyroxine 75 mcg tablet, Take 75 mcg by mouth daily , Disp: , Rfl:     magnesium oxide (MAG-OX) 400 mg tablet, Take by mouth, Disp: , Rfl:     metoprolol succinate (TOPROL-XL) 25 mg 24 hr tablet, Take 1 tablet (25 mg total) by mouth daily, Disp: 90 tablet, Rfl: 3    nystatin powder, if needed, Disp: , Rfl:     olmesartan-hydrochlorothiazide (BENICAR HCT) 40-25 MG per tablet, , Disp: , Rfl:     olmesartan-hydrochlorothiazide (BENICAR HCT) 40-25 MG per tablet, Take 1 tablet by mouth daily, Disp: 30 tablet, Rfl: 0    predniSONE 20 mg tablet,  "Take 2 tablets (40 mg total) by mouth daily for 5 days, Disp: 10 tablet, Rfl: 0    clindamycin (CLEOCIN) 2 % vaginal cream, , Disp: , Rfl:     Current Allergies     Allergies as of 2025 - Reviewed 2025   Allergen Reaction Noted    Flagyl [metronidazole] Shortness Of Breath 2022    Contrast [iodinated contrast media] Rash 2018    Iodine - food allergy Rash 2018            The following portions of the patient's history were reviewed and updated as appropriate: allergies, current medications, past family history, past medical history, past social history, past surgical history and problem list.     Past Medical History:   Diagnosis Date    Anxiety     Atrial fibrillation (HCC)     Disease of thyroid gland     Elevated d-dimer     2 seperate episodes of unclear etiology.    Fatty liver Unknown    GERD (gastroesophageal reflux disease) Unknown    Hyperlipidemia     Hypertension     Hypothyroid     Obesity     Paroxysmal atrial fibrillation (HCC)     New onset 2020.       Past Surgical History:   Procedure Laterality Date    CATARACT EXTRACTION, BILATERAL      COLONOSCOPY      HYSTERECTOMY      KNEE ARTHROPLASTY      SHOULDER OPEN ROTATOR CUFF REPAIR      x 2    TONSILLECTOMY      UPPER GASTROINTESTINAL ENDOSCOPY         Family History   Problem Relation Age of Onset    Hypertension Sister     Arthritis Sister     Heart disease Sister     Heart disease Paternal Aunt     Other Mother         Encephalitis    Other Father          in his 90s without significant problems    Arthritis Father     Hearing loss Father     Heart disease Father     Lung cancer Brother     Hypertension Brother     Cancer Brother     Diabetes Maternal Aunt     Diabetes Maternal Uncle          Medications have been verified.        Objective   /74   Pulse 72   Temp 99.4 °F (37.4 °C)   Resp 18   Ht 5' 7\" (1.702 m)   Wt 134 kg (296 lb)   LMP  (LMP Unknown)   SpO2 94%   BMI 46.36 kg/m²   No LMP " recorded (lmp unknown). Patient has had a hysterectomy.       Physical Exam     Physical Exam  Vitals and nursing note reviewed.   Constitutional:       General: She is not in acute distress.     Appearance: She is not toxic-appearing.   HENT:      Head: Normocephalic.   Eyes:      Conjunctiva/sclera: Conjunctivae normal.   Pulmonary:      Effort: Pulmonary effort is normal.   Musculoskeletal:         General: Swelling and tenderness present.      Left ankle: Normal.      Left foot: Decreased range of motion. Swelling, tenderness and bony tenderness present.      Comments: TTP over left great toe MTP joint with mild erythema and swelling    Skin:     General: Skin is warm and dry.      Findings: Erythema present.   Neurological:      Mental Status: She is alert and oriented to person, place, and time.      Sensory: Sensation is intact.      Motor: Motor function is intact.   Psychiatric:         Mood and Affect: Mood normal.         Behavior: Behavior normal.

## 2025-03-17 DIAGNOSIS — I10 ESSENTIAL HYPERTENSION: Primary | ICD-10-CM

## 2025-03-17 RX ORDER — OLMESARTAN MEDOXOMIL 40 MG/1
40 TABLET ORAL DAILY
Qty: 30 TABLET | Refills: 11 | Status: SHIPPED | OUTPATIENT
Start: 2025-03-17

## 2025-03-24 ENCOUNTER — OFFICE VISIT (OUTPATIENT)
Dept: SLEEP CENTER | Facility: CLINIC | Age: 70
End: 2025-03-24
Payer: MEDICARE

## 2025-03-24 VITALS
DIASTOLIC BLOOD PRESSURE: 68 MMHG | HEIGHT: 67 IN | WEIGHT: 286 LBS | TEMPERATURE: 98 F | OXYGEN SATURATION: 95 % | RESPIRATION RATE: 16 BRPM | SYSTOLIC BLOOD PRESSURE: 113 MMHG | HEART RATE: 60 BPM | BODY MASS INDEX: 44.89 KG/M2

## 2025-03-24 DIAGNOSIS — E66.01 MORBID OBESITY WITH BMI OF 40.0-44.9, ADULT (HCC): ICD-10-CM

## 2025-03-24 DIAGNOSIS — F43.9 STRESS AT HOME: ICD-10-CM

## 2025-03-24 DIAGNOSIS — J30.9 ALLERGIC RHINITIS WITH POSTNASAL DRIP: ICD-10-CM

## 2025-03-24 DIAGNOSIS — E66.2 OBESITY HYPOVENTILATION SYNDROME (HCC): ICD-10-CM

## 2025-03-24 DIAGNOSIS — R09.82 ALLERGIC RHINITIS WITH POSTNASAL DRIP: ICD-10-CM

## 2025-03-24 DIAGNOSIS — F32.A ANXIETY AND DEPRESSION: ICD-10-CM

## 2025-03-24 DIAGNOSIS — I48.0 PAROXYSMAL ATRIAL FIBRILLATION (HCC): ICD-10-CM

## 2025-03-24 DIAGNOSIS — I10 ESSENTIAL HYPERTENSION: ICD-10-CM

## 2025-03-24 DIAGNOSIS — R68.2 DRY MOUTH: ICD-10-CM

## 2025-03-24 DIAGNOSIS — G47.33 OBSTRUCTIVE SLEEP APNEA SYNDROME: Primary | ICD-10-CM

## 2025-03-24 DIAGNOSIS — F41.9 ANXIETY AND DEPRESSION: ICD-10-CM

## 2025-03-24 PROCEDURE — 99214 OFFICE O/P EST MOD 30 MIN: CPT | Performed by: INTERNAL MEDICINE

## 2025-03-24 PROCEDURE — G2211 COMPLEX E/M VISIT ADD ON: HCPCS | Performed by: INTERNAL MEDICINE

## 2025-03-24 RX ORDER — DIPHENOXYLATE HYDROCHLORIDE AND ATROPINE SULFATE 2.5; .025 MG/1; MG/1
1 TABLET ORAL 4 TIMES DAILY PRN
COMMUNITY
Start: 2025-03-06 | End: 2025-09-02

## 2025-03-24 RX ORDER — ONDANSETRON 4 MG/1
4 TABLET, FILM COATED ORAL EVERY 8 HOURS PRN
COMMUNITY
Start: 2025-03-09

## 2025-03-24 NOTE — PROGRESS NOTES
standardName: Ramona Cabral      : 1955      MRN: [id number]  Encounter Provider: Min Sheldon MD  Encounter Date: 3/24/2025   [facility]  :  Assessment & Plan  Obstructive sleep apnea syndrome    Orders:    PAP DME Pressure Change    PAP DME Resupply/Reorder    Obesity hypoventilation syndrome (HCC)         Stress at home         Anxiety and depression         Dry mouth         Allergic rhinitis with postnasal drip         Paroxysmal atrial fibrillation (HCC)         Essential hypertension         Morbid obesity with BMI of 40.0-44.9, adult (HCC)                  PLAN:   Problems & Comorbidities Addressed this Visit as listed.  Above conditions as reviewed in notes are improved/stable/controlled/resolved.  I reviewed results of prior studies and physiologic data with the patient.   I discussed treatment options with risks and benefits.  Treatment with  PAP is medically necessary and Ramona is agreable to continue use.   Care of equipment, methods to improve comfort using PAP and importance of compliance with therapy were discussed.  Pressure setting: change 10-12 cmH2O. Mask type nasal.    Rx provided to replace supplies and Care coordinated with DME provider.   Encouraged to persist with strategies for weight reduction.    Follow-up is advised in 1 year or sooner if needed to monitor progress, compliance and to adjust therapy.      History of Present Illness   HPI          Follow-Up Note - Sleep Center   Ramona Cabral  69 y.o. female  :1955  MRN:[id number]  DOS:3/24/2025    CC: I saw this patient for follow-up in clinic today for Sleep disordered breathing, Coexisting Sleep and Medical Problems.. Interval changes: None reported.      Home sleep testing in March of this year demonstrated : CAROLINE (respiratory event index of) 46.9 /hour.   Minimum oxygen saturation 81%  and 14.4 % of total sleep time was spent with saturations less than 90%.  The  snore index was 21.6%.   The subsequent titration study demonstrated sleep disordered breathing was successfully remediated with PAP at 9 cm H2O.     PFSH, Problem List, Medications & Allergies were reviewed in EMR.   She  has a past medical history of Anxiety, Atrial fibrillation (HCC), Disease of thyroid gland, Elevated d-dimer, Fatty liver (Unknown), GERD (gastroesophageal reflux disease) (Unknown), Hyperlipidemia, Hypertension, Hypothyroid, Obesity, and Paroxysmal atrial fibrillation (HCC).    She has a current medication list which includes the following prescription(s): alprazolam, apixaban, atorvastatin, citalopram, clindamycin, clobetasol, clotrimazole-betamethasone, desonide, diphenoxylate-atropine, ketoconazole, levothyroxine, magnesium oxide, metoprolol succinate, nystatin, olmesartan, ondansetron, and dicyclomine.    PHYSIOLOGICAL DATA REVIEW : Using PAP > 4 hours/night 83%. Estimated CAROLINE 6.4/hour with pressure of 10.9cm H2O@90th/95th percentile;.  INTERPRETATION: Compliance is Very good; Pressure setting is:in acceptable range; ;     SUBJECTIVE: With respect to use of PAP, Ramona  is experiencing minimal adverse effects: dry mouth/throat.She derives benefit.. Is satisfied with sleep and daytime function.     Sleep Routine: Ramona reports getting 7.5 hrs sleep; stress at home since her daughter passed away a month ago but recently having less difficulty initiating and maintaining sleep . She arises spontaneously and usually feels refreshed.Ramona]denies excessive daytime sleepiness,.  She rated herself at Total score: 4 /24 on the [place] Sleepiness Scale.   Other issues: None reported.     Habits: Reports that she has never smoked. She has never used smokeless tobacco.,  Reports that she does not currently use alcohol.,  Reports no history of drug use., Caffeine use: none until  ; Exercise routine: sometimes being limited by knee pain.      ROS: Significant for some recent weight reduction.   "Allergies have been controlled.  She has had no episodes of atrial fibrillation in the past 2 years but is still on anticoagulation.  Is on treatment for anxiety and depression..    Objective   /68 (BP Location: Left arm, Patient Position: Sitting, Cuff Size: Large)   Pulse 60   Temp 98 °F (36.7 °C) (Temporal)   Resp 16   Ht 5' 7\" (1.702 m)   Wt 130 kg (286 lb)   LMP  (LMP Unknown)   SpO2 95%   BMI 44.79 kg/m²        EXAM: /68 (BP Location: Left arm, Patient Position: Sitting, Cuff Size: Large)   Pulse 60   Temp 98 °F (36.7 °C) (Temporal)   Resp 16   Ht 5' 7\" (1.702 m)   Wt 130 kg (286 lb)   LMP  (LMP Unknown)   SpO2 95%   BMI 44.79 kg/m²     Wt Readings from Last 3 Encounters:   03/24/25 130 kg (286 lb)   03/09/25 134 kg (296 lb)   01/24/25 135 kg (296 lb 8.3 oz)      Patient is well groomed; well appearing.   CNS: Alert, orientated, speech clear & coherent  Psych: cooperative and in no distress. Mental state: Appears normal.  H&N: EOMI; NC/AT: No facial pressure marks, no rashes.    Skin/Extrem: col & hydration normal; no edema  Resp: Respiratory effort is normal  Physical findings otherwise essentially unchanged from previous.    Sincerely,     Authenticated electronically on 03/24/25   Board Certified Specialist     Portions of the record may have been created with voice recognition software. Occasional wrong word or \"sound a like\" substitutions may have occurred due to the inherent limitations of voice recognition software. There may also be notations and random deletions of words or characters from malfunctioning software. Read the chart carefully and recognize, using context, where substitutions/deletions have occurred.       Review of Systems            "

## 2025-03-24 NOTE — PATIENT INSTRUCTIONS
Nursing Support:  When: Monday through Friday 7:30A-4:30PM except holidays  Where: Our direct line is [phone]  *3  *1.      If you are having a true emergency please call 911.  In the event that the line is busy or it is after hours please leave a voice message and we will return your call.  Please speak clearly, leaving your full name, birth date, best number to reach you and the reason for your call.   Medication refills: We will need the name of the medication, the dosage, the ordering provider, whether you get a 30 or 90 day refill, and the pharmacy name and address.  Medications will be ordered by the provider only.  Nurses cannot call in prescriptions.  Please allow 7 days for medication refills.  Physician requested updates: If your provider requested that you call with an update after starting medication, please be ready to provide us the medication and dosage, what time you take your medication, the time you attempt to fall asleep, time you fall asleep, when you wake up, and what time you get out of bed.  Sleep Study Results: We will contact you with sleep study results and/or next steps after the physician has reviewed your testing.

## 2025-03-26 ENCOUNTER — TELEPHONE (OUTPATIENT)
Dept: SLEEP CENTER | Facility: CLINIC | Age: 70
End: 2025-03-26

## 2025-04-01 ENCOUNTER — TELEPHONE (OUTPATIENT)
Dept: SLEEP CENTER | Facility: CLINIC | Age: 70
End: 2025-04-01

## 2025-04-01 NOTE — TELEPHONE ENCOUNTER
Incoming call from Karli @ [facility] who states they received a request for PAP pressure change for patient. Have been unable to reach patient x 3. Patient needs to bring card into store for change to be made.     Call to patient home and cell phones left message on cell phone with call back number. Home number phone answered x 2 then hung up. VILOOPt message sent to patient as well.

## 2025-04-11 ENCOUNTER — APPOINTMENT (EMERGENCY)
Dept: CT IMAGING | Facility: HOSPITAL | Age: 70
End: 2025-04-11
Payer: MEDICARE

## 2025-04-11 ENCOUNTER — HOSPITAL ENCOUNTER (EMERGENCY)
Facility: HOSPITAL | Age: 70
Discharge: HOME | End: 2025-04-12
Attending: EMERGENCY MEDICINE
Payer: MEDICARE

## 2025-04-11 ENCOUNTER — APPOINTMENT (EMERGENCY)
Dept: RADIOLOGY | Facility: HOSPITAL | Age: 70
End: 2025-04-11
Payer: MEDICARE

## 2025-04-11 DIAGNOSIS — R51.9 NONINTRACTABLE HEADACHE, UNSPECIFIED CHRONICITY PATTERN, UNSPECIFIED HEADACHE TYPE: Primary | ICD-10-CM

## 2025-04-11 LAB
ALBUMIN SERPL BCG-MCNC: 4 G/DL (ref 3.5–5)
ALP SERPL-CCNC: 81 U/L (ref 34–104)
ALT SERPL W P-5'-P-CCNC: 22 U/L (ref 7–52)
ANION GAP SERPL CALCULATED.3IONS-SCNC: 8 MMOL/L (ref 4–13)
AST SERPL W P-5'-P-CCNC: 26 U/L (ref 13–39)
BASOPHILS # BLD AUTO: 0.05 THOUSANDS/ÂΜL (ref 0–0.1)
BASOPHILS NFR BLD AUTO: 1 % (ref 0–1)
BILIRUB SERPL-MCNC: 0.63 MG/DL (ref 0.2–1)
BNP SERPL-MCNC: 137 PG/ML (ref 0–100)
BUN SERPL-MCNC: 23 MG/DL (ref 5–25)
CALCIUM SERPL-MCNC: 9.4 MG/DL (ref 8.4–10.2)
CARDIAC TROPONIN I PNL SERPL HS: 3 NG/L (ref ?–50)
CHLORIDE SERPL-SCNC: 102 MMOL/L (ref 96–108)
CO2 SERPL-SCNC: 29 MMOL/L (ref 21–32)
CREAT SERPL-MCNC: 1.11 MG/DL (ref 0.6–1.3)
EOSINOPHIL # BLD AUTO: 0.13 THOUSAND/ÂΜL (ref 0–0.61)
EOSINOPHIL NFR BLD AUTO: 2 % (ref 0–6)
ERYTHROCYTE [DISTWIDTH] IN BLOOD BY AUTOMATED COUNT: 15 % (ref 11.6–15.1)
GFR SERPL CREATININE-BSD FRML MDRD: 50 ML/MIN/1.73SQ M
GLUCOSE SERPL-MCNC: 106 MG/DL (ref 65–140)
HCT VFR BLD AUTO: 36.9 % (ref 34.8–46.1)
HGB BLD-MCNC: 11.8 G/DL (ref 11.5–15.4)
IMM GRANULOCYTES # BLD AUTO: 0.03 THOUSAND/UL (ref 0–0.2)
IMM GRANULOCYTES NFR BLD AUTO: 0 % (ref 0–2)
LYMPHOCYTES # BLD AUTO: 1.55 THOUSANDS/ÂΜL (ref 0.6–4.47)
LYMPHOCYTES NFR BLD AUTO: 18 % (ref 14–44)
MCH RBC QN AUTO: 28.8 PG (ref 26.8–34.3)
MCHC RBC AUTO-ENTMCNC: 32 G/DL (ref 31.4–37.4)
MCV RBC AUTO: 90 FL (ref 82–98)
MONOCYTES # BLD AUTO: 0.71 THOUSAND/ÂΜL (ref 0.17–1.22)
MONOCYTES NFR BLD AUTO: 8 % (ref 4–12)
NEUTROPHILS # BLD AUTO: 6.09 THOUSANDS/ÂΜL (ref 1.85–7.62)
NEUTS SEG NFR BLD AUTO: 71 % (ref 43–75)
NRBC BLD AUTO-RTO: 0 /100 WBCS
PLATELET # BLD AUTO: 234 THOUSANDS/UL (ref 149–390)
PMV BLD AUTO: 9.7 FL (ref 8.9–12.7)
POTASSIUM SERPL-SCNC: 3.8 MMOL/L (ref 3.5–5.3)
PROT SERPL-MCNC: 7.5 G/DL (ref 6.4–8.4)
RBC # BLD AUTO: 4.1 MILLION/UL (ref 3.81–5.12)
SODIUM SERPL-SCNC: 139 MMOL/L (ref 135–147)
WBC # BLD AUTO: 8.56 THOUSAND/UL (ref 4.31–10.16)

## 2025-04-11 PROCEDURE — 93005 ELECTROCARDIOGRAM TRACING: CPT

## 2025-04-11 PROCEDURE — 83880 ASSAY OF NATRIURETIC PEPTIDE: CPT | Performed by: EMERGENCY MEDICINE

## 2025-04-11 PROCEDURE — 99284 EMERGENCY DEPT VISIT MOD MDM: CPT | Performed by: EMERGENCY MEDICINE

## 2025-04-11 PROCEDURE — 85025 COMPLETE CBC W/AUTO DIFF WBC: CPT | Performed by: EMERGENCY MEDICINE

## 2025-04-11 PROCEDURE — 84484 ASSAY OF TROPONIN QUANT: CPT | Performed by: EMERGENCY MEDICINE

## 2025-04-11 PROCEDURE — 99284 EMERGENCY DEPT VISIT MOD MDM: CPT

## 2025-04-11 PROCEDURE — 80053 COMPREHEN METABOLIC PANEL: CPT | Performed by: EMERGENCY MEDICINE

## 2025-04-11 PROCEDURE — 36415 COLL VENOUS BLD VENIPUNCTURE: CPT | Performed by: EMERGENCY MEDICINE

## 2025-04-11 PROCEDURE — 70450 CT HEAD/BRAIN W/O DYE: CPT

## 2025-04-11 PROCEDURE — 71045 X-RAY EXAM CHEST 1 VIEW: CPT

## 2025-04-11 RX ORDER — CLONIDINE HYDROCHLORIDE 0.1 MG/1
0.1 TABLET ORAL ONCE
Status: COMPLETED | OUTPATIENT
Start: 2025-04-11 | End: 2025-04-11

## 2025-04-11 RX ADMIN — CLONIDINE HYDROCHLORIDE 0.1 MG: 0.1 TABLET ORAL at 23:13

## 2025-04-11 NOTE — PROGRESS NOTES
Cardiology Follow Up    Ramona Cabral  1955  [id number]  [facility]  [address]  [place] [address] [phone]  [phone]    Ena presents for close follow up of HTN    1. Paroxysmal atrial fibrillation (HCC)  Assessment & Plan:  Patient with new onset atrial fibrillation 11/25/2020.  Patient presented to the emergency room after onset of “fluttering” and rapid heart rate.  She converted spontaneously to  normal sinus rhythm.  She has remained in normal sinus rhythm since and feels well.  [id number] Score 3   Eliquis 5 mg twice daily for stroke prophylaxis.   Home sleep study notable for severe sleep apnea being treated with CPAP.   48 hour Holter monitor 6/8/2022 showed NSR with 5 short AT runs.   Echocardiogram 4/3/2023 with preserved LVEF and no significant valvular abnormality.  Breakthrough a fib episode on 2/1/24.  ZIO 2/2024 shows SR with avg HR 55 bpm and no recurrent a fib.  Changed metoprolol succinate to 12.5 mg BID. Reviewed importance of mag/potassium replacement. Symptoms well controlled with this change.  2. Essential hypertension  Assessment & Plan:  Blood pressure is above goal.  Home BP cuff accuracy has been verified with excellent home readings.  HCTZ discontinued due to ANNETTE and dehydration.  Continue olmesartan 40 mg daily.  Add amlodipine 2.5mg daily and monitor.  Recent lab work shows normalization of renal function off HCTZ.  Orders:  -     amLODIPine (NORVASC) 2.5 mg tablet; Take 1 tablet (2.5 mg total) by mouth daily  3. Mixed hyperlipidemia  Assessment & Plan:  Lipid panel 2/6/2024: C 160. T 156. H 67. L 62.  Continue atorvastatin 10 mg daily.  LDL 81 on 1/2025 labs.  4. Obstructive sleep apnea syndrome  Assessment & Plan:  Home sleep study 12/2020 revealed severe sleep apnea  Compliant with CPAP therapy.  Following with [facility] medicine.  5. Stage 3a chronic kidney disease (HCC)  Assessment & Plan:  Lab Results   Component Value Date     "EGFR 50 04/11/2025    EGFR 45 (L) 03/06/2025    EGFR 47 (L) 02/19/2025    CREATININE 1.11 04/11/2025    CREATININE 1.28 (H) 03/06/2025    CREATININE 1.23 (H) 02/19/2025     Improving.  Off HCTZ  Working on BP management     HPI  Ena has a past medical history of paroxysmal atrial fibrillation, HTN, HLD, DENANA on CPAP, hypothyroidism, anxiety/depression, and obesity.     She initially presented to [facility] 11/25/2020 with chest pain, palpitations, and shortness of breath. Found to be in atrial fibrillation with RVR, rate 118 bpm. CTA ruled out PE. She was treated with IV Lopressor and Cardiazem and spontaneously converted to NSR. She was evaluated by Dr. Rojo during admission and ultimately discharged home on metoprolol succinate 25 mg daily and Eliquis 5 mg BID. Her TSH was mildly elevated and levothyroxine increased to 100 mch from 88 mcg during admission. Magnesium supplementation was initiated. She was under significant stress at the time with a recent cancer diagnosis for her spouse. He had just returned home from a complicated bowel surgery.     Echo 12/2/2020 was unremarkable.   Nuclear stress test 12/2/20 showed no scar or ischemia with EF 73%.     She completed a sleep study 3/4/2021 which was positive for DEANNA. She was referred to sleep medicine and underwent in-lab study 4/8/2021 and CPAP therapy was initiated.      Echocardiogram 4/3/2023 showed preserved LVEF with no concerning findings.      She was evaluated at [facility] ER 2/1/2024 when she presented via EMS due to chest pain and heart racing which started overnight. She took her metoprolol at 6 am. Upon ER arrival her ECG showed SR at 71 bpm. Lab work was unrevealing other than magnesium of 1.8. Chest xray WNL. She was given IV mag 2 g and ultimately discharged home. She followed up with me at which time she reported no recurrent symptoms today. She states she did not feel palpitations but did feel \"unsettled\" while her HR was irregular and rapid. She " "denies any missed doses of metoprolol. She states she had been out of her magnesium supplement for about 1 week and questions if this caused breakthrough A fib. She has been wearing her CPAP faithfully. Her metoprolol was changed to 12.5 mg BID and an updated ZIO was ordered.     ZIO showed sinus rhythm, HR , avg 55 bpm. 12 runs of SVT, longest 11.6 seconds with avg  bpm. Rare PAC's and rare PVC's. No atrial fibrillation detected.     4/14/2025: Ena presents for evaluation of HTN. She had reached out to me via the patient portal in March with concerns for progressive CKD with creatinine 1.2 and eGFR 47. She had been seen at [facility]ER with a fall where she struck her head and required sutures. She was noted to be \"dehydrated at that time as well. She denies syncope/near syncope and reports that she slipped on ice. We opted to discontinue her HCTZ and monitor BP. She was seen at [facility] ER 4/11/2025 with headache. BP was 185/89. She was given a dose of clonidine 0.1 which improved her BP and she was discharged home. She states BP is staying around 140-150's/80's. Repeat blood work shows interval improvement in her renal function. She is tearful today. She tells me her daughter passed away from cancer in February.     Medical Problems       Problem List       Anxiety and depression    Obesity hypoventilation syndrome (HCC)    Reactive airway disease    Shortness of breath    Paroxysmal atrial fibrillation (HCC)    Essential hypertension    Hyperlipidemia    Hypothyroidism due to Hashimoto's thyroiditis    Obstructive sleep apnea syndrome    Class 3 obesity    Post-COVID chronic dyspnea    History of COVID-19    Stage 3a chronic kidney disease (HCC)        Past Medical History:   Diagnosis Date    Anxiety     Atrial fibrillation (HCC)     Disease of thyroid gland     Elevated d-dimer     2 seperate episodes of unclear etiology.    Fatty liver Unknown    GERD (gastroesophageal reflux disease) Unknown    " Hyperlipidemia     Hypertension     Hypothyroid     Obesity     Paroxysmal atrial fibrillation (HCC)     New onset 11/25/2020.     Social History     Socioeconomic History    Marital status: /Civil Union     Spouse name: Not on file    Number of children: Not on file    Years of education: Not on file    Highest education level: Not on file   Occupational History    Occupation: Retired teacher   Tobacco Use    Smoking status: Never    Smokeless tobacco: Never   Vaping Use    Vaping status: Never Used   Substance and Sexual Activity    Alcohol use: Not Currently    Drug use: Never    Sexual activity: Not Currently     Partners: Male   Other Topics Concern    Not on file   Social History Narrative    Not on file     Social Drivers of Health     Financial Resource Strain: Not At Risk (3/5/2025)    Received from [facility]    Financial Insecurity     In the last 12 months did you skip medications to save money?: No     In the last 12 months was there a time when you needed to see a doctor but could not because of cost?: No   Food Insecurity: No Food Insecurity (3/5/2025)    Received from [facility]    Food Insecurity     In the last 12 months did you ever eat less than you felt you should because there wasn't enough money for food?: No   Transportation Needs: No Transportation Needs (3/5/2025)    Received from [facility]    Transportation Needs     In the last 12 months have you ever had to go without healthcare because you didn't have a way to get there?: No   Physical Activity: Not on file   Stress: Stress Concern Present (3/10/2024)    Received from [facility], [facility]    [employer] [place] [employer] - Occupational Stress Questionnaire     Feeling of Stress : Very much   Social Connections: Socially Isolated (3/5/2025)    Received from [facility]    Social Connection     Do you often feel  lonely?: Yes   Intimate Partner Violence: Not At Risk (3/10/2024)    [facility]    Humiliation, Afraid, Rape, and Kick questionnaire     Fear of Current or Ex-Partner: No     Emotionally Abused: No     Physically Abused: No     Sexually Abused: No   Housing Stability: Not At Risk (3/5/2025)    Received from [facility]    Housing Stability     Are you worried that in the next 2 months you may not have stable housing?: No      Family History   Problem Relation Age of Onset    Hypertension Sister     Arthritis Sister     Heart disease Sister     Heart disease Paternal Aunt     Other Mother         Encephalitis    Other Father          in his 90s without significant problems    Arthritis Father     Hearing loss Father     Heart disease Father     Lung cancer Brother     Hypertension Brother     Cancer Brother     Diabetes Maternal Aunt     Diabetes Maternal Uncle      Past Surgical History:   Procedure Laterality Date    CATARACT EXTRACTION, BILATERAL      COLONOSCOPY      HYSTERECTOMY      KNEE ARTHROPLASTY      SHOULDER OPEN ROTATOR CUFF REPAIR      x 2    TONSILLECTOMY      UPPER GASTROINTESTINAL ENDOSCOPY         Current Outpatient Medications:     ALPRAZolam (XANAX) 0.5 mg tablet, Take by mouth daily at bedtime as needed for anxiety, Disp: , Rfl:     amLODIPine (NORVASC) 2.5 mg tablet, Take 1 tablet (2.5 mg total) by mouth daily, Disp: 30 tablet, Rfl: 3    apixaban (Eliquis) 5 mg, Take 1 tablet (5 mg total) by mouth 2 (two) times a day, Disp: 180 tablet, Rfl: 3    atorvastatin (LIPITOR) 10 mg tablet, Take 10 mg by mouth daily, Disp: , Rfl:     citalopram (CeleXA) 40 mg tablet, Take 40 mg by mouth daily , Disp: , Rfl:     clindamycin (CLEOCIN) 2 % vaginal cream, , Disp: , Rfl:     clobetasol (TEMOVATE) 0.05 % ointment, , Disp: , Rfl:     clotrimazole-betamethasone (LOTRISONE) 1-0.05 % cream, , Disp: ,  Rfl:     desonide (DESOWEN) 0.05 % cream, , Disp: , Rfl:     diphenoxylate-atropine (LOMOTIL) 2.5-0.025 mg per tablet, Take 1 tablet by mouth 4 (four) times a day as needed, Disp: , Rfl:     ketoconazole (NIZORAL) 2 % cream, Apply 1 Application topically 2 (two) times a day as needed, Disp: , Rfl:     levothyroxine 75 mcg tablet, Take 75 mcg by mouth daily , Disp: , Rfl:     magnesium oxide (MAG-OX) 400 mg tablet, Take by mouth, Disp: , Rfl:     metoprolol succinate (TOPROL-XL) 25 mg 24 hr tablet, Take 1 tablet (25 mg total) by mouth daily, Disp: 90 tablet, Rfl: 3    nystatin powder, if needed, Disp: , Rfl:     olmesartan (BENICAR) 40 mg tablet, Take 1 tablet (40 mg total) by mouth daily, Disp: 30 tablet, Rfl: 11    ondansetron (ZOFRAN) 4 mg tablet, Take 4 mg by mouth every 8 (eight) hours as needed for nausea, Disp: , Rfl:     dicyclomine (BENTYL) 10 mg capsule, take 1 capsule by mouth twice a day if needed for abdominal pain and diarrhea (Patient not taking: Reported on 4/14/2025), Disp: 180 capsule, Rfl: 1  Allergies   Allergen Reactions    Flagyl [Metronidazole] Shortness Of Breath     Pt requested addition to allergies    Contrast [Iodinated Contrast Media] Rash    Iodine - Food Allergy Rash       Labs:     Chemistry        Component Value Date/Time     11/06/2015 0655    K 3.8 04/11/2025 2312    K 4.3 03/06/2025 1252     04/11/2025 2312     03/06/2025 1252    CO2 29 04/11/2025 2312    CO2 27 03/06/2025 1252    BUN 23 04/11/2025 2312    BUN 28 (H) 03/06/2025 1252    CREATININE 1.11 04/11/2025 2312    CREATININE 1.28 (H) 03/06/2025 1252        Component Value Date/Time    CALCIUM 9.4 04/11/2025 2312    CALCIUM 9.6 03/06/2025 1252    ALKPHOS 81 04/11/2025 2312    ALKPHOS 76 02/02/2025 2154    AST 26 04/11/2025 2312    AST 23 02/02/2025 2154    ALT 22 04/11/2025 2312    ALT 18 02/02/2025 2154    BILITOT 0.34 11/06/2015 0655        Lipid panel 1/24/2025: C 165. T 102. H 64. L 81.   Lipid panel  2/6/2024: C 160. T 156.  H 67. L 62.      Cardiac Test Results:   ECG 4/11/2025: Normal sinus rhythm. Normal ECG. Rate 61 bpm.     ZIO 2/13-2/26/2024:  Sinus rhythm, HR , avg 55 bpm.  12 runs of SVT, longest 11.6 seconds with avg  bpm.  Rare PAC's/PVC's.     ECG 2/6/2024: Normal sinus rhythm. Normal ECG. Rate 61 bpm.     Echocardiogram 4/3/2023:  EF 70%. Mild LVH.      ECG 3/24/2023: Sinus bradycardia. Rate 59 bpm with no significant change from prior.     ECG 10/19/2022: Sinus bradycardia. Otherwise normal ECG.      Holter monitor (48 hours) 6/8/2022:   Min HR 47. Max HR 98. Avg HR 60.   Rare PVC's. Rare PAC's.   5 brief atrial runs with the longest lasting 5 beats.   Palpitations correlated with PAC's/PVC's and atrial runs.      ECG 6/7/2022: Sinus bradycardia. 58 bpm.      Lipid panel 12/13/2021: C 176. T 177. H 65. L 76.      ECG 11/25/2020:  Atrial flutter/atrial fibrillation at 118 bpm.     Echocardiogram 12/02/2020:  EF 60%.  Normal diastolic function.  Mild annular calcification of the mitral valve.  Trace tricuspid regurgitation.     Lexiscan nuclear stress test 12/02/2020:  No evidence of myocardial scar.  No evidence of myocardial ischemia.  EF 73%.     Lipid panel 10/13/2020: C 166. T 154. H 64. L 71.      Echocardiogram 11/05/2015:  EF 55-60%.  Mild tricuspid regurgitation.  Estimated PASP 38 mmHg.     Review of Systems   Constitutional: Negative.   HENT: Negative.     Cardiovascular:  Negative for chest pain, dyspnea on exertion, irregular heartbeat, leg swelling, near-syncope, orthopnea and palpitations.   Respiratory:  Negative for cough and snoring.    Endocrine: Negative.    Skin: Negative.    Musculoskeletal: Negative.    Gastrointestinal: Negative.    Genitourinary: Negative.    Neurological:  Positive for headaches.   Psychiatric/Behavioral:  The patient is nervous/anxious.        Vitals:    04/14/25 1442   BP: 152/80   Pulse: 66   Temp: (!) 97.2 °F (36.2 °C)   SpO2: 95%     Vitals:  "   04/14/25 1442   Weight: 128 kg (282 lb)     Height: 5' 7\" (170.2 cm)   Body mass index is 44.17 kg/m².    Physical Exam  Vitals and nursing note reviewed.   Constitutional:       General: She is not in acute distress.     Appearance: She is well-developed. She is obese. She is not diaphoretic.   HENT:      Head: Normocephalic and atraumatic.   Neck:      Thyroid: No thyromegaly.      Vascular: No carotid bruit or JVD.   Cardiovascular:      Rate and Rhythm: Normal rate and regular rhythm.      Pulses: Intact distal pulses.           Radial pulses are 2+ on the right side and 2+ on the left side.      Heart sounds: Normal heart sounds, S1 normal and S2 normal. No murmur heard.     Comments: No edema  Pulmonary:      Effort: Pulmonary effort is normal.      Breath sounds: Normal breath sounds.   Abdominal:      General: There is no distension.      Palpations: Abdomen is soft.      Tenderness: There is no abdominal tenderness.   Musculoskeletal:         General: Normal range of motion.      Cervical back: Normal range of motion and neck supple.   Lymphadenopathy:      Cervical: No cervical adenopathy.   Skin:     General: Skin is warm and dry.   Neurological:      Mental Status: She is alert and oriented to person, place, and time.      Cranial Nerves: No cranial nerve deficit.   Psychiatric:         Behavior: Behavior normal.                "

## 2025-04-12 VITALS
OXYGEN SATURATION: 93 % | TEMPERATURE: 98.3 F | HEART RATE: 59 BPM | BODY MASS INDEX: 44.26 KG/M2 | WEIGHT: 282 LBS | DIASTOLIC BLOOD PRESSURE: 59 MMHG | HEIGHT: 67 IN | RESPIRATION RATE: 20 BRPM | SYSTOLIC BLOOD PRESSURE: 113 MMHG

## 2025-04-12 PROCEDURE — 96374 THER/PROPH/DIAG INJ IV PUSH: CPT

## 2025-04-12 RX ORDER — METOCLOPRAMIDE HYDROCHLORIDE 5 MG/ML
10 INJECTION INTRAMUSCULAR; INTRAVENOUS ONCE
Status: COMPLETED | OUTPATIENT
Start: 2025-04-12 | End: 2025-04-12

## 2025-04-12 RX ADMIN — METOCLOPRAMIDE HYDROCHLORIDE 10 MG: 5 INJECTION INTRAMUSCULAR; INTRAVENOUS at 02:05

## 2025-04-12 NOTE — DISCHARGE INSTRUCTIONS
You were seen in the emergency department for headache and elevated blood pressure, your CT scan and blood work are unremarkable, please follow-up with your primary care doctor in 24 to 48 hours for reassessment.  If your symptoms worsen or persist, please return to the emergency department immediately.

## 2025-04-12 NOTE — ED PROVIDER NOTES
Time reflects when diagnosis was documented in both MDM as applicable and the Disposition within this note       Time User Action Codes Description Comment    4/12/2025  1:44 AM Acharya Herman Add [R51.9] Nonintractable headache, unspecified chronicity pattern, unspecified headache type           ED Disposition       ED Disposition   Discharge    Condition   Stable    Date/Time   Sat Apr 12, 2025  1:44 AM    Comment   Ramona H [place] discharge to home/self care.                   Assessment & Plan       Medical Decision Making  69-year-old female presents to the emergency department for evaluation of headache, and hypertension, differential diagnosis include intracranial hemorrhage, subdural, epidural, hypertensive urgency, hypertensive emergency, posttraumatic headache, hypertensive headache, tension headache, will perform CT scan of head, blood pressure management, giving patient 0.1 mg of clonidine, basic labs, and reassess, if CT scan unremarkable, and pain is uncontrolled, will give patient migraine cocktail.  If workup unremarkable, and patient is medically clear for discharge, will have her follow-up with primary care doctor outpatient for medication management for her blood pressure.    Discussed all results with the patient.  She will follow-up with her primary care doctor outpatient.  Strict return precautions given.  Patient understands and agrees with treatment plan.    Amount and/or Complexity of Data Reviewed  Labs: ordered.  Radiology: ordered.    Risk  Prescription drug management.             Medications   cloNIDine (CATAPRES) tablet 0.1 mg (0.1 mg Oral Given 4/11/25 2313)   metoclopramide (REGLAN) injection 10 mg (10 mg Intravenous Given 4/12/25 0205)       ED Risk Strat Scores                    No data recorded        SBIRT 20yo+      Flowsheet Row Most Recent Value   Initial Alcohol Screen: US AUDIT-C     1. How often do you have a drink containing alcohol? 0 Filed at: 04/11/2025 4110   2.  How many drinks containing alcohol do you have on a typical day you are drinking?  0 Filed at: 2025   3b. FEMALE Any Age, or MALE 65+: How often do you have 4 or more drinks on one occassion? 0 Filed at: 2025   Audit-C Score 0 Filed at: 2025   CLEVE: How many times in the past year have you...    Used an illegal drug or used a prescription medication for non-medical reasons? Never Filed at: 2025                            History of Present Illness       Chief Complaint   Patient presents with    Headache     Pt having high blood pressures for several days. Ongoing headaches since a fall 3/26. Pt states she also had a major fall in January.        Past Medical History:   Diagnosis Date    Anxiety     Atrial fibrillation (HCC)     Disease of thyroid gland     Elevated d-dimer     2 seperate episodes of unclear etiology.    Fatty liver Unknown    GERD (gastroesophageal reflux disease) Unknown    Hyperlipidemia     Hypertension     Hypothyroid     Obesity     Paroxysmal atrial fibrillation (HCC)     New onset 2020.      Past Surgical History:   Procedure Laterality Date    CATARACT EXTRACTION, BILATERAL      COLONOSCOPY      HYSTERECTOMY      KNEE ARTHROPLASTY      SHOULDER OPEN ROTATOR CUFF REPAIR      x 2    TONSILLECTOMY      UPPER GASTROINTESTINAL ENDOSCOPY        Family History   Problem Relation Age of Onset    Hypertension Sister     Arthritis Sister     Heart disease Sister     Heart disease Paternal Aunt     Other Mother         Encephalitis    Other Father          in his 90s without significant problems    Arthritis Father     Hearing loss Father     Heart disease Father     Lung cancer Brother     Hypertension Brother     Cancer Brother     Diabetes Maternal Aunt     Diabetes Maternal Uncle       Social History     Tobacco Use    Smoking status: Never    Smokeless tobacco: Never   Vaping Use    Vaping status: Never Used   Substance Use Topics    Alcohol  use: Not Currently    Drug use: Never      E-Cigarette/Vaping    E-Cigarette Use Never User       E-Cigarette/Vaping Substances    Nicotine No     THC No     CBD No     Flavoring No     Other No     Unknown No       I have reviewed and agree with the history as documented.     69-year-old female with past medical history of anxiety, A-fib, GERD, hypertension, hyperlipidemia, on Eliquis, presents to the emergency department for evaluation of headaches going on for the past couple of weeks, patient states that in mid March, she had a fall in the bathtub, hit her head, did not get evaluated, patient states over the past couple weeks, her blood pressure has been elevated in the 180s, she is also had this dull headache which has been persistent, and not going away.  Patient denies any visual disturbances, photophobia, facial droop, slurring of her words, focal weakness, or numbness.  Patient denies any chest pain, shortness of breath, GI or  complaints.      Headache  Associated symptoms: no abdominal pain, no back pain, no cough, no ear pain, no eye pain, no fever, no seizures, no sore throat and no vomiting        Review of Systems   Constitutional:  Negative for chills and fever.   HENT:  Negative for ear pain and sore throat.    Eyes:  Negative for pain and visual disturbance.   Respiratory:  Negative for cough and shortness of breath.    Cardiovascular:  Negative for chest pain and palpitations.   Gastrointestinal:  Negative for abdominal pain and vomiting.   Genitourinary:  Negative for dysuria and hematuria.   Musculoskeletal:  Negative for arthralgias and back pain.   Skin:  Negative for color change and rash.   Neurological:  Positive for headaches. Negative for seizures and syncope.   All other systems reviewed and are negative.          Objective       ED Triage Vitals   Temperature Pulse Blood Pressure Respirations SpO2 Patient Position - Orthostatic VS   04/11/25 2151 04/11/25 2151 04/11/25 2152 04/11/25  2151 04/11/25 2151 04/11/25 2151   98.3 °F (36.8 °C) 68 (!) 185/89 17 99 % Sitting      Temp Source Heart Rate Source BP Location FiO2 (%) Pain Score    04/11/25 2151 04/11/25 2151 04/11/25 2151 -- --    Temporal Monitor Left arm        Vitals      Date and Time Temp Pulse SpO2 Resp BP Pain Score FACES Pain Rating User   04/12/25 0230 -- 59 93 % 20 113/59 -- -- SR   04/12/25 0145 -- 59 95 % -- 128/59 -- -- SR   04/11/25 2345 -- 55 97 % 23 159/70 -- -- SR   04/11/25 2313 -- -- -- -- 153/67 -- -- SR   04/11/25 2152 -- -- -- -- 185/89 -- -- RG   04/11/25 2151 98.3 °F (36.8 °C) 68 99 % 17 -- -- -- RG            Physical Exam  Vitals and nursing note reviewed.   Constitutional:       General: She is not in acute distress.     Appearance: She is well-developed.   HENT:      Head: Normocephalic and atraumatic.   Eyes:      Conjunctiva/sclera: Conjunctivae normal.   Cardiovascular:      Rate and Rhythm: Normal rate and regular rhythm.      Heart sounds: No murmur heard.  Pulmonary:      Effort: Pulmonary effort is normal. No respiratory distress.      Breath sounds: Normal breath sounds.   Abdominal:      Palpations: Abdomen is soft.      Tenderness: There is no abdominal tenderness.   Musculoskeletal:         General: No swelling.      Cervical back: Neck supple.   Skin:     General: Skin is warm and dry.      Capillary Refill: Capillary refill takes less than 2 seconds.   Neurological:      General: No focal deficit present.      Mental Status: She is alert and oriented to person, place, and time.      Cranial Nerves: No cranial nerve deficit.      Sensory: No sensory deficit.      Motor: No weakness.      Coordination: Coordination normal.   Psychiatric:         Mood and Affect: Mood normal.         Results Reviewed       Procedure Component Value Units Date/Time    HS Troponin I 4hr [[id number]]     Lab Status: No result Specimen: Blood     B-Type Natriuretic Peptide(BNP) [[id number]]  (Abnormal) Collected: 04/11/25 2312     Lab Status: Final result Specimen: Blood from Arm, Right Updated: 04/11/25 2358      pg/mL     HS Troponin I 2hr [[id number]]     Lab Status: No result Specimen: Blood     HS Troponin 0hr (reflex protocol) [[id number]]  (Normal) Collected: 04/11/25 2312    Lab Status: Final result Specimen: Blood from Arm, Right Updated: 04/11/25 2346     hs TnI 0hr 3 ng/L     Comprehensive metabolic panel [[id number]] Collected: 04/11/25 2312    Lab Status: Final result Specimen: Blood from Arm, Right Updated: 04/11/25 2344     Sodium 139 mmol/L      Potassium 3.8 mmol/L      Chloride 102 mmol/L      CO2 29 mmol/L      ANION GAP 8 mmol/L      BUN 23 mg/dL      Creatinine 1.11 mg/dL      Glucose 106 mg/dL      Calcium 9.4 mg/dL      AST 26 U/L      ALT 22 U/L      Alkaline Phosphatase 81 U/L      Total Protein 7.5 g/dL      Albumin 4.0 g/dL      Total Bilirubin 0.63 mg/dL      eGFR 50 ml/min/1.73sq m     Narrative:      National Kid[employer] guidelines for Chronic Kidney Disease (CKD):     Stage 1 with normal or high GFR (GFR > 90 mL/min/1.73 square meters)    Stage 2 Mild CKD (GFR = 60-89 mL/min/1.73 square meters)    Stage 3A Moderate CKD (GFR = 45-59 mL/min/1.73 square meters)    Stage 3B Moderate CKD (GFR = 30-44 mL/min/1.73 square meters)    Stage 4 Severe CKD (GFR = 15-29 mL/min/1.73 square meters)    Stage 5 End Stage CKD (GFR <15 mL/min/1.73 square meters)  Note: GFR calculation is accurate only with a steady state creatinine    CBC and differential [[id number]] Collected: 04/11/25 2312    Lab Status: Final result Specimen: Blood from Arm, Right Updated: 04/11/25 2319     WBC 8.56 Thousand/uL      RBC 4.10 Million/uL      Hemoglobin 11.8 g/dL      Hematocrit 36.9 %      MCV 90 fL      MCH 28.8 pg      MCHC 32.0 g/dL      RDW 15.0 %      MPV 9.7 fL      Platelets 234 Thousands/uL      nRBC 0 /100 WBCs      Segmented % 71 %      Immature Grans % 0 %      Lymphocytes % 18 %      Monocytes % 8 %       Eosinophils Relative 2 %      Basophils Relative 1 %      Absolute Neutrophils 6.09 Thousands/µL      Absolute Immature Grans 0.03 Thousand/uL      Absolute Lymphocytes 1.55 Thousands/µL      Absolute Monocytes 0.71 Thousand/µL      Eosinophils Absolute 0.13 Thousand/µL      Basophils Absolute 0.05 Thousands/µL             CT head without contrast   Final Interpretation by Eric Barajas MD (04/12 0112)      No acute intracranial abnormality.                  Workstation performed: [id number]         X-ray chest 1 view portable    (Results Pending)       Procedures    ED Medication and Procedure Management   Prior to Admission Medications   Prescriptions Last Dose Informant Patient Reported? Taking?   ALPRAZolam (XANAX) 0.5 mg tablet  Self Yes No   Sig: Take by mouth daily at bedtime as needed for anxiety   apixaban (Eliquis) 5 mg  Self No No   Sig: Take 1 tablet (5 mg total) by mouth 2 (two) times a day   atorvastatin (LIPITOR) 10 mg tablet  Self Yes No   Sig: Take 10 mg by mouth daily   citalopram (CeleXA) 40 mg tablet  Self Yes No   Sig: Take 40 mg by mouth daily    clindamycin (CLEOCIN) 2 % vaginal cream  Self Yes No   clobetasol (TEMOVATE) 0.05 % ointment  Self Yes No   clotrimazole-betamethasone (LOTRISONE) 1-0.05 % cream  Self Yes No   desonide (DESOWEN) 0.05 % cream  Self Yes No   dicyclomine (BENTYL) 10 mg capsule  Self No No   Sig: take 1 capsule by mouth twice a day if needed for abdominal pain and diarrhea   Patient not taking: Reported on 3/24/2025   diphenoxylate-atropine (LOMOTIL) 2.5-0.025 mg per tablet  Self Yes No   Sig: Take 1 tablet by mouth 4 (four) times a day as needed   ketoconazole (NIZORAL) 2 % cream  Self Yes No   Sig: Apply 1 Application topically 2 (two) times a day as needed   levothyroxine 75 mcg tablet  Self Yes No   Sig: Take 75 mcg by mouth daily    magnesium oxide (MAG-OX) 400 mg tablet  Self Yes No   Sig: Take by mouth   metoprolol succinate (TOPROL-XL) 25 mg 24 hr tablet  Self No No    Sig: Take 1 tablet (25 mg total) by mouth daily   nystatin powder  Self Yes No   Sig: if needed   olmesartan (BENICAR) 40 mg tablet  Self No No   Sig: Take 1 tablet (40 mg total) by mouth daily   ondansetron (ZOFRAN) 4 mg tablet  Self Yes No   Sig: Take 4 mg by mouth every 8 (eight) hours as needed for nausea      Facility-Administered Medications: None     Discharge Medication List as of 4/12/2025  1:44 AM        CONTINUE these medications which have NOT CHANGED    Details   ALPRAZolam (XANAX) 0.5 mg tablet Take by mouth daily at bedtime as needed for anxiety, Historical Med      apixaban (Eliquis) 5 mg Take 1 tablet (5 mg total) by mouth 2 (two) times a day, Starting Mon 6/10/2024, Normal      atorvastatin (LIPITOR) 10 mg tablet Take 10 mg by mouth daily, Historical Med      citalopram (CeleXA) 40 mg tablet Take 40 mg by mouth daily , Historical Med      clindamycin (CLEOCIN) 2 % vaginal cream Historical Med      clobetasol (TEMOVATE) 0.05 % ointment Historical Med      clotrimazole-betamethasone (LOTRISONE) 1-0.05 % cream Historical Med      desonide (DESOWEN) 0.05 % cream Starting Fri 3/10/2023, Historical Med      dicyclomine (BENTYL) 10 mg capsule take 1 capsule by mouth twice a day if needed for abdominal pain and diarrhea, Normal      diphenoxylate-atropine (LOMOTIL) 2.5-0.025 mg per tablet Take 1 tablet by mouth 4 (four) times a day as needed, Starting Thu 3/6/2025, Until Tue 9/2/2025 at 2359, Historical Med      ketoconazole (NIZORAL) 2 % cream Apply 1 Application topically 2 (two) times a day as needed, Starting Thu 2/22/2024, Historical Med      levothyroxine 75 mcg tablet Take 75 mcg by mouth daily , Historical Med      magnesium oxide (MAG-OX) 400 mg tablet Take by mouth, Historical Med      metoprolol succinate (TOPROL-XL) 25 mg 24 hr tablet Take 1 tablet (25 mg total) by mouth daily, Starting Mon 6/10/2024, Until Thu 6/5/2025, Normal      nystatin powder if needed, Starting Tue 10/11/2022,  Historical Med      olmesartan (BENICAR) 40 mg tablet Take 1 tablet (40 mg total) by mouth daily, Starting Mon 3/17/2025, Normal      ondansetron (ZOFRAN) 4 mg tablet Take 4 mg by mouth every 8 (eight) hours as needed for nausea, Starting Sun 3/9/2025, Historical Med           No discharge procedures on file.  ED SEPSIS DOCUMENTATION   Time reflects when diagnosis was documented in both MDM as applicable and the Disposition within this note       Time User Action Codes Description Comment    4/12/2025  1:44 AM Herman Acharya Add [R51.9] Nonintractable headache, unspecified chronicity pattern, unspecified headache type                  Herman Acharya,   04/12/25 7563

## 2025-04-14 ENCOUNTER — OFFICE VISIT (OUTPATIENT)
Dept: CARDIOLOGY CLINIC | Facility: CLINIC | Age: 70
End: 2025-04-14
Payer: MEDICARE

## 2025-04-14 VITALS
HEIGHT: 67 IN | BODY MASS INDEX: 44.26 KG/M2 | SYSTOLIC BLOOD PRESSURE: 152 MMHG | HEART RATE: 66 BPM | TEMPERATURE: 97.2 F | DIASTOLIC BLOOD PRESSURE: 80 MMHG | WEIGHT: 282 LBS | OXYGEN SATURATION: 95 %

## 2025-04-14 DIAGNOSIS — I10 ESSENTIAL HYPERTENSION: ICD-10-CM

## 2025-04-14 DIAGNOSIS — I48.0 PAROXYSMAL ATRIAL FIBRILLATION (HCC): Primary | ICD-10-CM

## 2025-04-14 DIAGNOSIS — E78.2 MIXED HYPERLIPIDEMIA: ICD-10-CM

## 2025-04-14 DIAGNOSIS — G47.33 OBSTRUCTIVE SLEEP APNEA SYNDROME: ICD-10-CM

## 2025-04-14 DIAGNOSIS — N18.31 STAGE 3A CHRONIC KIDNEY DISEASE (HCC): ICD-10-CM

## 2025-04-14 LAB
ATRIAL RATE: 61 BPM
P AXIS: 65 DEGREES
PR INTERVAL: 158 MS
QRS AXIS: 13 DEGREES
QRSD INTERVAL: 98 MS
QT INTERVAL: 456 MS
QTC INTERVAL: 459 MS
T WAVE AXIS: 67 DEGREES
VENTRICULAR RATE: 61 BPM

## 2025-04-14 PROCEDURE — 99214 OFFICE O/P EST MOD 30 MIN: CPT | Performed by: NURSE PRACTITIONER

## 2025-04-14 RX ORDER — AMLODIPINE BESYLATE 2.5 MG/1
2.5 TABLET ORAL DAILY
Qty: 30 TABLET | Refills: 3 | Status: SHIPPED | OUTPATIENT
Start: 2025-04-14

## 2025-04-14 NOTE — PATIENT INSTRUCTIONS
Your blood pressure is mildly elevated.  Add amlodipine 2.5 mg daily in the morning with your olmesartan.  Send me a portal message next week with your blood pressure readings.  Check BP once daily max at least 2 hours after you have taken your morning medication. Be sitting for about 10 minutes before you check with feet flat on the floor and arm resting at waist height.   If you develop leg swelling on this  medication let me know.

## 2025-04-15 NOTE — ASSESSMENT & PLAN NOTE
Home sleep study 12/2020 revealed severe sleep apnea  Compliant with CPAP therapy.  Following with [facility] sleep medicine.

## 2025-04-15 NOTE — ASSESSMENT & PLAN NOTE
Lipid panel 2/6/2024: C 160. T 156. H 67. L 62.  Continue atorvastatin 10 mg daily.  LDL 81 on 1/2025 labs.

## 2025-04-15 NOTE — ASSESSMENT & PLAN NOTE
Lab Results   Component Value Date    EGFR 50 04/11/2025    EGFR 45 (L) 03/06/2025    EGFR 47 (L) 02/19/2025    CREATININE 1.11 04/11/2025    CREATININE 1.28 (H) 03/06/2025    CREATININE 1.23 (H) 02/19/2025     Improving.  Off HCTZ  Working on BP management

## 2025-04-15 NOTE — ASSESSMENT & PLAN NOTE
Blood pressure is above goal.  Home BP cuff accuracy has been verified with excellent home readings.  HCTZ discontinued due to ANNETTE and dehydration.  Continue olmesartan 40 mg daily.  Add amlodipine 2.5mg daily and monitor.  Recent lab work shows normalization of renal function off HCTZ.

## 2025-04-15 NOTE — ASSESSMENT & PLAN NOTE
Patient with new onset atrial fibrillation 11/25/2020.  Patient presented to the emergency room after onset of “fluttering” and rapid heart rate.  She converted spontaneously to  normal sinus rhythm.  She has remained in normal sinus rhythm since and feels well.  [id number] Score 3   Eliquis 5 mg twice daily for stroke prophylaxis.   Home sleep study notable for severe sleep apnea being treated with CPAP.   48 hour Holter monitor 6/8/2022 showed NSR with 5 short AT runs.   Echocardiogram 4/3/2023 with preserved LVEF and no significant valvular abnormality.  Breakthrough a fib episode on 2/1/24.  ZIO 2/2024 shows SR with avg HR 55 bpm and no recurrent a fib.  Changed metoprolol succinate to 12.5 mg BID. Reviewed importance of mag/potassium replacement. Symptoms well controlled with this change.

## 2025-05-14 ENCOUNTER — HOSPITAL ENCOUNTER (EMERGENCY)
Facility: HOSPITAL | Age: 70
Discharge: HOME | End: 2025-05-14
Attending: EMERGENCY MEDICINE
Payer: MEDICARE

## 2025-05-14 ENCOUNTER — APPOINTMENT (EMERGENCY)
Dept: CT IMAGING | Facility: HOSPITAL | Age: 70
End: 2025-05-14
Payer: MEDICARE

## 2025-05-14 VITALS
HEIGHT: 67 IN | SYSTOLIC BLOOD PRESSURE: 121 MMHG | WEIGHT: 276.68 LBS | BODY MASS INDEX: 43.43 KG/M2 | DIASTOLIC BLOOD PRESSURE: 58 MMHG | TEMPERATURE: 98.7 F | OXYGEN SATURATION: 98 % | HEART RATE: 52 BPM | RESPIRATION RATE: 16 BRPM

## 2025-05-14 DIAGNOSIS — R19.7 DIARRHEA, UNSPECIFIED TYPE: Primary | ICD-10-CM

## 2025-05-14 DIAGNOSIS — R10.84 GENERALIZED ABDOMINAL PAIN: ICD-10-CM

## 2025-05-14 DIAGNOSIS — E86.0 DEHYDRATION: ICD-10-CM

## 2025-05-14 LAB
ALBUMIN SERPL BCG-MCNC: 4.2 G/DL (ref 3.5–5)
ALP SERPL-CCNC: 77 U/L (ref 34–104)
ALT SERPL W P-5'-P-CCNC: 25 U/L (ref 7–52)
ANION GAP SERPL CALCULATED.3IONS-SCNC: 8 MMOL/L (ref 4–13)
AST SERPL W P-5'-P-CCNC: 29 U/L (ref 13–39)
BASOPHILS # BLD AUTO: 0.01 THOUSANDS/ÂΜL (ref 0–0.1)
BASOPHILS NFR BLD AUTO: 0 % (ref 0–1)
BILIRUB SERPL-MCNC: 0.75 MG/DL (ref 0.2–1)
BUN SERPL-MCNC: 26 MG/DL (ref 5–25)
C COLI+JEJUNI TUF STL QL NAA+PROBE: NEGATIVE
C DIFF TOX GENS STL QL NAA+PROBE: NEGATIVE
CALCIUM SERPL-MCNC: 9.2 MG/DL (ref 8.4–10.2)
CHLORIDE SERPL-SCNC: 109 MMOL/L (ref 96–108)
CO2 SERPL-SCNC: 21 MMOL/L (ref 21–32)
CREAT SERPL-MCNC: 1.45 MG/DL (ref 0.6–1.3)
EC STX1+STX2 GENES STL QL NAA+PROBE: NEGATIVE
EOSINOPHIL # BLD AUTO: 0.18 THOUSAND/ÂΜL (ref 0–0.61)
EOSINOPHIL NFR BLD AUTO: 3 % (ref 0–6)
ERYTHROCYTE [DISTWIDTH] IN BLOOD BY AUTOMATED COUNT: 15.5 % (ref 11.6–15.1)
FLUAV AG UPPER RESP QL IA.RAPID: NEGATIVE
FLUBV AG UPPER RESP QL IA.RAPID: NEGATIVE
GFR SERPL CREATININE-BSD FRML MDRD: 36 ML/MIN/1.73SQ M
GLUCOSE SERPL-MCNC: 106 MG/DL (ref 65–140)
HCT VFR BLD AUTO: 39.2 % (ref 34.8–46.1)
HGB BLD-MCNC: 12.4 G/DL (ref 11.5–15.4)
IMM GRANULOCYTES # BLD AUTO: 0.02 THOUSAND/UL (ref 0–0.2)
IMM GRANULOCYTES NFR BLD AUTO: 0 % (ref 0–2)
LACTATE SERPL-SCNC: 1.4 MMOL/L (ref 0.5–2)
LIPASE SERPL-CCNC: 20 U/L (ref 11–82)
LYMPHOCYTES # BLD AUTO: 1.32 THOUSANDS/ÂΜL (ref 0.6–4.47)
LYMPHOCYTES NFR BLD AUTO: 22 % (ref 14–44)
MCH RBC QN AUTO: 28.9 PG (ref 26.8–34.3)
MCHC RBC AUTO-ENTMCNC: 31.6 G/DL (ref 31.4–37.4)
MCV RBC AUTO: 91 FL (ref 82–98)
MONOCYTES # BLD AUTO: 0.57 THOUSAND/ÂΜL (ref 0.17–1.22)
MONOCYTES NFR BLD AUTO: 9 % (ref 4–12)
NEUTROPHILS # BLD AUTO: 3.94 THOUSANDS/ÂΜL (ref 1.85–7.62)
NEUTS SEG NFR BLD AUTO: 66 % (ref 43–75)
NRBC BLD AUTO-RTO: 0 /100 WBCS
PLATELET # BLD AUTO: 249 THOUSANDS/UL (ref 149–390)
PMV BLD AUTO: 10 FL (ref 8.9–12.7)
POTASSIUM SERPL-SCNC: 3.9 MMOL/L (ref 3.5–5.3)
PROT SERPL-MCNC: 7.6 G/DL (ref 6.4–8.4)
RBC # BLD AUTO: 4.29 MILLION/UL (ref 3.81–5.12)
SALMONELLA SP SPAO STL QL NAA+PROBE: NEGATIVE
SARS-COV+SARS-COV-2 AG RESP QL IA.RAPID: NEGATIVE
SHIGELLA SP+EIEC IPAH STL QL NAA+PROBE: NEGATIVE
SODIUM SERPL-SCNC: 138 MMOL/L (ref 135–147)
WBC # BLD AUTO: 6.04 THOUSAND/UL (ref 4.31–10.16)

## 2025-05-14 PROCEDURE — 74176 CT ABD & PELVIS W/O CONTRAST: CPT

## 2025-05-14 PROCEDURE — 99284 EMERGENCY DEPT VISIT MOD MDM: CPT | Performed by: EMERGENCY MEDICINE

## 2025-05-14 PROCEDURE — 96361 HYDRATE IV INFUSION ADD-ON: CPT

## 2025-05-14 PROCEDURE — 87505 NFCT AGENT DETECTION GI: CPT | Performed by: EMERGENCY MEDICINE

## 2025-05-14 PROCEDURE — 83605 ASSAY OF LACTIC ACID: CPT | Performed by: EMERGENCY MEDICINE

## 2025-05-14 PROCEDURE — 87804 INFLUENZA ASSAY W/OPTIC: CPT | Performed by: EMERGENCY MEDICINE

## 2025-05-14 PROCEDURE — 36415 COLL VENOUS BLD VENIPUNCTURE: CPT | Performed by: EMERGENCY MEDICINE

## 2025-05-14 PROCEDURE — 85025 COMPLETE CBC W/AUTO DIFF WBC: CPT | Performed by: EMERGENCY MEDICINE

## 2025-05-14 PROCEDURE — 96365 THER/PROPH/DIAG IV INF INIT: CPT

## 2025-05-14 PROCEDURE — 80053 COMPREHEN METABOLIC PANEL: CPT | Performed by: EMERGENCY MEDICINE

## 2025-05-14 PROCEDURE — 83690 ASSAY OF LIPASE: CPT | Performed by: EMERGENCY MEDICINE

## 2025-05-14 PROCEDURE — 87811 SARS-COV-2 COVID19 W/OPTIC: CPT | Performed by: EMERGENCY MEDICINE

## 2025-05-14 PROCEDURE — 99284 EMERGENCY DEPT VISIT MOD MDM: CPT

## 2025-05-14 RX ORDER — LOPERAMIDE HYDROCHLORIDE 2 MG/1
2 CAPSULE ORAL ONCE
Status: COMPLETED | OUTPATIENT
Start: 2025-05-14 | End: 2025-05-14

## 2025-05-14 RX ORDER — SODIUM CHLORIDE, SODIUM GLUCONATE, SODIUM ACETATE, POTASSIUM CHLORIDE, MAGNESIUM CHLORIDE, SODIUM PHOSPHATE, DIBASIC, AND POTASSIUM PHOSPHATE .53; .5; .37; .037; .03; .012; .00082 G/100ML; G/100ML; G/100ML; G/100ML; G/100ML; G/100ML; G/100ML
1000 INJECTION, SOLUTION INTRAVENOUS ONCE
Status: COMPLETED | OUTPATIENT
Start: 2025-05-14 | End: 2025-05-14

## 2025-05-14 RX ORDER — DICYCLOMINE HCL 20 MG
20 TABLET ORAL EVERY 6 HOURS PRN
Qty: 40 TABLET | Refills: 0 | Status: SHIPPED | OUTPATIENT
Start: 2025-05-14

## 2025-05-14 RX ORDER — DICYCLOMINE HCL 20 MG
20 TABLET ORAL ONCE
Status: COMPLETED | OUTPATIENT
Start: 2025-05-14 | End: 2025-05-14

## 2025-05-14 RX ORDER — LOPERAMIDE HYDROCHLORIDE 2 MG/1
2 CAPSULE ORAL 4 TIMES DAILY PRN
Qty: 12 CAPSULE | Refills: 0 | Status: SHIPPED | OUTPATIENT
Start: 2025-05-14

## 2025-05-14 RX ADMIN — DICYCLOMINE HYDROCHLORIDE 20 MG: 20 TABLET ORAL at 07:02

## 2025-05-14 RX ADMIN — SODIUM CHLORIDE, SODIUM GLUCONATE, SODIUM ACETATE, POTASSIUM CHLORIDE, MAGNESIUM CHLORIDE, SODIUM PHOSPHATE, DIBASIC, AND POTASSIUM PHOSPHATE 1000 ML: .53; .5; .37; .037; .03; .012; .00082 INJECTION, SOLUTION INTRAVENOUS at 07:02

## 2025-05-14 RX ADMIN — LOPERAMIDE HYDROCHLORIDE 2 MG: 2 CAPSULE ORAL at 05:46

## 2025-05-14 RX ADMIN — SODIUM CHLORIDE 1000 ML: 0.9 INJECTION, SOLUTION INTRAVENOUS at 05:45

## 2025-05-14 NOTE — DISCHARGE INSTRUCTIONS
Follow-up with primary care for repeat lab work, reassessment, review of labs from ED visit.  Take medications as prescribed discussed.  Take Imodium for diarrheal control, take Bentyl/dicyclomine for abdominal cramping.  Be cautious as both medications can slow gastrointestinal motility, potentially resulting in constipation with overuse.  Stay well-hydrated, get plenty of rest.  Return to the ED for any new or worsening symptoms or concerns.

## 2025-05-14 NOTE — ED NOTES
Pt providing urine and stool sample w/o assistance in the bathroom     Blanca Esqueda RN  05/14/25 0663

## 2025-05-14 NOTE — ED CARE HANDOFF
Emergency Department Sign Out Note        Sign out and transfer of care from Dr. Gonzales. See Separate Emergency Department note.     The patient, Ramona Cabral, was evaluated by the previous provider for diarrhea, abdominal pain.    Workup Completed:  Labs    ED Course / Workup Pending (followup):  69 F. Presents with diarrhea, abdominal pain. Labs, imaging are pending. Disposition per workup, re-evaluation. Stool studies are pending.     CT imaging with signs of diarrheal disease.  Patient feeling better status post ED treatments.  Stool studies are pending.  Stable for discharge.    CT abdomen pelvis wo contrast   Final Result      1.  Fluid and scattered air-fluid levels within portions of the colon are compatible with reported diarrheal state. No bowel inflammation or bowel obstruction however. Colonic diverticulosis, without diverticulitis.   2.  Hepatic steatosis and mild hepatomegaly.   3.  Stable 12 mm peripherally calcified distal splenic artery aneurysm.      Workstation performed: [id number]                   No data recorded         Procedures  Medical Decision Making  Amount and/or Complexity of Data Reviewed  Labs: ordered.  Radiology: ordered.    Risk  Prescription drug management.            Disposition  Final diagnoses:   Diarrhea, unspecified type   Dehydration   Generalized abdominal pain     Time reflects when diagnosis was documented in both MDM as applicable and the Disposition within this note       Time User Action Codes Description Comment    5/14/2025  6:04 AM Eugene Gonzales Add [R19.7] Diarrhea, unspecified type     5/14/2025  6:58 AM Eugene Gonzales Add [E86.0] Dehydration     5/14/2025  7:05 AM Eugene Gonzales Add [R10.84] Generalized abdominal pain           ED Disposition       ED Disposition   Discharge    Condition   Stable    Date/Time   Wed May 14, 2025  8:02 AM    Comment   Ramona Cabral discharge to home/self care.                   Follow-up Information       Follow up  With Specialties Details Why Contact Info Additional Information    Evelia Gillespie, DO    [address] [facility] [address]  [phone]       [facility] Emergency Department Emergency Medicine  As needed, If symptoms worsen [address] [facility] [id number] [phone] [facility] Emergency Department, [address] [place], [place], [address]          Discharge Medication List as of 5/14/2025  7:08 AM        START taking these medications    Details   dicyclomine (BENTYL) 20 mg tablet Take 1 tablet (20 mg total) by mouth every 6 (six) hours as needed (abdominal crampin), Starting Wed 5/14/2025, Normal      loperamide (IMODIUM) 2 mg capsule Take 1 capsule (2 mg total) by mouth 4 (four) times a day as needed for diarrhea, Starting Wed 5/14/2025, Normal           CONTINUE these medications which have NOT CHANGED    Details   ALPRAZolam (XANAX) 0.5 mg tablet Take by mouth daily at bedtime as needed for anxiety, Historical Med      amLODIPine (NORVASC) 2.5 mg tablet Take 1 tablet (2.5 mg total) by mouth daily, Starting Mon 4/14/2025, Normal      apixaban (Eliquis) 5 mg Take 1 tablet (5 mg total) by mouth 2 (two) times a day, Starting Mon 6/10/2024, Normal      atorvastatin (LIPITOR) 10 mg tablet Take 10 mg by mouth daily, Historical Med      citalopram (CeleXA) 40 mg tablet Take 40 mg by mouth daily , Historical Med      clindamycin (CLEOCIN) 2 % vaginal cream Historical Med      clobetasol (TEMOVATE) 0.05 % ointment Historical Med      clotrimazole-betamethasone (LOTRISONE) 1-0.05 % cream Historical Med      desonide (DESOWEN) 0.05 % cream Starting Fri 3/10/2023, Historical Med      diphenoxylate-atropine (LOMOTIL) 2.5-0.025 mg per tablet Take 1 tablet by mouth 4 (four) times a day as needed, Starting Thu 3/6/2025, Until Tue 9/2/2025 at 2359, Historical Med      ketoconazole (NIZORAL) 2 % cream Apply 1 Application topically 2 (two) times a day as  needed, Starting Thu 2/22/2024, Historical Med      levothyroxine 75 mcg tablet Take 75 mcg by mouth daily , Historical Med      magnesium oxide (MAG-OX) 400 mg tablet Take by mouth, Historical Med      metoprolol succinate (TOPROL-XL) 25 mg 24 hr tablet Take 1 tablet (25 mg total) by mouth daily, Starting Mon 6/10/2024, Until Thu 6/5/2025, Normal      nystatin powder if needed, Starting Tue 10/11/2022, Historical Med      olmesartan (BENICAR) 40 mg tablet Take 1 tablet (40 mg total) by mouth daily, Starting Mon 3/17/2025, Normal      ondansetron (ZOFRAN) 4 mg tablet Take 4 mg by mouth every 8 (eight) hours as needed for nausea, Starting Sun 3/9/2025, Historical Med           No discharge procedures on file.       ED Provider  Electronically Signed by     Pernell Masters DO  05/14/25 0136

## 2025-05-14 NOTE — ED PROVIDER NOTES
Time reflects when diagnosis was documented in both MDM as applicable and the Disposition within this note       Time User Action Codes Description Comment    5/14/2025  6:04 AM Eugene Gonzales [R19.7] Diarrhea, unspecified type     5/14/2025  6:58 AM Eugene Gonzales [E86.0] Dehydration     5/14/2025  7:05 AM Eugene Gonzales [R10.84] Generalized abdominal pain           ED Disposition       None          Assessment & Plan       Medical Decision Making  Patient seen and evaluated for her presentation as outlined.  Patient at risk for viral gastrointestinal illness, gastroenteritis of other etiology, bowel obstruction, acute cholecystitis, acute appendicitis, diverticulitis, urinary tract infection, dehydration, electrolyte disturbance, organ dysfunction, other.  Medicated with Imodium and IV fluids.  Workup initiated as shown.    Amount and/or Complexity of Data Reviewed  Labs: ordered.  Radiology: ordered.    Risk  Prescription drug management.        ED Course as of 05/14/25 0709   Wed May 14, 2025   0655 Patient reassessed after initial blood work returned, CT resulted.  Very slight acute kidney injury noted, suspected to be due to dehydration.  No concerning electrolyte abnormalities.  CBC normal.  CT scan shows fluid-filled loops of bowel consistent with diarrheal state, no signs of obstruction or other acute process.  Patient states that she feels subjectively worse, with worsening abdominal cramping.  Dicyclomine and additional IV fluids ordered.  Anticipate that patient will be stable for discharge from the emergency department.  Will disposition as anticipated discharge, contingent upon improved symptoms after medication and additional fluids.       Medications   multi-electrolyte (Plasmalyte-A/Isolyte-S PH 7.4/Normosol-R) IV bolus 1,000 mL (1,000 mL Intravenous New Bag 5/14/25 0702)   sodium chloride 0.9 % bolus 1,000 mL (0 mL Intravenous Stopped 5/14/25 0701)   loperamide (IMODIUM) capsule 2 mg (2  mg Oral Given 25 0546)   dicyclomine (BENTYL) tablet 20 mg (20 mg Oral Given 25 0702)       ED Risk Strat Scores                    No data recorded        SBIRT 22yo+      Flowsheet Row Most Recent Value   Initial Alcohol Screen: US AUDIT-C     1. How often do you have a drink containing alcohol? 0 Filed at: 2025   2. How many drinks containing alcohol do you have on a typical day you are drinking?  0 Filed at: 2025   3b. FEMALE Any Age, or MALE 65+: How often do you have 4 or more drinks on one occassion? 0 Filed at: 2025   Audit-C Score 0 Filed at: 2025   CLEVE: How many times in the past year have you...    Used an illegal drug or used a prescription medication for non-medical reasons? Never Filed at: 2025                            History of Present Illness       Chief Complaint   Patient presents with    Diarrhea     Starting on Saturday patient c/o severe diarrhea. She said that it is on and off but just isn't getting better. This morning she woke up and it was green and she got worried.       Past Medical History:   Diagnosis Date    Anxiety     Atrial fibrillation (HCC)     Disease of thyroid gland     Elevated d-dimer     2 seperate episodes of unclear etiology.    Fatty liver Unknown    GERD (gastroesophageal reflux disease) Unknown    Hyperlipidemia     Hypertension     Hypothyroid     Obesity     Paroxysmal atrial fibrillation (HCC)     New onset 2020.      Past Surgical History:   Procedure Laterality Date    CATARACT EXTRACTION, BILATERAL      COLONOSCOPY      HYSTERECTOMY      KNEE ARTHROPLASTY      SHOULDER OPEN ROTATOR CUFF REPAIR      x 2    TONSILLECTOMY      UPPER GASTROINTESTINAL ENDOSCOPY        Family History   Problem Relation Age of Onset    Hypertension Sister     Arthritis Sister     Heart disease Sister     Heart disease Paternal Aunt     Other Mother         Encephalitis    Other Father          in his 90s  without significant problems    Arthritis Father     Hearing loss Father     Heart disease Father     Lung cancer Brother     Hypertension Brother     Cancer Brother     Diabetes Maternal Aunt     Diabetes Maternal Uncle       Social History[1]   E-Cigarette/Vaping    E-Cigarette Use Never User       E-Cigarette/Vaping Substances    Nicotine No     THC No     CBD No     Flavoring No     Other No     Unknown No       I have reviewed and agree with the history as documented.     Patient presents to the emergency department accompanied by her  for evaluation of diarrhea and abdominal discomfort.  Patient states that she started having diarrhea on Saturday night as well as some generalized abdominal pain.  Her symptoms persisted through Sunday, worsened on Monday.  She took some leftover Lomotil from a prior prescription.  She had some improvement on Tuesday, and tried drinking Gatorade and chicken noodle soup.  She woke up this morning and had several more episodes of diarrhea.  Her diarrhea this morning was green in color, which raised concern for her and prompted her ED visit.  She denies any black or tarry stool.  Denies any fevers, chills, nausea, or vomiting.  Describes feeling very fatigued.  Denies any known sick contacts.  No recent antibiotic use.  History of hysterectomy, otherwise no prior abdominal surgeries.  No other complaints, modifying factors, or associated symptoms.        Review of Systems   All other systems reviewed and are negative.          Objective       ED Triage Vitals [05/14/25 0519]   Temperature Pulse Blood Pressure Respirations SpO2 Patient Position - Orthostatic VS   98.7 °F (37.1 °C) 59 148/67 18 97 % Lying      Temp Source Heart Rate Source BP Location FiO2 (%) Pain Score    Temporal Monitor Left arm -- No Pain      Vitals      Date and Time Temp Pulse SpO2 Resp BP Pain Score FACES Pain Rating User   05/14/25 0632 -- 56 100 % 18 133/63 -- -- MD   05/14/25 0519 98.7 °F (37.1 °C)  59 97 % 18 148/67 No Pain -- AR            Physical Exam  Vitals and nursing note reviewed.   Constitutional:       General: She is not in acute distress.     Appearance: Normal appearance. She is well-developed.   HENT:      Head: Normocephalic and atraumatic.     Eyes:      Conjunctiva/sclera: Conjunctivae normal.       Cardiovascular:      Rate and Rhythm: Normal rate and regular rhythm.      Pulses: Normal pulses.      Heart sounds: Normal heart sounds. No murmur heard.     No friction rub. No gallop.   Pulmonary:      Effort: Pulmonary effort is normal. No respiratory distress.      Breath sounds: Normal breath sounds. No wheezing, rhonchi or rales.   Abdominal:      General: There is no distension.      Palpations: Abdomen is soft.      Tenderness: There is generalized abdominal tenderness and tenderness in the right upper quadrant and right lower quadrant. There is no guarding or rebound.     Musculoskeletal:         General: No swelling. Normal range of motion.      Cervical back: Neck supple.     Skin:     General: Skin is warm and dry.      Capillary Refill: Capillary refill takes less than 2 seconds.     Neurological:      General: No focal deficit present.      Mental Status: She is alert and oriented to person, place, and time.     Psychiatric:         Mood and Affect: Mood normal.         Behavior: Behavior normal.         Results Reviewed       Procedure Component Value Units Date/Time    Stool Enteric Bacterial Panel by PCR [[id number]] Collected: 05/14/25 0634    Lab Status: In process Specimen: Stool from Per Rectum Updated: 05/14/25 0639    Clostridium difficile toxin by PCR with EIA [[id number]] Collected: 05/14/25 0634    Lab Status: In process Specimen: Stool from Per Rectum Updated: 05/14/25 0639    Comprehensive metabolic panel [[id number]]  (Abnormal) Collected: 05/14/25 0547    Lab Status: Final result Specimen: Blood from Arm, Right Updated: 05/14/25 0621     Sodium 138 mmol/L      Potassium  3.9 mmol/L      Chloride 109 mmol/L      CO2 21 mmol/L      ANION GAP 8 mmol/L      BUN 26 mg/dL      Creatinine 1.45 mg/dL      Glucose 106 mg/dL      Calcium 9.2 mg/dL      AST 29 U/L      ALT 25 U/L      Alkaline Phosphatase 77 U/L      Total Protein 7.6 g/dL      Albumin 4.2 g/dL      Total Bilirubin 0.75 mg/dL      eGFR 36 ml/min/1.73sq m     Narrative:      National Kid[employer] Disease Foundation guidelines for Chronic Kidney Disease (CKD):     Stage 1 with normal or high GFR (GFR > 90 mL/min/1.73 square meters)    Stage 2 Mild CKD (GFR = 60-89 mL/min/1.73 square meters)    Stage 3A Moderate CKD (GFR = 45-59 mL/min/1.73 square meters)    Stage 3B Moderate CKD (GFR = 30-44 mL/min/1.73 square meters)    Stage 4 Severe CKD (GFR = 15-29 mL/min/1.73 square meters)    Stage 5 End Stage CKD (GFR <15 mL/min/1.73 square meters)  Note: GFR calculation is accurate only with a steady state creatinine    Lipase [[id number]]  (Normal) Collected: 05/14/25 0547    Lab Status: Final result Specimen: Blood from Arm, Right Updated: 05/14/25 0621     Lipase 20 u/L     Lactic acid, plasma (w/reflex if result > 2.0) [[id number]]  (Normal) Collected: 05/14/25 0547    Lab Status: Final result Specimen: Blood from Arm, Right Updated: 05/14/25 0621     LACTIC ACID 1.4 mmol/L     Narrative:      Result may be elevated if tourniquet was used during collection.    FLU/COVID Rapid Antigen (30 min. TAT) - Preferred screening test in ED [[id number]]  (Normal) Collected: 05/14/25 0547    Lab Status: Final result Specimen: Nares from Nose Updated: 05/14/25 0615     SARS COV Rapid Antigen Negative     Influenza A Rapid Antigen Negative     Influenza B Rapid Antigen Negative    Narrative:      This test has been performed using the needmade [employer]naz 2 FLU+SARS Antigen test under the Emergency Use Authorization (EUA). This test has been validated by the  and verified by the performing laboratory. The Mahnaz uses lateral flow immunofluorescent  sandwich assay to detect SARS-COV, Influenza A and Influenza B Antigen.     The Quidel Mahnaz 2 SARS Antigen test does not differentiate between SARS-CoV and SARS-CoV-2.     Negative results are presumptive and may be confirmed with a molecular assay, if necessary, for patient management. Negative results do not rule out SARS-CoV-2 or influenza infection and should not be used as the sole basis for treatment or patient management decisions. A negative test result may occur if the level of antigen in a sample is below the limit of detection of this test.     Positive results are indicative of the presence of viral antigens, but do not rule out bacterial infection or co-infection with other viruses.     All test results should be used as an adjunct to clinical observations and other information available to the provider.    FOR PEDIATRIC PATIENTS - copy/paste COVID Guidelines URL to browser: [email or web address]    CBC and differential [[id number]]  (Abnormal) Collected: 05/14/25 0547    Lab Status: Final result Specimen: Blood from Arm, Right Updated: 05/14/25 0559     WBC 6.04 Thousand/uL      RBC 4.29 Million/uL      Hemoglobin 12.4 g/dL      Hematocrit 39.2 %      MCV 91 fL      MCH 28.9 pg      MCHC 31.6 g/dL      RDW 15.5 %      MPV 10.0 fL      Platelets 249 Thousands/uL      nRBC 0 /100 WBCs      Segmented % 66 %      Immature Grans % 0 %      Lymphocytes % 22 %      Monocytes % 9 %      Eosinophils Relative 3 %      Basophils Relative 0 %      Absolute Neutrophils 3.94 Thousands/µL      Absolute Immature Grans 0.02 Thousand/uL      Absolute Lymphocytes 1.32 Thousands/µL      Absolute Monocytes 0.57 Thousand/µL      Eosinophils Absolute 0.18 Thousand/µL      Basophils Absolute 0.01 Thousands/µL             CT abdomen pelvis wo contrast   Final Interpretation by Sherman Mera MD (05/14 0644)      1.  Fluid and scattered air-fluid levels within portions of  the colon are compatible with reported diarrheal state. No bowel inflammation or bowel obstruction however. Colonic diverticulosis, without diverticulitis.   2.  Hepatic steatosis and mild hepatomegaly.   3.  Stable 12 mm peripherally calcified distal splenic artery aneurysm.      Workstation performed: [id number]             Procedures    ED Medication and Procedure Management   Prior to Admission Medications   Prescriptions Last Dose Informant Patient Reported? Taking?   ALPRAZolam (XANAX) 0.5 mg tablet  Self Yes No   Sig: Take by mouth daily at bedtime as needed for anxiety   amLODIPine (NORVASC) 2.5 mg tablet   No No   Sig: Take 1 tablet (2.5 mg total) by mouth daily   apixaban (Eliquis) 5 mg  Self No No   Sig: Take 1 tablet (5 mg total) by mouth 2 (two) times a day   atorvastatin (LIPITOR) 10 mg tablet  Self Yes No   Sig: Take 10 mg by mouth daily   citalopram (CeleXA) 40 mg tablet  Self Yes No   Sig: Take 40 mg by mouth daily    clindamycin (CLEOCIN) 2 % vaginal cream  Self Yes No   clobetasol (TEMOVATE) 0.05 % ointment  Self Yes No   clotrimazole-betamethasone (LOTRISONE) 1-0.05 % cream  Self Yes No   desonide (DESOWEN) 0.05 % cream  Self Yes No   diphenoxylate-atropine (LOMOTIL) 2.5-0.025 mg per tablet  Self Yes No   Sig: Take 1 tablet by mouth 4 (four) times a day as needed   ketoconazole (NIZORAL) 2 % cream  Self Yes No   Sig: Apply 1 Application topically 2 (two) times a day as needed   levothyroxine 75 mcg tablet  Self Yes No   Sig: Take 75 mcg by mouth daily    magnesium oxide (MAG-OX) 400 mg tablet  Self Yes No   Sig: Take by mouth   metoprolol succinate (TOPROL-XL) 25 mg 24 hr tablet  Self No No   Sig: Take 1 tablet (25 mg total) by mouth daily   nystatin powder  Self Yes No   Sig: if needed   olmesartan (BENICAR) 40 mg tablet  Self No No   Sig: Take 1 tablet (40 mg total) by mouth daily   ondansetron (ZOFRAN) 4 mg tablet  Self Yes No   Sig: Take 4 mg by mouth every 8 (eight) hours as needed for nausea       Facility-Administered Medications: None     Patient's Medications   Discharge Prescriptions    DICYCLOMINE (BENTYL) 20 MG TABLET    Take 1 tablet (20 mg total) by mouth every 6 (six) hours as needed (abdominal crampin)       Start Date: 5/14/2025 End Date: --       Order Dose: 20 mg       Quantity: 40 tablet    Refills: 0    LOPERAMIDE (IMODIUM) 2 MG CAPSULE    Take 1 capsule (2 mg total) by mouth 4 (four) times a day as needed for diarrhea       Start Date: 5/14/2025 End Date: --       Order Dose: 2 mg       Quantity: 12 capsule    Refills: 0     No discharge procedures on file.  ED SEPSIS DOCUMENTATION   Time reflects when diagnosis was documented in both MDM as applicable and the Disposition within this note       Time User Action Codes Description Comment    5/14/2025  6:04 AM Eugene Gonzales [R19.7] Diarrhea, unspecified type     5/14/2025  6:58 AM Eugene Gonzales [E86.0] Dehydration     5/14/2025  7:05 AM Eugene Gonzales [R10.84] Generalized abdominal pain                      Eugene Gonzales MD  05/14/25 0605       [1]   Social History  Tobacco Use    Smoking status: Never    Smokeless tobacco: Never   Vaping Use    Vaping status: Never Used   Substance Use Topics    Alcohol use: Not Currently    Drug use: Never        Eugene Gonzales MD  05/14/25 0709

## 2025-05-29 ENCOUNTER — NURSE TRIAGE (OUTPATIENT)
Age: 70
End: 2025-05-29

## 2025-05-29 DIAGNOSIS — I10 ESSENTIAL HYPERTENSION: Primary | ICD-10-CM

## 2025-05-29 RX ORDER — HYDROCHLOROTHIAZIDE 12.5 MG/1
12.5 TABLET ORAL DAILY
Qty: 30 TABLET | Refills: 11 | Status: SHIPPED | OUTPATIENT
Start: 2025-05-29

## 2025-05-29 NOTE — TELEPHONE ENCOUNTER
We can try hydrochlorothiazide 12.5 mg daily for swelling and BP  I put an order for this to her pharmacy along with a repeat lab test to be done within 2 weeks to monitor her kidney function.  Thank you!

## 2025-05-29 NOTE — TELEPHONE ENCOUNTER
"REASON FOR CONVERSATION: Foot Swelling  Pt called to find out what she can do for continued bilateral lower extremity foot and ankle swelling up to the shin area. Pt reports the swelling is more frequent, but blood pressure has been better. Pt is requesting the small dose diuretic or other recommendations.     SYMPTOMS: bilateral lower ext swelling- increases as the day progresses.     OTHER HEALTH INFORMATION: Pt with hx of CKD IIIa, contusion on lower leg and PAF  Pt has been elevating legs at night and can start using compression now that a contusion on her leg is healing per orthopedics. Pt does report that she is drinking enough fluid daily.  Pt reports that blood pressures last week were 120's/80's but today was 145/80.     Pt was recently in ER with diarrhea, vomiting and thus dehydration. Most recent labs are from that hospital visit 5/14    Medications:  Olmesartan 40mg daily  Toprol 25mg daily daily  Amlodipine 2.5 mg daily     PROTOCOL DISPOSITION: No disposition on file.    CARE ADVICE PROVIDED: Pt is elevating legs, and is starting to use compression stockings today. Pt reports drinking enough fluid. Advised more ambulation.   Advised patient would inform provider of symptoms and concern and would call patient back with further recommendations     PRACTICE FOLLOW-UP: Please review and call patient back with recommendation.         Answer Assessment - Initial Assessment Questions  1. ONSET: \"When did the swelling start?\" (e.g., minutes, hours, days)      Continued foot and ankle swelling   2. LOCATION: \"What part of the leg is swollen?\"  \"Are both legs swollen or just one leg?\"      Bilateral lower legs.   3. SEVERITY: \"How bad is the swelling?\" (e.g., localized; mild, moderate, severe)      More puffy at night, up to over the ankle just below the to the shin   4. REDNESS: \"Does the swelling look red or infected?\"      Denies   5. PAIN: \"Is the swelling painful to touch?\" If Yes, ask: \"How painful is " "it?\"   (Scale 1-10; mild, moderate or severe)      Denies   6. FEVER: \"Do you have a fever?\" If Yes, ask: \"What is it, how was it measured, and when did it start?\"       Denies   7. CAUSE: \"What do you think is causing the leg swelling?\"      Unsure   8. MEDICAL HISTORY: \"Do you have a history of blood clots (e.g., DVT), cancer, heart failure, kidney disease, or liver failure?\"      CKD III and venous insufficiency, contusion, PAF  9. RECURRENT SYMPTOM: \"Have you had leg swelling before?\" If Yes, ask: \"When was the last time?\" \"What happened that time?\"      Recurrent   10. OTHER SYMPTOMS: \"Do you have any other symptoms?\" (e.g., chest pain, difficulty breathing)        Denies    Protocols used: Leg Swelling and Edema-Adult-[place]    "

## 2025-06-05 ENCOUNTER — APPOINTMENT (OUTPATIENT)
Dept: LAB | Facility: CLINIC | Age: 70
End: 2025-06-05
Payer: MEDICARE

## 2025-06-05 DIAGNOSIS — I10 ESSENTIAL HYPERTENSION: ICD-10-CM

## 2025-06-05 PROCEDURE — 80048 BASIC METABOLIC PNL TOTAL CA: CPT

## 2025-06-05 PROCEDURE — 36415 COLL VENOUS BLD VENIPUNCTURE: CPT

## 2025-06-06 ENCOUNTER — RESULTS FOLLOW-UP (OUTPATIENT)
Dept: NON INVASIVE DIAGNOSTICS | Facility: HOSPITAL | Age: 70
End: 2025-06-06

## 2025-06-06 LAB
ANION GAP SERPL CALCULATED.3IONS-SCNC: 13 MMOL/L (ref 4–13)
BUN SERPL-MCNC: 22 MG/DL (ref 5–25)
CALCIUM SERPL-MCNC: 9.6 MG/DL (ref 8.4–10.2)
CHLORIDE SERPL-SCNC: 102 MMOL/L (ref 96–108)
CO2 SERPL-SCNC: 25 MMOL/L (ref 21–32)
CREAT SERPL-MCNC: 1.08 MG/DL (ref 0.6–1.3)
GFR SERPL CREATININE-BSD FRML MDRD: 51 ML/MIN/1.73SQ M
GLUCOSE SERPL-MCNC: 102 MG/DL (ref 65–140)
POTASSIUM SERPL-SCNC: 4.4 MMOL/L (ref 3.5–5.3)
SODIUM SERPL-SCNC: 140 MMOL/L (ref 135–147)

## 2025-06-09 ENCOUNTER — RESULTS FOLLOW-UP (OUTPATIENT)
Dept: URGENT CARE | Facility: CLINIC | Age: 70
End: 2025-06-09

## 2025-06-09 ENCOUNTER — APPOINTMENT (OUTPATIENT)
Dept: RADIOLOGY | Facility: CLINIC | Age: 70
End: 2025-06-09
Payer: MEDICARE

## 2025-06-09 ENCOUNTER — OFFICE VISIT (OUTPATIENT)
Dept: URGENT CARE | Facility: CLINIC | Age: 70
End: 2025-06-09
Payer: MEDICARE

## 2025-06-09 VITALS
RESPIRATION RATE: 16 BRPM | BODY MASS INDEX: 44.26 KG/M2 | HEART RATE: 68 BPM | WEIGHT: 282 LBS | OXYGEN SATURATION: 97 % | HEIGHT: 67 IN | SYSTOLIC BLOOD PRESSURE: 126 MMHG | DIASTOLIC BLOOD PRESSURE: 80 MMHG | TEMPERATURE: 99 F

## 2025-06-09 DIAGNOSIS — J22 LOWER RESPIRATORY INFECTION (E.G., BRONCHITIS, PNEUMONIA, PNEUMONITIS, PULMONITIS): ICD-10-CM

## 2025-06-09 DIAGNOSIS — I48.0 PAROXYSMAL ATRIAL FIBRILLATION (HCC): ICD-10-CM

## 2025-06-09 DIAGNOSIS — J22 LOWER RESPIRATORY INFECTION (E.G., BRONCHITIS, PNEUMONIA, PNEUMONITIS, PULMONITIS): Primary | ICD-10-CM

## 2025-06-09 PROCEDURE — G0463 HOSPITAL OUTPT CLINIC VISIT: HCPCS

## 2025-06-09 PROCEDURE — 99214 OFFICE O/P EST MOD 30 MIN: CPT

## 2025-06-09 PROCEDURE — 71046 X-RAY EXAM CHEST 2 VIEWS: CPT

## 2025-06-09 RX ORDER — AZITHROMYCIN 250 MG/1
TABLET, FILM COATED ORAL
Qty: 6 TABLET | Refills: 0 | Status: SHIPPED | OUTPATIENT
Start: 2025-06-09 | End: 2025-06-13

## 2025-06-09 RX ORDER — ALBUTEROL SULFATE 90 UG/1
2 INHALANT RESPIRATORY (INHALATION) EVERY 6 HOURS PRN
Qty: 8.5 G | Refills: 0 | Status: SHIPPED | OUTPATIENT
Start: 2025-06-09

## 2025-06-09 RX ORDER — METOPROLOL SUCCINATE 25 MG/1
25 TABLET, EXTENDED RELEASE ORAL DAILY
Qty: 90 TABLET | Refills: 1 | Status: SHIPPED | OUTPATIENT
Start: 2025-06-09

## 2025-06-09 RX ORDER — METHYLPREDNISOLONE 4 MG/1
TABLET ORAL
Qty: 21 TABLET | Refills: 0 | Status: SHIPPED | OUTPATIENT
Start: 2025-06-09 | End: 2025-06-15

## 2025-06-09 NOTE — PROGRESS NOTES
"  [facility]        NAME: Ramona Cabral is a 70 y.o. female  : 1955    MRN: [id number]  DATE: 2025  TIME: 9:21 AM    Assessment and Plan   Lower respiratory infection (e.g., bronchitis, pneumonia, pneumonitis, pulmonitis) [J22]  1. Lower respiratory infection (e.g., bronchitis, pneumonia, pneumonitis, pulmonitis)  XR chest pa and lateral    azithromycin (ZITHROMAX) 250 mg tablet    methylPREDNISolone 4 MG tablet therapy pack    albuterol (ProAir HFA) 90 mcg/act inhaler        Xray initial interpretation: chest xray - no acute cardiopulmonary abnormality   Official radiology read pending - We will only notify you if there needs to be a change in your treatment plan.       Patient Instructions   Xray initial interpretation: chest xray - no acute cardiopulmonary abnormality   Official radiology read pending - We will only notify you if there needs to be a change in your treatment plan.     Take full course of Azithromycin as prescribed  Eat yogurt with live and active cultures and/or take a probiotic at least 3 hours before or after antibiotic dose. Monitor stool for diarrhea and/or blood. If this occurs, contact primary care doctor ASAP.   Take Medrol dose pack as prescribed - take in morning with food  Take Albuterol inhaler as needed for cough.     Take over the counter Mucinex during the day  Take over the counter cough suppressant at night  Fluids and rest (Warm water with honey and lemon)  Tylenol/Ibuprofen for pain fever    Follow up with PCP in 3-5 days.  Proceed to  ER if symptoms worsen.    If tests are performed, our office will contact you with results only if changes need to made to the care plan discussed with you at the visit. [facility]    Chief Complaint     Chief Complaint   Patient presents with    Wheezing     C/O intermittent \"wheezing\" since 2 days ago. No wheezing noted, upper airway noise noted.         History of Present " "Illness       70 year old female arrives reporting intermittent wheezing sensation ongoing for past 2 days.  Patient denies any fevers.  Patient reports the cough is typically dry and worse at night.  Patient reports it feels like a rattling sensation in her chest.  Patient denies any current shortness of breath or chest pain.  Patient denies any sick contacts at home.  Patient is currently not taking anything over-the-counter.     Wheezing  Associated symptoms include coughing and wheezing. Pertinent negatives include no sore throat.       Review of Systems   Review of Systems   Constitutional: Negative.  Negative for fever.   HENT:  Positive for congestion. Negative for ear pain, sinus pressure, sinus pain and sore throat.    Respiratory:  Positive for cough and wheezing. Negative for shortness of breath.    Cardiovascular: Negative.    Gastrointestinal: Negative.    Musculoskeletal: Negative.          Current Medications     Current Medications[1]    Current Allergies     Allergies as of 06/09/2025 - Reviewed 06/09/2025   Allergen Reaction Noted    Flagyl [metronidazole] Shortness Of Breath 11/29/2022    Contrast [iodinated contrast media] Rash 12/21/2018    Iodine - food allergy Rash 02/17/2018            The following portions of the patient's history were reviewed and updated as appropriate: allergies, current medications, past family history, past medical history, past social history, past surgical history and problem list.     Past Medical History[2]    Past Surgical History[3]    Family History[4]      Medications have been verified.        Objective   /80   Pulse 68   Temp 99 °F (37.2 °C)   Resp 16   Ht 5' 7\" (1.702 m)   Wt 128 kg (282 lb)   LMP  (LMP Unknown)   SpO2 97%   BMI 44.17 kg/m²        Physical Exam     Physical Exam  Vitals and nursing note reviewed.   Constitutional:       General: She is not in acute distress.     Appearance: Normal appearance. She is not ill-appearing, " toxic-appearing or diaphoretic.   HENT:      Head: Normocephalic.      Right Ear: Tympanic membrane, ear canal and external ear normal.      Left Ear: Tympanic membrane, ear canal and external ear normal.      Nose: Congestion present.      Mouth/Throat:      Mouth: Mucous membranes are moist.      Pharynx: No posterior oropharyngeal erythema.     Eyes:      Pupils: Pupils are equal, round, and reactive to light.       Cardiovascular:      Rate and Rhythm: Normal rate and regular rhythm.      Pulses: Normal pulses.      Heart sounds: Normal heart sounds.   Pulmonary:      Effort: Pulmonary effort is normal. No respiratory distress.      Breath sounds: Normal breath sounds. No stridor. No wheezing, rhonchi or rales.      Comments: Forced expiratory wheeze noted from upper airway.    Chest:      Chest wall: No tenderness.     Musculoskeletal:         General: Normal range of motion.      Cervical back: Normal range of motion and neck supple.   Lymphadenopathy:      Cervical: No cervical adenopathy.     Skin:     General: Skin is warm and dry.      Capillary Refill: Capillary refill takes less than 2 seconds.     Neurological:      General: No focal deficit present.      Mental Status: She is alert and oriented to person, place, and time.     Psychiatric:         Mood and Affect: Mood normal.         Behavior: Behavior normal.                        [1]   Current Outpatient Medications:     albuterol (ProAir HFA) 90 mcg/act inhaler, Inhale 2 puffs every 6 (six) hours as needed for wheezing, Disp: 8.5 g, Rfl: 0    ALPRAZolam (XANAX) 0.5 mg tablet, Take by mouth daily at bedtime as needed for anxiety, Disp: , Rfl:     amLODIPine (NORVASC) 2.5 mg tablet, Take 1 tablet (2.5 mg total) by mouth daily, Disp: 30 tablet, Rfl: 3    apixaban (Eliquis) 5 mg, Take 1 tablet (5 mg total) by mouth 2 (two) times a day, Disp: 180 tablet, Rfl: 3    atorvastatin (LIPITOR) 10 mg tablet, Take 10 mg by mouth in the morning., Disp: , Rfl:      azithromycin (ZITHROMAX) 250 mg tablet, Take 2 tablets today then 1 tablet daily x 4 days, Disp: 6 tablet, Rfl: 0    citalopram (CeleXA) 40 mg tablet, Take 40 mg by mouth in the morning., Disp: , Rfl:     clindamycin (CLEOCIN) 2 % vaginal cream, , Disp: , Rfl:     clobetasol (TEMOVATE) 0.05 % ointment, , Disp: , Rfl:     clotrimazole-betamethasone (LOTRISONE) 1-0.05 % cream, , Disp: , Rfl:     desonide (DESOWEN) 0.05 % cream, , Disp: , Rfl:     diphenoxylate-atropine (LOMOTIL) 2.5-0.025 mg per tablet, Take 1 tablet by mouth as needed in the morning and 1 tablet as needed at noon and 1 tablet as needed in the evening and 1 tablet as needed before bedtime., Disp: , Rfl:     hydroCHLOROthiazide 12.5 mg tablet, Take 1 tablet (12.5 mg total) by mouth daily, Disp: 30 tablet, Rfl: 11    ketoconazole (NIZORAL) 2 % cream, Apply 1 Application topically as needed in the morning and 1 Application as needed in the evening., Disp: , Rfl:     levothyroxine 75 mcg tablet, Take 75 mcg by mouth in the morning., Disp: , Rfl:     loperamide (IMODIUM) 2 mg capsule, Take 1 capsule (2 mg total) by mouth 4 (four) times a day as needed for diarrhea, Disp: 12 capsule, Rfl: 0    magnesium oxide (MAG-OX) 400 mg tablet, Take by mouth, Disp: , Rfl:     methylPREDNISolone 4 MG tablet therapy pack, Take 6 tablets (24 mg total) by mouth daily for 1 day, THEN 5 tablets (20 mg total) daily for 1 day, THEN 4 tablets (16 mg total) daily for 1 day, THEN 3 tablets (12 mg total) daily for 1 day, THEN 2 tablets (8 mg total) daily for 1 day, THEN 1 tablet (4 mg total) daily for 1 day. Use as directed on package., Disp: 21 tablet, Rfl: 0    metoprolol succinate (TOPROL-XL) 25 mg 24 hr tablet, TAKE 1 TABLET DAILY, Disp: 90 tablet, Rfl: 1    nystatin powder, if needed, Disp: , Rfl:     olmesartan (BENICAR) 40 mg tablet, Take 1 tablet (40 mg total) by mouth daily, Disp: 30 tablet, Rfl: 11    ondansetron (ZOFRAN) 4 mg tablet, Take 4 mg by mouth every 8 (eight)  hours as needed for nausea, Disp: , Rfl:     dicyclomine (BENTYL) 20 mg tablet, Take 1 tablet (20 mg total) by mouth every 6 (six) hours as needed (abdominal crampin) (Patient not taking: Reported on 2025), Disp: 40 tablet, Rfl: 0  [2]   Past Medical History:  Diagnosis Date    Anxiety     Atrial fibrillation (HCC)     Disease of thyroid gland     Elevated d-dimer     2 seperate episodes of unclear etiology.    Fatty liver Unknown    GERD (gastroesophageal reflux disease) Unknown    Hyperlipidemia     Hypertension     Hypothyroid     Obesity     Paroxysmal atrial fibrillation (HCC)     New onset 2020.   [3]   Past Surgical History:  Procedure Laterality Date    CATARACT EXTRACTION, BILATERAL      COLONOSCOPY      HYSTERECTOMY      KNEE ARTHROPLASTY      SHOULDER OPEN ROTATOR CUFF REPAIR      x 2    TONSILLECTOMY      UPPER GASTROINTESTINAL ENDOSCOPY     [4]   Family History  Problem Relation Name Age of Onset    Hypertension Sister Nolvia     Arthritis Sister Nolvia     Heart disease Sister Nolvia     Heart disease Paternal Aunt      Other Mother          Encephalitis    Other Father Dontae          in his 90s without significant problems    Arthritis Father Dontae     Hearing loss Father Dontae     Heart disease Father Dontae     Lung cancer Brother Nicolasa     Hypertension Brother Nicolasa     Cancer Brother Jayesh     Diabetes Maternal Aunt Estee     Diabetes Maternal Uncle Bola

## 2025-06-11 DIAGNOSIS — R60.0 EDEMA OF BOTH LOWER LEGS: Primary | ICD-10-CM

## 2025-06-24 ENCOUNTER — OFFICE VISIT (OUTPATIENT)
Dept: CARDIOLOGY CLINIC | Facility: CLINIC | Age: 70
End: 2025-06-24
Payer: MEDICARE

## 2025-06-24 VITALS
WEIGHT: 280 LBS | TEMPERATURE: 98 F | OXYGEN SATURATION: 96 % | HEART RATE: 58 BPM | DIASTOLIC BLOOD PRESSURE: 70 MMHG | HEIGHT: 67 IN | BODY MASS INDEX: 43.95 KG/M2 | SYSTOLIC BLOOD PRESSURE: 128 MMHG

## 2025-06-24 DIAGNOSIS — E78.2 MIXED HYPERLIPIDEMIA: ICD-10-CM

## 2025-06-24 DIAGNOSIS — I48.0 PAROXYSMAL ATRIAL FIBRILLATION (HCC): Primary | ICD-10-CM

## 2025-06-24 DIAGNOSIS — G47.33 OBSTRUCTIVE SLEEP APNEA SYNDROME: ICD-10-CM

## 2025-06-24 DIAGNOSIS — I10 ESSENTIAL HYPERTENSION: ICD-10-CM

## 2025-06-24 DIAGNOSIS — N18.31 STAGE 3A CHRONIC KIDNEY DISEASE (HCC): ICD-10-CM

## 2025-06-24 DIAGNOSIS — E66.813 CLASS 3 OBESITY: ICD-10-CM

## 2025-06-24 DIAGNOSIS — R60.0 LOCALIZED EDEMA: ICD-10-CM

## 2025-06-24 PROCEDURE — 99214 OFFICE O/P EST MOD 30 MIN: CPT | Performed by: NURSE PRACTITIONER

## 2025-06-24 RX ORDER — HYDROCHLOROTHIAZIDE 12.5 MG/1
12.5 TABLET ORAL DAILY
Qty: 90 TABLET | Refills: 3 | Status: SHIPPED | OUTPATIENT
Start: 2025-06-24

## 2025-06-24 RX ORDER — AMLODIPINE BESYLATE 2.5 MG/1
2.5 TABLET ORAL DAILY
Qty: 90 TABLET | Refills: 3 | Status: SHIPPED | OUTPATIENT
Start: 2025-06-24

## 2025-06-24 RX ORDER — OLMESARTAN MEDOXOMIL 40 MG/1
40 TABLET ORAL DAILY
Qty: 90 TABLET | Refills: 3 | Status: SHIPPED | OUTPATIENT
Start: 2025-06-24

## 2025-06-24 NOTE — PATIENT INSTRUCTIONS
Continue current regimen.  Keep up with the low sodium diet and activity as your knee allows  We will be in touch with the vascular testing results

## 2025-06-25 NOTE — ASSESSMENT & PLAN NOTE
Patient with new onset atrial fibrillation 11/25/2020.  Patient presented to the emergency room after onset of “fluttering” and rapid heart rate.  She converted spontaneously to  normal sinus rhythm.  She has remained in normal sinus rhythm since and feels well.  [id number] Score 3   Eliquis 5 mg twice daily for stroke prophylaxis.   Home sleep study notable for severe sleep apnea being treated with CPAP.   48 hour Holter monitor 6/8/2022 showed NSR with 5 short AT runs.   Echocardiogram 4/3/2023 with preserved LVEF and no significant valvular abnormality.  Breakthrough a fib episode on 2/1/24.  ZIO 2/2024 shows SR with avg HR 55 bpm and no recurrent a fib.  Changed metoprolol succinate to 12.5 mg BID. Reviewed importance of mag/potassium replacement. Symptoms well controlled with this change.

## 2025-06-25 NOTE — ASSESSMENT & PLAN NOTE
Lab Results   Component Value Date    EGFR 51 06/05/2025    EGFR 36 05/14/2025    EGFR 50 04/11/2025    CREATININE 1.08 06/05/2025    CREATININE 1.45 (H) 05/14/2025    CREATININE 1.11 04/11/2025     Improving.  Working on BP management

## 2025-06-25 NOTE — ASSESSMENT & PLAN NOTE
Home sleep study 12/2020 revealed severe sleep apnea  Compliant with CPAP therapy.  Following with [facility] sleep medicine.

## 2025-06-25 NOTE — ASSESSMENT & PLAN NOTE
Blood pressure is improved on current regimen.  Home BP cuff accuracy has been verified with excellent home readings.  Continue amlodipine 2.5 mg daily, olmesartan 40 mg daily, metoprolol succinate 25 mg daily, and HCTZ 12.5 mg daily.  Recent BMP reviewed

## 2025-06-25 NOTE — ASSESSMENT & PLAN NOTE
Body mass index is 43.85 kg/m².  Reviewed dietary and exercise modifications.  Reviewed importance of weight loss for symptom control.  She has met with weight management and declined prescription treatment.  I have offered referral to PT for treatment of deconditioning, which she will consider.

## 2025-07-10 DIAGNOSIS — I48.0 PAROXYSMAL ATRIAL FIBRILLATION (HCC): ICD-10-CM

## 2025-07-10 RX ORDER — METOPROLOL SUCCINATE 25 MG/1
25 TABLET, EXTENDED RELEASE ORAL DAILY
Qty: 14 TABLET | Refills: 0 | Status: SHIPPED | OUTPATIENT
Start: 2025-07-10

## 2025-07-10 NOTE — TELEPHONE ENCOUNTER
- Patient needs a short term supply as patient never received the script that was mailed out last month    Reason for call:   [x] Refill   [] Prior Auth  [] Other:     Office:   [] PCP/Provider -   [x] Specialty/Provider - Cardio     Medication:   - Metoprolol 25 mg- take 1 tablet by daily      Pharmacy: [facility]   Does the patient have enough for 3 days?   [] Yes   [x] No - Send as HP to POD    Mail Away Pharmacy   Does the patient have enough for 10 days?   [] Yes   [] No - Send as HP to POD

## 2025-07-22 ENCOUNTER — HOSPITAL ENCOUNTER (OUTPATIENT)
Dept: NON INVASIVE DIAGNOSTICS | Facility: HOSPITAL | Age: 70
Discharge: HOME | End: 2025-07-22
Attending: NURSE PRACTITIONER
Payer: MEDICARE

## 2025-07-22 DIAGNOSIS — R60.0 EDEMA OF BOTH LOWER LEGS: ICD-10-CM

## 2025-07-22 PROCEDURE — 93970 EXTREMITY STUDY: CPT

## 2025-07-22 PROCEDURE — 93970 EXTREMITY STUDY: CPT | Performed by: SURGERY
